# Patient Record
Sex: FEMALE | Race: WHITE | NOT HISPANIC OR LATINO | URBAN - METROPOLITAN AREA
[De-identification: names, ages, dates, MRNs, and addresses within clinical notes are randomized per-mention and may not be internally consistent; named-entity substitution may affect disease eponyms.]

---

## 2017-03-21 ENCOUNTER — INPATIENT (INPATIENT)
Facility: HOSPITAL | Age: 82
LOS: 16 days | Discharge: NURSING, REHAB OR LONG-TERM CARE | DRG: 291 | End: 2017-04-07
Attending: FAMILY MEDICINE | Admitting: INTERNAL MEDICINE
Payer: MEDICARE

## 2017-03-21 VITALS
WEIGHT: 89.95 LBS | HEART RATE: 140 BPM | SYSTOLIC BLOOD PRESSURE: 206 MMHG | RESPIRATION RATE: 34 BRPM | DIASTOLIC BLOOD PRESSURE: 140 MMHG

## 2017-03-21 DIAGNOSIS — T45.515A ADVERSE EFFECT OF ANTICOAGULANTS, INITIAL ENCOUNTER: ICD-10-CM

## 2017-03-21 DIAGNOSIS — R79.1 ABNORMAL COAGULATION PROFILE: ICD-10-CM

## 2017-03-21 DIAGNOSIS — J12.3 HUMAN METAPNEUMOVIRUS PNEUMONIA: ICD-10-CM

## 2017-03-21 DIAGNOSIS — J69.0 PNEUMONITIS DUE TO INHALATION OF FOOD AND VOMIT: ICD-10-CM

## 2017-03-21 DIAGNOSIS — B37.0 CANDIDAL STOMATITIS: ICD-10-CM

## 2017-03-22 DIAGNOSIS — I48.91 UNSPECIFIED ATRIAL FIBRILLATION: ICD-10-CM

## 2017-03-22 DIAGNOSIS — J81.0 ACUTE PULMONARY EDEMA: ICD-10-CM

## 2017-03-22 DIAGNOSIS — J96.91 RESPIRATORY FAILURE, UNSPECIFIED WITH HYPOXIA: ICD-10-CM

## 2017-03-22 DIAGNOSIS — A41.9 SEPSIS, UNSPECIFIED ORGANISM: ICD-10-CM

## 2017-03-22 DIAGNOSIS — E87.1 HYPO-OSMOLALITY AND HYPONATREMIA: ICD-10-CM

## 2017-03-22 DIAGNOSIS — J80 ACUTE RESPIRATORY DISTRESS SYNDROME: ICD-10-CM

## 2017-03-22 DIAGNOSIS — E78.5 HYPERLIPIDEMIA, UNSPECIFIED: ICD-10-CM

## 2017-03-22 DIAGNOSIS — I10 ESSENTIAL (PRIMARY) HYPERTENSION: ICD-10-CM

## 2017-03-22 DIAGNOSIS — Z41.8 ENCOUNTER FOR OTHER PROCEDURES FOR PURPOSES OTHER THAN REMEDYING HEALTH STATE: ICD-10-CM

## 2017-03-22 PROCEDURE — 71250 CT THORAX DX C-: CPT | Mod: 26

## 2017-03-22 PROCEDURE — 99223 1ST HOSP IP/OBS HIGH 75: CPT

## 2017-03-22 PROCEDURE — 99291 CRITICAL CARE FIRST HOUR: CPT

## 2017-03-22 PROCEDURE — 71010: CPT | Mod: 26

## 2017-03-22 PROCEDURE — 93010 ELECTROCARDIOGRAM REPORT: CPT

## 2017-03-22 PROCEDURE — 99223 1ST HOSP IP/OBS HIGH 75: CPT | Mod: AI,GC

## 2017-03-22 RX ORDER — SODIUM CHLORIDE 9 MG/ML
3 INJECTION INTRAMUSCULAR; INTRAVENOUS; SUBCUTANEOUS ONCE
Qty: 0 | Refills: 0 | Status: COMPLETED | OUTPATIENT
Start: 2017-03-22 | End: 2017-03-22

## 2017-03-22 RX ORDER — VANCOMYCIN HCL 1 G
1000 VIAL (EA) INTRAVENOUS EVERY 12 HOURS
Qty: 0 | Refills: 0 | Status: DISCONTINUED | OUTPATIENT
Start: 2017-03-22 | End: 2017-03-22

## 2017-03-22 RX ORDER — CEFTRIAXONE 500 MG/1
1 INJECTION, POWDER, FOR SOLUTION INTRAMUSCULAR; INTRAVENOUS EVERY 24 HOURS
Qty: 0 | Refills: 0 | Status: DISCONTINUED | OUTPATIENT
Start: 2017-03-23 | End: 2017-03-22

## 2017-03-22 RX ORDER — IPRATROPIUM/ALBUTEROL SULFATE 18-103MCG
3 AEROSOL WITH ADAPTER (GRAM) INHALATION EVERY 6 HOURS
Qty: 0 | Refills: 0 | Status: DISCONTINUED | OUTPATIENT
Start: 2017-03-22 | End: 2017-04-07

## 2017-03-22 RX ORDER — DILTIAZEM HCL 120 MG
5 CAPSULE, EXT RELEASE 24 HR ORAL
Qty: 125 | Refills: 0 | Status: DISCONTINUED | OUTPATIENT
Start: 2017-03-22 | End: 2017-03-22

## 2017-03-22 RX ORDER — ENOXAPARIN SODIUM 100 MG/ML
40 INJECTION SUBCUTANEOUS DAILY
Qty: 0 | Refills: 0 | Status: DISCONTINUED | OUTPATIENT
Start: 2017-03-22 | End: 2017-03-22

## 2017-03-22 RX ORDER — WARFARIN SODIUM 2.5 MG/1
2.5 TABLET ORAL ONCE
Qty: 0 | Refills: 0 | Status: COMPLETED | OUTPATIENT
Start: 2017-03-22 | End: 2017-03-22

## 2017-03-22 RX ORDER — CEFTRIAXONE 500 MG/1
1 INJECTION, POWDER, FOR SOLUTION INTRAMUSCULAR; INTRAVENOUS ONCE
Qty: 0 | Refills: 0 | Status: COMPLETED | OUTPATIENT
Start: 2017-03-22 | End: 2017-03-22

## 2017-03-22 RX ORDER — OLANZAPINE 15 MG/1
5 TABLET, FILM COATED ORAL ONCE
Qty: 0 | Refills: 0 | Status: COMPLETED | OUTPATIENT
Start: 2017-03-22 | End: 2017-03-22

## 2017-03-22 RX ORDER — NITROGLYCERIN 6.5 MG
50 CAPSULE, EXTENDED RELEASE ORAL
Qty: 50 | Refills: 0 | Status: DISCONTINUED | OUTPATIENT
Start: 2017-03-22 | End: 2017-03-22

## 2017-03-22 RX ORDER — FUROSEMIDE 40 MG
40 TABLET ORAL EVERY 12 HOURS
Qty: 0 | Refills: 0 | Status: DISCONTINUED | OUTPATIENT
Start: 2017-03-22 | End: 2017-03-24

## 2017-03-22 RX ORDER — FUROSEMIDE 40 MG
40 TABLET ORAL ONCE
Qty: 0 | Refills: 0 | Status: COMPLETED | OUTPATIENT
Start: 2017-03-22 | End: 2017-03-22

## 2017-03-22 RX ORDER — SODIUM CHLORIDE 9 MG/ML
1000 INJECTION INTRAMUSCULAR; INTRAVENOUS; SUBCUTANEOUS ONCE
Qty: 0 | Refills: 0 | Status: COMPLETED | OUTPATIENT
Start: 2017-03-22 | End: 2017-03-22

## 2017-03-22 RX ORDER — ACETAMINOPHEN 500 MG
650 TABLET ORAL EVERY 6 HOURS
Qty: 0 | Refills: 0 | Status: DISCONTINUED | OUTPATIENT
Start: 2017-03-22 | End: 2017-04-07

## 2017-03-22 RX ORDER — DILTIAZEM HCL 120 MG
10 CAPSULE, EXT RELEASE 24 HR ORAL
Qty: 125 | Refills: 0 | Status: DISCONTINUED | OUTPATIENT
Start: 2017-03-22 | End: 2017-03-24

## 2017-03-22 RX ORDER — VANCOMYCIN HCL 1 G
750 VIAL (EA) INTRAVENOUS EVERY 12 HOURS
Qty: 0 | Refills: 0 | Status: DISCONTINUED | OUTPATIENT
Start: 2017-03-22 | End: 2017-03-22

## 2017-03-22 RX ORDER — PANTOPRAZOLE SODIUM 20 MG/1
40 TABLET, DELAYED RELEASE ORAL DAILY
Qty: 0 | Refills: 0 | Status: DISCONTINUED | OUTPATIENT
Start: 2017-03-22 | End: 2017-03-27

## 2017-03-22 RX ADMIN — Medication 3 MILLILITER(S): at 08:15

## 2017-03-22 RX ADMIN — OLANZAPINE 5 MILLIGRAM(S): 15 TABLET, FILM COATED ORAL at 20:04

## 2017-03-22 RX ADMIN — Medication 20 MILLIGRAM(S): at 14:54

## 2017-03-22 RX ADMIN — Medication 40 MILLIGRAM(S): at 00:45

## 2017-03-22 RX ADMIN — Medication 5 MG/HR: at 00:51

## 2017-03-22 RX ADMIN — Medication 3 MILLILITER(S): at 19:24

## 2017-03-22 RX ADMIN — Medication 650 MILLIGRAM(S): at 22:34

## 2017-03-22 RX ADMIN — Medication 3 MILLILITER(S): at 13:51

## 2017-03-22 RX ADMIN — Medication 150 MILLIGRAM(S): at 04:08

## 2017-03-22 RX ADMIN — CEFTRIAXONE 100 GRAM(S): 500 INJECTION, POWDER, FOR SOLUTION INTRAMUSCULAR; INTRAVENOUS at 01:49

## 2017-03-22 RX ADMIN — Medication 40 MILLIGRAM(S): at 07:39

## 2017-03-22 RX ADMIN — SODIUM CHLORIDE 3 MILLILITER(S): 9 INJECTION INTRAMUSCULAR; INTRAVENOUS; SUBCUTANEOUS at 00:28

## 2017-03-22 RX ADMIN — WARFARIN SODIUM 2.5 MILLIGRAM(S): 2.5 TABLET ORAL at 22:34

## 2017-03-22 RX ADMIN — Medication 20 MILLIGRAM(S): at 22:33

## 2017-03-22 RX ADMIN — Medication 125 MILLIGRAM(S): at 00:27

## 2017-03-22 RX ADMIN — SODIUM CHLORIDE 1000 MILLILITER(S): 9 INJECTION INTRAMUSCULAR; INTRAVENOUS; SUBCUTANEOUS at 00:27

## 2017-03-22 RX ADMIN — Medication 40 MILLIGRAM(S): at 18:15

## 2017-03-22 RX ADMIN — ENOXAPARIN SODIUM 40 MILLIGRAM(S): 100 INJECTION SUBCUTANEOUS at 12:09

## 2017-03-22 RX ADMIN — PANTOPRAZOLE SODIUM 40 MILLIGRAM(S): 20 TABLET, DELAYED RELEASE ORAL at 12:08

## 2017-03-22 RX ADMIN — Medication 10 MG/HR: at 12:44

## 2017-03-22 RX ADMIN — Medication 15 MICROGRAM(S)/MIN: at 00:23

## 2017-03-22 NOTE — ED ADULT NURSE NOTE - PLAN OF CARE
Explanation of exam/test/Fall precautions/Side rails/Call bell/NPO/Position of comfort/Bedside visitors

## 2017-03-22 NOTE — H&P ADULT. - ASSESSMENT
87 y.o. F with PMHx of A fib on coumadin, MDD, HLD, GERD, dysphagia, dementia, prior hx of DVT/PE?, HTN, urinary retention, and constipation brought in by EMS from [facility], admitted with acute hypoxic respiratory failure 2/2 pulmonary edema 2/2 HMPV virus, UTI, hypertensive urgency, and afib with RVR.

## 2017-03-22 NOTE — H&P ADULT. - PROBLEM SELECTOR PLAN 3
-likely 2/2 respiratory distress  -rates improved on cardizem drip  -coumadin for goal INR 2-3   -f/u echo

## 2017-03-22 NOTE — ED PROVIDER NOTE - MEDICAL DECISION MAKING DETAILS
screening septic work up, BNP, procalcitonin, chest x-ray, TSH, INR, saline lock, Nitro gtt, Cardizem gtt, BIPAP, Lasix,

## 2017-03-22 NOTE — H&P ADULT. - PROBLEM SELECTOR PLAN 2
-elevated pro-bnp with vascular congestion on CXR  -continue with diuresis   -strict Is and Os, fluid restriction  -f/u echo   -trops negative, continue to trend   -consider beta blocker or ACE

## 2017-03-22 NOTE — ED PROVIDER NOTE - CARE PLAN
Principal Discharge DX:	Respiratory distress  Instructions for follow-up, activity and diet:	admit  Secondary Diagnosis:	Pulmonary edema, acute  Secondary Diagnosis:	Urinary tract infection

## 2017-03-22 NOTE — ED ADULT NURSE NOTE - PMH
Afib    Arthritis    Atrial fibrillation    DVT (deep venous thrombosis)    DVT (deep venous thrombosis)    Femur neck fracture    GERD (gastroesophageal reflux disease)    HTN (hypertension)    Hypertension    Hyponatremia    Memory impairment    Neurogenic bladder    Pulmonary embolism    Pulmonary embolism    Shortness of breath  etiology probably copd  Urinary retention    UTI (lower urinary tract infection)    Vertebral fracture, pathological

## 2017-03-22 NOTE — H&P ADULT. - PROBLEM SELECTOR PLAN 4
-(+) UA, (+) HMPV on RVP   -rocephin for UTI  -add vanco for HCAP coverage in setting of HMPV  -follow-up blood and urine cx -(+) UA, (+) HMPV on RVP   -rocephin for UTI  -add vanco for HCAP coverage in setting of HMPV  -consider coverage for aspiration PNA  -follow-up blood and urine cx

## 2017-03-22 NOTE — H&P ADULT. - ATTENDING COMMENTS
87 y.o. F with PMHx stated above being admitted with acute hypoxic respiratory failure 2/2 pulmonary edema 2/2 HMPV virus, UTI, hypertensive urgency, and afib with RVR.   Pt will be managed initially in the ICU.  Hospitalist will follow.

## 2017-03-22 NOTE — H&P ADULT. - PROBLEM SELECTOR PLAN 1
-acute hypoxemic respiratory failure 2/2 pulmonary edema, ?triggered by HMPV virus  -continue with BIPAP, ABG in AM -acute hypoxemic respiratory failure 2/2 pulmonary edema, ?triggered by HMPV virus  -continue with BIPAP, ABG in AM  -continue with solumedrol and duonebs

## 2017-03-22 NOTE — ED PROVIDER NOTE - OBJECTIVE STATEMENT
Pt presents to the ED in acute respiratory distress with systolic BPs 220 and higher.  Per EMS they received a call for respiratory distress.  Pt had received 1 breathing treatment prior to their arrival and they gave an additional treatment.  Unable to obtain access prior to arrival.  Per son's report at bedside pt had not been feeling well over the weekend when he came to visit.  She had been coughing and he noted some nasal congestion.   Tonight he came to see her after dinner and she appeared to be in acute distress.  He requested transfer to the ED. Per staff reports a NH pt has a previous history of pneumonia.  On arrival pt hypoixc, in a-fib RVR with BPs above 200s.  Pt noted to be anxious

## 2017-03-22 NOTE — ED ADULT NURSE REASSESSMENT NOTE - NS ED NURSE REASSESS COMMENT FT1
patient appears much improved, overall color improved, no longer with labored breathing. On bipap mask with good air flow and improvement in O2 sat. Cardizem gtt increased to 10 mg/hr as per Dr Bello.
patient has been much better, breathing unlabored and HR still in a-fib but rate improving to 90's, low 100's. Re-eval by ICU ELPIDIO Romo, patient may be downgraded. Bipap still in place.
BIPAP D/C'D. Placed on 100% NR. Miracle NR well, maintaining Spo2 >99%. Respirations nonlabored and eupneic. Lactate rising, increased from 1.6 2.6. Critical value discussed and reported to Dr Solis.

## 2017-03-22 NOTE — H&P ADULT. - HISTORY OF PRESENT ILLNESS
87 y.o. F with PMHx of A fib on coumadin, MDD, HLD, GERD, dysphagia, dementia, prior hx of DVT/PE?, HTN, urinary retention, and constipation brought in by EMS from [facility] for respiratory distress. History obtained from charts and son over phone as pt is lethargic and demented. As per son, he was called by facility and told that his mother was having some increased work of breathing which was being treated with duonebs. Pt went to visit Mom and asked for pt to be brought to hospital as she appeared to have labored breathing and was not acting like herself. Son is unsure if pt has been complaining of cough, SOB, chest pain, fevers, n/v/d/c. Upon chart review, pt was getting albuterol nebs around the clock for cough and was recently treated with levaquin on March 3rd.     In ED, labs significant for pro-bnp 3595 and (+) for HMPV on RVP  CXR: b/l vascular congestion   UA: (+) nitrites and LE with >50 WBC, TNTC bacteria   EKG: afib with RVR  Vitals significant for HR initially 180 now in 110s on cardizem drip, BP initially 206/140 now 124/70 after nitro drip which now d/c, tachypneic 87 y.o. F with PMHx of A fib on coumadin, MDD, HLD, GERD, dysphagia, dementia, prior hx of DVT/PE?, HTN, urinary retention, and constipation brought in by EMS from [facility] for respiratory distress. History obtained from charts and son over phone as pt is lethargic and demented. As per son, he was called by facility and told that his mother was having some increased work of breathing which was being treated with duonebs. Pt went to visit Mom and asked for pt to be brought to hospital as she appeared to have labored breathing and was not acting like herself. Son is unsure if pt has been complaining of cough, SOB, chest pain, fevers, n/v/d/c. Upon chart review, pt was getting albuterol nebs around the clock for cough and was recently treated with levaquin on March 3rd.     In ED, labs significant for pro-bnp 3595 and (+) for HMPV on RVP  CXR: b/l vascular congestion   UA: (+) nitrites and LE with >50 WBC, TNTC bacteria   EKG: afib with RVR  Vitals significant for HR initially 180 now in 110s on cardizem drip, BP initially 206/140 now 124/70 after nitro drip which now d/c, tachypneic     ICU was consulted, candidate for ICU.

## 2017-03-22 NOTE — ED ADULT NURSE NOTE - OBJECTIVE STATEMENT
patient arrives with resp distress, labored, orthopnea, low O2 sat, tachycardia to 180's (a-fib), placed on bipap on arrival as per MD

## 2017-03-22 NOTE — ED PROVIDER NOTE - PROGRESS NOTE DETAILS
pt presented to the ED in acute respiratory distress likely secondary to flash pulmonary edema in light of diffuse rhonchi and BPs with systolic greater then 220.  She had received 2 treatments prior to arrival.  Access was obtained, pt was placed on BIPAP, Solumedrol was given and pt was started on a Nitro gtt. Pt noted to be in A-fib RVR Pt with improvement in BP on Nitro gtt with titrate down to 25 mcg.  Pt stable on BIPAP with improved SPO2.  Son at bedside.  Pt remains in A-fib RVR Nitro gtt was stopped and pt was given Cardizem push at 10 mg along with Lasix 20 mg IVP.  X-ray reviewed.  Son at bedside updated on progress all questions answered Pt had min improvement in HR after Cardizem push, gtt ordered will begin at 5 ml/hr and titrate as needed pt remains stable on BIPAP at this time.  Son updated at bedside will leave to go home at this time all questions answered Results of urine noted and Rocephin 1 gm ordered at this time.  Pt HR has improved but still in rapid ventricular rate gtt increased to 10 mg/hr.  Will continue to monitor Pt continues to remain in A-fib with rapid ventricular rate gtt increased to 15 mg/hr. Pt with min. urine output after Lasix 20 mg with given another 20 mg as pt continues to have rhonchi bilaterally pt remains stable from a respiratory standpoint at this time.  On 15 ml/hr of Cardizem HR improving but will still trend into the 150s for periods of time.  ICU contacted will accept pt at this time. pt remains stable from a respiratory standpoint at this time.  On 15 ml/hr of Cardizem HR improving but will still trend into the 150s for periods of time.  ICU contacted will accept pt at this time. ABG still not drawn at this time and RVP remains pending

## 2017-03-22 NOTE — ED PROVIDER NOTE - CONDUCTED A DETAILED DISCUSSION WITH PATIENT AND/OR GUARDIAN REGARDING, MDM
lab results/radiology results/need to admit/return to ED if symptoms worsen, persist or questions arise

## 2017-03-23 LAB
ANION GAP SERPL CALC-SCNC: 10 MMOL/L — SIGNIFICANT CHANGE UP (ref 5–17)
BUN SERPL-MCNC: 32 MG/DL — HIGH (ref 7–23)
CALCIUM SERPL-MCNC: 8.7 MG/DL — SIGNIFICANT CHANGE UP (ref 8.5–10.1)
CHLORIDE SERPL-SCNC: 97 MMOL/L — SIGNIFICANT CHANGE UP (ref 96–108)
CO2 SERPL-SCNC: 31 MMOL/L — SIGNIFICANT CHANGE UP (ref 22–31)
CREAT SERPL-MCNC: 1 MG/DL — SIGNIFICANT CHANGE UP (ref 0.5–1.3)
GLUCOSE SERPL-MCNC: 202 MG/DL — HIGH (ref 70–99)
INR BLD: 3.88 RATIO — HIGH (ref 0.88–1.16)
POTASSIUM SERPL-MCNC: 3.7 MMOL/L — SIGNIFICANT CHANGE UP (ref 3.5–5.3)
POTASSIUM SERPL-SCNC: 3.7 MMOL/L — SIGNIFICANT CHANGE UP (ref 3.5–5.3)
PROTHROM AB SERPL-ACNC: 43.5 SEC — HIGH (ref 9.8–12.7)
SODIUM SERPL-SCNC: 138 MMOL/L — SIGNIFICANT CHANGE UP (ref 135–145)

## 2017-03-23 PROCEDURE — 99233 SBSQ HOSP IP/OBS HIGH 50: CPT | Mod: GC

## 2017-03-23 PROCEDURE — 99233 SBSQ HOSP IP/OBS HIGH 50: CPT

## 2017-03-23 RX ORDER — CEFTRIAXONE 500 MG/1
1 INJECTION, POWDER, FOR SOLUTION INTRAMUSCULAR; INTRAVENOUS ONCE
Qty: 0 | Refills: 0 | Status: COMPLETED | OUTPATIENT
Start: 2017-03-23 | End: 2017-03-23

## 2017-03-23 RX ORDER — CEFTRIAXONE 500 MG/1
1 INJECTION, POWDER, FOR SOLUTION INTRAMUSCULAR; INTRAVENOUS EVERY 24 HOURS
Qty: 0 | Refills: 0 | Status: DISCONTINUED | OUTPATIENT
Start: 2017-03-24 | End: 2017-03-26

## 2017-03-23 RX ORDER — POTASSIUM CHLORIDE 20 MEQ
20 PACKET (EA) ORAL
Qty: 0 | Refills: 0 | Status: COMPLETED | OUTPATIENT
Start: 2017-03-23 | End: 2017-03-23

## 2017-03-23 RX ORDER — CEFTRIAXONE 500 MG/1
INJECTION, POWDER, FOR SOLUTION INTRAMUSCULAR; INTRAVENOUS
Qty: 0 | Refills: 0 | Status: DISCONTINUED | OUTPATIENT
Start: 2017-03-23 | End: 2017-03-26

## 2017-03-23 RX ADMIN — Medication 20 MILLIGRAM(S): at 14:02

## 2017-03-23 RX ADMIN — Medication 40 MILLIGRAM(S): at 17:33

## 2017-03-23 RX ADMIN — Medication 3 MILLILITER(S): at 19:26

## 2017-03-23 RX ADMIN — Medication 3 MILLILITER(S): at 13:19

## 2017-03-23 RX ADMIN — CEFTRIAXONE 100 GRAM(S): 500 INJECTION, POWDER, FOR SOLUTION INTRAMUSCULAR; INTRAVENOUS at 17:33

## 2017-03-23 RX ADMIN — Medication 20 MILLIGRAM(S): at 22:09

## 2017-03-23 RX ADMIN — Medication 3 MILLILITER(S): at 00:59

## 2017-03-23 RX ADMIN — Medication 20 MILLIEQUIVALENT(S): at 11:41

## 2017-03-23 RX ADMIN — Medication 40 MILLIGRAM(S): at 05:59

## 2017-03-23 RX ADMIN — Medication 10 MG/HR: at 12:57

## 2017-03-23 RX ADMIN — Medication 20 MILLIGRAM(S): at 05:59

## 2017-03-23 RX ADMIN — PANTOPRAZOLE SODIUM 40 MILLIGRAM(S): 20 TABLET, DELAYED RELEASE ORAL at 11:40

## 2017-03-23 RX ADMIN — Medication 20 MILLIEQUIVALENT(S): at 10:01

## 2017-03-23 RX ADMIN — Medication 3 MILLILITER(S): at 08:09

## 2017-03-23 RX ADMIN — Medication 20 MILLIEQUIVALENT(S): at 08:54

## 2017-03-24 LAB
ALBUMIN SERPL ELPH-MCNC: 3.1 G/DL — LOW (ref 3.3–5)
ALP SERPL-CCNC: 60 U/L — SIGNIFICANT CHANGE UP (ref 40–120)
ALT FLD-CCNC: 14 U/L — SIGNIFICANT CHANGE UP (ref 12–78)
ANION GAP SERPL CALC-SCNC: 10 MMOL/L — SIGNIFICANT CHANGE UP (ref 5–17)
AST SERPL-CCNC: 18 U/L — SIGNIFICANT CHANGE UP (ref 15–37)
BILIRUB SERPL-MCNC: 0.2 MG/DL — SIGNIFICANT CHANGE UP (ref 0.2–1.2)
BUN SERPL-MCNC: 39 MG/DL — HIGH (ref 7–23)
CALCIUM SERPL-MCNC: 8.5 MG/DL — SIGNIFICANT CHANGE UP (ref 8.5–10.1)
CHLORIDE SERPL-SCNC: 95 MMOL/L — LOW (ref 96–108)
CO2 SERPL-SCNC: 32 MMOL/L — HIGH (ref 22–31)
CREAT SERPL-MCNC: 0.98 MG/DL — SIGNIFICANT CHANGE UP (ref 0.5–1.3)
GLUCOSE SERPL-MCNC: 189 MG/DL — HIGH (ref 70–99)
HCT VFR BLD CALC: 37.6 % — SIGNIFICANT CHANGE UP (ref 34.5–45)
HGB BLD-MCNC: 12 G/DL — SIGNIFICANT CHANGE UP (ref 11.5–15.5)
INR BLD: 5.36 RATIO — CRITICAL HIGH (ref 0.88–1.16)
MAGNESIUM SERPL-MCNC: 2.2 MG/DL — SIGNIFICANT CHANGE UP (ref 1.8–2.4)
MCHC RBC-ENTMCNC: 26 PG — LOW (ref 27–34)
MCHC RBC-ENTMCNC: 32 GM/DL — SIGNIFICANT CHANGE UP (ref 32–36)
MCV RBC AUTO: 81.2 FL — SIGNIFICANT CHANGE UP (ref 80–100)
PHOSPHATE SERPL-MCNC: 3 MG/DL — SIGNIFICANT CHANGE UP (ref 2.5–4.5)
PLATELET # BLD AUTO: 219 K/UL — SIGNIFICANT CHANGE UP (ref 150–400)
POTASSIUM SERPL-MCNC: 3.9 MMOL/L — SIGNIFICANT CHANGE UP (ref 3.5–5.3)
POTASSIUM SERPL-SCNC: 3.9 MMOL/L — SIGNIFICANT CHANGE UP (ref 3.5–5.3)
PROT SERPL-MCNC: 6.6 G/DL — SIGNIFICANT CHANGE UP (ref 6–8.3)
PROTHROM AB SERPL-ACNC: 60.5 SEC — HIGH (ref 9.8–12.7)
RBC # BLD: 4.63 M/UL — SIGNIFICANT CHANGE UP (ref 3.8–5.2)
RBC # FLD: 15.4 % — HIGH (ref 10.3–14.5)
SODIUM SERPL-SCNC: 137 MMOL/L — SIGNIFICANT CHANGE UP (ref 135–145)
WBC # BLD: 10.8 K/UL — HIGH (ref 3.8–10.5)
WBC # FLD AUTO: 10.8 K/UL — HIGH (ref 3.8–10.5)

## 2017-03-24 PROCEDURE — 99233 SBSQ HOSP IP/OBS HIGH 50: CPT | Mod: GC

## 2017-03-24 PROCEDURE — 99233 SBSQ HOSP IP/OBS HIGH 50: CPT

## 2017-03-24 RX ADMIN — Medication 3 MILLILITER(S): at 01:43

## 2017-03-24 RX ADMIN — Medication 40 MILLIGRAM(S): at 05:39

## 2017-03-24 RX ADMIN — Medication 3 MILLILITER(S): at 08:54

## 2017-03-24 RX ADMIN — Medication 10 MG/HR: at 00:25

## 2017-03-24 RX ADMIN — CEFTRIAXONE 100 GRAM(S): 500 INJECTION, POWDER, FOR SOLUTION INTRAMUSCULAR; INTRAVENOUS at 13:54

## 2017-03-24 RX ADMIN — Medication 20 MILLIGRAM(S): at 13:54

## 2017-03-24 RX ADMIN — Medication 20 MILLIGRAM(S): at 21:35

## 2017-03-24 RX ADMIN — Medication 3 MILLILITER(S): at 13:51

## 2017-03-24 RX ADMIN — Medication 20 MILLIGRAM(S): at 05:39

## 2017-03-24 RX ADMIN — Medication 3 MILLILITER(S): at 20:37

## 2017-03-24 RX ADMIN — PANTOPRAZOLE SODIUM 40 MILLIGRAM(S): 20 TABLET, DELAYED RELEASE ORAL at 11:47

## 2017-03-24 NOTE — DIETITIAN INITIAL EVALUATION ADULT. - PROBLEM SELECTOR PLAN 4
-(+) UA, (+) HMPV on RVP   -rocephin for UTI  -add vanco for HCAP coverage in setting of HMPV  -consider coverage for aspiration PNA  -follow-up blood and urine cx

## 2017-03-24 NOTE — DIETITIAN INITIAL EVALUATION ADULT. - PROBLEM SELECTOR PLAN 1
-acute hypoxemic respiratory failure 2/2 pulmonary edema, ?triggered by HMPV virus  -continue with BIPAP, ABG in AM  -continue with solumedrol and duonebs

## 2017-03-24 NOTE — SWALLOW BEDSIDE ASSESSMENT ADULT - COMMENTS
Pt seen for clinical swallow eval to r/o aspiration, ? Aspiration PNA. Pt with mild oral dysphagia due to poor dentition, h/e pt's son is reported to be bringing dentures later today. Pt with no overt s/s aspiration with any consistency trialed.

## 2017-03-24 NOTE — GOALS OF CARE CONVERSATION - PERSONAL ADVANCE DIRECTIVE - CONVERSATION DETAILS
message left for Louis phone to further discuss pt directives. await return call.
l/m for son on 3/22 and 3/23, spoke to son on phone today, reviewed pt medical and molst, son agrees w pt decision for cpr, want full code,all measures, spoke of pt decrease in appetite, pt is a picky eater and no dentures in hospital, pt ate a regular yogurt and banana for son last evening. pt not eating here and then son spoke to dietician regarding preferences. son feels once her infection is under control pt will be back to her prior status and be able to eat. son does not want feeding tube.

## 2017-03-24 NOTE — SWALLOW BEDSIDE ASSESSMENT ADULT - SWALLOW EVAL: RECOMMENDED FEEDING/EATING TECHNIQUES
crush medication (when feasible)/allow for swallow between intakes/maintain upright posture during/after eating for 30 mins

## 2017-03-25 LAB
ANION GAP SERPL CALC-SCNC: 12 MMOL/L — SIGNIFICANT CHANGE UP (ref 5–17)
BUN SERPL-MCNC: 42 MG/DL — HIGH (ref 7–23)
CALCIUM SERPL-MCNC: 8.8 MG/DL — SIGNIFICANT CHANGE UP (ref 8.5–10.1)
CHLORIDE SERPL-SCNC: 94 MMOL/L — LOW (ref 96–108)
CO2 SERPL-SCNC: 30 MMOL/L — SIGNIFICANT CHANGE UP (ref 22–31)
CREAT SERPL-MCNC: 0.96 MG/DL — SIGNIFICANT CHANGE UP (ref 0.5–1.3)
GLUCOSE SERPL-MCNC: 167 MG/DL — HIGH (ref 70–99)
HCT VFR BLD CALC: 37.5 % — SIGNIFICANT CHANGE UP (ref 34.5–45)
HGB BLD-MCNC: 12 G/DL — SIGNIFICANT CHANGE UP (ref 11.5–15.5)
INR BLD: 5.42 RATIO — CRITICAL HIGH (ref 0.88–1.16)
MCHC RBC-ENTMCNC: 26 PG — LOW (ref 27–34)
MCHC RBC-ENTMCNC: 32.1 GM/DL — SIGNIFICANT CHANGE UP (ref 32–36)
MCV RBC AUTO: 80.9 FL — SIGNIFICANT CHANGE UP (ref 80–100)
PLATELET # BLD AUTO: 225 K/UL — SIGNIFICANT CHANGE UP (ref 150–400)
POTASSIUM SERPL-MCNC: 4.1 MMOL/L — SIGNIFICANT CHANGE UP (ref 3.5–5.3)
POTASSIUM SERPL-SCNC: 4.1 MMOL/L — SIGNIFICANT CHANGE UP (ref 3.5–5.3)
PROTHROM AB SERPL-ACNC: 61.1 SEC — HIGH (ref 9.8–12.7)
RBC # BLD: 4.63 M/UL — SIGNIFICANT CHANGE UP (ref 3.8–5.2)
RBC # FLD: 15.2 % — HIGH (ref 10.3–14.5)
SODIUM SERPL-SCNC: 136 MMOL/L — SIGNIFICANT CHANGE UP (ref 135–145)
WBC # BLD: 12.8 K/UL — HIGH (ref 3.8–10.5)
WBC # FLD AUTO: 12.8 K/UL — HIGH (ref 3.8–10.5)

## 2017-03-25 PROCEDURE — 93306 TTE W/DOPPLER COMPLETE: CPT | Mod: 26

## 2017-03-25 PROCEDURE — 99233 SBSQ HOSP IP/OBS HIGH 50: CPT | Mod: 25

## 2017-03-25 PROCEDURE — 99233 SBSQ HOSP IP/OBS HIGH 50: CPT

## 2017-03-25 RX ADMIN — Medication 3 MILLILITER(S): at 13:13

## 2017-03-25 RX ADMIN — Medication 20 MILLIGRAM(S): at 05:30

## 2017-03-25 RX ADMIN — Medication 3 MILLILITER(S): at 08:45

## 2017-03-25 RX ADMIN — PANTOPRAZOLE SODIUM 40 MILLIGRAM(S): 20 TABLET, DELAYED RELEASE ORAL at 12:51

## 2017-03-25 RX ADMIN — Medication 3 MILLILITER(S): at 20:19

## 2017-03-25 RX ADMIN — Medication 20 MILLIGRAM(S): at 17:05

## 2017-03-25 RX ADMIN — CEFTRIAXONE 100 GRAM(S): 500 INJECTION, POWDER, FOR SOLUTION INTRAMUSCULAR; INTRAVENOUS at 14:12

## 2017-03-26 LAB
ANION GAP SERPL CALC-SCNC: 11 MMOL/L — SIGNIFICANT CHANGE UP (ref 5–17)
APTT BLD: 32.6 SEC — SIGNIFICANT CHANGE UP (ref 27.5–37.4)
BUN SERPL-MCNC: 42 MG/DL — HIGH (ref 7–23)
CALCIUM SERPL-MCNC: 8.8 MG/DL — SIGNIFICANT CHANGE UP (ref 8.5–10.1)
CHLORIDE SERPL-SCNC: 94 MMOL/L — LOW (ref 96–108)
CK MB BLD-MCNC: 2.9 % — SIGNIFICANT CHANGE UP (ref 0–3.5)
CK MB BLD-MCNC: 3.6 % — HIGH (ref 0–3.5)
CK MB BLD-MCNC: 4 % — HIGH (ref 0–3.5)
CK MB CFR SERPL CALC: 1.1 NG/ML — SIGNIFICANT CHANGE UP (ref 0–3.6)
CK MB CFR SERPL CALC: 1.2 NG/ML — SIGNIFICANT CHANGE UP (ref 0–3.6)
CK MB CFR SERPL CALC: 1.4 NG/ML — SIGNIFICANT CHANGE UP (ref 0–3.6)
CK SERPL-CCNC: 30 U/L — SIGNIFICANT CHANGE UP (ref 26–192)
CK SERPL-CCNC: 38 U/L — SIGNIFICANT CHANGE UP (ref 26–192)
CK SERPL-CCNC: 39 U/L — SIGNIFICANT CHANGE UP (ref 26–192)
CO2 SERPL-SCNC: 31 MMOL/L — SIGNIFICANT CHANGE UP (ref 22–31)
CREAT SERPL-MCNC: 0.87 MG/DL — SIGNIFICANT CHANGE UP (ref 0.5–1.3)
GLUCOSE SERPL-MCNC: 145 MG/DL — HIGH (ref 70–99)
HCT VFR BLD CALC: 36.8 % — SIGNIFICANT CHANGE UP (ref 34.5–45)
HGB BLD-MCNC: 12.1 G/DL — SIGNIFICANT CHANGE UP (ref 11.5–15.5)
INR BLD: 3.81 RATIO — HIGH (ref 0.88–1.16)
MAGNESIUM SERPL-MCNC: 2.2 MG/DL — SIGNIFICANT CHANGE UP (ref 1.8–2.4)
MCHC RBC-ENTMCNC: 26 PG — LOW (ref 27–34)
MCHC RBC-ENTMCNC: 32.9 GM/DL — SIGNIFICANT CHANGE UP (ref 32–36)
MCV RBC AUTO: 79 FL — LOW (ref 80–100)
PLATELET # BLD AUTO: 240 K/UL — SIGNIFICANT CHANGE UP (ref 150–400)
POTASSIUM SERPL-MCNC: 4.7 MMOL/L — SIGNIFICANT CHANGE UP (ref 3.5–5.3)
POTASSIUM SERPL-SCNC: 4.7 MMOL/L — SIGNIFICANT CHANGE UP (ref 3.5–5.3)
PROTHROM AB SERPL-ACNC: 42.7 SEC — HIGH (ref 9.8–12.7)
RBC # BLD: 4.66 M/UL — SIGNIFICANT CHANGE UP (ref 3.8–5.2)
RBC # FLD: 15 % — HIGH (ref 10.3–14.5)
SODIUM SERPL-SCNC: 136 MMOL/L — SIGNIFICANT CHANGE UP (ref 135–145)
TROPONIN I SERPL-MCNC: 0.03 NG/ML — SIGNIFICANT CHANGE UP (ref 0.01–0.04)
TROPONIN I SERPL-MCNC: 0.03 NG/ML — SIGNIFICANT CHANGE UP (ref 0.01–0.04)
TROPONIN I SERPL-MCNC: 0.04 NG/ML — SIGNIFICANT CHANGE UP (ref 0.01–0.04)
WBC # BLD: 16.3 K/UL — HIGH (ref 3.8–10.5)
WBC # FLD AUTO: 16.3 K/UL — HIGH (ref 3.8–10.5)

## 2017-03-26 PROCEDURE — 99233 SBSQ HOSP IP/OBS HIGH 50: CPT

## 2017-03-26 PROCEDURE — 93010 ELECTROCARDIOGRAM REPORT: CPT

## 2017-03-26 RX ORDER — PIPERACILLIN AND TAZOBACTAM 4; .5 G/20ML; G/20ML
3.38 INJECTION, POWDER, LYOPHILIZED, FOR SOLUTION INTRAVENOUS ONCE
Qty: 0 | Refills: 0 | Status: COMPLETED | OUTPATIENT
Start: 2017-03-26 | End: 2017-03-26

## 2017-03-26 RX ORDER — NITROGLYCERIN 6.5 MG
0.4 CAPSULE, EXTENDED RELEASE ORAL ONCE
Qty: 0 | Refills: 0 | Status: DISCONTINUED | OUTPATIENT
Start: 2017-03-26 | End: 2017-03-26

## 2017-03-26 RX ORDER — LACTOBACILLUS ACIDOPHILUS 100MM CELL
1 CAPSULE ORAL
Qty: 0 | Refills: 0 | Status: DISCONTINUED | OUTPATIENT
Start: 2017-03-26 | End: 2017-04-07

## 2017-03-26 RX ORDER — METOPROLOL TARTRATE 50 MG
5 TABLET ORAL ONCE
Qty: 0 | Refills: 0 | Status: COMPLETED | OUTPATIENT
Start: 2017-03-26 | End: 2017-03-26

## 2017-03-26 RX ORDER — PIPERACILLIN AND TAZOBACTAM 4; .5 G/20ML; G/20ML
3.38 INJECTION, POWDER, LYOPHILIZED, FOR SOLUTION INTRAVENOUS EVERY 12 HOURS
Qty: 0 | Refills: 0 | Status: DISCONTINUED | OUTPATIENT
Start: 2017-03-26 | End: 2017-03-27

## 2017-03-26 RX ADMIN — Medication 1 TABLET(S): at 13:02

## 2017-03-26 RX ADMIN — Medication 1 TABLET(S): at 17:14

## 2017-03-26 RX ADMIN — Medication 3 MILLILITER(S): at 19:29

## 2017-03-26 RX ADMIN — Medication 3 MILLILITER(S): at 14:10

## 2017-03-26 RX ADMIN — Medication 5 MILLIGRAM(S): at 02:37

## 2017-03-26 RX ADMIN — PIPERACILLIN AND TAZOBACTAM 200 GRAM(S): 4; .5 INJECTION, POWDER, LYOPHILIZED, FOR SOLUTION INTRAVENOUS at 13:02

## 2017-03-26 RX ADMIN — Medication 3 MILLILITER(S): at 09:17

## 2017-03-26 RX ADMIN — PANTOPRAZOLE SODIUM 40 MILLIGRAM(S): 20 TABLET, DELAYED RELEASE ORAL at 11:06

## 2017-03-26 RX ADMIN — Medication 20 MILLIGRAM(S): at 05:54

## 2017-03-26 RX ADMIN — PIPERACILLIN AND TAZOBACTAM 25 GRAM(S): 4; .5 INJECTION, POWDER, LYOPHILIZED, FOR SOLUTION INTRAVENOUS at 17:14

## 2017-03-27 LAB
ANION GAP SERPL CALC-SCNC: 5 MMOL/L — SIGNIFICANT CHANGE UP (ref 5–17)
BUN SERPL-MCNC: 39 MG/DL — HIGH (ref 7–23)
CALCIUM SERPL-MCNC: 8.5 MG/DL — SIGNIFICANT CHANGE UP (ref 8.5–10.1)
CHLORIDE SERPL-SCNC: 97 MMOL/L — SIGNIFICANT CHANGE UP (ref 96–108)
CO2 SERPL-SCNC: 32 MMOL/L — HIGH (ref 22–31)
CREAT SERPL-MCNC: 0.82 MG/DL — SIGNIFICANT CHANGE UP (ref 0.5–1.3)
GLUCOSE SERPL-MCNC: 128 MG/DL — HIGH (ref 70–99)
HCT VFR BLD CALC: 36.8 % — SIGNIFICANT CHANGE UP (ref 34.5–45)
HGB BLD-MCNC: 11.8 G/DL — SIGNIFICANT CHANGE UP (ref 11.5–15.5)
INR BLD: 3.22 RATIO — HIGH (ref 0.88–1.16)
LYMPHOCYTES # BLD AUTO: 13 % — SIGNIFICANT CHANGE UP (ref 13–44)
MCHC RBC-ENTMCNC: 26 PG — LOW (ref 27–34)
MCHC RBC-ENTMCNC: 32.2 GM/DL — SIGNIFICANT CHANGE UP (ref 32–36)
MCV RBC AUTO: 80.7 FL — SIGNIFICANT CHANGE UP (ref 80–100)
MONOCYTES NFR BLD AUTO: 3 % — SIGNIFICANT CHANGE UP (ref 1–9)
NEUTROPHILS NFR BLD AUTO: 81 % — HIGH (ref 43–77)
PLATELET # BLD AUTO: 257 K/UL — SIGNIFICANT CHANGE UP (ref 150–400)
POTASSIUM SERPL-MCNC: 4.7 MMOL/L — SIGNIFICANT CHANGE UP (ref 3.5–5.3)
POTASSIUM SERPL-SCNC: 4.7 MMOL/L — SIGNIFICANT CHANGE UP (ref 3.5–5.3)
PROTHROM AB SERPL-ACNC: 36 SEC — HIGH (ref 9.8–12.7)
RBC # BLD: 4.55 M/UL — SIGNIFICANT CHANGE UP (ref 3.8–5.2)
RBC # FLD: 14.8 % — HIGH (ref 10.3–14.5)
SODIUM SERPL-SCNC: 134 MMOL/L — LOW (ref 135–145)
WBC # BLD: 16.3 K/UL — HIGH (ref 3.8–10.5)
WBC # FLD AUTO: 16.3 K/UL — HIGH (ref 3.8–10.5)

## 2017-03-27 PROCEDURE — 71010: CPT | Mod: 26

## 2017-03-27 PROCEDURE — 99233 SBSQ HOSP IP/OBS HIGH 50: CPT | Mod: GC

## 2017-03-27 PROCEDURE — 99233 SBSQ HOSP IP/OBS HIGH 50: CPT

## 2017-03-27 RX ORDER — PIPERACILLIN AND TAZOBACTAM 4; .5 G/20ML; G/20ML
3.38 INJECTION, POWDER, LYOPHILIZED, FOR SOLUTION INTRAVENOUS EVERY 8 HOURS
Qty: 0 | Refills: 0 | Status: DISCONTINUED | OUTPATIENT
Start: 2017-03-27 | End: 2017-03-27

## 2017-03-27 RX ORDER — METOPROLOL TARTRATE 50 MG
25 TABLET ORAL DAILY
Qty: 0 | Refills: 0 | Status: DISCONTINUED | OUTPATIENT
Start: 2017-03-27 | End: 2017-03-31

## 2017-03-27 RX ORDER — PANTOPRAZOLE SODIUM 20 MG/1
40 TABLET, DELAYED RELEASE ORAL
Qty: 0 | Refills: 0 | Status: DISCONTINUED | OUTPATIENT
Start: 2017-03-27 | End: 2017-04-07

## 2017-03-27 RX ORDER — FUROSEMIDE 40 MG
40 TABLET ORAL ONCE
Qty: 0 | Refills: 0 | Status: COMPLETED | OUTPATIENT
Start: 2017-03-27 | End: 2017-03-27

## 2017-03-27 RX ORDER — ERTAPENEM SODIUM 1 G/1
500 INJECTION, POWDER, LYOPHILIZED, FOR SOLUTION INTRAMUSCULAR; INTRAVENOUS EVERY 24 HOURS
Qty: 0 | Refills: 0 | Status: DISCONTINUED | OUTPATIENT
Start: 2017-03-27 | End: 2017-03-29

## 2017-03-27 RX ADMIN — PIPERACILLIN AND TAZOBACTAM 25 GRAM(S): 4; .5 INJECTION, POWDER, LYOPHILIZED, FOR SOLUTION INTRAVENOUS at 06:54

## 2017-03-27 RX ADMIN — Medication 3 MILLILITER(S): at 08:07

## 2017-03-27 RX ADMIN — Medication 40 MILLIGRAM(S): at 05:21

## 2017-03-27 RX ADMIN — Medication 1 TABLET(S): at 17:25

## 2017-03-27 RX ADMIN — Medication 3 MILLILITER(S): at 19:59

## 2017-03-27 RX ADMIN — Medication 1 TABLET(S): at 11:39

## 2017-03-27 RX ADMIN — ERTAPENEM SODIUM 110 MILLIGRAM(S): 1 INJECTION, POWDER, LYOPHILIZED, FOR SOLUTION INTRAMUSCULAR; INTRAVENOUS at 17:25

## 2017-03-27 RX ADMIN — Medication 1 TABLET(S): at 08:43

## 2017-03-27 RX ADMIN — Medication 40 MILLIGRAM(S): at 12:45

## 2017-03-27 RX ADMIN — Medication 3 MILLILITER(S): at 13:38

## 2017-03-27 RX ADMIN — PIPERACILLIN AND TAZOBACTAM 25 GRAM(S): 4; .5 INJECTION, POWDER, LYOPHILIZED, FOR SOLUTION INTRAVENOUS at 13:12

## 2017-03-27 RX ADMIN — Medication 25 MILLIGRAM(S): at 08:44

## 2017-03-28 LAB
ANION GAP SERPL CALC-SCNC: 7 MMOL/L — SIGNIFICANT CHANGE UP (ref 5–17)
BUN SERPL-MCNC: 39 MG/DL — HIGH (ref 7–23)
CALCIUM SERPL-MCNC: 8.6 MG/DL — SIGNIFICANT CHANGE UP (ref 8.5–10.1)
CHLORIDE SERPL-SCNC: 92 MMOL/L — LOW (ref 96–108)
CO2 SERPL-SCNC: 35 MMOL/L — HIGH (ref 22–31)
CREAT SERPL-MCNC: 0.96 MG/DL — SIGNIFICANT CHANGE UP (ref 0.5–1.3)
GLUCOSE SERPL-MCNC: 96 MG/DL — SIGNIFICANT CHANGE UP (ref 70–99)
HCT VFR BLD CALC: 39.9 % — SIGNIFICANT CHANGE UP (ref 34.5–45)
HGB BLD-MCNC: 12.9 G/DL — SIGNIFICANT CHANGE UP (ref 11.5–15.5)
INR BLD: 2.05 RATIO — HIGH (ref 0.88–1.16)
MCHC RBC-ENTMCNC: 26.1 PG — LOW (ref 27–34)
MCHC RBC-ENTMCNC: 32.4 GM/DL — SIGNIFICANT CHANGE UP (ref 32–36)
MCV RBC AUTO: 80.8 FL — SIGNIFICANT CHANGE UP (ref 80–100)
PLATELET # BLD AUTO: 289 K/UL — SIGNIFICANT CHANGE UP (ref 150–400)
POTASSIUM SERPL-MCNC: 3.8 MMOL/L — SIGNIFICANT CHANGE UP (ref 3.5–5.3)
POTASSIUM SERPL-SCNC: 3.8 MMOL/L — SIGNIFICANT CHANGE UP (ref 3.5–5.3)
PROTHROM AB SERPL-ACNC: 22.7 SEC — HIGH (ref 9.8–12.7)
RBC # BLD: 4.95 M/UL — SIGNIFICANT CHANGE UP (ref 3.8–5.2)
RBC # FLD: 15.4 % — HIGH (ref 10.3–14.5)
SODIUM SERPL-SCNC: 134 MMOL/L — LOW (ref 135–145)
WBC # BLD: 18.1 K/UL — HIGH (ref 3.8–10.5)
WBC # FLD AUTO: 18.1 K/UL — HIGH (ref 3.8–10.5)

## 2017-03-28 PROCEDURE — 99233 SBSQ HOSP IP/OBS HIGH 50: CPT | Mod: GC

## 2017-03-28 PROCEDURE — 99233 SBSQ HOSP IP/OBS HIGH 50: CPT

## 2017-03-28 PROCEDURE — 71250 CT THORAX DX C-: CPT | Mod: 26

## 2017-03-28 RX ORDER — TAMSULOSIN HYDROCHLORIDE 0.4 MG/1
0.4 CAPSULE ORAL AT BEDTIME
Qty: 0 | Refills: 0 | Status: DISCONTINUED | OUTPATIENT
Start: 2017-03-28 | End: 2017-04-07

## 2017-03-28 RX ORDER — WARFARIN SODIUM 2.5 MG/1
0.5 TABLET ORAL ONCE
Qty: 0 | Refills: 0 | Status: COMPLETED | OUTPATIENT
Start: 2017-03-28 | End: 2017-03-28

## 2017-03-28 RX ADMIN — Medication 1 TABLET(S): at 08:32

## 2017-03-28 RX ADMIN — Medication 25 MILLIGRAM(S): at 05:35

## 2017-03-28 RX ADMIN — Medication 3 MILLILITER(S): at 19:55

## 2017-03-28 RX ADMIN — PANTOPRAZOLE SODIUM 40 MILLIGRAM(S): 20 TABLET, DELAYED RELEASE ORAL at 05:35

## 2017-03-28 RX ADMIN — Medication 3 MILLILITER(S): at 07:54

## 2017-03-28 RX ADMIN — WARFARIN SODIUM 0.5 MILLIGRAM(S): 2.5 TABLET ORAL at 21:21

## 2017-03-28 RX ADMIN — Medication 40 MILLIGRAM(S): at 05:34

## 2017-03-28 RX ADMIN — Medication 1 TABLET(S): at 11:37

## 2017-03-28 RX ADMIN — Medication 3 MILLILITER(S): at 14:33

## 2017-03-28 RX ADMIN — TAMSULOSIN HYDROCHLORIDE 0.4 MILLIGRAM(S): 0.4 CAPSULE ORAL at 21:21

## 2017-03-28 RX ADMIN — ERTAPENEM SODIUM 110 MILLIGRAM(S): 1 INJECTION, POWDER, LYOPHILIZED, FOR SOLUTION INTRAMUSCULAR; INTRAVENOUS at 17:20

## 2017-03-28 RX ADMIN — Medication 1 TABLET(S): at 17:32

## 2017-03-29 DIAGNOSIS — I48.0 PAROXYSMAL ATRIAL FIBRILLATION: ICD-10-CM

## 2017-03-29 DIAGNOSIS — I50.9 HEART FAILURE, UNSPECIFIED: ICD-10-CM

## 2017-03-29 DIAGNOSIS — R33.9 RETENTION OF URINE, UNSPECIFIED: ICD-10-CM

## 2017-03-29 DIAGNOSIS — E43 UNSPECIFIED SEVERE PROTEIN-CALORIE MALNUTRITION: ICD-10-CM

## 2017-03-29 DIAGNOSIS — Z29.9 ENCOUNTER FOR PROPHYLACTIC MEASURES, UNSPECIFIED: ICD-10-CM

## 2017-03-29 DIAGNOSIS — I50.23 ACUTE ON CHRONIC SYSTOLIC (CONGESTIVE) HEART FAILURE: ICD-10-CM

## 2017-03-29 DIAGNOSIS — M19.90 UNSPECIFIED OSTEOARTHRITIS, UNSPECIFIED SITE: ICD-10-CM

## 2017-03-29 DIAGNOSIS — J81.0 ACUTE PULMONARY EDEMA: ICD-10-CM

## 2017-03-29 DIAGNOSIS — N39.0 URINARY TRACT INFECTION, SITE NOT SPECIFIED: ICD-10-CM

## 2017-03-29 DIAGNOSIS — E87.6 HYPOKALEMIA: ICD-10-CM

## 2017-03-29 DIAGNOSIS — I10 ESSENTIAL (PRIMARY) HYPERTENSION: ICD-10-CM

## 2017-03-29 DIAGNOSIS — N31.9 NEUROMUSCULAR DYSFUNCTION OF BLADDER, UNSPECIFIED: ICD-10-CM

## 2017-03-29 DIAGNOSIS — M25.551 PAIN IN RIGHT HIP: ICD-10-CM

## 2017-03-29 DIAGNOSIS — I82.409 ACUTE EMBOLISM AND THROMBOSIS OF UNSPECIFIED DEEP VEINS OF UNSPECIFIED LOWER EXTREMITY: ICD-10-CM

## 2017-03-29 DIAGNOSIS — E87.1 HYPO-OSMOLALITY AND HYPONATREMIA: ICD-10-CM

## 2017-03-29 DIAGNOSIS — R41.3 OTHER AMNESIA: ICD-10-CM

## 2017-03-29 DIAGNOSIS — J12.9 VIRAL PNEUMONIA, UNSPECIFIED: ICD-10-CM

## 2017-03-29 DIAGNOSIS — F03.90 UNSPECIFIED DEMENTIA, UNSPECIFIED SEVERITY, WITHOUT BEHAVIORAL DISTURBANCE, PSYCHOTIC DISTURBANCE, MOOD DISTURBANCE, AND ANXIETY: ICD-10-CM

## 2017-03-29 DIAGNOSIS — J96.00 ACUTE RESPIRATORY FAILURE, UNSPECIFIED WHETHER WITH HYPOXIA OR HYPERCAPNIA: ICD-10-CM

## 2017-03-29 DIAGNOSIS — I48.91 UNSPECIFIED ATRIAL FIBRILLATION: ICD-10-CM

## 2017-03-29 LAB
ANION GAP SERPL CALC-SCNC: 8 MMOL/L — SIGNIFICANT CHANGE UP (ref 5–17)
BUN SERPL-MCNC: 42 MG/DL — HIGH (ref 7–23)
CALCIUM SERPL-MCNC: 8.5 MG/DL — SIGNIFICANT CHANGE UP (ref 8.5–10.1)
CHLORIDE SERPL-SCNC: 95 MMOL/L — LOW (ref 96–108)
CO2 SERPL-SCNC: 34 MMOL/L — HIGH (ref 22–31)
CREAT SERPL-MCNC: 0.75 MG/DL — SIGNIFICANT CHANGE UP (ref 0.5–1.3)
GLUCOSE SERPL-MCNC: 97 MG/DL — SIGNIFICANT CHANGE UP (ref 70–99)
HCT VFR BLD CALC: 37.5 % — SIGNIFICANT CHANGE UP (ref 34.5–45)
HGB BLD-MCNC: 11.9 G/DL — SIGNIFICANT CHANGE UP (ref 11.5–15.5)
INR BLD: 1.45 RATIO — HIGH (ref 0.88–1.16)
MAGNESIUM SERPL-MCNC: 2.4 MG/DL — SIGNIFICANT CHANGE UP (ref 1.8–2.4)
MCHC RBC-ENTMCNC: 25.7 PG — LOW (ref 27–34)
MCHC RBC-ENTMCNC: 31.8 GM/DL — LOW (ref 32–36)
MCV RBC AUTO: 80.8 FL — SIGNIFICANT CHANGE UP (ref 80–100)
PHOSPHATE SERPL-MCNC: 2.9 MG/DL — SIGNIFICANT CHANGE UP (ref 2.5–4.5)
PLATELET # BLD AUTO: 273 K/UL — SIGNIFICANT CHANGE UP (ref 150–400)
POTASSIUM SERPL-MCNC: 4.1 MMOL/L — SIGNIFICANT CHANGE UP (ref 3.5–5.3)
POTASSIUM SERPL-SCNC: 4.1 MMOL/L — SIGNIFICANT CHANGE UP (ref 3.5–5.3)
PROTHROM AB SERPL-ACNC: 15.9 SEC — HIGH (ref 9.8–12.7)
RBC # BLD: 4.64 M/UL — SIGNIFICANT CHANGE UP (ref 3.8–5.2)
RBC # FLD: 15.5 % — HIGH (ref 10.3–14.5)
SODIUM SERPL-SCNC: 137 MMOL/L — SIGNIFICANT CHANGE UP (ref 135–145)
WBC # BLD: 19.7 K/UL — HIGH (ref 3.8–10.5)
WBC # FLD AUTO: 19.7 K/UL — HIGH (ref 3.8–10.5)

## 2017-03-29 PROCEDURE — 99233 SBSQ HOSP IP/OBS HIGH 50: CPT | Mod: GC

## 2017-03-29 PROCEDURE — 73501 X-RAY EXAM HIP UNI 1 VIEW: CPT | Mod: 26,RT

## 2017-03-29 PROCEDURE — 99233 SBSQ HOSP IP/OBS HIGH 50: CPT

## 2017-03-29 RX ORDER — ERTAPENEM SODIUM 1 G/1
1000 INJECTION, POWDER, LYOPHILIZED, FOR SOLUTION INTRAMUSCULAR; INTRAVENOUS EVERY 24 HOURS
Qty: 0 | Refills: 0 | Status: DISCONTINUED | OUTPATIENT
Start: 2017-03-29 | End: 2017-03-29

## 2017-03-29 RX ORDER — ENOXAPARIN SODIUM 100 MG/ML
75 INJECTION SUBCUTANEOUS ONCE
Qty: 0 | Refills: 0 | Status: COMPLETED | OUTPATIENT
Start: 2017-03-29 | End: 2017-03-29

## 2017-03-29 RX ORDER — ERTAPENEM SODIUM 1 G/1
500 INJECTION, POWDER, LYOPHILIZED, FOR SOLUTION INTRAMUSCULAR; INTRAVENOUS EVERY 24 HOURS
Qty: 0 | Refills: 0 | Status: COMPLETED | OUTPATIENT
Start: 2017-03-29 | End: 2017-04-03

## 2017-03-29 RX ORDER — WARFARIN SODIUM 2.5 MG/1
1 TABLET ORAL ONCE
Qty: 0 | Refills: 0 | Status: COMPLETED | OUTPATIENT
Start: 2017-03-29 | End: 2017-03-29

## 2017-03-29 RX ORDER — METOPROLOL TARTRATE 50 MG
5 TABLET ORAL ONCE
Qty: 0 | Refills: 0 | Status: COMPLETED | OUTPATIENT
Start: 2017-03-29 | End: 2017-03-29

## 2017-03-29 RX ADMIN — TAMSULOSIN HYDROCHLORIDE 0.4 MILLIGRAM(S): 0.4 CAPSULE ORAL at 21:18

## 2017-03-29 RX ADMIN — Medication 3 MILLILITER(S): at 07:41

## 2017-03-29 RX ADMIN — Medication 3 MILLILITER(S): at 14:16

## 2017-03-29 RX ADMIN — Medication 1 TABLET(S): at 08:21

## 2017-03-29 RX ADMIN — Medication 3 MILLILITER(S): at 19:59

## 2017-03-29 RX ADMIN — Medication 650 MILLIGRAM(S): at 10:35

## 2017-03-29 RX ADMIN — ENOXAPARIN SODIUM 75 MILLIGRAM(S): 100 INJECTION SUBCUTANEOUS at 11:45

## 2017-03-29 RX ADMIN — Medication 650 MILLIGRAM(S): at 17:22

## 2017-03-29 RX ADMIN — PANTOPRAZOLE SODIUM 40 MILLIGRAM(S): 20 TABLET, DELAYED RELEASE ORAL at 06:01

## 2017-03-29 RX ADMIN — WARFARIN SODIUM 1 MILLIGRAM(S): 2.5 TABLET ORAL at 21:18

## 2017-03-29 RX ADMIN — Medication 40 MILLIGRAM(S): at 06:01

## 2017-03-29 RX ADMIN — Medication 650 MILLIGRAM(S): at 09:59

## 2017-03-29 RX ADMIN — ERTAPENEM SODIUM 110 MILLIGRAM(S): 1 INJECTION, POWDER, LYOPHILIZED, FOR SOLUTION INTRAMUSCULAR; INTRAVENOUS at 16:16

## 2017-03-29 RX ADMIN — Medication 1 TABLET(S): at 11:44

## 2017-03-29 RX ADMIN — Medication 25 MILLIGRAM(S): at 06:01

## 2017-03-29 RX ADMIN — Medication 650 MILLIGRAM(S): at 18:28

## 2017-03-29 RX ADMIN — Medication 5 MILLIGRAM(S): at 02:35

## 2017-03-29 RX ADMIN — Medication 1 TABLET(S): at 17:22

## 2017-03-29 NOTE — PROGRESS NOTE ADULT - ASSESSMENT
87 y.o. F with PMHx of A fib on coumadin, MDD, HLD, GERD, dysphagia, dementia, prior hx of DVT/PE?, HTN, urinary retention, and constipation brought in by EMS from [facility], admitted with acute hypoxic respiratory failure 2/2 pulmonary edema 2/2 HMPV virus, UTI(E.coli) , hypertensive urgency, and afib with RVR.

## 2017-03-29 NOTE — PROGRESS NOTE ADULT - ASSESSMENT
87 y.o. F with PMHx of A fib on coumadin, MDD, HLD, GERD, dysphagia, dementia, prior hx of DVT/PE?, HTN, urinary retention, and constipation brought in by EMS from [facility], admitted with acute hypoxic respiratory failure sec pulmonary edema with HMPV virus infection bronchitis/pna, UTI(E.coli) , and afib with RVR. Clinically stable.

## 2017-03-29 NOTE — PROGRESS NOTE ADULT - SUBJECTIVE AND OBJECTIVE BOX
CC:SOB    INTERVAL HPI/OVERNIGHT EVENTS: Pt seen and examed at bedside with . Per night team, Pt was afib with RVR at 130s -150s. Afib was controlled by 1 dose of lopressor    MEDICATIONS  (STANDING):  ALBUTerol/ipratropium for Nebulization 3milliLiter(s) Nebulizer every 6 hours  diltiazem    Tablet 60milliGRAM(s) Oral four times a day  predniSONE   Tablet 40milliGRAM(s) Oral daily  lactobacillus acidophilus 1Tablet(s) Oral three times a day with meals  metoprolol succinate ER 25milliGRAM(s) Oral daily  pantoprazole    Tablet 40milliGRAM(s) Oral before breakfast  ertapenem  IVPB 500milliGRAM(s) IV Intermittent every 24 hours  tamsulosin 0.4milliGRAM(s) Oral at bedtime    MEDICATIONS  (PRN):  acetaminophen   Tablet. 650milliGRAM(s) Oral every 6 hours PRN Mild Pain (1 - 3)      Allergies    No Known Allergies    Intolerances        CONSTITUTIONAL: No weakness, fevers or chills  EYES/ENT: No visual changes;  No vertigo or throat pain   NECK: No pain or stiffness  RESPIRATORY: No cough, wheezing, hemoptysis; No shortness of breath  CARDIOVASCULAR: No chest pain or palpitations  GASTROINTESTINAL: No abdominal or epigastric pain. No nausea, vomiting, or hematemesis; No diarrhea or constipation. No melena or hematochezia.  GENITOURINARY: No dysuria, frequency or hematuria  NEUROLOGICAL: No numbness or weakness  SKIN: No itching, burning, rashes, or lesions   All other review of systems is negative unless indicated above.    Vital Signs Last 24 Hrs  T(C): 36.3, Max: 36.6 (03-28 @ 16:36)  T(F): 97.3, Max: 97.8 (03-28 @ 16:36)  HR: 83 (79 - 105)  BP: 99/68 (99/68 - 120/73)  BP(mean): --  RR: 19 (19 - 21)  SpO2: 94% (93% - 97%)    I & Os for current day (as of 03-29 @ 09:00)  =============================================  IN: 520 ml / OUT: 1200 ml / NET: -680 ml      General: WN/WD NAD  Neurology: A&Ox3, nonfocal, AGUILAR x 4  Respiratory: CTA B/L  CV: RRR, S1S2, no murmurs, rubs or gallops  Abdominal: Soft, NT, ND +BS, Last BM  Extremities: No edema, + peripheral pulses      LABS:                          RADIOLOGY & ADDITIONAL TESTS: CT chest in 03/28 showed Interval resolution right lung infiltrate. No change small layering left   pleural effusion with dependent atelectasis.    PCP:  consultant: - cardio, Pallitive- , -ID CC:SOB    INTERVAL HPI/OVERNIGHT EVENTS: Pt seen and examed at bedside with . Per night team, Pt was afib with RVR at 130s -150s. Afib was controlled by 1 dose of lopressor    MEDICATIONS  (STANDING):  ALBUTerol/ipratropium for Nebulization 3milliLiter(s) Nebulizer every 6 hours  diltiazem    Tablet 60milliGRAM(s) Oral four times a day  predniSONE   Tablet 40milliGRAM(s) Oral daily  lactobacillus acidophilus 1Tablet(s) Oral three times a day with meals  metoprolol succinate ER 25milliGRAM(s) Oral daily  pantoprazole    Tablet 40milliGRAM(s) Oral before breakfast  ertapenem  IVPB 500milliGRAM(s) IV Intermittent every 24 hours  tamsulosin 0.4milliGRAM(s) Oral at bedtime    MEDICATIONS  (PRN):  acetaminophen   Tablet. 650milliGRAM(s) Oral every 6 hours PRN Mild Pain (1 - 3)      Allergies    No Known Allergies    Intolerances        CONSTITUTIONAL: No weakness, fevers or chills  EYES/ENT: No visual changes;  No vertigo or throat pain   NECK: No pain or stiffness  RESPIRATORY: No cough, wheezing, hemoptysis; No shortness of breath  CARDIOVASCULAR: No chest pain or palpitations  GASTROINTESTINAL: No abdominal or epigastric pain. No nausea, vomiting, or hematemesis; No diarrhea or constipation. No melena or hematochezia.  GENITOURINARY: No dysuria, frequency or hematuria  NEUROLOGICAL: No numbness or weakness  SKIN: No itching, burning, rashes, or lesions   All other review of systems is negative unless indicated above.    Vital Signs Last 24 Hrs  T(C): 36.3, Max: 36.6 (03-28 @ 16:36)  T(F): 97.3, Max: 97.8 (03-28 @ 16:36)  HR: 83 (79 - 105)  BP: 99/68 (99/68 - 120/73)  BP(mean): --  RR: 19 (19 - 21)  SpO2: 94% (93% - 97%)    I & Os for current day (as of 03-29 @ 09:00)  =============================================  IN: 520 ml / OUT: 1200 ml / NET: -680 ml      General: WN/WD NAD  Neurology: A&Ox3, nonfocal, AGUILAR x 4  Respiratory: CTA B/L  CV: RRR, S1S2, no murmurs, rubs or gallops  Abdominal: Soft, NT, ND +BS, Last BM  Extremities: No edema, + peripheral pulses      LABS:                      11.9   19.7  )-----------( 273      ( 29 Mar 2017 06:59 )             37.5     29 Mar 2017 06:59    137    |  95     |  42     ----------------------------<  97     4.1     |  34     |  0.75     Ca    8.5        29 Mar 2017 06:59  Phos  2.9       29 Mar 2017 06:59  Mg     2.4       29 Mar 2017 06:59      PT/INR - ( 29 Mar 2017 06:59 )   PT: 15.9 sec;   INR: 1.45 ratio                                     RADIOLOGY & ADDITIONAL TESTS: CT chest in 03/28 showed Interval resolution right lung infiltrate. No change small layering left   pleural effusion with dependent atelectasis.    PCP:  consultant: - cardio, Pallitive- , -ID CC:SOB    INTERVAL HPI/OVERNIGHT EVENTS: Pt seen and examed at bedside with . Per night team, Pt was afib with RVR at 130s -150s. Afib was controlled by 1 dose of lopressor. Reported Rt Hip pain    MEDICATIONS  (STANDING):  ALBUTerol/ipratropium for Nebulization 3milliLiter(s) Nebulizer every 6 hours  diltiazem    Tablet 60milliGRAM(s) Oral four times a day  predniSONE   Tablet 40milliGRAM(s) Oral daily  lactobacillus acidophilus 1Tablet(s) Oral three times a day with meals  metoprolol succinate ER 25milliGRAM(s) Oral daily  pantoprazole    Tablet 40milliGRAM(s) Oral before breakfast  ertapenem  IVPB 500milliGRAM(s) IV Intermittent every 24 hours  tamsulosin 0.4milliGRAM(s) Oral at bedtime    MEDICATIONS  (PRN):  acetaminophen   Tablet. 650milliGRAM(s) Oral every 6 hours PRN Mild Pain (1 - 3)      Allergies    No Known Allergies    Intolerances        limited ROS 2/2 dementia   All other review of systems is negative unless indicated above.    Vital Signs Last 24 Hrs  T(C): 36.3, Max: 36.6 (03-28 @ 16:36)  T(F): 97.3, Max: 97.8 (03-28 @ 16:36)  HR: 83 (79 - 105)  BP: 99/68 (99/68 - 120/73)  BP(mean): --  RR: 19 (19 - 21)  SpO2: 94% (93% - 97%)    I & Os for current day (as of 03-29 @ 09:00)  =============================================  IN: 520 ml / OUT: 1200 ml / NET: -680 ml      General: WN/WD NAD  Neurology: A&Ox3, nonfocal, AGUILAR x 4  Respiratory: diminished breath sound, no w/r/r  CV: distant HS, irregular rate and rhythm,  S1S2, no murmurs, rubs or gallops  Abdominal: Soft, NT, ND +BS, Last BM  Extremities: No edema, + peripheral pulses      LABS:                      11.9   19.7  )-----------( 273      ( 29 Mar 2017 06:59 )             37.5     29 Mar 2017 06:59    137    |  95     |  42     ----------------------------<  97     4.1     |  34     |  0.75     Ca    8.5        29 Mar 2017 06:59  Phos  2.9       29 Mar 2017 06:59  Mg     2.4       29 Mar 2017 06:59      PT/INR - ( 29 Mar 2017 06:59 )   PT: 15.9 sec;   INR: 1.45 ratio                                     RADIOLOGY & ADDITIONAL TESTS: CT chest in 03/28 showed Interval resolution right lung infiltrate. No change small layering left   pleural effusion with dependent atelectasis.    PCP:  consultant: - cardio, Pallitive- , -ID

## 2017-03-29 NOTE — PROGRESS NOTE ADULT - SUBJECTIVE AND OBJECTIVE BOX
KITTY ALCANTARA is a 87yFemale , patient examined and chart reviewed.   Patient being followed for UTI and possible asp pna    Subjective  No distress.  Had rapid afiv last night.  Afebrile.    ROS:  CONSTITUTIONAL:  Negative fever or chills, feels well, good appetite  EYES:  Negative  blurry vision or double vision  CARDIOVASCULAR:  Negative for chest pain or palpitations  RESPIRATORY:  Negative for cough, wheezing, or SOB   GASTROINTESTINAL:  Negative for nausea, vomiting, diarrhea, constipation, or abdominal pain  GENITOURINARY:  Negative frequency, urgency or dysuria  NEUROLOGIC:  Confusion    No Known Allergies    ANTIBIOTICS/RELEVANT:  lactobacillus acidophilus 1Tablet(s) Oral three times a day with meals  ertapenem  IVPB 500milliGRAM(s) IV Intermittent every 24 hours      ALBUTerol/ipratropium for Nebulization 3milliLiter(s) Nebulizer every 6 hours  acetaminophen   Tablet. 650milliGRAM(s) Oral every 6 hours PRN  diltiazem    Tablet 60milliGRAM(s) Oral four times a day  predniSONE   Tablet 40milliGRAM(s) Oral daily  metoprolol succinate ER 25milliGRAM(s) Oral daily  pantoprazole    Tablet 40milliGRAM(s) Oral before breakfast  tamsulosin 0.4milliGRAM(s) Oral at bedtime  warfarin 1milliGRAM(s) Oral once      Objective:  I & Os for 24h ending 03-29 @ 07:00  =============================================  IN: 520 ml / OUT: 1200 ml / NET: -680 ml    I & Os for current day (as of 03-29 @ 14:34)  =============================================  IN: 0 ml / OUT: 200 ml / NET: -200 ml    T(C): 37, Max: 37 (03-29 @ 11:31)  HR: 77 (75 - 105)  BP: 128/69 (99/68 - 131/82)  RR: 19 (19 - 20)  SpO2: 95% (93% - 97%)  Wt(kg): --    PHYSICAL EXAM:  Constitutional:Well-developed, well nourished  Eyes:RADHA, EOMI  Ear/Nose/Throat: no oral lesion, no sinus tenderness on percussion	  Neck:no JVD, no lymphadenopathy, supple  Respiratory: Decreased rashida  Cardiovascular: S1S2 RRR, no murmurs  Gastrointestinal:soft, (+) BS, no HSM  Extremities:no e/e/c  CNS: Confused    LABS:                        11.9   19.7  )-----------( 273      ( 29 Mar 2017 06:59 )             37.5     29 Mar 2017 06:59    137    |  95     |  42     ----------------------------<  97     4.1     |  34     |  0.75     Ca    8.5        29 Mar 2017 06:59  Phos  2.9       29 Mar 2017 06:59  Mg     2.4       29 Mar 2017 06:59      PT/INR - ( 29 Mar 2017 06:59 )   PT: 15.9 sec;   INR: 1.45 ratio           RADIOLOGY & ADDITIONAL STUDIES:  EXAM:  CT CHEST                            PROCEDURE DATE:  03/28/2017        INTERPRETATION:  Indication: Follow-up.    Noncontrast chest CT. Prior 3/22/2017.    There has been interval resolution of the right lung infiltrate.  No significant interval change in small layering left pleural effusion   and dependent atelectasis left base.  Central airways patent. No mediastinal adenopathy or other interval   change.    Impression:    Interval resolution right lung infiltrate. No change small layering left   pleural effusion with dependent atelectasis.

## 2017-03-29 NOTE — PROGRESS NOTE ADULT - PROBLEM SELECTOR PLAN 4
-C/w Ertapenem  -ID following -increase Ertapenem to 1g q24hr  -ID following -increase Ertapenem to 1g q24hr, cr improved  -ID following -c/w Ertapenem to 500mg q24hr  -ID following

## 2017-03-29 NOTE — PROGRESS NOTE ADULT - PROBLEM SELECTOR PLAN 3
-acute on chronic  -c/w Cardizem PO per cardio, INR within therapeutic range  -start Coumadin, c/w trending INR -acute on chronic  -c/w Cardizem PO per cardio, INR was under therapeutic range  -start 1 mg Coumadin and 1x Lovenox full dose today, c/w trending INR  -c/w tele monitor   -cardio following

## 2017-03-29 NOTE — PROGRESS NOTE ADULT - ATTENDING COMMENTS
restrictive lung disease, copd, kyphosis, ataxic gait, weakness, frailty, functional decline, dysphagia, hx of VTE  asp prec  oral and skin care  nebs  cvs regimen  out of bed with asst  fall prec  monitor sats  overall prognosis is very poor  son has unrealistic expectations

## 2017-03-29 NOTE — PROGRESS NOTE ADULT - SUBJECTIVE AND OBJECTIVE BOX
[facility]     Patient resting comfortably in bed in NAD.  Still reporting that she feels unwell.  Meza placed yesterday with 700-800 cc urinary retention.  No overnight issues.    Telemetry shows rate controlled atrial fibrillation    MEDICATIONS  (STANDING):  ALBUTerol/ipratropium for Nebulization 3milliLiter(s) Nebulizer every 6 hours  diltiazem    Tablet 60milliGRAM(s) Oral four times a day  predniSONE   Tablet 40milliGRAM(s) Oral daily  lactobacillus acidophilus 1Tablet(s) Oral three times a day with meals  metoprolol succinate ER 25milliGRAM(s) Oral daily  pantoprazole    Tablet 40milliGRAM(s) Oral before breakfast  ertapenem  IVPB 500milliGRAM(s) IV Intermittent every 24 hours  tamsulosin 0.4milliGRAM(s) Oral at bedtime    MEDICATIONS  (PRN):  acetaminophen   Tablet. 650milliGRAM(s) Oral every 6 hours PRN Mild Pain (1 - 3)      Allergies    No Known Allergies        Vital Signs Last 24 Hrs  T(C): 36.3, Max: 36.6 (03-28 @ 16:36)  T(F): 97.3, Max: 97.8 (03-28 @ 16:36)  HR: 83 (79 - 105)  BP: 99/68 (99/68 - 120/73)  BP(mean): --  RR: 19 (19 - 21)  SpO2: 94% (93% - 97%)    I&O's Summary    I & Os for current day (as of 29 Mar 2017 08:41)  =============================================  IN: 520 ml / OUT: 1200 ml / NET: -680 ml      PHYSICAL EXAM:    Constitutional: NAD, awake and alert, verbal  ENMT: No exudate or erythema.  MMM.  Anicteric  Pulmonary: Non-labored, breath sounds are clear bilaterally, No wheezing, rales or rhonchi  Cardiovascular: Irregular, S1 and S2 positive  Gastrointestinal: Bowel Sounds present, soft, nontender.   Lymph: No peripheral edema.  Skin: No rashes.    LABS: All Labs Reviewed:                        11.9   19.7  )-----------( 273      ( 29 Mar 2017 06:59 )             37.5                         12.9   18.1  )-----------( 289      ( 28 Mar 2017 07:05 )             39.9                         11.8   16.3  )-----------( 257      ( 27 Mar 2017 07:44 )             36.8     28 Mar 2017 07:05    134    |  92     |  39     ----------------------------<  96     3.8     |  35     |  0.96   27 Mar 2017 07:44    134    |  97     |  39     ----------------------------<  128    4.7     |  32     |  0.82     Ca    8.6        28 Mar 2017 07:05  Ca    8.5        27 Mar 2017 07:44      PT/INR - ( 29 Mar 2017 06:59 )   PT: 15.9 sec;   INR: 1.45 ratio      CT chest:  No evidence of pulmonary edema.  Unchanged pleural effusion.      Assessment/Plan:  87 year old woman with chronic atrial fibrillation, AC with Coumadin, DVT/PE, dementia, respiratory failure secondary to HMPV:    1.  Ventricular rate is well controlled.  Continue diltiazem 60 QID and Toprol 25 QD  2.  Dose Coumadin for goal INR 2-3  3.  Nebs/steroids per pulmonary  4.  Monitor and replete electrolytes  5.  To follow with you.

## 2017-03-29 NOTE — PROGRESS NOTE ADULT - ATTENDING COMMENTS
Agree with exam and plan as above with the following: In brief this is a 86 yo F w/ ILD/COPD/ HFpEF, hx of PE, Afib comes in with Viral infection/ respiratory failure as above, had urinary retention s/p recent love, also on ertapenem for UTI to complete 7 days. C.w medical management as above. Spoke to son aware of overall grim prognosis given multiple comorbidities and frailty. Will attempt to readdress GOC. Get Xray of hip for R hip tenderness. C/w Coumadin bridge today with Lovenox. INR daily. Rest agree as above. Agree with exam and plan as above with the following: In brief this is a 86 yo F w/ ILD/COPD/ HFpEF, hx of PE, Afib comes in with Viral infection/ respiratory failure as above, had urinary retention s/p recent love, also on ertapenem for UTI to complete 7 days. Keep ertapenem at 500mg IV given elderly patient and risk of seizure with ertapenems. C.w medical management as above. Spoke to son aware of overall grim prognosis given multiple comorbidities and frailty. Will attempt to readdress GOC. Get Xray of hip for R hip tenderness. C/w Coumadin bridge today with Lovenox. INR daily. Rest agree as above.

## 2017-03-29 NOTE — PROGRESS NOTE ADULT - SUBJECTIVE AND OBJECTIVE BOX
Date/Time Patient Seen:  		  Referring MD:   Data Reviewed	       Patient is a 87y old  Female who presents with a chief complaint of SOB (22 Mar 2017 03:46)  pt in bed  weak and frail  occ Pain complaint        Subjective/HPI       Medication list         MEDICATIONS  (STANDING):  ALBUTerol/ipratropium for Nebulization 3milliLiter(s) Nebulizer every 6 hours  diltiazem    Tablet 60milliGRAM(s) Oral four times a day  predniSONE   Tablet 40milliGRAM(s) Oral daily  lactobacillus acidophilus 1Tablet(s) Oral three times a day with meals  metoprolol succinate ER 25milliGRAM(s) Oral daily  pantoprazole    Tablet 40milliGRAM(s) Oral before breakfast  tamsulosin 0.4milliGRAM(s) Oral at bedtime  ertapenem  IVPB 1000milliGRAM(s) IV Intermittent every 24 hours  warfarin 1milliGRAM(s) Oral once    MEDICATIONS  (PRN):  acetaminophen   Tablet. 650milliGRAM(s) Oral every 6 hours PRN Mild Pain (1 - 3)         Vitals log        ICU Vital Signs Last 24 Hrs  T(C): 37, Max: 37 (03-29 @ 11:31)  T(F): 98.6, Max: 98.6 (03-29 @ 11:31)  HR: 75 (75 - 105)  BP: 128/69 (99/68 - 131/82)  BP(mean): --  ABP: --  ABP(mean): --  RR: 19 (19 - 21)  SpO2: 96% (93% - 97%)           Input and Output:  I&O's Detail    I & Os for current day (as of 29 Mar 2017 12:44)  =============================================  IN:    Oral Fluid: 420 ml    Solution: 100 ml    Total IN: 520 ml  ---------------------------------------------  OUT:    Indwelling Catheter - Urethral: 1200 ml    Total OUT: 1200 ml  ---------------------------------------------  Total NET: -680 ml      Lab Data                        11.9   19.7  )-----------( 273      ( 29 Mar 2017 06:59 )             37.5     29 Mar 2017 06:59    137    |  95     |  42     ----------------------------<  97     4.1     |  34     |  0.75     Ca    8.5        29 Mar 2017 06:59  Phos  2.9       29 Mar 2017 06:59  Mg     2.4       29 Mar 2017 06:59              Subjective/HPI:             Other:    Review of Systems	    Constitutional:  weak       ENT/Mouth:  at     Resp:  dec BS,        Cardiovascular s1s2                   Objective  frail and weak  dec functional status    General             Resp no wheeze    CV s1s2       GI    Extremities no gross edema       Neuro       Skin         Pertinent Lab findings & Imaging  CXR:    ***   Pathology:  ***    Derrick:  NO   Adequate UO     I&O's Detail    I & Os for current day (as of 29 Mar 2017 12:44)  =============================================  IN:    Oral Fluid: 420 ml    Solution: 100 ml    Total IN: 520 ml  ---------------------------------------------  OUT:    Indwelling Catheter - Urethral: 1200 ml    Total OUT: 1200 ml  ---------------------------------------------  Total NET: -680 ml           Discussed with:     Cultures:	        Radiology      Impression & Recommendations

## 2017-03-29 NOTE — PROGRESS NOTE ADULT - PROBLEM SELECTOR PLAN 9
-full code,   -poor prognosis,  -son has unrealistic expectations -full code,   -poor prognosis,  -son made aware of multiple comorbidities, unrealistic expectations

## 2017-03-30 ENCOUNTER — TRANSCRIPTION ENCOUNTER (OUTPATIENT)
Age: 82
End: 2017-03-30

## 2017-03-30 DIAGNOSIS — J98.4 OTHER DISORDERS OF LUNG: ICD-10-CM

## 2017-03-30 DIAGNOSIS — R79.1 ABNORMAL COAGULATION PROFILE: ICD-10-CM

## 2017-03-30 DIAGNOSIS — J44.9 CHRONIC OBSTRUCTIVE PULMONARY DISEASE, UNSPECIFIED: ICD-10-CM

## 2017-03-30 LAB
ANION GAP SERPL CALC-SCNC: 9 MMOL/L — SIGNIFICANT CHANGE UP (ref 5–17)
BUN SERPL-MCNC: 39 MG/DL — HIGH (ref 7–23)
CALCIUM SERPL-MCNC: 8.7 MG/DL — SIGNIFICANT CHANGE UP (ref 8.5–10.1)
CHLORIDE SERPL-SCNC: 95 MMOL/L — LOW (ref 96–108)
CO2 SERPL-SCNC: 33 MMOL/L — HIGH (ref 22–31)
CREAT SERPL-MCNC: 0.74 MG/DL — SIGNIFICANT CHANGE UP (ref 0.5–1.3)
GLUCOSE SERPL-MCNC: 102 MG/DL — HIGH (ref 70–99)
HCT VFR BLD CALC: 37 % — SIGNIFICANT CHANGE UP (ref 34.5–45)
HGB BLD-MCNC: 12.3 G/DL — SIGNIFICANT CHANGE UP (ref 11.5–15.5)
INR BLD: 1.3 RATIO — HIGH (ref 0.88–1.16)
MAGNESIUM SERPL-MCNC: 2.3 MG/DL — SIGNIFICANT CHANGE UP (ref 1.8–2.4)
MCHC RBC-ENTMCNC: 26.3 PG — LOW (ref 27–34)
MCHC RBC-ENTMCNC: 33.2 GM/DL — SIGNIFICANT CHANGE UP (ref 32–36)
MCV RBC AUTO: 79.2 FL — LOW (ref 80–100)
PHOSPHATE SERPL-MCNC: 3 MG/DL — SIGNIFICANT CHANGE UP (ref 2.5–4.5)
PLATELET # BLD AUTO: 296 K/UL — SIGNIFICANT CHANGE UP (ref 150–400)
POTASSIUM SERPL-MCNC: 4.3 MMOL/L — SIGNIFICANT CHANGE UP (ref 3.5–5.3)
POTASSIUM SERPL-SCNC: 4.3 MMOL/L — SIGNIFICANT CHANGE UP (ref 3.5–5.3)
PROTHROM AB SERPL-ACNC: 14.3 SEC — HIGH (ref 9.8–12.7)
RBC # BLD: 4.67 M/UL — SIGNIFICANT CHANGE UP (ref 3.8–5.2)
RBC # FLD: 15.5 % — HIGH (ref 10.3–14.5)
SODIUM SERPL-SCNC: 137 MMOL/L — SIGNIFICANT CHANGE UP (ref 135–145)
WBC # BLD: 19.3 K/UL — HIGH (ref 3.8–10.5)
WBC # FLD AUTO: 19.3 K/UL — HIGH (ref 3.8–10.5)

## 2017-03-30 PROCEDURE — 99233 SBSQ HOSP IP/OBS HIGH 50: CPT | Mod: GC

## 2017-03-30 PROCEDURE — 99233 SBSQ HOSP IP/OBS HIGH 50: CPT

## 2017-03-30 RX ORDER — WARFARIN SODIUM 2.5 MG/1
1 TABLET ORAL ONCE
Qty: 0 | Refills: 0 | Status: COMPLETED | OUTPATIENT
Start: 2017-03-30 | End: 2017-03-30

## 2017-03-30 RX ORDER — FUROSEMIDE 40 MG
60 TABLET ORAL EVERY 12 HOURS
Qty: 0 | Refills: 0 | Status: DISCONTINUED | OUTPATIENT
Start: 2017-03-30 | End: 2017-03-30

## 2017-03-30 RX ORDER — FUROSEMIDE 40 MG
20 TABLET ORAL ONCE
Qty: 0 | Refills: 0 | Status: COMPLETED | OUTPATIENT
Start: 2017-03-30 | End: 2017-03-30

## 2017-03-30 RX ORDER — ENOXAPARIN SODIUM 100 MG/ML
75 INJECTION SUBCUTANEOUS ONCE
Qty: 0 | Refills: 0 | Status: COMPLETED | OUTPATIENT
Start: 2017-03-30 | End: 2017-03-30

## 2017-03-30 RX ADMIN — Medication 1 TABLET(S): at 11:56

## 2017-03-30 RX ADMIN — Medication 650 MILLIGRAM(S): at 18:04

## 2017-03-30 RX ADMIN — PANTOPRAZOLE SODIUM 40 MILLIGRAM(S): 20 TABLET, DELAYED RELEASE ORAL at 05:27

## 2017-03-30 RX ADMIN — ENOXAPARIN SODIUM 75 MILLIGRAM(S): 100 INJECTION SUBCUTANEOUS at 11:56

## 2017-03-30 RX ADMIN — TAMSULOSIN HYDROCHLORIDE 0.4 MILLIGRAM(S): 0.4 CAPSULE ORAL at 21:25

## 2017-03-30 RX ADMIN — Medication 1 TABLET(S): at 08:09

## 2017-03-30 RX ADMIN — Medication 3 MILLILITER(S): at 07:31

## 2017-03-30 RX ADMIN — Medication 650 MILLIGRAM(S): at 17:37

## 2017-03-30 RX ADMIN — Medication 20 MILLIGRAM(S): at 11:55

## 2017-03-30 RX ADMIN — Medication 3 MILLILITER(S): at 13:49

## 2017-03-30 RX ADMIN — WARFARIN SODIUM 1 MILLIGRAM(S): 2.5 TABLET ORAL at 21:25

## 2017-03-30 RX ADMIN — Medication 25 MILLIGRAM(S): at 05:28

## 2017-03-30 RX ADMIN — Medication 40 MILLIGRAM(S): at 05:28

## 2017-03-30 RX ADMIN — Medication 1 TABLET(S): at 17:36

## 2017-03-30 RX ADMIN — ERTAPENEM SODIUM 110 MILLIGRAM(S): 1 INJECTION, POWDER, LYOPHILIZED, FOR SOLUTION INTRAMUSCULAR; INTRAVENOUS at 15:34

## 2017-03-30 NOTE — DISCHARGE NOTE ADULT - HOSPITAL COURSE
87 y.o. F with PMHx of A fib on coumadin, MDD, HLD, GERD, dysphagia, dementia, prior hx of DVT/PE?, HTN, urinary retention, and constipation brought in by EMS from [facility] for respiratory distress. History obtained from charts and son over phone as pt is lethargic and demented. As per son, he was called by facility and told that his mother was having some increased work of breathing which was being treated with duonebs. Pt went to visit Mom and asked for pt to be brought to hospital as she appeared to have labored breathing and was not acting like herself. Son is unsure if pt has been complaining of cough, SOB, chest pain, fevers, n/v/d/c. Upon chart review, pt was getting albuterol nebs around the clock for cough and was recently treated with levaquin on March 3rd.     In ED, labs significant for pro-bnp 3595 and (+) for HMPV on RVP  CXR: b/l vascular congestion   UA: (+) nitrites and LE with >50 WBC, TNTC bacteria   EKG: afib with RVR  Vitals significant for HR initially 180 now in 110s on cardizem drip, BP initially 206/140 now 124/70 after nitro drip which now d/c, tachypneic     ICU was consulted, candidate for ICU.     03/23: consulted Dr. Romeo for + ecoli UTI,Started rocephin iv.  Per night team, pt was agiated, contolled by 1x zyprexa. hyperkalemia resolved s/p K+ supplement. per pulm, taping down solumedrol    03/24: d/c cardizemdrip and start cardizem PO and stop iv lasix and start po lasix per cardio. per speech and swollow, start mechanical soft diet    03/26: SERVICE INTERN: pt complained of 3 min CP, then spontaneously resolved; pt had stat ekg showing a. fib with RVR (like admit ekg)--pt CE's stat ordered--3 sets of trop wnl  Pt was tacy 118--given 5mg IV Lopressor,  pt HR 94 at 5am  love was d/c    03/27: pt 's breathing worsenning with more ronchi. Repeat CXR showed Low lung volumes. Patient's face obscures portions of both upper lobes.   No change heart mediastinum. There is new left basilar   consolidation/atelectasis possible trace left pleural effusion. Reveived 1 dose of IV lasix. continued baldder scan for urinary retention ppx.per cardio's rec, Added 25 mg metorpolol  change abx to ertapenem.     03/28: Pt was in mild resp. distress, repeat CT Chest showed Interval resolution right lung infiltrate. No change small layering left pleural effusion with dependent atelectasis. Pt's bladder scan showed that  Pt was in urinary retension, Love placed and started flomax    03/29: per night team, pt was in rapid afib at rate 130-150s. Rapid afib was controlled by 1x lopressor. No further intervention. C/w pt on ertepenam and Pt 's INR was low at 1.45, start 1 x lovenox bridging  and 1 mg coumadin. C/w INR trending. Pt c.o hip and pelvic pain, hip and pelvic  xray showed no acute fracture    03/30: pt's lung with mild ronchi, gave 20mg lasix x1 and INR at 1.3,  continue 1mg Coumadin and bridging with Lovenox. continue ertepenem (last dose at 04/03) 87 y.o. F with PMHx of A fib on coumadin, MDD, HLD, GERD, dysphagia, dementia, prior hx of DVT/PE?, HTN, urinary retention, and constipation brought in by EMS from [facility] for respiratory distress. History obtained from charts and son over phone as pt is lethargic and demented. As per son, he was called by facility and told that his mother was having some increased work of breathing which was being treated with duonebs. Pt went to visit Mom and asked for pt to be brought to hospital as she appeared to have labored breathing and was not acting like herself. Son is unsure if pt has been complaining of cough, SOB, chest pain, fevers, n/v/d/c. Upon chart review, pt was getting albuterol nebs around the clock for cough and was recently treated with levaquin on March 3rd.     In ED, labs significant for pro-bnp 3595 and (+) for HMPV on RVP  CXR: b/l vascular congestion   UA: (+) nitrites and LE with >50 WBC, TNTC bacteria   EKG: afib with RVR  Vitals significant for HR initially 180 now in 110s on cardizem drip, BP initially 206/140 now 124/70 after nitro drip which now d/c, tachypneic     ICU was consulted, candidate for ICU.     03/23: consulted Dr. Romeo for + ecoli UTI,Started rocephin iv.  Per night team, pt was agiated, contolled by 1x zyprexa. hyperkalemia resolved s/p K+ supplement. per pulm, taping down solumedrol    03/24: d/c cardizemdrip and start cardizem PO and stop iv lasix and start po lasix per cardio. per speech and swollow, start mechanical soft diet    03/26: SERVICE INTERN: pt complained of 3 min CP, then spontaneously resolved; pt had stat ekg showing a. fib with RVR (like admit ekg)--pt CE's stat ordered--3 sets of trop wnl  Pt was tacy 118--given 5mg IV Lopressor,  pt HR 94 at 5am  love was d/c    03/27: pt 's breathing worsenning with more ronchi. Repeat CXR showed Low lung volumes. Patient's face obscures portions of both upper lobes.   No change heart mediastinum. There is new left basilar   consolidation/atelectasis possible trace left pleural effusion. Reveived 1 dose of IV lasix. continued baldder scan for urinary retention ppx.per cardio's rec, Added 25 mg metorpolol  change abx to ertapenem.     03/28: Pt was in mild resp. distress, repeat CT Chest showed Interval resolution right lung infiltrate. No change small layering left pleural effusion with dependent atelectasis. Pt's bladder scan showed that  Pt was in urinary retension, Love placed and started flomax    03/29: per night team, pt was in rapid afib at rate 130-150s. Rapid afib was controlled by 1x lopressor. No further intervention. C/w pt on ertepenam and Pt 's INR was low at 1.45, start 1 x lovenox bridging  and 1 mg coumadin. C/w INR trending. Pt c.o hip and pelvic pain, hip and pelvic  xray showed no acute fracture    03/30: pt's lung with mild ronchi, gave 20mg lasix x1 and INR at 1.3,  continue 1mg Coumadin and bridging with Lovenox. continue ertepenem (last dose at 04/03)     04/03: hold coumadin for today 2/2 INR within [place] range, will have family meeting for tomorrow  4/4: continuing to hold coumadin due to borderline INR, family meeting tomorrow.  4/5-      Pt medically optimized for discharge, patients family aware of plan and instructed for follow up. 87 y.o. F with PMHx of A fib on coumadin, MDD, HLD, GERD, dysphagia, dementia, prior hx of DVT/PE?, HTN, urinary retention, and constipation brought in by EMS from [facility] for respiratory distress. History obtained from charts and son over phone as pt is lethargic and demented. As per son, he was called by facility and told that his mother was having some increased work of breathing which was being treated with duonebs. Pt went to visit Mom and asked for pt to be brought to hospital as she appeared to have labored breathing and was not acting like herself. Son is unsure if pt has been complaining of cough, SOB, chest pain, fevers, n/v/d/c. Upon chart review, pt was getting albuterol nebs around the clock for cough and was recently treated with levaquin on March 3rd.     In ED, labs significant for pro-bnp 3595 and (+) for HMPV on RVP  CXR: b/l vascular congestion   UA: (+) nitrites and LE with >50 WBC, TNTC bacteria   EKG: afib with RVR  Vitals significant for HR initially 180 now in 110s on cardizem drip, BP initially 206/140 now 124/70 after nitro drip which now d/c, tachypneic     ICU was consulted, candidate for ICU.     03/23: consulted Dr. Romeo for + ecoli UTI,Started rocephin iv.  Per night team, pt was agiated, contolled by 1x zyprexa. hyperkalemia resolved s/p K+ supplement. per pulm, taping down solumedrol    03/24: d/c cardizemdrip and start cardizem PO and stop iv lasix and start po lasix per cardio. per speech and swollow, start mechanical soft diet    03/26: SERVICE INTERN: pt complained of 3 min CP, then spontaneously resolved; pt had stat ekg showing a. fib with RVR (like admit ekg)--pt CE's stat ordered--3 sets of trop wnl  Pt was tacy 118--given 5mg IV Lopressor,  pt HR 94 at 5am  love was d/c    03/27: pt 's breathing worsenning with more ronchi. Repeat CXR showed Low lung volumes. Patient's face obscures portions of both upper lobes.   No change heart mediastinum. There is new left basilar   consolidation/atelectasis possible trace left pleural effusion. Reveived 1 dose of IV lasix. continued baldder scan for urinary retention ppx.per cardio's rec, Added 25 mg metorpolol  change abx to ertapenem.     03/28: Pt was in mild resp. distress, repeat CT Chest showed Interval resolution right lung infiltrate. No change small layering left pleural effusion with dependent atelectasis. Pt's bladder scan showed that  Pt was in urinary retension, Love placed and started flomax    03/29: per night team, pt was in rapid afib at rate 130-150s. Rapid afib was controlled by 1x lopressor. No further intervention. C/w pt on ertepenam and Pt 's INR was low at 1.45, start 1 x lovenox bridging  and 1 mg coumadin. C/w INR trending. Pt c.o hip and pelvic pain, hip and pelvic  xray showed no acute fracture    03/30: pt's lung with mild ronchi, gave 20mg lasix x1 and INR at 1.3,  continue 1mg Coumadin and bridging with Lovenox. continue ertepenem (last dose at 04/03)     04/03: hold coumadin for today 2/2 INR within [place] range, will have family meeting for tomorrow  04/04: continuing to hold coumadin due to borderline INR,   04/05: INR wnl, continue to hold coumadin, family meeting with hospice care today.      Pt medically optimized for discharge, patients family aware of plan and instructed for follow up. 87 y.o. F with PMHx of A fib on coumadin, MDD, HLD, GERD, dysphagia, dementia, prior hx of DVT/PE?, HTN, urinary retention, and constipation was brought in by EMS from [facility] to [facility] ED for respiratory distress in 03/22 . History obtained from charts and son over phone as pt is lethargic and demented. As per son, he was called by facility and told that his mother was having some increased work of breathing which was being treated with duonebs. Pt went to visit Mom and asked for pt to be brought to hospital as she appeared to have labored breathing and was not acting like herself. Son is unsure if pt has been complaining of cough, SOB, chest pain, fevers, n/v/d/c. Upon chart review, pt was getting albuterol nebs around the clock for cough and was recently treated with levaquin on March 3rd. In ED, labs was significant for pro-bnp 3595 and (+) for HMPV on RVP, CXR: b/l vascular congestion, UA: (+) nitrites and LE with >50 WBC, TNTC bacteria, EKG: afib with RVR, Vitals significant for HR initially 180 now in 110s on cardizem drip, BP initially 206/140 now 124/70 after nitro drip which now d/c, tachypneic   ICU was consulted. Pt was admitted to Tele. Through out hte     in 03/23: consulted Dr. Romeo for + ecoli UTI,Started rocephin iv.  Per night team, pt was agiated, contolled by 1x zyprexa. hyperkalemia resolved s/p K+ supplement. per pulm, taping down solumedrol    03/24: d/c cardizemdrip and start cardizem PO and stop iv lasix and start po lasix per cardio. per speech and swollow, start mechanical soft diet    03/26: SERVICE INTERN: pt complained of 3 min CP, then spontaneously resolved; pt had stat ekg showing a. fib with RVR (like admit ekg)--pt CE's stat ordered--3 sets of trop wnl  Pt was tacy 118--given 5mg IV Lopressor,  pt HR 94 at 5am  love was d/c    03/27: pt 's breathing worsenning with more ronchi. Repeat CXR showed Low lung volumes. Patient's face obscures portions of both upper lobes.   No change heart mediastinum. There is new left basilar   consolidation/atelectasis possible trace left pleural effusion. Reveived 1 dose of IV lasix. continued baldder scan for urinary retention ppx.per cardio's rec, Added 25 mg metorpolol  change abx to ertapenem.     03/28: Pt was in mild resp. distress, repeat CT Chest showed Interval resolution right lung infiltrate. No change small layering left pleural effusion with dependent atelectasis. Pt's bladder scan showed that  Pt was in urinary retension, Love placed and started flomax    03/29: per night team, pt was in rapid afib at rate 130-150s. Rapid afib was controlled by 1x lopressor. No further intervention. C/w pt on ertepenam and Pt 's INR was low at 1.45, start 1 x lovenox bridging  and 1 mg coumadin. C/w INR trending. Pt c.o hip and pelvic pain, hip and pelvic  xray showed no acute fracture    03/30: pt's lung with mild ronchi, gave 20mg lasix x1 and INR at 1.3,  continue 1mg Coumadin and bridging with Lovenox. continue ertepenem (last dose at 04/03)     04/03: hold coumadin for today 2/2 INR within [place] range, will have family meeting for tomorrow  04/04: continuing to hold coumadin due to borderline INR,   04/05: INR wnl, continue to hold coumadin, family meeting with hospice care today.      Pt medically optimized for discharge, patients family aware of plan and instructed for follow up. 87 y.o. F with PMHx of A fib on coumadin, MDD, HLD, GERD, dysphagia, dementia, prior hx of DVT/PE?, HTN, urinary retention, and constipation was brought in by EMS from [facility] to [facility] ED for respiratory distress in 03/22 . History obtained from charts and son over phone as pt is lethargic and demented. As per son, he was called by facility and told that his mother was having some increased work of breathing which was being treated with duonebs. Pt went to visit Mom and asked for pt to be brought to hospital as she appeared to have labored breathing and was not acting like herself. Son is unsure if pt has been complaining of cough, SOB, chest pain, fevers, n/v/d/c. Upon chart review, pt was getting albuterol nebs around the clock for cough and was recently treated with levaquin on March 3rd. In ED, labs was significant for pro-bnp 3595 and (+) for HMPV on RVP, CXR: b/l vascular congestion, UA: (+) nitrites and LE with >50 WBC, TNTC bacteria, EKG: afib with RVR, Vitals significant for HR initially 180 now in 110s on cardizem drip, BP initially 206/140 now 124/70 after nitro drip which now d/c, tachypneic ICU was consulted. Pt was admitted to Tele.Through the hospital stay, Pt was treated with supportive care for HMPV. Cardiologist was on board for assisting controlling  Pt's arrythmia and fluid overload with lasix. Pulm was on board for assisting manage of pt's resp. failure 2/2 viral infection and pulm edema. and ID was consulted for assisting for treating pt's MDR UTI. Pt finished 7 days course of ertapenem. Palliative care team was consulted, and family meeting was hold among son, palliative care, and PCP.  Son verbally stated that he understanding the poor prognosis of pt, however, still had unrealistic expectation and refuse hospice care.      Pt medically optimized for discharge, patients family aware of plan and instructed for follow up. 87 y.o. F with PMHx of A fib on coumadin, MDD, HLD, GERD, dysphagia, dementia, prior hx of DVT/PE?, HTN, urinary retention, and constipation was brought in by EMS from [facility] to [facility] ED for respiratory distress in 03/22 . History obtained from charts and son over phone as pt is lethargic and demented. As per son, he was called by facility and told that his mother was having some increased work of breathing which was being treated with duonebs. Pt went to visit Mom and asked for pt to be brought to hospital as she appeared to have labored breathing and was not acting like herself. Son is unsure if pt has been complaining of cough, SOB, chest pain, fevers, n/v/d/c. Upon chart review, pt was getting albuterol nebs around the clock for cough and was recently treated with levaquin on March 3rd. In ED, labs was significant for pro-bnp 3595 and (+) for HMPV on RVP, CXR: b/l vascular congestion, UA: (+) nitrites and LE with >50 WBC, TNTC bacteria, EKG: afib with RVR, Vitals significant for HR initially 180 now in 110s on cardizem drip, BP initially 206/140 now 124/70 after nitro drip which now d/c, tachypneic ICU was consulted. Pt was admitted to Tele.Through the hospital stay, Pt was treated with supportive care for HMPV. Cardiologist was on board for assisting controlling  Pt's arrythmia and fluid overload with lasix. Pulm was on board for assisting manage of pt's resp. failure 2/2 viral infection and pulm edema. and ID was consulted for assisting for treating pt's MDR UTI. Pt finished 7 days course of ertapenem. Palliative care team was consulted, and family meeting was hold among son, palliative care, and PCP.  Son verbally stated that he understanding the poor prognosis of pt, however, still wants to maintain pt on full code and will rediscuss in future if condition changes.    Pt medically optimized for discharge, patients family aware of plan and instructed for follow up.      Time spent: 55 minutes

## 2017-03-30 NOTE — DISCHARGE NOTE ADULT - PLAN OF CARE
improvement of symptoms Follow up with PMD and Pulmonologist within 1 week   continue with bronchodilators, oral and skin care  s/p course of Ertapenam for pneumonia -completed Ertapenam x 7 days course   -WBC normalized  -follow up with PMD within 1 week continue outpatient care, follow up with PMD within 1 week Dose Coumadin goal INR 2-3, continue current dosages of diltiazem and Toprol continue current dosages of diltiazem and Toprol continue diflucan tablet, oral hygiene hold Coumadin for now, recheck INR on Sunday, goal INR 2-3,   continue current dosages of diltiazem and Toprol - oral hygiene

## 2017-03-30 NOTE — PROGRESS NOTE ADULT - PROBLEM SELECTOR PLAN 4
-acute on chronic  -c/w Cardizem PO per cardio, INR was under therapeutic range  -start 2 mg Coumadin and 1x Lovenox full dose today, c/w trending INR  -c/w tele monitor   -cardio following. -acute on chronic  -c/w Cardizem PO per cardio, INR was under therapeutic range  -continue with 1 mg Coumadin and 1x Lovenox full dose bridging today, c/w trending INR  -c/w tele monitor   -cardio following.

## 2017-03-30 NOTE — PROGRESS NOTE ADULT - ASSESSMENT
Assessment/Plan:  87 year old woman with chronic atrial fibrillation, AC with Coumadin, DVT/PE, dementia, respiratory failure secondary to HMPV:    1.  Ventricular rate is well controlled.  Continue diltiazem 60 QID and Toprol 25 QD  2.  Dose Coumadin for goal INR 2-3  3.  Nebs/steroids per pulmonary  4.  Monitor and replete electrolytes  5.  To follow with you.

## 2017-03-30 NOTE — PROGRESS NOTE ADULT - SUBJECTIVE AND OBJECTIVE BOX
ICC:SOB    INTERVAL HPI/OVERNIGHT EVENTS: Pt seen and examed at bedside with . No acute event overnight      MEDICATIONS  (STANDING):  ALBUTerol/ipratropium for Nebulization 3milliLiter(s) Nebulizer every 6 hours  diltiazem    Tablet 60milliGRAM(s) Oral four times a day  lactobacillus acidophilus 1Tablet(s) Oral three times a day with meals  metoprolol succinate ER 25milliGRAM(s) Oral daily  pantoprazole    Tablet 40milliGRAM(s) Oral before breakfast  tamsulosin 0.4milliGRAM(s) Oral at bedtime  ertapenem  IVPB 500milliGRAM(s) IV Intermittent every 24 hours    MEDICATIONS  (PRN):  acetaminophen   Tablet. 650milliGRAM(s) Oral every 6 hours PRN Mild Pain (1 - 3)      Allergies    No Known Allergies    Intolerances        CONSTITUTIONAL: No weakness, fevers or chills  EYES/ENT: No visual changes;  No vertigo or throat pain   NECK: No pain or stiffness  RESPIRATORY: No cough, wheezing, hemoptysis; No shortness of breath  CARDIOVASCULAR: No chest pain or palpitations  GASTROINTESTINAL: No abdominal or epigastric pain. No nausea, vomiting, or hematemesis; No diarrhea or constipation. No melena or hematochezia.  GENITOURINARY: No dysuria, frequency or hematuria  NEUROLOGICAL: No numbness or weakness  SKIN: No itching, burning, rashes, or lesions   All other review of systems is negative unless indicated above.    Vital Signs Last 24 Hrs  T(C): 36.4, Max: 37 (03-29 @ 11:31)  T(F): 97.5, Max: 98.6 (03-29 @ 11:31)  HR: 105 (75 - 105)  BP: 132/83 (115/69 - 132/83)  BP(mean): --  RR: 18 (18 - 19)  SpO2: 94% (92% - 96%)    I & Os for current day (as of 03-30 @ 07:47)  =============================================  IN: 410 ml / OUT: 700 ml / NET: -290 ml      General: WN/WD NAD  Neurology: A&Ox3, nonfocal, AGUILAR x 4  Respiratory: CTA B/L  CV: RRR, S1S2, no murmurs, rubs or gallops  Abdominal: Soft, NT, ND +BS, Last BM  Extremities: No edema, + peripheral pulses      LABS:                        12.3   19.3  )-----------( 296      ( 30 Mar 2017 06:02 )             37.0     30 Mar 2017 06:02    137    |  95     |  39     ----------------------------<  102    4.3     |  33     |  0.74     Ca    8.7        30 Mar 2017 06:02  Phos  3.0       30 Mar 2017 06:02  Mg     2.3       30 Mar 2017 06:02      PT/INR - ( 30 Mar 2017 06:02 )   PT: 14.3 sec;   INR: 1.30 ratio               RADIOLOGY & ADDITIONAL TESTS: Rt hip x ray  No acute displaced fracture.          PCP:  consultant: - cardio, Pallitive- , -ID ICC:SOB    INTERVAL HPI/OVERNIGHT EVENTS: Pt seen and examed at bedside with . No acute event overnight. Pt denied CP/ palpitation/ and hip pain. Per tele monitor, Pt was at rate controlled afib.      MEDICATIONS  (STANDING):  ALBUTerol/ipratropium for Nebulization 3milliLiter(s) Nebulizer every 6 hours  diltiazem    Tablet 60milliGRAM(s) Oral four times a day  lactobacillus acidophilus 1Tablet(s) Oral three times a day with meals  metoprolol succinate ER 25milliGRAM(s) Oral daily  pantoprazole    Tablet 40milliGRAM(s) Oral before breakfast  tamsulosin 0.4milliGRAM(s) Oral at bedtime  ertapenem  IVPB 500milliGRAM(s) IV Intermittent every 24 hours    MEDICATIONS  (PRN):  acetaminophen   Tablet. 650milliGRAM(s) Oral every 6 hours PRN Mild Pain (1 - 3)      Allergies    No Known Allergies    Intolerances        ROS:   Limited 2/2 dementia  All other review of systems is negative unless indicated above.    Vital Signs Last 24 Hrs  T(C): 36.4, Max: 37 (03-29 @ 11:31)  T(F): 97.5, Max: 98.6 (03-29 @ 11:31)  HR: 105 (75 - 105)  BP: 132/83 (115/69 - 132/83)  BP(mean): --  RR: 18 (18 - 19)  SpO2: 94% (92% - 96%)    I & Os for current day (as of 03-30 @ 07:47)  =============================================  IN: 410 ml / OUT: 700 ml / NET: -290 ml      General: WN/WD NAD  Neurology: alert and awake, nonfocal,   Respiratory: CTA B/L  CV: Irregular rate and rhythm , S1S2, no murmurs, rubs or gallops  Abdominal: Soft, NT, ND +BS, Last BM  Extremities: No edema, + peripheral pulses      LABS:                        12.3   19.3  )-----------( 296      ( 30 Mar 2017 06:02 )             37.0     30 Mar 2017 06:02    137    |  95     |  39     ----------------------------<  102    4.3     |  33     |  0.74     Ca    8.7        30 Mar 2017 06:02  Phos  3.0       30 Mar 2017 06:02  Mg     2.3       30 Mar 2017 06:02      PT/INR - ( 30 Mar 2017 06:02 )   PT: 14.3 sec;   INR: 1.30 ratio               RADIOLOGY & ADDITIONAL TESTS: Rt hip x ray  No acute displaced fracture.          PCP:  consultant: - cardio, Pallitive- , -ID ICC:SOB    INTERVAL HPI/OVERNIGHT EVENTS: Pt seen and examed at bedside with . No acute event overnight. Pt denied CP/ palpitation/ and hip pain. Per tele monitor, Pt was at rate controlled afib.      MEDICATIONS  (STANDING):  ALBUTerol/ipratropium for Nebulization 3milliLiter(s) Nebulizer every 6 hours  diltiazem    Tablet 60milliGRAM(s) Oral four times a day  lactobacillus acidophilus 1Tablet(s) Oral three times a day with meals  metoprolol succinate ER 25milliGRAM(s) Oral daily  pantoprazole    Tablet 40milliGRAM(s) Oral before breakfast  tamsulosin 0.4milliGRAM(s) Oral at bedtime  ertapenem  IVPB 500milliGRAM(s) IV Intermittent every 24 hours    MEDICATIONS  (PRN):  acetaminophen   Tablet. 650milliGRAM(s) Oral every 6 hours PRN Mild Pain (1 - 3)      Allergies    No Known Allergies    Intolerances        ROS:   Limited 2/2 dementia  All other review of systems is negative unless indicated above.    Vital Signs Last 24 Hrs  T(C): 36.4, Max: 37 (03-29 @ 11:31)  T(F): 97.5, Max: 98.6 (03-29 @ 11:31)  HR: 105 (75 - 105)  BP: 132/83 (115/69 - 132/83)  BP(mean): --  RR: 18 (18 - 19)  SpO2: 94% (92% - 96%)    I & Os for current day (as of 03-30 @ 07:47)  =============================================  IN: 410 ml / OUT: 700 ml / NET: -290 ml      General: WN/WD NAD  Neurology: alert and awake, nonfocal,   Respiratory: diminished BS, mild Lt upper and lower lung rhonchi     CV: distant HS, Irregular rate and rhythm , S1/S2, no murmurs, rubs or gallops  Abdominal: Soft, NT, ND +BS, Last BM  Extremities: No edema, + peripheral pulses      LABS:                        12.3   19.3  )-----------( 296      ( 30 Mar 2017 06:02 )             37.0     30 Mar 2017 06:02    137    |  95     |  39     ----------------------------<  102    4.3     |  33     |  0.74     Ca    8.7        30 Mar 2017 06:02  Phos  3.0       30 Mar 2017 06:02  Mg     2.3       30 Mar 2017 06:02      PT/INR - ( 30 Mar 2017 06:02 )   PT: 14.3 sec;   INR: 1.30 ratio               RADIOLOGY & ADDITIONAL TESTS: Rt hip x ray  No acute displaced fracture.          PCP:  consultant: - cardio, Pallitive- , -ID ICC:SOB    INTERVAL HPI/OVERNIGHT EVENTS: Pt seen and examed at bedside with . No acute event overnight. Pt denied CP/ palpitation/ and hip pain. Per tele monitor, Pt was at rate controlled afib.      MEDICATIONS  (STANDING):  ALBUTerol/ipratropium for Nebulization 3milliLiter(s) Nebulizer every 6 hours  diltiazem    Tablet 60milliGRAM(s) Oral four times a day  lactobacillus acidophilus 1Tablet(s) Oral three times a day with meals  metoprolol succinate ER 25milliGRAM(s) Oral daily  pantoprazole    Tablet 40milliGRAM(s) Oral before breakfast  tamsulosin 0.4milliGRAM(s) Oral at bedtime  ertapenem  IVPB 500milliGRAM(s) IV Intermittent every 24 hours    MEDICATIONS  (PRN):  acetaminophen   Tablet. 650milliGRAM(s) Oral every 6 hours PRN Mild Pain (1 - 3)      Allergies    No Known Allergies    Intolerances        ROS:   Limited 2/2 dementia  All other review of systems is negative unless indicated above.    Vital Signs Last 24 Hrs  T(C): 36.4, Max: 37 (03-29 @ 11:31)  T(F): 97.5, Max: 98.6 (03-29 @ 11:31)  HR: 105 (75 - 105)  BP: 132/83 (115/69 - 132/83)  BP(mean): --  RR: 18 (18 - 19)  SpO2: 94% (92% - 96%)    I & Os for current day (as of 03-30 @ 07:47)  =============================================  IN: 410 ml / OUT: 700 ml / NET: -290 ml      General: WN/WD NAD  Neurology: alert and awake, nonfocal,   Respiratory: diminished BS, mild Lt upper and lower lung rhonchi   +crackles  CV: distant HS, Irregular rate and rhythm , S1/S2, no murmurs, rubs or gallops  Abdominal: Soft, NT, ND +BS, Last BM  Extremities: No edema, + peripheral pulses      LABS:                        12.3   19.3  )-----------( 296      ( 30 Mar 2017 06:02 )             37.0     30 Mar 2017 06:02    137    |  95     |  39     ----------------------------<  102    4.3     |  33     |  0.74     Ca    8.7        30 Mar 2017 06:02  Phos  3.0       30 Mar 2017 06:02  Mg     2.3       30 Mar 2017 06:02      PT/INR - ( 30 Mar 2017 06:02 )   PT: 14.3 sec;   INR: 1.30 ratio               RADIOLOGY & ADDITIONAL TESTS: Rt hip x ray  No acute displaced fracture.          PCP:  consultant: - cardio, Pallitive- , -ID

## 2017-03-30 NOTE — PROGRESS NOTE ADULT - ASSESSMENT
87 y.o. F with PMHx of A fib on coumadin, MDD, HLD, GERD, dysphagia, dementia, prior hx of DVT/PE?, HTN, urinary retention, and constipation brought in by EMS from [facility], admitted with acute hypoxic respiratory failure sec pulmonary edema with HMPV virus infection bronchitis/pna, UTI(E.coli) , and afib with RVR. Clinically stable.

## 2017-03-30 NOTE — PROGRESS NOTE ADULT - SUBJECTIVE AND OBJECTIVE BOX
Date/Time Patient Seen:  		  Referring MD:   Data Reviewed	       Patient is a 87y old  Female who presents with a chief complaint of SOB (22 Mar 2017 03:46)    in bedm awake, love cath in, frail and weak  poor functional status      Subjective/HPI       Medication list         MEDICATIONS  (STANDING):  ALBUTerol/ipratropium for Nebulization 3milliLiter(s) Nebulizer every 6 hours  diltiazem    Tablet 60milliGRAM(s) Oral four times a day  lactobacillus acidophilus 1Tablet(s) Oral three times a day with meals  metoprolol succinate ER 25milliGRAM(s) Oral daily  pantoprazole    Tablet 40milliGRAM(s) Oral before breakfast  tamsulosin 0.4milliGRAM(s) Oral at bedtime  ertapenem  IVPB 500milliGRAM(s) IV Intermittent every 24 hours    MEDICATIONS  (PRN):  acetaminophen   Tablet. 650milliGRAM(s) Oral every 6 hours PRN Mild Pain (1 - 3)         Vitals log        ICU Vital Signs Last 24 Hrs  T(C): 36.4, Max: 37 (03-29 @ 11:31)  T(F): 97.5, Max: 98.6 (03-29 @ 11:31)  HR: 105 (75 - 105)  BP: 132/83 (99/68 - 132/83)  BP(mean): --  ABP: --  ABP(mean): --  RR: 18 (18 - 19)  SpO2: 94% (92% - 96%)           Input and Output:  I&O's Detail  I & Os for 24h ending 29 Mar 2017 07:00  =============================================  IN:    Oral Fluid: 420 ml    Solution: 100 ml    Total IN: 520 ml  ---------------------------------------------  OUT:    Indwelling Catheter - Urethral: 1200 ml    Total OUT: 1200 ml  ---------------------------------------------  Total NET: -680 ml    I & Os for current day (as of 30 Mar 2017 05:37)  =============================================  IN:    Oral Fluid: 360 ml    Solution: 50 ml    Total IN: 410 ml  ---------------------------------------------  OUT:    Indwelling Catheter - Urethral: 700 ml    Total OUT: 700 ml  ---------------------------------------------  Total NET: -290 ml      Lab Data                        11.9   19.7  )-----------( 273      ( 29 Mar 2017 06:59 )             37.5     29 Mar 2017 06:59    137    |  95     |  42     ----------------------------<  97     4.1     |  34     |  0.75     Ca    8.5        29 Mar 2017 06:59  Phos  2.9       29 Mar 2017 06:59  Mg     2.4       29 Mar 2017 06:59              Subjective/HPI:             Other:    Review of Systems	    Constitutional:         ENT/Mouth:       Resp:         Cardiovascular denies CP                   Objective    General weak             Resp dec BS    CVs1s2       GI    Extremities no gross edema       Neuro       Skin         Pertinent Lab findings & Imaging  CXR:    ***   Pathology:  ***    Derrick:  NO   Adequate UO     I&O's Detail  I & Os for 24h ending 29 Mar 2017 07:00  =============================================  IN:    Oral Fluid: 420 ml    Solution: 100 ml    Total IN: 520 ml  ---------------------------------------------  OUT:    Indwelling Catheter - Urethral: 1200 ml    Total OUT: 1200 ml  ---------------------------------------------  Total NET: -680 ml    I & Os for current day (as of 30 Mar 2017 05:37)  =============================================  IN:    Oral Fluid: 360 ml    Solution: 50 ml    Total IN: 410 ml  ---------------------------------------------  OUT:    Indwelling Catheter - Urethral: 700 ml    Total OUT: 700 ml  ---------------------------------------------  Total NET: -290 ml           Discussed with:     Cultures:	        Radiology      Impression & Recommendations

## 2017-03-30 NOTE — DISCHARGE NOTE ADULT - CARE PLAN
Principal Discharge DX:	Acute respiratory failure  Secondary Diagnosis:	UTI (urinary tract infection)  Secondary Diagnosis:	Pulmonary embolism  Secondary Diagnosis:	Atrial fibrillation  Secondary Diagnosis:	Dementia  Secondary Diagnosis:	HLD (hyperlipidemia)  Secondary Diagnosis:	HTN (hypertension) Principal Discharge DX:	Acute respiratory failure  Goal:	improvement of symptoms  Instructions for follow-up, activity and diet:	Follow up with PMD and Pulmonologist within 1 week   continue with bronchodilators, oral and skin care  s/p course of Ertapenam for pneumonia  Secondary Diagnosis:	UTI (urinary tract infection)  Instructions for follow-up, activity and diet:	-completed Ertapenam x 7 days course   -WBC normalized  -follow up with PMD within 1 week  Secondary Diagnosis:	Atrial fibrillation  Instructions for follow-up, activity and diet:	Dose Coumadin goal INR 2-3, continue current dosages of diltiazem and Toprol  Secondary Diagnosis:	Dementia  Instructions for follow-up, activity and diet:	continue outpatient care, follow up with PMD within 1 week  Secondary Diagnosis:	HTN (hypertension)  Instructions for follow-up, activity and diet:	continue current dosages of diltiazem and Toprol  Secondary Diagnosis:	Thrush  Instructions for follow-up, activity and diet:	continue diflucan tablet, oral hygiene Principal Discharge DX:	Acute respiratory failure  Goal:	improvement of symptoms  Instructions for follow-up, activity and diet:	Follow up with PMD and Pulmonologist within 1 week   continue with bronchodilators, oral and skin care  s/p course of Ertapenam for pneumonia  Secondary Diagnosis:	UTI (urinary tract infection)  Instructions for follow-up, activity and diet:	-completed Ertapenam x 7 days course   -WBC normalized  -follow up with PMD within 1 week  Secondary Diagnosis:	Atrial fibrillation  Instructions for follow-up, activity and diet:	hold Coumadin for now, recheck INR on Sunday, goal INR 2-3,   continue current dosages of diltiazem and Toprol  Secondary Diagnosis:	Dementia  Instructions for follow-up, activity and diet:	continue outpatient care, follow up with PMD within 1 week  Secondary Diagnosis:	HTN (hypertension)  Instructions for follow-up, activity and diet:	continue current dosages of diltiazem and Toprol  Secondary Diagnosis:	Thrush  Instructions for follow-up, activity and diet:	- oral hygiene

## 2017-03-30 NOTE — PROGRESS NOTE ADULT - PROBLEM SELECTOR PLAN 7
-INR was under therapeutic range  -start 2 mg Coumadin and 1x Lovenox full dose today, c/w trending INR

## 2017-03-30 NOTE — PROGRESS NOTE ADULT - ATTENDING COMMENTS
Agree with exam and plan as above with the following: In brief this is a 88 yo F w/ ILD/COPD/ HFpEF, hx of PE, Afib comes in with Viral infection HMPV/ respiratory failure as above, had urinary retention s/p recent love, also on ertapenem for UTI to complete 7 days. ID recs noted.  Will give Lasix 20mg IVP x1 today given crackles on exam, may need daily lasix PO. Appreciate cardio recs.   Continue Coumadin- bridge again w/ Lovenox today. INR daily.  Will need to readdress GOC with son. Spoke to son aware of overall grim prognosis given multiple comorbidities and frailty. Appreciate Pulm recs.  Rest agree as above

## 2017-03-30 NOTE — DISCHARGE NOTE ADULT - MEDICATION SUMMARY - MEDICATIONS TO TAKE
I will START or STAY ON the medications listed below when I get home from the hospital:    Milk of Magnesia 8% oral suspension  -- 30 milliliter(s) by mouth once a day (at bedtime), As Needed  -- Indication: For Constipation    diltiazem 60 mg oral tablet  -- 1 tab(s) by mouth 3 times a day  -- Indication: For Afib    Marinol 2.5 mg oral capsule  -- 1 cap(s) by mouth 2 times a day  -- Indication: For Appetite stimuli     metoprolol succinate 25 mg oral tablet, extended release  -- 1 tab(s) by mouth once a day  -- Indication: For HTN (hypertension)    albuterol 2.5 mg/3 mL (0.083%) inhalation solution  -- 3 milliliter(s) inhaled every 8 hours  -- Indication: For Obstructive airway disease    ferrous sulfate 325 mg (65 mg elemental iron) oral delayed release tablet  -- 1 tab(s) by mouth once a day  -- Indication: For Supplement    Dulcolax Laxative 10 mg rectal suppository  -- 1 suppository(ies) rectally once a day, As Needed  -- Indication: For Constipation    docusate sodium 100 mg oral capsule  -- 2 cap(s) by mouth once a day (at bedtime), As Needed  -- Indication: For Constipation    polyethylene glycol 3350 oral powder for reconstitution  -- 17 gram(s) by mouth once a day  -- Indication: For Constipation    senna 8.6 mg oral tablet  -- 2 tab(s) by mouth once a day (at bedtime)  -- Indication: For Constipation    Lovaza 1000 mg oral capsule  -- 2 cap(s) by mouth 2 times a day  -- Indication: For Sore throat    Multiple Vitamins oral tablet  -- 1 tab(s) by mouth once a day  -- Indication: For Supplement    Oyster Shell Calcium with Vitamin D 500 mg-200 intl units oral tablet  -- 1 tab(s) by mouth once a day  -- Indication: For Supplement

## 2017-03-30 NOTE — PROGRESS NOTE ADULT - PROBLEM SELECTOR PLAN 2
-2/2 HMPV  -c/w duoneb /O2 supplement  -Pulm following  -per Pulm rec. tapering prednisone. -2/2 HMPV  -c/w duoneb /O2 supplement  -finished coursed of steroid tapering  -Pulm following -2/2 HMPV  -c/w Duoneb /O2 supplement  -finished coursed of steroid tapering  -Pulm following

## 2017-03-30 NOTE — DISCHARGE NOTE ADULT - SECONDARY DIAGNOSIS.
UTI (urinary tract infection) Pulmonary embolism Atrial fibrillation Dementia HLD (hyperlipidemia) HTN (hypertension) Thrush

## 2017-03-30 NOTE — PROGRESS NOTE ADULT - ASSESSMENT
87 y.o. F with PMHx of A fib on coumadin, MDD, HLD, GERD, dysphagia, dementia, prior hx of DVT/PE?, HTN, urinary retention, and constipation brought in by EMS from [facility], admitted with acute hypoxic respiratory failure 2/2 pulmonary edema 2/2 HMPV virus, UTI(E.coli) , hypertensive urgency, and afib with RVR.

## 2017-03-30 NOTE — PROGRESS NOTE ADULT - SUBJECTIVE AND OBJECTIVE BOX
[facility]     CHIEF COMPLAINT: Patient is a 87y old  Female who presents with a chief complaint of SOB (30 Mar 2017 14:20)      Follow Up: [ ] Chest Pain      [x ] Dyspnea     [ ] Palpitations    [ x] Atrial Fibrillation     [ ] Ventricular Dysrhythmia    [ ] Abnormal EKG                      [ ] Abnormal Cardiac Enzymes     [ ] Valvular Disease   [ ] CAD  [ ] Hypertension [ ] CHF     HPI:  87 y.o. F with PMHx of A fib on coumadin, MDD, HLD, GERD, dysphagia, dementia, prior hx of DVT/PE?, HTN, urinary retention, and constipation brought in by EMS from [facility] for respiratory distress. History obtained from charts and son over phone as pt is lethargic and demented. As per son, he was called by facility and told that his mother was having some increased work of breathing which was being treated with duonebs. Pt went to visit Mom and asked for pt to be brought to hospital as she appeared to have labored breathing and was not acting like herself. Son is unsure if pt has been complaining of cough, SOB, chest pain, fevers, n/v/d/c. Upon chart review, pt was getting albuterol nebs around the clock for cough and was recently treated with levaquin on March 3rd.     In ED, labs significant for pro-bnp 3595 and (+) for HMPV on RVP  CXR: b/l vascular congestion   UA: (+) nitrites and LE with >50 WBC, TNTC bacteria   EKG: afib with RVR    She was initially treated with negative chronotropic therapy and evidence of pulmonary edema felt to be due to tube  multiple comorbidities including sepsis. She is now on the floor feeling better without specific complaints. She reports no chest pain  or dyspnea.      PAST MEDICAL & SURGICAL HISTORY:  Femur neck fracture  Vertebral fracture, pathological  Neurogenic bladder  DVT (deep venous thrombosis)  Afib  GERD (gastroesophageal reflux disease)  Pulmonary embolism  UTI (lower urinary tract infection)  HTN (hypertension)  Pulmonary embolism  Memory impairment  Hypertension  Atrial fibrillation  Hyponatremia  Shortness of breath: etiology probably copd  Urinary retention  Arthritis  DVT (deep venous thrombosis)  No significant past surgical history  History of hip surgery: right gamma nail  No significant past surgical history      MEDICATIONS  (STANDING):  ALBUTerol/ipratropium for Nebulization 3milliLiter(s) Nebulizer every 6 hours  diltiazem    Tablet 60milliGRAM(s) Oral four times a day  lactobacillus acidophilus 1Tablet(s) Oral three times a day with meals  metoprolol succinate ER 25milliGRAM(s) Oral daily  pantoprazole    Tablet 40milliGRAM(s) Oral before breakfast  tamsulosin 0.4milliGRAM(s) Oral at bedtime  ertapenem  IVPB 500milliGRAM(s) IV Intermittent every 24 hours  warfarin 1milliGRAM(s) Oral once    MEDICATIONS  (PRN):  acetaminophen   Tablet. 650milliGRAM(s) Oral every 6 hours PRN Mild Pain (1 - 3)      Allergies    No Known Allergies    Intolerances        REVIEW OF SYSTEMS:    CONSTITUTIONAL: No weakness, fevers or chills.   EYES/ENT: No visual changes;  No vertigo or throat pain   NECK: No pain or stiffness  RESPIRATORY: No cough, wheezing, hemoptysis; No shortness of breath  CARDIOVASCULAR: No chest pain or palpitations  GASTROINTESTINAL: No abdominal or epigastric pain. No nausea, vomiting, or hematemesis; No diarrhea or constipation. No melena or hematochezia.  GENITOURINARY: No dysuria, frequency or hematuria  NEUROLOGICAL: No numbness or weakness  SKIN: No itching, burning, rashes, or lesions   All other review of systems is negative unless indicated above    Vital Signs Last 24 Hrs  T(C): 36.6, Max: 37.1 (03-30 @ 07:05)  T(F): 97.8, Max: 98.8 (03-30 @ 07:05)  HR: 79 (71 - 105)  BP: 135/85 (107/70 - 135/85)  BP(mean): --  RR: 16 (16 - 23)  SpO2: 99% (91% - 99%)    I&O's Summary  I & Os for 24h ending 30 Mar 2017 07:00  =============================================  IN: 410 ml / OUT: 700 ml / NET: -290 ml    I & Os for current day (as of 30 Mar 2017 18:32)  =============================================  IN: 50 ml / OUT: 675 ml / NET: -625 ml      PHYSICAL EXAM:    Constitutional: NAD, awake and alert, well-developed  Eyes:  EOMI,  Pupils round, No oral cyanosis.  ENMT: No exudate or erythema  Pulmonary: Non-labored, breath sounds are clear bilaterally, No wheezing, rales or rhonchi  Cardiovascular: Regular, S1 and S2, No murmurs, rubs, gallops oir clicks  Gastrointestinal: Bowel Sounds present, soft, nontender.   Lymph: No peripheral edema. No lymphadenopathy.  Neurological: Alert, no focal deficits  Skin: No rashes.  Psych:  Mood & affect appropriate    LABS: All Labs Reviewed:                        12.3   19.3  )-----------( 296      ( 30 Mar 2017 06:02 )             37.0                         11.9   19.7  )-----------( 273      ( 29 Mar 2017 06:59 )             37.5                         12.9   18.1  )-----------( 289      ( 28 Mar 2017 07:05 )             39.9     30 Mar 2017 06:02    137    |  95     |  39     ----------------------------<  102    4.3     |  33     |  0.74   29 Mar 2017 06:59    137    |  95     |  42     ----------------------------<  97     4.1     |  34     |  0.75   28 Mar 2017 07:05    134    |  92     |  39     ----------------------------<  96     3.8     |  35     |  0.96     Ca    8.7        30 Mar 2017 06:02  Ca    8.5        29 Mar 2017 06:59  Ca    8.6        28 Mar 2017 07:05  Phos  3.0       30 Mar 2017 06:02  Phos  2.9       29 Mar 2017 06:59  Mg     2.3       30 Mar 2017 06:02  Mg     2.4       29 Mar 2017 06:59      PT/INR - ( 30 Mar 2017 06:02 )   PT: 14.3 sec;   INR: 1.30 ratio       Telemetry reveals atrial fibrillation/flutter at approximately 100 beats per minute.    Echocardiography on March 25 revealed grossly normal left ventricular function, aortic sclerosis, mitral annular calcification, left atrial enlargement

## 2017-03-30 NOTE — DISCHARGE NOTE ADULT - CARE PROVIDERS DIRECT ADDRESSES
,DirectAddress_Unknown,[email or web address],DirectAddress_Unknown,DirectAddress_Unknown

## 2017-03-30 NOTE — DISCHARGE NOTE ADULT - MEDICATION SUMMARY - MEDICATIONS TO CHANGE
I will SWITCH the dose or number of times a day I take the medications listed below when I get home from the hospital:    Coumadin 5 mg oral tablet  -- pt takes anywhere from 2.5-5mg according to INR

## 2017-03-30 NOTE — DISCHARGE NOTE ADULT - CARE PROVIDER_API CALL
Lena Augustin), Internal Medicine; Rheumatology  [address] [facility] [address]  [place] [address]  Phone: [phone]  Fax: [phone]    Miles Phelps), Cardiovascular Disease  [address] [place] [address]  Phone: [phone]  Fax: [phone]    Jay Lopez), Critical Care Medicine; [facility]; Internal Medicine; Pulmonary Disease  [address] [place] [address]  Phone: [phone]  Fax: [phone]

## 2017-03-30 NOTE — DISCHARGE NOTE ADULT - ADDITIONAL INSTRUCTIONS
-follow up with  within 2-3 days after discharge for the subacute rechab   -follow up with  within 1 week after discharge for the subacute rechab  -follow up with  within 1 week after discharge for the subacute rechab  -if patient develop difficulty of breath, chest pain, loss of consciousness, fever>100.4, please sent patient to Emergency department or doctor's office -urinary retention: to be discharge with chronic Meza   -follow up with  within 2-3 days after discharge for the subacute rechab   -follow up with  within 1 week after discharge for the subacute rechab  -follow up with  within 1 week after discharge for the subacute rechab  -if patient develop difficulty of breath, chest pain, loss of consciousness, fever>100.4, please sent patient to Emergency department or doctor's office -urinary retention: to be discharge with chronic Meza   -follow up with  within 2-3 days after discharge for the subacute rechab   -follow up with  within 1 week after discharge for the subacute rechab  -follow up with  palliative care within 1 week after discharge for the subacute rechab  -if patient develop difficulty of breath, chest pain, loss of consciousness, fever>100.4, please sent patient to Emergency department or doctor's office  -please check inr on monday as was supratherapeutic  -demond garcia wants maintain patient on full code and will re discuss in future if condition changes

## 2017-03-30 NOTE — DISCHARGE NOTE ADULT - PATIENT PORTAL LINK FT
“You can access the Follow[employer] Patient Portal, offered by [facility], by registering with the following website: [email or web address]”

## 2017-03-30 NOTE — PROGRESS NOTE ADULT - PROBLEM SELECTOR PLAN 3
-suspect 2/2 CHF vs infection,   -last TTE was limited study -suspect 2/2 CHF vs infection  -last TTE was limited study  -Lasix x1 -suspect 2/2 CHF vs infection  -last TTE was limited study  -Lasix 20mg IVP x1

## 2017-03-30 NOTE — PROGRESS NOTE ADULT - SUBJECTIVE AND OBJECTIVE BOX
infectious diseases progress note:  KITTY ALCANTARA is a 87yFemale patient    Subjective:  Awake alert.   Sitting in chair.  Family at bedside.  Afebrile.    INR (international normal ratio) abnormal  Restrictive airway disease  Obstructive airway disease  Paroxysmal atrial fibrillation  Urinary retention  Dementia  Acute on chronic systolic congestive heart failure  Pneumonia, viral  Neurogenic bladder  DVT (deep venous thrombosis)  Arthritis  Memory impairment  Hypertension  Heart failure  Hip pain, acute, right  Prophylactic measure  Severe malnutrition  Hypokalemia  Chronic hyponatremia  UTI (urinary tract infection)  Afib  Acute pulmonary edema  Acute respiratory failure  Need for prophylactic measure  HLD (hyperlipidemia)  Hyponatremia  HTN (hypertension)  Sepsis  Atrial fibrillation with RVR  Pulmonary edema, acute  Respiratory failure with hypoxia      ROS:  CONSTITUTIONAL:  Negative fever or chills, feels well, good appetite  EYES:  Negative  blurry vision or double vision  CARDIOVASCULAR:  Negative for chest pain or palpitations  RESPIRATORY:  Negative for cough, wheezing, or SOB   GASTROINTESTINAL:  Negative for nausea, vomiting, diarrhea, constipation, or abdominal pain  GENITOURINARY:  Negative frequency, urgency or dysuria  NEUROLOGIC:  No headache, confusion, dizziness, lightheadedness    Allergies    No Known Allergies    Intolerances        ANTIBIOTICS/RELEVANT:  antimicrobials  ertapenem  IVPB 500milliGRAM(s) IV Intermittent every 24 hours    immunologic:    OTHER:  ALBUTerol/ipratropium for Nebulization 3milliLiter(s) Nebulizer every 6 hours  acetaminophen   Tablet. 650milliGRAM(s) Oral every 6 hours PRN  diltiazem    Tablet 60milliGRAM(s) Oral four times a day  lactobacillus acidophilus 1Tablet(s) Oral three times a day with meals  metoprolol succinate ER 25milliGRAM(s) Oral daily  pantoprazole    Tablet 40milliGRAM(s) Oral before breakfast  tamsulosin 0.4milliGRAM(s) Oral at bedtime  warfarin 1milliGRAM(s) Oral once      Objective:  Vital Signs Last 24 Hrs  T(C): 36.5, Max: 37.1 (03-30 @ 07:05)  T(F): 97.7, Max: 98.8 (03-30 @ 07:05)  HR: 71 (71 - 105)  BP: 111/74 (107/70 - 132/83)  BP(mean): --  RR: 23 (18 - 23)  SpO2: 99% (91% - 99%)    PHYSICAL EXAM:  Constitutional: NAD  Eyes:RADHA, EOMI  Ear/Nose/Throat: no oral lesion, no sinus tenderness on percussion	  Neck:no JVD, no lymphadenopathy, supple  Respiratory: Decreased  rashida  Cardiovascular: S1S2 RRR, no murmurs  Gastrointestinal:soft, (+) BS, no HSM  Extremities: Trace edema        LABS:                        12.3   19.3  )-----------( 296      ( 30 Mar 2017 06:02 )             37.0     30 Mar 2017 06:02    137    |  95     |  39     ----------------------------<  102    4.3     |  33     |  0.74     Ca    8.7        30 Mar 2017 06:02  Phos  3.0       30 Mar 2017 06:02  Mg     2.3       30 Mar 2017 06:02      PT/INR - ( 30 Mar 2017 06:02 )   PT: 14.3 sec;   INR: 1.30 ratio              RADIOLOGY & ADDITIONAL STUDIES:

## 2017-03-31 DIAGNOSIS — I48.91 UNSPECIFIED ATRIAL FIBRILLATION: ICD-10-CM

## 2017-03-31 DIAGNOSIS — J44.9 CHRONIC OBSTRUCTIVE PULMONARY DISEASE, UNSPECIFIED: ICD-10-CM

## 2017-03-31 DIAGNOSIS — D72.829 ELEVATED WHITE BLOOD CELL COUNT, UNSPECIFIED: ICD-10-CM

## 2017-03-31 LAB
ANION GAP SERPL CALC-SCNC: 10 MMOL/L — SIGNIFICANT CHANGE UP (ref 5–17)
BUN SERPL-MCNC: 42 MG/DL — HIGH (ref 7–23)
CALCIUM SERPL-MCNC: 8.5 MG/DL — SIGNIFICANT CHANGE UP (ref 8.5–10.1)
CHLORIDE SERPL-SCNC: 97 MMOL/L — SIGNIFICANT CHANGE UP (ref 96–108)
CO2 SERPL-SCNC: 30 MMOL/L — SIGNIFICANT CHANGE UP (ref 22–31)
CREAT SERPL-MCNC: 0.69 MG/DL — SIGNIFICANT CHANGE UP (ref 0.5–1.3)
GLUCOSE SERPL-MCNC: 107 MG/DL — HIGH (ref 70–99)
HCT VFR BLD CALC: 34.6 % — SIGNIFICANT CHANGE UP (ref 34.5–45)
HGB BLD-MCNC: 11.5 G/DL — SIGNIFICANT CHANGE UP (ref 11.5–15.5)
INR BLD: 1.25 RATIO — HIGH (ref 0.88–1.16)
MAGNESIUM SERPL-MCNC: 2.2 MG/DL — SIGNIFICANT CHANGE UP (ref 1.8–2.4)
MCHC RBC-ENTMCNC: 26.3 PG — LOW (ref 27–34)
MCHC RBC-ENTMCNC: 33.2 GM/DL — SIGNIFICANT CHANGE UP (ref 32–36)
MCV RBC AUTO: 79.2 FL — LOW (ref 80–100)
PHOSPHATE SERPL-MCNC: 2.9 MG/DL — SIGNIFICANT CHANGE UP (ref 2.5–4.5)
PLATELET # BLD AUTO: 288 K/UL — SIGNIFICANT CHANGE UP (ref 150–400)
POTASSIUM SERPL-MCNC: 4 MMOL/L — SIGNIFICANT CHANGE UP (ref 3.5–5.3)
POTASSIUM SERPL-SCNC: 4 MMOL/L — SIGNIFICANT CHANGE UP (ref 3.5–5.3)
PROTHROM AB SERPL-ACNC: 13.7 SEC — HIGH (ref 9.8–12.7)
RBC # BLD: 4.37 M/UL — SIGNIFICANT CHANGE UP (ref 3.8–5.2)
RBC # FLD: 15.1 % — HIGH (ref 10.3–14.5)
SODIUM SERPL-SCNC: 137 MMOL/L — SIGNIFICANT CHANGE UP (ref 135–145)
WBC # BLD: 20.5 K/UL — HIGH (ref 3.8–10.5)
WBC # FLD AUTO: 20.5 K/UL — HIGH (ref 3.8–10.5)

## 2017-03-31 PROCEDURE — 99233 SBSQ HOSP IP/OBS HIGH 50: CPT

## 2017-03-31 PROCEDURE — 99233 SBSQ HOSP IP/OBS HIGH 50: CPT | Mod: GC

## 2017-03-31 RX ORDER — METOPROLOL TARTRATE 50 MG
50 TABLET ORAL DAILY
Qty: 0 | Refills: 0 | Status: DISCONTINUED | OUTPATIENT
Start: 2017-04-01 | End: 2017-04-03

## 2017-03-31 RX ORDER — ENOXAPARIN SODIUM 100 MG/ML
75 INJECTION SUBCUTANEOUS ONCE
Qty: 0 | Refills: 0 | Status: COMPLETED | OUTPATIENT
Start: 2017-03-31 | End: 2017-03-31

## 2017-03-31 RX ORDER — METOPROLOL TARTRATE 50 MG
25 TABLET ORAL ONCE
Qty: 0 | Refills: 0 | Status: COMPLETED | OUTPATIENT
Start: 2017-03-31 | End: 2017-03-31

## 2017-03-31 RX ORDER — WARFARIN SODIUM 2.5 MG/1
1.5 TABLET ORAL ONCE
Qty: 0 | Refills: 0 | Status: COMPLETED | OUTPATIENT
Start: 2017-03-31 | End: 2017-03-31

## 2017-03-31 RX ORDER — NYSTATIN 500MM UNIT
500000 POWDER (EA) MISCELLANEOUS
Qty: 0 | Refills: 0 | Status: DISCONTINUED | OUTPATIENT
Start: 2017-03-31 | End: 2017-04-07

## 2017-03-31 RX ADMIN — Medication 3 MILLILITER(S): at 14:09

## 2017-03-31 RX ADMIN — PANTOPRAZOLE SODIUM 40 MILLIGRAM(S): 20 TABLET, DELAYED RELEASE ORAL at 05:41

## 2017-03-31 RX ADMIN — Medication 25 MILLIGRAM(S): at 10:06

## 2017-03-31 RX ADMIN — ENOXAPARIN SODIUM 75 MILLIGRAM(S): 100 INJECTION SUBCUTANEOUS at 12:06

## 2017-03-31 RX ADMIN — Medication 25 MILLIGRAM(S): at 05:41

## 2017-03-31 RX ADMIN — Medication 3 MILLILITER(S): at 07:43

## 2017-03-31 RX ADMIN — Medication 500000 UNIT(S): at 23:58

## 2017-03-31 RX ADMIN — Medication 1 TABLET(S): at 11:32

## 2017-03-31 RX ADMIN — Medication 1 TABLET(S): at 17:23

## 2017-03-31 RX ADMIN — TAMSULOSIN HYDROCHLORIDE 0.4 MILLIGRAM(S): 0.4 CAPSULE ORAL at 22:20

## 2017-03-31 RX ADMIN — Medication 1 TABLET(S): at 08:06

## 2017-03-31 RX ADMIN — WARFARIN SODIUM 1.5 MILLIGRAM(S): 2.5 TABLET ORAL at 22:20

## 2017-03-31 RX ADMIN — ERTAPENEM SODIUM 110 MILLIGRAM(S): 1 INJECTION, POWDER, LYOPHILIZED, FOR SOLUTION INTRAMUSCULAR; INTRAVENOUS at 17:22

## 2017-03-31 RX ADMIN — Medication 3 MILLILITER(S): at 19:36

## 2017-03-31 NOTE — PROGRESS NOTE ADULT - PROBLEM SELECTOR PLAN 3
kyphosis present  nebs  incentive marisela - pt cannot follow instructions  monitor sats  supp o2  keep sats greater than 88 percent  prognosis poor

## 2017-03-31 NOTE — PROGRESS NOTE ADULT - PROBLEM SELECTOR PLAN 4
-acute on chronic  -c/w Cardizem PO per cardio, INR was under therapeutic range  -continue with 2 mg Coumadin and 1x Lovenox full dose bridging today, c/w trending INR  -c/w tele monitor   -cardio following. -acute on chronic  -c/w Cardizem PO per cardio, INR was under therapeutic range  -increase to  50mg Toprol QD  -continue with1.5mg Coumadin and 1x Lovenox full dose bridging today, c/w trending INR  -c/w tele monitor   -cardio following.

## 2017-03-31 NOTE — PROGRESS NOTE ADULT - SUBJECTIVE AND OBJECTIVE BOX
Date/Time Patient Seen:  		  Referring MD:   Data Reviewed	       Patient is a 87y old  Female who presents with a chief complaint of SOB (30 Mar 2017 14:20)  weak and frail  lvoe cath remains in  poor historian      Subjective/HPI       Medication list         MEDICATIONS  (STANDING):  ALBUTerol/ipratropium for Nebulization 3milliLiter(s) Nebulizer every 6 hours  diltiazem    Tablet 60milliGRAM(s) Oral four times a day  lactobacillus acidophilus 1Tablet(s) Oral three times a day with meals  metoprolol succinate ER 25milliGRAM(s) Oral daily  pantoprazole    Tablet 40milliGRAM(s) Oral before breakfast  tamsulosin 0.4milliGRAM(s) Oral at bedtime  ertapenem  IVPB 500milliGRAM(s) IV Intermittent every 24 hours    MEDICATIONS  (PRN):  acetaminophen   Tablet. 650milliGRAM(s) Oral every 6 hours PRN Mild Pain (1 - 3)         Vitals log        ICU Vital Signs Last 24 Hrs  T(C): 36.7, Max: 36.7 (03-31 @ 04:14)  T(F): 98.1, Max: 98.1 (03-31 @ 04:14)  HR: 99 (71 - 111)  BP: 122/70 (105/72 - 135/85)  BP(mean): --  ABP: --  ABP(mean): --  RR: 16 (16 - 16)  SpO2: 96% (92% - 99%)           Input and Output:  I&O's Detail    I & Os for current day (as of 31 Mar 2017 07:12)  =============================================  IN:    Oral Fluid: 570 ml    Solution: 50 ml    Total IN: 620 ml  ---------------------------------------------  OUT:    Indwelling Catheter - Urethral: 1375 ml    Total OUT: 1375 ml  ---------------------------------------------  Total NET: -755 ml      Lab Data                        11.5   20.5  )-----------( 288      ( 31 Mar 2017 06:26 )             34.6     30 Mar 2017 06:02    137    |  95     |  39     ----------------------------<  102    4.3     |  33     |  0.74     Ca    8.7        30 Mar 2017 06:02  Phos  3.0       30 Mar 2017 06:02  Mg     2.3       30 Mar 2017 06:02              Review of Systems	      Objective       Resp  dec BS  no rhonchi  CV     s1s2  GI    Extremities     no gross edema  Neuro       Skin         Pertinent Lab findings & Imaging      Derrick:  NO   Adequate UO     I&O's Detail    I & Os for current day (as of 31 Mar 2017 07:12)  =============================================  IN:    Oral Fluid: 570 ml    Solution: 50 ml    Total IN: 620 ml  ---------------------------------------------  OUT:    Indwelling Catheter - Urethral: 1375 ml    Total OUT: 1375 ml  ---------------------------------------------  Total NET: -755 ml           Discussed with:     Cultures:	        Radiology

## 2017-03-31 NOTE — PROGRESS NOTE ADULT - PROBLEM SELECTOR PLAN 7
Likely sec to steroids  Clinically stable Likely sec to steroids  No diarrhea. Pt is afebrile.  Clinically stable

## 2017-03-31 NOTE — PROGRESS NOTE ADULT - PROBLEM SELECTOR PLAN 2
-suspect 2/2 CHF vs infection  -last TTE was limited study  -Lasix 20mg IVP x1 -suspect 2/2 CHF vs infection  -last TTE was limited study  -Lasix 20mg IVP prn  -Appreciate cardiology recs

## 2017-03-31 NOTE — CHART NOTE - NSCHARTNOTEFT_GEN_A_CORE
PGY1 on call note    Called by RN due to sore throat. Pt with family at bedside. Stated having sore throat starting from this morning. Difficult/painful to swallow yogurt. Pt denies having any other symptoms otherwise. Denies fever/chills, n/v, SOB, CP, palpitations.       ALBUTerol/ipratropium for Nebulization 3milliLiter(s) Nebulizer every 6 hours  acetaminophen   Tablet. 650milliGRAM(s) Oral every 6 hours PRN  diltiazem    Tablet 60milliGRAM(s) Oral four times a day  lactobacillus acidophilus 1Tablet(s) Oral three times a day with meals  pantoprazole    Tablet 40milliGRAM(s) Oral before breakfast  tamsulosin 0.4milliGRAM(s) Oral at bedtime  ertapenem  IVPB 500milliGRAM(s) IV Intermittent every 24 hours  nystatin    Suspension 686457Htci(s) Oral four times a day      T(C): 36.3, Max: 36.7 (03-31 @ 04:14)  HR: 75 (62 - 109)  BP: 123/70 (105/72 - 123/70)  RR: 17 (16 - 18)  SpO2: 90% (89% - 97%)  Wt(kg): --        Gen: Awake , alert, NAD  HEENT: MMM, No JVD, left cervical lymphadenopathy - tender upon palpation, oral thrush present  Cardio: +S1S2, No M/R/G  Resp: CTA b/l No W/R/R  Abd: Soft NTND   Ext: No C/C/E  Neuro: no focal deficits    A/P: 87 y.o. F with PMHx of A fib on coumadin, MDD, HLD, GERD, dysphagia, dementia, prior hx of DVT/PE?, HTN, urinary retention, and constipation brought in by EMS from [facility], admitted with acute hypoxic respiratory failure, now with oral thrush possibly 2/2 duoneb treatments  -will order nystatin suspension, oral, QID PGY1 on call note    Called by RN due to sore throat. Pt with family at bedside. Stated having sore throat starting from this morning. Difficult/painful to swallow yogurt. Pt denies having any other symptoms otherwise. Denies fever/chills, n/v, SOB, CP, palpitations.       ALBUTerol/ipratropium for Nebulization 3milliLiter(s) Nebulizer every 6 hours  acetaminophen   Tablet. 650milliGRAM(s) Oral every 6 hours PRN  diltiazem    Tablet 60milliGRAM(s) Oral four times a day  lactobacillus acidophilus 1Tablet(s) Oral three times a day with meals  pantoprazole    Tablet 40milliGRAM(s) Oral before breakfast  tamsulosin 0.4milliGRAM(s) Oral at bedtime  ertapenem  IVPB 500milliGRAM(s) IV Intermittent every 24 hours  nystatin    Suspension 255097Blmw(s) Oral four times a day      T(C): 36.3, Max: 36.7 (03-31 @ 04:14)  HR: 75 (62 - 109)  BP: 123/70 (105/72 - 123/70)  RR: 17 (16 - 18)  SpO2: 90% (89% - 97%)  Wt(kg): --        Gen: Awake , alert, NAD  HEENT: MMM, No JVD, left cervical lymphadenopathy - tender upon palpation, oral thrush present  Cardio: +S1S2, No M/R/G  Resp: CTA b/l No W/R/R  Abd: Soft NTND   Ext: No C/C/E  Neuro: no focal deficits    A/P: 87 y.o. F with PMHx of A fib on coumadin, MDD, HLD, GERD, dysphagia, dementia, prior hx of DVT/PE?, HTN, urinary retention, and constipation brought in by EMS from [facility], admitted with acute hypoxic respiratory failure, now with oral thrush.  -will order nystatin suspension, oral, QID

## 2017-03-31 NOTE — PROGRESS NOTE ADULT - SUBJECTIVE AND OBJECTIVE BOX
ICC:SOB    INTERVAL HPI/OVERNIGHT EVENTS: Pt seen and examed at bedside with . No acute event overnight.    MEDICATIONS  (STANDING):  ALBUTerol/ipratropium for Nebulization 3milliLiter(s) Nebulizer every 6 hours  diltiazem    Tablet 60milliGRAM(s) Oral four times a day  lactobacillus acidophilus 1Tablet(s) Oral three times a day with meals  metoprolol succinate ER 25milliGRAM(s) Oral daily  pantoprazole    Tablet 40milliGRAM(s) Oral before breakfast  tamsulosin 0.4milliGRAM(s) Oral at bedtime  ertapenem  IVPB 500milliGRAM(s) IV Intermittent every 24 hours    MEDICATIONS  (PRN):  acetaminophen   Tablet. 650milliGRAM(s) Oral every 6 hours PRN Mild Pain (1 - 3)      Allergies    No Known Allergies    Intolerances        ROS:   Limited 2/2 dementia  All other review of systems is negative unless indicated above.    Vital Signs Last 24 Hrs  T(C): 36.4, Max: 37 (03-29 @ 11:31)  T(F): 97.5, Max: 98.6 (03-29 @ 11:31)  HR: 105 (75 - 105)  BP: 132/83 (115/69 - 132/83)  BP(mean): --  RR: 18 (18 - 19)  SpO2: 94% (92% - 96%)    I & Os for current day (as of 03-30 @ 07:47)  =============================================  IN: 410 ml / OUT: 700 ml / NET: -290 ml      General: WN/WD NAD  Neurology: alert and awake, nonfocal,   Respiratory: diminished BS, mild Lt upper and lower lung rhonchi   +crackles  CV: distant HS, Irregular rate and rhythm , S1/S2, no murmurs, rubs or gallops  Abdominal: Soft, NT, ND +BS, Last BM  Extremities: No edema, + peripheral pulses      LABS:                                   11.5   20.5  )-----------( 288      ( 31 Mar 2017 06:26 )             34.6     31 Mar 2017 06:26    137    |  97     |  42     ----------------------------<  107    4.0     |  30     |  0.69     Ca    8.5        31 Mar 2017 06:26  Phos  2.9       31 Mar 2017 06:26  Mg     2.2       31 Mar 2017 06:26      PT/INR - ( 31 Mar 2017 06:26 )   PT: 13.7 sec;   INR: 1.25 ratio                        RADIOLOGY & ADDITIONAL TESTS: Rt hip x ray  No acute displaced fracture.          PCP:  consultant: - cardio, Pallitive- , -ID ICC:SOB    INTERVAL HPI/OVERNIGHT EVENTS: Pt seen and examed at bedside with . No acute event overnight. Per tele monitor, pt was Afib at rate 100s-110s. HR was controlled by one dose of lopressor per cardio    MEDICATIONS  (STANDING):  ALBUTerol/ipratropium for Nebulization 3milliLiter(s) Nebulizer every 6 hours  diltiazem    Tablet 60milliGRAM(s) Oral four times a day  lactobacillus acidophilus 1Tablet(s) Oral three times a day with meals  metoprolol succinate ER 25milliGRAM(s) Oral daily  pantoprazole    Tablet 40milliGRAM(s) Oral before breakfast  tamsulosin 0.4milliGRAM(s) Oral at bedtime  ertapenem  IVPB 500milliGRAM(s) IV Intermittent every 24 hours    MEDICATIONS  (PRN):  acetaminophen   Tablet. 650milliGRAM(s) Oral every 6 hours PRN Mild Pain (1 - 3)      Allergies    No Known Allergies    Intolerances        ROS:   Limited 2/2 dementia  All other review of systems is negative unless indicated above.    Vital Signs Last 24 Hrs  T(C): 36.4, Max: 37 (03-29 @ 11:31)  T(F): 97.5, Max: 98.6 (03-29 @ 11:31)  HR: 105 (75 - 105)  BP: 132/83 (115/69 - 132/83)  BP(mean): --  RR: 18 (18 - 19)  SpO2: 94% (92% - 96%)    I & Os for current day (as of 03-30 @ 07:47)  =============================================  IN: 410 ml / OUT: 700 ml / NET: -290 ml      General: WN/WD NAD  Neurology: alert and awake, nonfocal,   Respiratory: diminished BS, mild Lt upper and lower lung rhonchi   +crackles  CV: distant HS, Irregular rate and rhythm , S1/S2, no murmurs, rubs or gallops  Abdominal: Soft, NT, ND +BS, Last BM  Extremities: No edema, + peripheral pulses      LABS:                                   11.5   20.5  )-----------( 288      ( 31 Mar 2017 06:26 )             34.6     31 Mar 2017 06:26    137    |  97     |  42     ----------------------------<  107    4.0     |  30     |  0.69     Ca    8.5        31 Mar 2017 06:26  Phos  2.9       31 Mar 2017 06:26  Mg     2.2       31 Mar 2017 06:26      PT/INR - ( 31 Mar 2017 06:26 )   PT: 13.7 sec;   INR: 1.25 ratio                        RADIOLOGY & ADDITIONAL TESTS: Rt hip x ray  No acute displaced fracture.          PCP:  consultant: - cardio, Pallitive- , -ID

## 2017-03-31 NOTE — PROGRESS NOTE ADULT - ASSESSMENT
87 y.o. F with PMHx of A fib on coumadin, MDD, HLD, GERD, dysphagia, dementia, prior hx of DVT/PE?, HTN, urinary retention, and constipation brought in by EMS from [facility], admitted with acute hypoxic respiratory failure 2/2 pulmonary edema 2/2 HMPV virus, UTI(E.coli) , hypertensive urgency, and afib with RVR.

## 2017-03-31 NOTE — PROGRESS NOTE ADULT - SUBJECTIVE AND OBJECTIVE BOX
KITTY ALCANTARA is a 87yFemale , patient examined and chart reviewed. Patient being followed for   UTI and possible Asp pna    Subjective:  No events.  Afebrile.  No n/v/diarrhea.      ROS:  CONSTITUTIONAL:  Negative fever or chills, feels well, good appetite  EYES:  Negative  blurry vision or double vision  CARDIOVASCULAR:  Negative for chest pain or palpitations  RESPIRATORY:  Negative for cough, wheezing, or SOB   GASTROINTESTINAL:  Negative for nausea, vomiting, diarrhea, constipation, or abdominal pain  GENITOURINARY:  Negative frequency, urgency or dysuria  NEUROLOGIC:  No headache, confusion, dizziness, lightheadedness    No Known Allergies      ANTIBIOTICS/RELEVANT:  lactobacillus acidophilus 1Tablet(s) Oral three times a day with meals  ertapenem  IVPB 500milliGRAM(s) IV Intermittent every 24 hours      ALBUTerol/ipratropium for Nebulization 3milliLiter(s) Nebulizer every 6 hours  acetaminophen   Tablet. 650milliGRAM(s) Oral every 6 hours PRN  diltiazem    Tablet 60milliGRAM(s) Oral four times a day  pantoprazole    Tablet 40milliGRAM(s) Oral before breakfast  tamsulosin 0.4milliGRAM(s) Oral at bedtime  warfarin 1.5milliGRAM(s) Oral once      Objective:  I & Os for 24h ending 03-31 @ 07:00  =============================================  IN: 620 ml / OUT: 1375 ml / NET: -755 ml    I & Os for current day (as of 03-31 @ 18:53)  =============================================  IN: 270 ml / OUT: 0 ml / NET: 270 ml    T(C): 36.4, Max: 36.7 (03-31 @ 04:14)  HR: 75 (62 - 111)  BP: 114/76 (105/72 - 122/70)  RR: 17 (16 - 18)  SpO2: 91% (89% - 97%)  Wt(kg): --    PHYSICAL EXAM:  Constitutional:Well-developed, well nourished  Eyes:RADHA, EOMI  Ear/Nose/Throat: no oral lesion, no sinus tenderness on percussion	  Neck:no JVD, no lymphadenopathy, supple  Respiratory: CTA rashida  Cardiovascular: S1S2 RRR, no murmurs  Gastrointestinal:soft, (+) BS, no HSM  Extremities:no e/e/c  CNS:     LABS:                        11.5   20.5  )-----------( 288      ( 31 Mar 2017 06:26 )             34.6     31 Mar 2017 06:26    137    |  97     |  42     ----------------------------<  107    4.0     |  30     |  0.69     Ca    8.5        31 Mar 2017 06:26  Phos  2.9       31 Mar 2017 06:26  Mg     2.2       31 Mar 2017 06:26      PT/INR - ( 31 Mar 2017 06:26 )   PT: 13.7 sec;   INR: 1.25 ratio         RADIOLOGY & ADDITIONAL STUDIES:  EXAM:  CT CHEST                        PROCEDURE DATE:  03/28/2017        INTERPRETATION:  Indication: Follow-up.    Noncontrast chest CT. Prior 3/22/2017.    There has been interval resolution of the right lung infiltrate.  No significant interval change in small layering left pleural effusion   and dependent atelectasis left base.  Central airways patent. No mediastinal adenopathy or other interval   change.    Impression:    Interval resolution right lung infiltrate. No change small layering left   pleural effusion with dependent atelectasis.    RADHA POLK M.D., ATTENDING RADIOLOGIST  This document has been electronically signed. Mar 28 2017 12:48PM

## 2017-03-31 NOTE — PROGRESS NOTE ADULT - SUBJECTIVE AND OBJECTIVE BOX
[facility]     CHIEF COMPLAINT: Patient is a 87y old  Female who presents with a chief complaint of SOB (30 Mar 2017 14:20)      Follow Up: af, prior dvt/pe    Interim history:  The patient is unable to provide a history on the basis of dementia. The chart has been reviewed.    MEDICATIONS  (STANDING):  ALBUTerol/ipratropium for Nebulization 3milliLiter(s) Nebulizer every 6 hours  diltiazem    Tablet 60milliGRAM(s) Oral four times a day  lactobacillus acidophilus 1Tablet(s) Oral three times a day with meals  metoprolol succinate ER 25milliGRAM(s) Oral daily  pantoprazole    Tablet 40milliGRAM(s) Oral before breakfast  tamsulosin 0.4milliGRAM(s) Oral at bedtime  ertapenem  IVPB 500milliGRAM(s) IV Intermittent every 24 hours    MEDICATIONS  (PRN):  acetaminophen   Tablet. 650milliGRAM(s) Oral every 6 hours PRN Mild Pain (1 - 3)      REVIEW OF SYSTEMS:  eye, ent, GI, , allergic, dermatologic, musculoskeletal and neurologic are negative except as described above    Vital Signs Last 24 Hrs  T(C): 36.5, Max: 36.7 (03-31 @ 04:14)  T(F): 97.7, Max: 98.1 (03-31 @ 04:14)  HR: 97 (71 - 111)  BP: 117/72 (105/72 - 135/85)  BP(mean): --  RR: 16 (16 - 18)  SpO2: 97% (93% - 99%)    I&O's Summary    I & Os for current day (as of 31 Mar 2017 09:23)  =============================================  IN: 620 ml / OUT: 1375 ml / NET: -755 ml      Telemetry past 24h: Atrial fibrillation, 90s to 110s, overall not optimally controlled.    PHYSICAL EXAM:    Constitutional: well-nourished, well-developed, NAD   HEENT:  MMM, sclerae anicteric, conjunctivae clear, no oral cyanosis.  Pulmonary: Non-labored, breath sounds are clear bilaterally, Limited anterior examination. No wheezing, rales or rhonchi  Cardiovascular: Irregular, S1 and S2, No murmurs, rubs, gallops or clicks  Gastrointestinal: Bowel Sounds present, soft, nontender.   Lymph: No peripheral edema. No lymphadenopathy.  Neurological: Lethargic but arousable  Skin: No rashes.  Psych: Lethargic but arousable    LABS: All Labs Reviewed:                        11.5   20.5  )-----------( 288      ( 31 Mar 2017 06:26 )             34.6                         12.3   19.3  )-----------( 296      ( 30 Mar 2017 06:02 )             37.0                         11.9   19.7  )-----------( 273      ( 29 Mar 2017 06:59 )             37.5     31 Mar 2017 06:26    137    |  97     |  42     ----------------------------<  107    4.0     |  30     |  0.69   30 Mar 2017 06:02    137    |  95     |  39     ----------------------------<  102    4.3     |  33     |  0.74   29 Mar 2017 06:59    137    |  95     |  42     ----------------------------<  97     4.1     |  34     |  0.75     Ca    8.5        31 Mar 2017 06:26  Ca    8.7        30 Mar 2017 06:02  Ca    8.5        29 Mar 2017 06:59  Phos  2.9       31 Mar 2017 06:26  Phos  3.0       30 Mar 2017 06:02  Phos  2.9       29 Mar 2017 06:59  Mg     2.2       31 Mar 2017 06:26  Mg     2.3       30 Mar 2017 06:02  Mg     2.4       29 Mar 2017 06:59      PT/INR - ( 31 Mar 2017 06:26 )   PT: 13.7 sec;   INR: 1.25 ratio               Blood Culture:         RADIOLOGY/EKG:         EXAM:  ECHO TTE W/O CON COMP W/DOPPLR         PROCEDURE DATE:  03/25/2017        INTERPRETATION:  Ordering Physician: LYNNE GARDNER [id number]    Indication: SVT    Study Quality: Technically difficult and limited   A limited echocardiographicstudy was performed.      Height: 157 cm  Weight: 41 kg  BSA: 1.36 sq m  Blood Pressure: 114/73    RVSP: 39mmHg    FINDINGS  Left Ventricle: Endocardium is not well-visualized. Grossly, normal left   ventricular systolic function.  Aortic Valve: Calcified trileaflet aortic valve. Minimal aortic   insufficiency.  Mitral Valve: Mitral annular calcification and calcified mitral leaflets.   Mild mitral insufficiency.  Tricuspid Valve: Tricuspid valve is not well-visualized. Mild tricuspid   insufficiency.  Pulmonic Valve: Not well visualized. Trace pulmonic insufficiency.  Left Atrium: Severe left atrial enlargement.  Right Ventricle: Normal right ventricular size and systolic function.  Right Atrium: Moderately enlarged.  Pericardium/Pleura: Normal pericardium with no pericardial effusion.                    JOSE GRIJALVA M.D., ATTENDING CARDIOLOGIST  This document has been electronically signed. Mar 26 2017  1:47PM                Impression and Plan:    There is no evidence of acute ischemia. There is no known history of CAD.    There is no meaningful volume overload on exam. A small left pleural effusion was identified on the CT scan from March 28. Diuretic therapy does not seem to be required at this time.    She is in permanent atrial fibrillation. Her heart rate control is suboptimal. She is on diltiazem 60 mg 4 times daily as well as metoprolol succinate 25 mg daily. I will try to add additional metoprolol succinate, raising the dose to 50 mg daily. Her blood pressure is marginal at times, and her blood pressure will need to be followed carefully.    She is on long-term anticoagulation for atrial fibrillation and prior pulmonary embolism. Goal INR is between 2 and 3. Unless there is a contraindication, Lovenox and be administered to bridge while INR is subtherapeutic.

## 2017-03-31 NOTE — PROGRESS NOTE ADULT - PROBLEM SELECTOR PLAN 7
-INR was under therapeutic range  -start 2 mg Coumadin and 1x Lovenox full dose today, c/w trending INR -INR was under therapeutic range  -start 1.5 mg Coumadin and 1x Lovenox full dose today, c/w trending INR

## 2017-03-31 NOTE — PROGRESS NOTE ADULT - ATTENDING COMMENTS
Agree with exam and plan as above with the following: In brief this is a 88 yo F w/ ILD/COPD/ HFpEF, hx of PE, Afib comes in with Viral infection HMPV/ respiratory failure as above, had urinary retention s/p recent love, also on ertapenem for UTI to complete 7 days.   ID recs noted.  Hold Lasix for now. Increase Metoprolol. Appreciate cardio recs.   Continue Coumadin- bridge again w/ Lovenox today. INR daily.  Will need to readdress GOC with son. Son aware of overall grim prognosis given multiple comorbidities and frailty.   Appreciate Pulm recs.  Rest agree as above.

## 2017-03-31 NOTE — PROGRESS NOTE ADULT - PROBLEM SELECTOR PLAN 3
-c/w Duoneb /O2 supplement  -finished coursed of steroid tapering  -Pt was unable to follow instruction of intense marisela   -Pulm following.   -overall prognosis poor -c/w Duoneb /O2 supplement  -finished coursed of steroid tapering  -leukocytosis likely from steroids, monitor CBC  -Pulm following.   -overall prognosis poor

## 2017-03-31 NOTE — PROGRESS NOTE ADULT - ASSESSMENT
87 y.o. F with PMHx of A fib on coumadin, MDD, HLD, GERD, dysphagia, dementia, prior hx of DVT/PE?, HTN, urinary retention, and constipation brought in by EMS from [facility], admitted with acute hypoxic respiratory failure sec pulmonary edema with HMPV virus infection bronchitis/pna, UTI(E.coli) , and afib with RVR. Clinically stable.

## 2017-04-01 DIAGNOSIS — B37.0 CANDIDAL STOMATITIS: ICD-10-CM

## 2017-04-01 DIAGNOSIS — I26.99 OTHER PULMONARY EMBOLISM WITHOUT ACUTE COR PULMONALE: ICD-10-CM

## 2017-04-01 LAB
ANION GAP SERPL CALC-SCNC: 8 MMOL/L — SIGNIFICANT CHANGE UP (ref 5–17)
BUN SERPL-MCNC: 34 MG/DL — HIGH (ref 7–23)
CALCIUM SERPL-MCNC: 8 MG/DL — LOW (ref 8.5–10.1)
CHLORIDE SERPL-SCNC: 98 MMOL/L — SIGNIFICANT CHANGE UP (ref 96–108)
CO2 SERPL-SCNC: 31 MMOL/L — SIGNIFICANT CHANGE UP (ref 22–31)
CREAT SERPL-MCNC: 0.66 MG/DL — SIGNIFICANT CHANGE UP (ref 0.5–1.3)
GLUCOSE SERPL-MCNC: 103 MG/DL — HIGH (ref 70–99)
HCT VFR BLD CALC: 35.4 % — SIGNIFICANT CHANGE UP (ref 34.5–45)
HGB BLD-MCNC: 11.5 G/DL — SIGNIFICANT CHANGE UP (ref 11.5–15.5)
INR BLD: 1.27 RATIO — HIGH (ref 0.88–1.16)
MAGNESIUM SERPL-MCNC: 2.1 MG/DL — SIGNIFICANT CHANGE UP (ref 1.8–2.4)
MCHC RBC-ENTMCNC: 26.2 PG — LOW (ref 27–34)
MCHC RBC-ENTMCNC: 32.3 GM/DL — SIGNIFICANT CHANGE UP (ref 32–36)
MCV RBC AUTO: 81.1 FL — SIGNIFICANT CHANGE UP (ref 80–100)
PHOSPHATE SERPL-MCNC: 2.7 MG/DL — SIGNIFICANT CHANGE UP (ref 2.5–4.5)
PLATELET # BLD AUTO: 236 K/UL — SIGNIFICANT CHANGE UP (ref 150–400)
POTASSIUM SERPL-MCNC: 4 MMOL/L — SIGNIFICANT CHANGE UP (ref 3.5–5.3)
POTASSIUM SERPL-SCNC: 4 MMOL/L — SIGNIFICANT CHANGE UP (ref 3.5–5.3)
PROCALCITONIN SERPL-MCNC: <0.05 NG/ML — SIGNIFICANT CHANGE UP (ref 0–0.05)
PROTHROM AB SERPL-ACNC: 13.9 SEC — HIGH (ref 9.8–12.7)
RBC # BLD: 4.37 M/UL — SIGNIFICANT CHANGE UP (ref 3.8–5.2)
RBC # FLD: 15.6 % — HIGH (ref 10.3–14.5)
SODIUM SERPL-SCNC: 137 MMOL/L — SIGNIFICANT CHANGE UP (ref 135–145)
WBC # BLD: 17.7 K/UL — HIGH (ref 3.8–10.5)
WBC # FLD AUTO: 17.7 K/UL — HIGH (ref 3.8–10.5)

## 2017-04-01 PROCEDURE — 99233 SBSQ HOSP IP/OBS HIGH 50: CPT

## 2017-04-01 RX ORDER — WARFARIN SODIUM 2.5 MG/1
5 TABLET ORAL ONCE
Qty: 0 | Refills: 0 | Status: COMPLETED | OUTPATIENT
Start: 2017-04-01 | End: 2017-04-01

## 2017-04-01 RX ORDER — FLUCONAZOLE 150 MG/1
200 TABLET ORAL DAILY
Qty: 0 | Refills: 0 | Status: COMPLETED | OUTPATIENT
Start: 2017-04-01 | End: 2017-04-07

## 2017-04-01 RX ORDER — ENOXAPARIN SODIUM 100 MG/ML
50 INJECTION SUBCUTANEOUS DAILY
Qty: 0 | Refills: 0 | Status: DISCONTINUED | OUTPATIENT
Start: 2017-04-01 | End: 2017-04-03

## 2017-04-01 RX ADMIN — PANTOPRAZOLE SODIUM 40 MILLIGRAM(S): 20 TABLET, DELAYED RELEASE ORAL at 05:31

## 2017-04-01 RX ADMIN — ENOXAPARIN SODIUM 50 MILLIGRAM(S): 100 INJECTION SUBCUTANEOUS at 23:47

## 2017-04-01 RX ADMIN — Medication 50 MILLIGRAM(S): at 05:31

## 2017-04-01 RX ADMIN — WARFARIN SODIUM 5 MILLIGRAM(S): 2.5 TABLET ORAL at 21:41

## 2017-04-01 RX ADMIN — Medication 500000 UNIT(S): at 23:46

## 2017-04-01 RX ADMIN — Medication 650 MILLIGRAM(S): at 19:52

## 2017-04-01 RX ADMIN — Medication 500000 UNIT(S): at 05:30

## 2017-04-01 RX ADMIN — Medication 1 TABLET(S): at 11:59

## 2017-04-01 RX ADMIN — Medication 500000 UNIT(S): at 17:27

## 2017-04-01 RX ADMIN — Medication 650 MILLIGRAM(S): at 12:01

## 2017-04-01 RX ADMIN — Medication 1 TABLET(S): at 17:27

## 2017-04-01 RX ADMIN — Medication 3 MILLILITER(S): at 19:55

## 2017-04-01 RX ADMIN — TAMSULOSIN HYDROCHLORIDE 0.4 MILLIGRAM(S): 0.4 CAPSULE ORAL at 21:41

## 2017-04-01 RX ADMIN — Medication 3 MILLILITER(S): at 14:51

## 2017-04-01 RX ADMIN — Medication 1 TABLET(S): at 09:34

## 2017-04-01 RX ADMIN — Medication 650 MILLIGRAM(S): at 13:10

## 2017-04-01 RX ADMIN — FLUCONAZOLE 200 MILLIGRAM(S): 150 TABLET ORAL at 12:00

## 2017-04-01 RX ADMIN — ERTAPENEM SODIUM 110 MILLIGRAM(S): 1 INJECTION, POWDER, LYOPHILIZED, FOR SOLUTION INTRAMUSCULAR; INTRAVENOUS at 15:20

## 2017-04-01 RX ADMIN — Medication 500000 UNIT(S): at 11:59

## 2017-04-01 RX ADMIN — Medication 650 MILLIGRAM(S): at 20:50

## 2017-04-01 RX ADMIN — Medication 3 MILLILITER(S): at 07:37

## 2017-04-01 NOTE — PROGRESS NOTE ADULT - PROBLEM SELECTOR PLAN 1
-acute  -ID rec. appreciated   -c/w Ertapenem to 500mg q24hr for total 7 days, last dose 4/4  -ID following, apprec recs

## 2017-04-01 NOTE — PROGRESS NOTE ADULT - SUBJECTIVE AND OBJECTIVE BOX
Date/Time Patient Seen:  		  Referring MD:   Data Reviewed	       Patient is a 87y old  Female who presents with a chief complaint of SOB (30 Mar 2017 14:20)  in bed  weak and frail  on O2 support  c/o sore throat - started on Nystatin, but not cooperative with Nystatin oral      Subjective/HPI       Medication list         MEDICATIONS  (STANDING):  ALBUTerol/ipratropium for Nebulization 3milliLiter(s) Nebulizer every 6 hours  diltiazem    Tablet 60milliGRAM(s) Oral four times a day  lactobacillus acidophilus 1Tablet(s) Oral three times a day with meals  pantoprazole    Tablet 40milliGRAM(s) Oral before breakfast  tamsulosin 0.4milliGRAM(s) Oral at bedtime  ertapenem  IVPB 500milliGRAM(s) IV Intermittent every 24 hours  metoprolol succinate ER 50milliGRAM(s) Oral daily  nystatin    Suspension [id number](s) Oral four times a day    MEDICATIONS  (PRN):  acetaminophen   Tablet. 650milliGRAM(s) Oral every 6 hours PRN Mild Pain (1 - 3)         Vitals log        ICU Vital Signs Last 24 Hrs  T(C): 36.4, Max: 36.5 (03-31 @ 07:50)  T(F): 97.5, Max: 97.7 (03-31 @ 07:50)  HR: 93 (62 - 104)  BP: 110/81 (110/77 - 123/70)  BP(mean): --  ABP: --  ABP(mean): --  RR: 17 (16 - 18)  SpO2: 92% (89% - 97%)           Input and Output:  I&O's Detail  I & Os for 24h ending 31 Mar 2017 07:00  =============================================  IN:    Oral Fluid: 570 ml    Solution: 50 ml    Total IN: 620 ml  ---------------------------------------------  OUT:    Indwelling Catheter - Urethral: 1375 ml    Total OUT: 1375 ml  ---------------------------------------------  Total NET: -755 ml    I & Os for current day (as of 01 Apr 2017 06:15)  =============================================  IN:    Oral Fluid: 340 ml    Solution: 50 ml    Total IN: 390 ml  ---------------------------------------------  OUT:    Indwelling Catheter - Urethral: 650 ml    Total OUT: 650 ml  ---------------------------------------------  Total NET: -260 ml      Lab Data                        11.5   20.5  )-----------( 288      ( 31 Mar 2017 06:26 )             34.6     31 Mar 2017 06:26    137    |  97     |  42     ----------------------------<  107    4.0     |  30     |  0.69     Ca    8.5        31 Mar 2017 06:26  Phos  2.9       31 Mar 2017 06:26  Mg     2.2       31 Mar 2017 06:26              Review of Systems	      Objective       Resp no rhonchi    CV s1s2       GI    Extremities       Neuro       Skin         Pertinent Lab findings & Imaging      Derrick:  NO   Adequate UO     I&O's Detail  I & Os for 24h ending 31 Mar 2017 07:00  =============================================  IN:    Oral Fluid: 570 ml    Solution: 50 ml    Total IN: 620 ml  ---------------------------------------------  OUT:    Indwelling Catheter - Urethral: 1375 ml    Total OUT: 1375 ml  ---------------------------------------------  Total NET: -755 ml    I & Os for current day (as of 01 Apr 2017 06:15)  =============================================  IN:    Oral Fluid: 340 ml    Solution: 50 ml    Total IN: 390 ml  ---------------------------------------------  OUT:    Indwelling Catheter - Urethral: 650 ml    Total OUT: 650 ml  ---------------------------------------------  Total NET: -260 ml           Discussed with:     Cultures:	        Radiology

## 2017-04-01 NOTE — PROGRESS NOTE ADULT - SUBJECTIVE AND OBJECTIVE BOX
[facility]    Marshall Frederick, Lenin Griffin, Kofi [place]      [phone]    CHIEF COMPLAINT: Patient is a 87y old  Female who presents with a chief complaint of SOB (30 Mar 2017 14:20)      Follow Up: dvt, pe, af    Interim history: She is unable to provide a meaningful history on the basis of dementia.    MEDICATIONS  (STANDING):  ALBUTerol/ipratropium for Nebulization 3milliLiter(s) Nebulizer every 6 hours  diltiazem    Tablet 60milliGRAM(s) Oral four times a day  lactobacillus acidophilus 1Tablet(s) Oral three times a day with meals  pantoprazole    Tablet 40milliGRAM(s) Oral before breakfast  tamsulosin 0.4milliGRAM(s) Oral at bedtime  ertapenem  IVPB 500milliGRAM(s) IV Intermittent every 24 hours  metoprolol succinate ER 50milliGRAM(s) Oral daily  nystatin    Suspension [id number](s) Oral four times a day  fluconAZOLE   Tablet 200milliGRAM(s) Oral daily    MEDICATIONS  (PRN):  acetaminophen   Tablet. 650milliGRAM(s) Oral every 6 hours PRN Mild Pain (1 - 3)      REVIEW OF SYSTEMS:  eye, ent, GI, , allergic, dermatologic, musculoskeletal and neurologic are negative except as described above    Vital Signs Last 24 Hrs  T(C): 36.4, Max: 36.4 (03-31 @ 11:34)  T(F): 97.5, Max: 97.6 (03-31 @ 11:34)  HR: 93 (62 - 104)  BP: 110/81 (110/77 - 123/70)  BP(mean): --  RR: 17 (16 - 17)  SpO2: 92% (89% - 95%)    I&O's Summary    I & Os for current day (as of 01 Apr 2017 08:17)  =============================================  IN: 390 ml / OUT: 650 ml / NET: -260 ml      Telemetry past 24h:    PHYSICAL EXAM:    Constitutional: well-nourished, well-developed, NAD   HEENT:  MMM, sclerae anicteric, conjunctivae clear, no oral cyanosis.  Pulmonary: Limited anterior examination. Non-labored, breath sounds are clear bilaterally, No wheezing, rales or rhonchi  Cardiovascular: Irregular, S1 and S2, No murmurs, rubs, gallops or clicks  Gastrointestinal: Bowel Sounds present, soft, nontender.   Lymph: No peripheral edema. No lymphadenopathy.  Neurological: Alert, Dementia  Skin: No rashes.  Psych:  Mood & affect appropriate    LABS: All Labs Reviewed:                        11.5   17.7  )-----------( 236      ( 01 Apr 2017 06:46 )             35.4                         11.5   20.5  )-----------( 288      ( 31 Mar 2017 06:26 )             34.6                         12.3   19.3  )-----------( 296      ( 30 Mar 2017 06:02 )             37.0     01 Apr 2017 06:46    137    |  98     |  34     ----------------------------<  103    4.0     |  31     |  0.66   31 Mar 2017 06:26    137    |  97     |  42     ----------------------------<  107    4.0     |  30     |  0.69   30 Mar 2017 06:02    137    |  95     |  39     ----------------------------<  102    4.3     |  33     |  0.74     Ca    8.0        01 Apr 2017 06:46  Ca    8.5        31 Mar 2017 06:26  Ca    8.7        30 Mar 2017 06:02  Phos  2.7       01 Apr 2017 06:46  Phos  2.9       31 Mar 2017 06:26  Phos  3.0       30 Mar 2017 06:02  Mg     2.1       01 Apr 2017 06:46  Mg     2.2       31 Mar 2017 06:26  Mg     2.3       30 Mar 2017 06:02      PT/INR - ( 01 Apr 2017 06:46 )   PT: 13.9 sec;   INR: 1.27 ratio               Blood Culture:         RADIOLOGY:       EXAM:  ECHO TTE W/O CON COMP W/DOPPLR         PROCEDURE DATE:  03/25/2017        INTERPRETATION:  Ordering Physician: LYNNE GARDNER [id number]    Indication: SVT    Study Quality: Technically difficult and limited   A limited echocardiographicstudy was performed.      Height: 157 cm  Weight: 41 kg  BSA: 1.36 sq m  Blood Pressure: 114/73    RVSP: 39mmHg    FINDINGS  Left Ventricle: Endocardium is not well-visualized. Grossly, normal left   ventricular systolic function.  Aortic Valve: Calcified trileaflet aortic valve. Minimal aortic   insufficiency.  Mitral Valve: Mitral annular calcification and calcified mitral leaflets.   Mild mitral insufficiency.  Tricuspid Valve: Tricuspid valve is not well-visualized. Mild tricuspid   insufficiency.  Pulmonic Valve: Not well visualized. Trace pulmonic insufficiency.  Left Atrium: Severe left atrial enlargement.  Right Ventricle: Normal right ventricular size and systolic function.  Right Atrium: Moderately enlarged.  Pericardium/Pleura: Normal pericardium with no pericardial effusion.               EKG:      Impression and Plan: 87 female, chronic atrial fibrillation and prior DVT PE admitted with viral infection, decompensated by urinary retention and uncontrolled heart rate.    There is no evidence of acute ischemia.    Her exam is difficult, but there is no clear volume overload. No diuretics appear necessary at this time. I would suggest a repeat chest x-ray.    She remains in atrial fibrillation, overall rate controlled. Medications were changed yesterday. At this time, I would continue her present rate control regimen, including metoprolol succinate 50 mg daily, diltiazem 60 mg 4 times daily. I would suggest changing diltiazem to Cardizem  mg daily, beginning tomorrow.    She should be anticoagulated. She is not being bridged with parenteral medication. Her goal INR is between 2 and 3, but she should be given daily Lovenox until her INR is therapeutic.

## 2017-04-01 NOTE — PROGRESS NOTE ADULT - SUBJECTIVE AND OBJECTIVE BOX
Patient is a 87y old  Female who presents with a chief complaint of SOB (30 Mar 2017 14:20)      INTERVAL HPI: Pt seen and examined. No acute complaints, still has some sob.     OVERNIGHT EVENTS:  T(C): 36.6, Max: 37 (04-01 @ 08:00)  HR: 85 (84 - 104)  BP: 118/84 (100/63 - 118/84)  RR: 19 (17 - 23)  SpO2: 98% (91% - 98%)  Wt(kg): --  I&O's Summary  I & Os for 24h ending 01 Apr 2017 07:00  =============================================  IN: 390 ml / OUT: 650 ml / NET: -260 ml    I & Os for current day (as of 01 Apr 2017 23:05)  =============================================  IN: 110 ml / OUT: 400 ml / NET: -290 ml      REVIEW OF SYSTEMS:  CONSTITUTIONAL: No fever, weight loss, or fatigue  EYES: No eye pain, visual disturbances, or discharge  ENMT:  No difficulty hearing, tinnitus, vertigo; No sinus or throat pain  NECK: No pain or stiffness  BREASTS: No pain, masses, or nipple discharge  RESPIRATORY: No cough, wheezing, chills or hemoptysis; mild shortness of breath  CARDIOVASCULAR: No chest pain, palpitations, dizziness, or leg swelling  GASTROINTESTINAL: No abdominal or epigastric pain. No nausea, vomiting, or hematemesis; No diarrhea or constipation. No melena or hematochezia.  GENITOURINARY: No dysuria, frequency, hematuria, or incontinence  NEUROLOGICAL: No headaches, memory loss, loss of strength, numbness, or tremors  SKIN: No itching, burning, rashes, or lesions   LYMPH NODES: No enlarged glands  ENDOCRINE: No heat or cold intolerance; No hair loss  MUSCULOSKELETAL: No joint pain or swelling; No muscle, back, or extremity pain  PSYCHIATRIC: No depression, anxiety, mood swings, or difficulty sleeping  HEME/LYMPH: No easy bruising, or bleeding gums  ALLERY AND IMMUNOLOGIC: No hives or eczema    PHYSICAL EXAM:  GENERAL: NAD, cooperative on exam  HEAD:  Atraumatic, Normocephalic  EYES: EOMI, PERRLA, conjunctiva and sclera clear  ENMT: No tonsillar erythema, exudates, or enlargement; Moist mucous membranes, Good dentition, No lesions  NECK: Supple, No JVD, Normal thyroid  NERVOUS SYSTEM:  Alert & Oriented X3, no other focal deficits  CHEST/LUNG: Clear to percussion bilaterally; No rales, rhonchi, mild wheeze  HEART: Regular rate and rhythm; No murmurs, rubs, or gallops  ABDOMEN: Soft, Nontender, Nondistended; Bowel sounds present  EXTREMITIES:  2+ Peripheral Pulses, No clubbing, cyanosis, or edema  LYMPH: No lymphadenopathy noted  SKIN: No rashes or lesions    LABS:                        11.5   17.7  )-----------( 236      ( 01 Apr 2017 06:46 )             35.4     01 Apr 2017 06:46    137    |  98     |  34     ----------------------------<  103    4.0     |  31     |  0.66     Ca    8.0        01 Apr 2017 06:46  Phos  2.7       01 Apr 2017 06:46  Mg     2.1       01 Apr 2017 06:46      PT/INR - ( 01 Apr 2017 06:46 )   PT: 13.9 sec;   INR: 1.27 ratio        MEDICATIONS  (STANDING):  ALBUTerol/ipratropium for Nebulization 3milliLiter(s) Nebulizer every 6 hours  diltiazem    Tablet 60milliGRAM(s) Oral four times a day  lactobacillus acidophilus 1Tablet(s) Oral three times a day with meals  pantoprazole    Tablet 40milliGRAM(s) Oral before breakfast  tamsulosin 0.4milliGRAM(s) Oral at bedtime  ertapenem  IVPB 500milliGRAM(s) IV Intermittent every 24 hours  metoprolol succinate ER 50milliGRAM(s) Oral daily  nystatin    Suspension [id number](s) Oral four times a day  fluconAZOLE   Tablet 200milliGRAM(s) Oral daily  enoxaparin Injectable 50milliGRAM(s) SubCutaneous daily    MEDICATIONS  (PRN):  acetaminophen   Tablet. 650milliGRAM(s) Oral every 6 hours PRN Mild Pain (1 - 3)

## 2017-04-01 NOTE — PROGRESS NOTE ADULT - PROBLEM SELECTOR PLAN 3
-chronic  -c/w Duoneb /O2 supplement  -finished coursed of steroid tapering  -leukocytosis likely from steroids, monitor CBC  -Pulm following.   -overall prognosis poor

## 2017-04-01 NOTE — PROGRESS NOTE ADULT - PROBLEM SELECTOR PLAN 2
-suspect 2/2 CHF vs infection-hmpv,   -last TTE was limited study  -Lasix 20mg IVP prn  -Appreciate cardiology recs

## 2017-04-01 NOTE — PROGRESS NOTE ADULT - ASSESSMENT
87 y.o. F with PMHx of A fib on coumadin, MDD, HLD, GERD, dysphagia, dementia, prior hx of DVT/PE?, HTN, urinary retention, and constipation brought in by EMS from [facility], admitted with acute hypoxic respiratory failure 2/2 pulmonary edema 2/2 HMPV virus, UTI(E.coli) , hypertensive urgency, and afib with RVR.

## 2017-04-01 NOTE — PROGRESS NOTE ADULT - PROBLEM SELECTOR PLAN 4
-acute on chronic  -c/w Cardizem PO per cardio, bridge coumadin to therapeutic range with lovenox  -increase to  50mg Toprol QD  -continue with1.5mg Coumadin and 1x Lovenox full dose bridging today, c/w trending INR  -c/w tele monitor   -cardio following.

## 2017-04-02 LAB
ANION GAP SERPL CALC-SCNC: 8 MMOL/L — SIGNIFICANT CHANGE UP (ref 5–17)
BASOPHILS # BLD AUTO: 0.1 K/UL — SIGNIFICANT CHANGE UP (ref 0–0.2)
BASOPHILS NFR BLD AUTO: 0.5 % — SIGNIFICANT CHANGE UP (ref 0–2)
BUN SERPL-MCNC: 37 MG/DL — HIGH (ref 7–23)
CALCIUM SERPL-MCNC: 8.5 MG/DL — SIGNIFICANT CHANGE UP (ref 8.5–10.1)
CHLORIDE SERPL-SCNC: 100 MMOL/L — SIGNIFICANT CHANGE UP (ref 96–108)
CO2 SERPL-SCNC: 30 MMOL/L — SIGNIFICANT CHANGE UP (ref 22–31)
CREAT SERPL-MCNC: 0.63 MG/DL — SIGNIFICANT CHANGE UP (ref 0.5–1.3)
EOSINOPHIL # BLD AUTO: 0.3 K/UL — SIGNIFICANT CHANGE UP (ref 0–0.5)
EOSINOPHIL NFR BLD AUTO: 1.7 % — SIGNIFICANT CHANGE UP (ref 0–6)
GLUCOSE SERPL-MCNC: 121 MG/DL — HIGH (ref 70–99)
HCT VFR BLD CALC: 35.1 % — SIGNIFICANT CHANGE UP (ref 34.5–45)
HGB BLD-MCNC: 11.6 G/DL — SIGNIFICANT CHANGE UP (ref 11.5–15.5)
INR BLD: 1.36 RATIO — HIGH (ref 0.88–1.16)
LYMPHOCYTES # BLD AUTO: 17.2 % — SIGNIFICANT CHANGE UP (ref 13–44)
LYMPHOCYTES # BLD AUTO: 2.8 K/UL — SIGNIFICANT CHANGE UP (ref 1–3.3)
MCHC RBC-ENTMCNC: 26.6 PG — LOW (ref 27–34)
MCHC RBC-ENTMCNC: 33.1 GM/DL — SIGNIFICANT CHANGE UP (ref 32–36)
MCV RBC AUTO: 80.3 FL — SIGNIFICANT CHANGE UP (ref 80–100)
MONOCYTES # BLD AUTO: 0.9 K/UL — SIGNIFICANT CHANGE UP (ref 0–0.9)
MONOCYTES NFR BLD AUTO: 5.7 % — SIGNIFICANT CHANGE UP (ref 1–9)
NEUTROPHILS # BLD AUTO: 12.4 K/UL — HIGH (ref 1.8–7.4)
NEUTROPHILS NFR BLD AUTO: 74.9 % — SIGNIFICANT CHANGE UP (ref 43–77)
PLATELET # BLD AUTO: 258 K/UL — SIGNIFICANT CHANGE UP (ref 150–400)
POTASSIUM SERPL-MCNC: 3.8 MMOL/L — SIGNIFICANT CHANGE UP (ref 3.5–5.3)
POTASSIUM SERPL-SCNC: 3.8 MMOL/L — SIGNIFICANT CHANGE UP (ref 3.5–5.3)
PROTHROM AB SERPL-ACNC: 14.9 SEC — HIGH (ref 9.8–12.7)
RBC # BLD: 4.38 M/UL — SIGNIFICANT CHANGE UP (ref 3.8–5.2)
RBC # FLD: 15.6 % — HIGH (ref 10.3–14.5)
SODIUM SERPL-SCNC: 138 MMOL/L — SIGNIFICANT CHANGE UP (ref 135–145)
WBC # BLD: 16.5 K/UL — HIGH (ref 3.8–10.5)
WBC # FLD AUTO: 16.5 K/UL — HIGH (ref 3.8–10.5)

## 2017-04-02 PROCEDURE — 71010: CPT | Mod: 26

## 2017-04-02 PROCEDURE — 99233 SBSQ HOSP IP/OBS HIGH 50: CPT

## 2017-04-02 RX ORDER — INSULIN LISPRO 100/ML
VIAL (ML) SUBCUTANEOUS AT BEDTIME
Qty: 0 | Refills: 0 | Status: DISCONTINUED | OUTPATIENT
Start: 2017-04-03 | End: 2017-04-07

## 2017-04-02 RX ORDER — GLUCAGON INJECTION, SOLUTION 0.5 MG/.1ML
1 INJECTION, SOLUTION SUBCUTANEOUS ONCE
Qty: 0 | Refills: 0 | Status: DISCONTINUED | OUTPATIENT
Start: 2017-04-02 | End: 2017-04-07

## 2017-04-02 RX ORDER — SODIUM CHLORIDE 9 MG/ML
1000 INJECTION, SOLUTION INTRAVENOUS
Qty: 0 | Refills: 0 | Status: DISCONTINUED | OUTPATIENT
Start: 2017-04-02 | End: 2017-04-07

## 2017-04-02 RX ORDER — DILTIAZEM HCL 120 MG
240 CAPSULE, EXT RELEASE 24 HR ORAL DAILY
Qty: 0 | Refills: 0 | Status: DISCONTINUED | OUTPATIENT
Start: 2017-04-02 | End: 2017-04-07

## 2017-04-02 RX ORDER — WARFARIN SODIUM 2.5 MG/1
5 TABLET ORAL ONCE
Qty: 0 | Refills: 0 | Status: COMPLETED | OUTPATIENT
Start: 2017-04-02 | End: 2017-04-02

## 2017-04-02 RX ORDER — DEXTROSE 50 % IN WATER 50 %
25 SYRINGE (ML) INTRAVENOUS ONCE
Qty: 0 | Refills: 0 | Status: DISCONTINUED | OUTPATIENT
Start: 2017-04-02 | End: 2017-04-07

## 2017-04-02 RX ORDER — DEXTROSE 50 % IN WATER 50 %
12.5 SYRINGE (ML) INTRAVENOUS ONCE
Qty: 0 | Refills: 0 | Status: DISCONTINUED | OUTPATIENT
Start: 2017-04-02 | End: 2017-04-07

## 2017-04-02 RX ORDER — DEXTROSE 50 % IN WATER 50 %
1 SYRINGE (ML) INTRAVENOUS ONCE
Qty: 0 | Refills: 0 | Status: DISCONTINUED | OUTPATIENT
Start: 2017-04-02 | End: 2017-04-07

## 2017-04-02 RX ORDER — INSULIN LISPRO 100/ML
VIAL (ML) SUBCUTANEOUS
Qty: 0 | Refills: 0 | Status: DISCONTINUED | OUTPATIENT
Start: 2017-04-03 | End: 2017-04-07

## 2017-04-02 RX ADMIN — Medication 50 MILLIGRAM(S): at 05:58

## 2017-04-02 RX ADMIN — ENOXAPARIN SODIUM 50 MILLIGRAM(S): 100 INJECTION SUBCUTANEOUS at 11:53

## 2017-04-02 RX ADMIN — TAMSULOSIN HYDROCHLORIDE 0.4 MILLIGRAM(S): 0.4 CAPSULE ORAL at 22:20

## 2017-04-02 RX ADMIN — FLUCONAZOLE 200 MILLIGRAM(S): 150 TABLET ORAL at 11:55

## 2017-04-02 RX ADMIN — ERTAPENEM SODIUM 110 MILLIGRAM(S): 1 INJECTION, POWDER, LYOPHILIZED, FOR SOLUTION INTRAMUSCULAR; INTRAVENOUS at 15:56

## 2017-04-02 RX ADMIN — Medication 500000 UNIT(S): at 17:32

## 2017-04-02 RX ADMIN — Medication 3 MILLILITER(S): at 01:02

## 2017-04-02 RX ADMIN — PANTOPRAZOLE SODIUM 40 MILLIGRAM(S): 20 TABLET, DELAYED RELEASE ORAL at 05:58

## 2017-04-02 RX ADMIN — Medication 650 MILLIGRAM(S): at 01:00

## 2017-04-02 RX ADMIN — Medication 1 TABLET(S): at 17:32

## 2017-04-02 RX ADMIN — Medication 1 TABLET(S): at 11:55

## 2017-04-02 RX ADMIN — Medication 500000 UNIT(S): at 05:58

## 2017-04-02 RX ADMIN — Medication 3 MILLILITER(S): at 20:23

## 2017-04-02 RX ADMIN — WARFARIN SODIUM 5 MILLIGRAM(S): 2.5 TABLET ORAL at 22:19

## 2017-04-02 RX ADMIN — Medication 500000 UNIT(S): at 23:44

## 2017-04-02 RX ADMIN — Medication 3 MILLILITER(S): at 07:38

## 2017-04-02 RX ADMIN — Medication 500000 UNIT(S): at 11:56

## 2017-04-02 RX ADMIN — Medication 650 MILLIGRAM(S): at 02:00

## 2017-04-02 RX ADMIN — Medication 3 MILLILITER(S): at 14:02

## 2017-04-02 RX ADMIN — Medication 240 MILLIGRAM(S): at 12:04

## 2017-04-02 RX ADMIN — Medication 1 TABLET(S): at 08:29

## 2017-04-02 NOTE — PROGRESS NOTE ADULT - PROBLEM SELECTOR PLAN 8
-poor prognosis, frailty  -Son updated, made aware of poor prognosis, may be amenable to hospice intro, but still may want aggressive measures for mother and dispo may involve mother to return to  rehab once INR therapeutic, aware of poor prognosis  -Palliative apprec

## 2017-04-02 NOTE — PROGRESS NOTE ADULT - PROBLEM SELECTOR PLAN 2
-suspect 2/2 CHF vs infection-hmpv, improving  -last TTE was limited study  -Lasix 20mg IVP prn  -Appreciate cardiology and pulm  recs

## 2017-04-02 NOTE — PROGRESS NOTE ADULT - SUBJECTIVE AND OBJECTIVE BOX
[facility]    Marshall Frederick, Elias, Lenin, Valencia, Kofi      [phone]    CHIEF COMPLAINT: Patient is a 87y old  Female who presents with a chief complaint of SOB (30 Mar 2017 14:20)      Follow Up: af, sob    Interim history:  She is unable to provide a meaningful history on the basis of dementia.    MEDICATIONS  (STANDING):  ALBUTerol/ipratropium for Nebulization 3milliLiter(s) Nebulizer every 6 hours  diltiazem    Tablet 60milliGRAM(s) Oral four times a day  lactobacillus acidophilus 1Tablet(s) Oral three times a day with meals  pantoprazole    Tablet 40milliGRAM(s) Oral before breakfast  tamsulosin 0.4milliGRAM(s) Oral at bedtime  ertapenem  IVPB 500milliGRAM(s) IV Intermittent every 24 hours  metoprolol succinate ER 50milliGRAM(s) Oral daily  nystatin    Suspension 299002Qmna(s) Oral four times a day  fluconAZOLE   Tablet 200milliGRAM(s) Oral daily  enoxaparin Injectable 50milliGRAM(s) SubCutaneous daily    MEDICATIONS  (PRN):  acetaminophen   Tablet. 650milliGRAM(s) Oral every 6 hours PRN Mild Pain (1 - 3)      REVIEW OF SYSTEMS:  eye, ent, GI, , allergic, dermatologic, musculoskeletal and neurologic are negative except as described above    Vital Signs Last 24 Hrs  T(C): 36.7, Max: 36.7 (04-02 @ 07:50)  T(F): 98.1, Max: 98.1 (04-02 @ 07:50)  HR: 79 (77 - 98)  BP: 102/73 (102/73 - 118/84)  BP(mean): --  RR: 17 (17 - 20)  SpO2: 99% (91% - 99%)    I&O's Summary    I & Os for current day (as of 02 Apr 2017 11:16)  =============================================  IN: 110 ml / OUT: 600 ml / NET: -490 ml      Telemetry past 24h:    PHYSICAL EXAM:    Constitutional: well-nourished, well-developed, NAD   HEENT:  MMM, sclerae anicteric, conjunctivae clear, no oral cyanosis.  Pulmonary: Limited anterior examination. Non-labored, breath sounds are clear bilaterally, No wheezing, rales or rhonchi  Cardiovascular: Regular, S1 and S2, No murmurs, rubs, gallops or clicks  Gastrointestinal: Bowel Sounds present, soft, nontender.   Lymph: No peripheral edema. No lymphadenopathy.  Neurological: Alert, Demented  Skin: No rashes.  Psych:  Mood & affect appropriate Demented    LABS: All Labs Reviewed:                        11.6   16.5  )-----------( 258      ( 02 Apr 2017 06:25 )             35.1                         11.5   17.7  )-----------( 236      ( 01 Apr 2017 06:46 )             35.4                         11.5   20.5  )-----------( 288      ( 31 Mar 2017 06:26 )             34.6     02 Apr 2017 06:25    138    |  100    |  37     ----------------------------<  121    3.8     |  30     |  0.63   01 Apr 2017 06:46    137    |  98     |  34     ----------------------------<  103    4.0     |  31     |  0.66   31 Mar 2017 06:26    137    |  97     |  42     ----------------------------<  107    4.0     |  30     |  0.69     Ca    8.5        02 Apr 2017 06:25  Ca    8.0        01 Apr 2017 06:46  Ca    8.5        31 Mar 2017 06:26  Phos  2.7       01 Apr 2017 06:46  Phos  2.9       31 Mar 2017 06:26  Mg     2.1       01 Apr 2017 06:46  Mg     2.2       31 Mar 2017 06:26      PT/INR - ( 02 Apr 2017 06:25 )   PT: 14.9 sec;   INR: 1.36 ratio               Blood Culture:         RADIOLOGY:    EKG:    Echo:       EXAM:  ECHO TTE W/O CON COMP W/DOPPLR         PROCEDURE DATE:  03/25/2017        INTERPRETATION:  Ordering Physician: LYNNE GARDNER [id number]    Indication: SVT    Study Quality: Technically difficult and limited   A limited echocardiographicstudy was performed.      Height: 157 cm  Weight: 41 kg  BSA: 1.36 sq m  Blood Pressure: 114/73    RVSP: 39mmHg    FINDINGS  Left Ventricle: Endocardium is not well-visualized. Grossly, normal left   ventricular systolic function.  Aortic Valve: Calcified trileaflet aortic valve. Minimal aortic   insufficiency.  Mitral Valve: Mitral annular calcification and calcified mitral leaflets.   Mild mitral insufficiency.  Tricuspid Valve: Tricuspid valve is not well-visualized. Mild tricuspid   insufficiency.  Pulmonic Valve: Not well visualized. Trace pulmonic insufficiency.  Left Atrium: Severe left atrial enlargement.  Right Ventricle: Normal right ventricular size and systolic function.  Right Atrium: Moderately enlarged.  Pericardium/Pleura: Normal pericardium with no pericardial effusion.                    JOSE GRIJALVA M.D., ATTENDING CARDIOLOGIST  This document has been electronically signed. Mar 26 2017  1:47PM

## 2017-04-02 NOTE — PROGRESS NOTE ADULT - PROBLEM SELECTOR PLAN 3
-acute on chronic  -c/w Duoneb /O2 supplement  -finished coursed of steroid tapering  -leukocytosis likely from steroids, monitor CBC  -Pulm following.   -overall prognosis poor

## 2017-04-02 NOTE — PROGRESS NOTE ADULT - SUBJECTIVE AND OBJECTIVE BOX
Date/Time Patient Seen:  		  Referring MD:   Data Reviewed	       Patient is a 87y old  Female who presents with a chief complaint of SOB (30 Mar 2017 14:20)  pt in bed  seen and examined  weak and frail  on oxygen  love cath remains in  confused      Subjective/HPI       Medication list         MEDICATIONS  (STANDING):  ALBUTerol/ipratropium for Nebulization 3milliLiter(s) Nebulizer every 6 hours  diltiazem    Tablet 60milliGRAM(s) Oral four times a day  lactobacillus acidophilus 1Tablet(s) Oral three times a day with meals  pantoprazole    Tablet 40milliGRAM(s) Oral before breakfast  tamsulosin 0.4milliGRAM(s) Oral at bedtime  ertapenem  IVPB 500milliGRAM(s) IV Intermittent every 24 hours  metoprolol succinate ER 50milliGRAM(s) Oral daily  nystatin    Suspension 862011Rebu(s) Oral four times a day  fluconAZOLE   Tablet 200milliGRAM(s) Oral daily  enoxaparin Injectable 50milliGRAM(s) SubCutaneous daily    MEDICATIONS  (PRN):  acetaminophen   Tablet. 650milliGRAM(s) Oral every 6 hours PRN Mild Pain (1 - 3)         Vitals log        ICU Vital Signs Last 24 Hrs  T(C): 36.3, Max: 37 (04-01 @ 08:00)  T(F): 97.3, Max: 98.6 (04-01 @ 08:00)  HR: 77 (77 - 98)  BP: 103/68 (100/63 - 118/84)  BP(mean): --  ABP: --  ABP(mean): --  RR: 18 (17 - 23)  SpO2: 97% (91% - 98%)           Input and Output:  I&O's Detail  I & Os for 24h ending 01 Apr 2017 07:00  =============================================  IN:    Oral Fluid: 340 ml    Solution: 50 ml    Total IN: 390 ml  ---------------------------------------------  OUT:    Indwelling Catheter - Urethral: 650 ml    Total OUT: 650 ml  ---------------------------------------------  Total NET: -260 ml    I & Os for current day (as of 02 Apr 2017 06:44)  =============================================  IN:    Solution: 110 ml    Total IN: 110 ml  ---------------------------------------------  OUT:    Indwelling Catheter - Urethral: 400 ml    Total OUT: 400 ml  ---------------------------------------------  Total NET: -290 ml      Lab Data                        11.5   17.7  )-----------( 236      ( 01 Apr 2017 06:46 )             35.4     01 Apr 2017 06:46    137    |  98     |  34     ----------------------------<  103    4.0     |  31     |  0.66     Ca    8.0        01 Apr 2017 06:46  Phos  2.7       01 Apr 2017 06:46  Mg     2.1       01 Apr 2017 06:46              Review of Systems	      Objective       Resp occ rhonchi    CV       GI    Extremities       Neuro       Skin         Pertinent Lab findings & Imaging      Love:  NO   Adequate UO     I&O's Detail  I & Os for 24h ending 01 Apr 2017 07:00  =============================================  IN:    Oral Fluid: 340 ml    Solution: 50 ml    Total IN: 390 ml  ---------------------------------------------  OUT:    Indwelling Catheter - Urethral: 650 ml    Total OUT: 650 ml  ---------------------------------------------  Total NET: -260 ml    I & Os for current day (as of 02 Apr 2017 06:44)  =============================================  IN:    Solution: 110 ml    Total IN: 110 ml  ---------------------------------------------  OUT:    Indwelling Catheter - Urethral: 400 ml    Total OUT: 400 ml  ---------------------------------------------  Total NET: -290 ml           Discussed with:     Cultures:	        Radiology

## 2017-04-02 NOTE — PROGRESS NOTE ADULT - PROBLEM SELECTOR PLAN 1
-acute, MDR ecoli  -ID rec. appreciated   -c/w Ertapenem to 500mg q24hr for total 7 days, last dose 4/4  -ID following, apprec recs

## 2017-04-02 NOTE — PROGRESS NOTE ADULT - PROBLEM SELECTOR PLAN 1
nebs  hob > 30 deg  oral care  chest PT prn  pt will not cooperate with Incentive Prudencio  pt also has restrictive lung disease and her dyspnea is multifactorial   HF  Restr Lung Disease  Kyphosis  COPD  Atelectasis  OP  OA  Frailty    Prognosis is very poor  remains on ABX for UTI and on Diflucan for Thrush  will follow  needs a GOC and Family meeting  Unrealistic expectations from the SON

## 2017-04-02 NOTE — PROGRESS NOTE ADULT - SUBJECTIVE AND OBJECTIVE BOX
Patient is a 87y old  Female who presents with a chief complaint of SOB (30 Mar 2017 14:20)      INTERVAL HPI: Pt appears well, states she is ok and her breathing is good. States ok to speak with son.    OVERNIGHT EVENTS:  T(F): 97.3, Max: 98.1 (04-02 @ 07:50)  HR: 99 (77 - 102)  BP: 123/72 (102/73 - 123/72)  RR: 19 (17 - 23)  SpO2: 91% (91% - 99%)  Wt(kg): --  I&O's Summary  I & Os for 24h ending 02 Apr 2017 07:00  =============================================  IN: 110 ml / OUT: 600 ml / NET: -490 ml    I & Os for current day (as of 02 Apr 2017 17:16)  =============================================  IN: 0 ml / OUT: 150 ml / NET: -150 ml      REVIEW OF SYSTEMS: Limited english  CONSTITUTIONAL: No fever, weight loss, or fatigue  EYES: No eye pain, visual disturbances, or discharge  ENMT:  No difficulty hearing, tinnitus, vertigo; No sinus or throat pain  NECK: No pain or stiffness  RESPIRATORY: minimal, wheezing, no cough,  chills or hemoptysis; No shortness of breath  CARDIOVASCULAR: No chest pain, palpitations, dizziness, or leg swelling  GASTROINTESTINAL: No abdominal or epigastric pain. No nausea, vomiting, or hematemesis; No diarrhea or constipation. No melena or hematochezia.  GENITOURINARY: No dysuria, frequency, hematuria, or incontinence  NEUROLOGICAL: No headaches, memory loss, loss of strength, numbness, or tremors  SKIN: No itching, burning, rashes, or lesions   MUSCULOSKELETAL: No joint pain or swelling; No muscle, back, or extremity pain        PHYSICAL EXAM:  GENERAL: NAD, weak and frail, bedbound, chronically ill appearing  HEAD:  Atraumatic, Normocephalic  EYES: EOMI, PERRLA, conjunctiva and sclera clear  ENMT: No tonsillar erythema, exudates, or enlargement; Moist mucous membranes, Good dentition, No lesions  NECK: Supple, No JVD, Normal thyroid  NERVOUS SYSTEM:  Alert & Oriented X3, Good concentration; Motor Strength 5/5 B/L upper and lower extremities; DTRs 2+ intact and symmetric  CHEST/LUNG: diminished breaths sounds, less labored breathing, comfortable  HEART: Regular rate and rhythm; No murmurs, rubs, or gallops  ABDOMEN: Soft, Nontender, Nondistended; Bowel sounds present  EXTREMITIES:  2+ Peripheral Pulses, No clubbing, cyanosis, or edema  LYMPH: No lymphadenopathy noted  SKIN: No rashes or lesions    LABS:                        11.6   16.5  )-----------( 258      ( 02 Apr 2017 06:25 )             35.1     02 Apr 2017 06:25    138    |  100    |  37     ----------------------------<  121    3.8     |  30     |  0.63     Ca    8.5        02 Apr 2017 06:25  Phos  2.7       01 Apr 2017 06:46  Mg     2.1       01 Apr 2017 06:46      PT/INR - ( 02 Apr 2017 06:25 )   PT: 14.9 sec;   INR: 1.36 ratio             MEDICATIONS  (STANDING):  ALBUTerol/ipratropium for Nebulization 3milliLiter(s) Nebulizer every 6 hours  lactobacillus acidophilus 1Tablet(s) Oral three times a day with meals  pantoprazole    Tablet 40milliGRAM(s) Oral before breakfast  tamsulosin 0.4milliGRAM(s) Oral at bedtime  ertapenem  IVPB 500milliGRAM(s) IV Intermittent every 24 hours  metoprolol succinate ER 50milliGRAM(s) Oral daily  nystatin    Suspension [id number](s) Oral four times a day  fluconAZOLE   Tablet 200milliGRAM(s) Oral daily  enoxaparin Injectable 50milliGRAM(s) SubCutaneous daily  diltiazem   CD 240milliGRAM(s) Oral daily  warfarin 5milliGRAM(s) Oral once    MEDICATIONS  (PRN):  acetaminophen   Tablet. 650milliGRAM(s) Oral every 6 hours PRN Mild Pain (1 - 3)

## 2017-04-02 NOTE — PROGRESS NOTE ADULT - ASSESSMENT
Impression and Plan: 87 female, chronic atrial fibrillation and prior DVT PE admitted with viral infection, decompensated by urinary retention and uncontrolled heart rate.    There is no evidence of acute ischemia.    Her exam is difficult, but there is no clear volume overload. No diuretics appear necessary at this time. I have ordered a repeat chest x-ray.    She remains in atrial fibrillation, overall rate controlled. Continue metoprolol succinate. I have changed her Cardizem to  mg daily.    She should be anticoagulated. Her goal INR is between 2 and 3.  She should continue daily Lovenox until her INR is therapeutic.

## 2017-04-02 NOTE — PROGRESS NOTE ADULT - ASSESSMENT
87 y.o. F with PMHx of A fib on coumadin, MDD, HLD, GERD, dysphagia, dementia, prior hx of DVT/PE?, HTN, urinary retention, and constipation brought in by EMS from [facility], admitted with acute hypoxic respiratory failure 2/2 pulmonary edema 2/2 HMPV virus, UTI(E.coli) , hypertensive urgency, and afib with RVR.

## 2017-04-03 DIAGNOSIS — B37.0 CANDIDAL STOMATITIS: ICD-10-CM

## 2017-04-03 DIAGNOSIS — M79.604 PAIN IN RIGHT LEG: ICD-10-CM

## 2017-04-03 DIAGNOSIS — J96.10 CHRONIC RESPIRATORY FAILURE, UNSPECIFIED WHETHER WITH HYPOXIA OR HYPERCAPNIA: ICD-10-CM

## 2017-04-03 LAB
ANION GAP SERPL CALC-SCNC: 7 MMOL/L — SIGNIFICANT CHANGE UP (ref 5–17)
BASOPHILS # BLD AUTO: 0.1 K/UL — SIGNIFICANT CHANGE UP (ref 0–0.2)
BASOPHILS NFR BLD AUTO: 0.5 % — SIGNIFICANT CHANGE UP (ref 0–2)
BUN SERPL-MCNC: 32 MG/DL — HIGH (ref 7–23)
CALCIUM SERPL-MCNC: 7.9 MG/DL — LOW (ref 8.5–10.1)
CHLORIDE SERPL-SCNC: 101 MMOL/L — SIGNIFICANT CHANGE UP (ref 96–108)
CO2 SERPL-SCNC: 30 MMOL/L — SIGNIFICANT CHANGE UP (ref 22–31)
CREAT SERPL-MCNC: 0.61 MG/DL — SIGNIFICANT CHANGE UP (ref 0.5–1.3)
EOSINOPHIL # BLD AUTO: 0.2 K/UL — SIGNIFICANT CHANGE UP (ref 0–0.5)
EOSINOPHIL NFR BLD AUTO: 1.3 % — SIGNIFICANT CHANGE UP (ref 0–6)
GLUCOSE SERPL-MCNC: 115 MG/DL — HIGH (ref 70–99)
HCT VFR BLD CALC: 32 % — LOW (ref 34.5–45)
HGB BLD-MCNC: 10.3 G/DL — LOW (ref 11.5–15.5)
INR BLD: 2.03 RATIO — HIGH (ref 0.88–1.16)
LYMPHOCYTES # BLD AUTO: 18.8 % — SIGNIFICANT CHANGE UP (ref 13–44)
LYMPHOCYTES # BLD AUTO: 2.9 K/UL — SIGNIFICANT CHANGE UP (ref 1–3.3)
MCHC RBC-ENTMCNC: 26.4 PG — LOW (ref 27–34)
MCHC RBC-ENTMCNC: 32.2 GM/DL — SIGNIFICANT CHANGE UP (ref 32–36)
MCV RBC AUTO: 81.8 FL — SIGNIFICANT CHANGE UP (ref 80–100)
MONOCYTES # BLD AUTO: 0.9 K/UL — SIGNIFICANT CHANGE UP (ref 0–0.9)
MONOCYTES NFR BLD AUTO: 5.7 % — SIGNIFICANT CHANGE UP (ref 1–9)
NEUTROPHILS # BLD AUTO: 11.2 K/UL — HIGH (ref 1.8–7.4)
NEUTROPHILS NFR BLD AUTO: 73.6 % — SIGNIFICANT CHANGE UP (ref 43–77)
PLATELET # BLD AUTO: 219 K/UL — SIGNIFICANT CHANGE UP (ref 150–400)
POTASSIUM SERPL-MCNC: 4 MMOL/L — SIGNIFICANT CHANGE UP (ref 3.5–5.3)
POTASSIUM SERPL-SCNC: 4 MMOL/L — SIGNIFICANT CHANGE UP (ref 3.5–5.3)
PROTHROM AB SERPL-ACNC: 22.4 SEC — HIGH (ref 9.8–12.7)
RBC # BLD: 3.91 M/UL — SIGNIFICANT CHANGE UP (ref 3.8–5.2)
RBC # FLD: 16 % — HIGH (ref 10.3–14.5)
SODIUM SERPL-SCNC: 138 MMOL/L — SIGNIFICANT CHANGE UP (ref 135–145)
WBC # BLD: 15.3 K/UL — HIGH (ref 3.8–10.5)
WBC # FLD AUTO: 15.3 K/UL — HIGH (ref 3.8–10.5)

## 2017-04-03 PROCEDURE — 99233 SBSQ HOSP IP/OBS HIGH 50: CPT

## 2017-04-03 PROCEDURE — 99233 SBSQ HOSP IP/OBS HIGH 50: CPT | Mod: GC

## 2017-04-03 RX ORDER — METOPROLOL TARTRATE 50 MG
50 TABLET ORAL DAILY
Qty: 0 | Refills: 0 | Status: DISCONTINUED | OUTPATIENT
Start: 2017-04-03 | End: 2017-04-05

## 2017-04-03 RX ADMIN — Medication 3 MILLILITER(S): at 20:17

## 2017-04-03 RX ADMIN — Medication 1 TABLET(S): at 11:50

## 2017-04-03 RX ADMIN — ERTAPENEM SODIUM 110 MILLIGRAM(S): 1 INJECTION, POWDER, LYOPHILIZED, FOR SOLUTION INTRAMUSCULAR; INTRAVENOUS at 17:49

## 2017-04-03 RX ADMIN — Medication 3 MILLILITER(S): at 14:15

## 2017-04-03 RX ADMIN — TAMSULOSIN HYDROCHLORIDE 0.4 MILLIGRAM(S): 0.4 CAPSULE ORAL at 23:07

## 2017-04-03 RX ADMIN — FLUCONAZOLE 200 MILLIGRAM(S): 150 TABLET ORAL at 11:38

## 2017-04-03 RX ADMIN — Medication 500000 UNIT(S): at 11:38

## 2017-04-03 RX ADMIN — Medication 500000 UNIT(S): at 17:49

## 2017-04-03 RX ADMIN — Medication 500000 UNIT(S): at 23:07

## 2017-04-03 RX ADMIN — Medication 3 MILLILITER(S): at 07:38

## 2017-04-03 RX ADMIN — Medication 650 MILLIGRAM(S): at 01:28

## 2017-04-03 RX ADMIN — ENOXAPARIN SODIUM 50 MILLIGRAM(S): 100 INJECTION SUBCUTANEOUS at 11:37

## 2017-04-03 RX ADMIN — Medication 1 TABLET(S): at 17:49

## 2017-04-03 RX ADMIN — Medication 240 MILLIGRAM(S): at 11:39

## 2017-04-03 RX ADMIN — Medication 650 MILLIGRAM(S): at 02:30

## 2017-04-03 RX ADMIN — Medication 2: at 11:47

## 2017-04-03 NOTE — PROGRESS NOTE ADULT - PROBLEM SELECTOR PLAN 4
-acute on chronic  -c/w Cardizem PO per cardio, bridge coumadin to therapeutic range with lovenox  -increase to  50mg Toprol QD  -continue with1.5mg Coumadin and 1x Lovenox full dose bridging today, c/w trending INR  -c/w tele monitor   -cardio following. -acute on chronic  -c/w Duoneb /O2 supplement  -finished coursed of steroid tapering  -leukocytosis likely from steroids, monitor CBC  -Pulm following.   -overall prognosis poor

## 2017-04-03 NOTE — PROGRESS NOTE ADULT - PROBLEM SELECTOR PLAN 9
-poor prognosis, frailty  -Son updated, made aware of poor prognosis, may be amenable to hospice intro, but still may want aggressive measures for mother and dispo may involve mother to return to  rehab once INR therapeutic, aware of poor prognosis  -Palliative rec. apprec -poor prognosis, frailty  -Son with unrealistic expectation, awared of poor prognosis, ?comfort care vs long term nursing home care  -Palliative rec. apprec

## 2017-04-03 NOTE — PROGRESS NOTE ADULT - SUBJECTIVE AND OBJECTIVE BOX
Date/Time Patient Seen:  		  Referring MD:   Data Reviewed	       Patient is a 87y old  Female who presents with a chief complaint of SOB (30 Mar 2017 14:20)  pt in bed  seen and examined  vs and meds reviewed  on oxygen  frail  weak  confused  hard of hearing      Subjective/HPI       Medication list         MEDICATIONS  (STANDING):  ALBUTerol/ipratropium for Nebulization 3milliLiter(s) Nebulizer every 6 hours  lactobacillus acidophilus 1Tablet(s) Oral three times a day with meals  pantoprazole    Tablet 40milliGRAM(s) Oral before breakfast  tamsulosin 0.4milliGRAM(s) Oral at bedtime  ertapenem  IVPB 500milliGRAM(s) IV Intermittent every 24 hours  nystatin    Suspension 256259Wvhw(s) Oral four times a day  fluconAZOLE   Tablet 200milliGRAM(s) Oral daily  enoxaparin Injectable 50milliGRAM(s) SubCutaneous daily  diltiazem   CD 240milliGRAM(s) Oral daily  insulin lispro (HumaLOG) corrective regimen sliding scale  SubCutaneous three times a day before meals  insulin lispro (HumaLOG) corrective regimen sliding scale  SubCutaneous at bedtime  dextrose 5%. 1000milliLiter(s) IV Continuous <Continuous>  dextrose 50% Injectable 12.5Gram(s) IV Push once  dextrose 50% Injectable 25Gram(s) IV Push once  dextrose 50% Injectable 25Gram(s) IV Push once  metoprolol succinate ER 50milliGRAM(s) Oral daily    MEDICATIONS  (PRN):  acetaminophen   Tablet. 650milliGRAM(s) Oral every 6 hours PRN Mild Pain (1 - 3)  dextrose Gel 1Dose(s) Oral once PRN Blood Glucose LESS THAN 70 milliGRAM(s)/deciliter  glucagon  Injectable 1milliGRAM(s) IntraMuscular once PRN Glucose LESS THAN 70 milligrams/deciliter         Vitals log        ICU Vital Signs Last 24 Hrs  T(C): 37.1, Max: 38.1 (04-02 @ 23:06)  T(F): 98.8, Max: 100.5 (04-02 @ 23:06)  HR: 98 (78 - 104)  BP: 113/76 (92/58 - 123/72)  BP(mean): --  ABP: --  ABP(mean): --  RR: 18 (17 - 23)  SpO2: 97% (91% - 99%)           Input and Output:  I&O's Detail    I & Os for current day (as of 03 Apr 2017 07:37)  =============================================  IN:    Total IN: 0 ml  ---------------------------------------------  OUT:    Indwelling Catheter - Urethral: 600 ml    Total OUT: 600 ml  ---------------------------------------------  Total NET: -600 ml      Lab Data                        10.3   15.3  )-----------( 219      ( 03 Apr 2017 06:51 )             32.0     03 Apr 2017 06:51    138    |  101    |  32     ----------------------------<  115    4.0     |  30     |  0.61     Ca    7.9        03 Apr 2017 06:51              Review of Systems	      Objective       Resp dec BS    CV s1s2       GI    Extremities frail       Neuro       Skin         Pertinent Lab findings & Imaging      Meza:  NO   Adequate UO     I&O's Detail    I & Os for current day (as of 03 Apr 2017 07:37)  =============================================  IN:    Total IN: 0 ml  ---------------------------------------------  OUT:    Indwelling Catheter - Urethral: 600 ml    Total OUT: 600 ml  ---------------------------------------------  Total NET: -600 ml           Discussed with:     Cultures:	        Radiology

## 2017-04-03 NOTE — PROGRESS NOTE ADULT - ASSESSMENT
87 y.o. F with PMHx of A fib on coumadin, MDD, HLD, GERD, dysphagia, dementia, prior hx of DVT/PE?, HTN, urinary retention, and constipation brought in by EMS from [facility], admitted with acute hypoxic respiratory failure sec pulmonary edema with HMPV virus infection bronchitis/pna, UTI(E.coli) , and afib with RVR. Clinically stable.

## 2017-04-03 NOTE — PROGRESS NOTE ADULT - PROBLEM SELECTOR PLAN 2
-suspect 2/2 CHF vs infection HMVP , improving  -last TTE was limited study  -Appreciate cardiology and pulm recs -suspect 2/2 CHF vs infection HMPV , improving  -last TTE was limited study  -Appreciate cardiology and pulm recs

## 2017-04-03 NOTE — PROGRESS NOTE ADULT - PROBLEM SELECTOR PLAN 2
Stable  Poss concurrent aspiration pneumonia  Resp status stable  To finish course of Ertapenam today  Monitor temps  Trend wbc

## 2017-04-03 NOTE — PROGRESS NOTE ADULT - ATTENDING COMMENTS
pt is clinically improved, dc planning, family meeting tomorrow to discuss goals of care long term, likely dc to  tomorrow or wednesday, apprec pulm, id and cardio recs, encourage po as lee ann, pt has severe protein malnutrition, px poor

## 2017-04-03 NOTE — PROGRESS NOTE ADULT - ASSESSMENT
87 y.o. F with PMHx of A fib on coumadin, MDD, HLD, GERD, dysphagia, dementia, prior hx of DVT/PE?, HTN, urinary retention, and constipation brought in by EMS from [facility], admitted with acute hypoxic respiratory failure 2/2 pulmonary edema 2/2 HMPV virus, UTI(E.coli) , hypertensive urgency, and afib with RVR.

## 2017-04-03 NOTE — PROGRESS NOTE ADULT - PROBLEM SELECTOR PLAN 5
-acute on chronic  failed ociding trial   -c/w Derrick -acute on chronic  -c/w Cardizem PO per cardio, bridge coumadin to therapeutic range with lovenox  -increase to  50mg Toprol QD  -continue with1.5mg Coumadin and 1x Lovenox full dose bridging today, c/w trending INR  -c/w tele monitor   -cardio following.

## 2017-04-03 NOTE — PROGRESS NOTE ADULT - PROBLEM SELECTOR PLAN 10
-poor prognosis, frailty  -Son updated, made aware of poor prognosis, may be amenable to hospice intro, but still may want aggressive measures for mother and dispo may involve mother to return to  rehab once INR therapeutic, aware of poor prognosis  -Palliative rec. apprec -poor prognosis, frailty  -Son updated, made aware of poor prognosis, may be amenable to hospice intro, will have family meeting with medicine team, palliative care attending and case management tomorrow   -Palliative rec. apprec

## 2017-04-03 NOTE — PROGRESS NOTE ADULT - SUBJECTIVE AND OBJECTIVE BOX
Follow up: AF, SOB, HTN    HPI:  87 y.o. F with PMHx of A fib on coumadin, MDD, HLD, GERD, dysphagia, dementia, prior hx of DVT/PE?, HTN, urinary retention, and constipation brought in by EMS from [facility] for respiratory distress. Serology was positive for human metapneumo virus and she was treated for accelerated hypertension and viral infection. Her course was complicated by volume overload which continues to be treated.    PAST MEDICAL & SURGICAL HISTORY:  Femur neck fracture  Vertebral fracture, pathological  Neurogenic bladder  DVT (deep venous thrombosis)  Afib  GERD (gastroesophageal reflux disease)  Pulmonary embolism  UTI (lower urinary tract infection)  HTN (hypertension)  Pulmonary embolism  Memory impairment  Hypertension  Atrial fibrillation  Hyponatremia  Shortness of breath: etiology probably copd  Urinary retention  Arthritis  DVT (deep venous thrombosis)  No significant past surgical history  History of hip surgery: right gamma nail  No significant past surgical history      MEDICATIONS  (STANDING):  ALBUTerol/ipratropium for Nebulization 3milliLiter(s) Nebulizer every 6 hours  lactobacillus acidophilus 1Tablet(s) Oral three times a day with meals  pantoprazole    Tablet 40milliGRAM(s) Oral before breakfast  tamsulosin 0.4milliGRAM(s) Oral at bedtime  ertapenem  IVPB 500milliGRAM(s) IV Intermittent every 24 hours  nystatin    Suspension [id number](s) Oral four times a day  fluconAZOLE   Tablet 200milliGRAM(s) Oral daily  diltiazem   CD 240milliGRAM(s) Oral daily  insulin lispro (HumaLOG) corrective regimen sliding scale  SubCutaneous three times a day before meals  insulin lispro (HumaLOG) corrective regimen sliding scale  SubCutaneous at bedtime  dextrose 5%. 1000milliLiter(s) IV Continuous <Continuous>  dextrose 50% Injectable 12.5Gram(s) IV Push once  dextrose 50% Injectable 25Gram(s) IV Push once  dextrose 50% Injectable 25Gram(s) IV Push once  metoprolol succinate ER 50milliGRAM(s) Oral daily    MEDICATIONS  (PRN):  acetaminophen   Tablet. 650milliGRAM(s) Oral every 6 hours PRN Mild Pain (1 - 3)  dextrose Gel 1Dose(s) Oral once PRN Blood Glucose LESS THAN 70 milliGRAM(s)/deciliter  glucagon  Injectable 1milliGRAM(s) IntraMuscular once PRN Glucose LESS THAN 70 milligrams/deciliter    Vital Signs Last 24 Hrs  T(C): 37, Max: 38.1 (04-02 @ 23:06)  T(F): 98.6, Max: 100.5 (04-02 @ 23:06)  HR: 93 (83 - 104)  BP: 99/64 (92/58 - 113/76)  BP(mean): --  RR: 17 (17 - 18)  SpO2: 96% (94% - 97%)    I&O's Summary  I & Os for 24h ending 03 Apr 2017 07:00  =============================================  IN: 0 ml / OUT: 600 ml / NET: -600 ml    I & Os for current day (as of 03 Apr 2017 16:15)  =============================================  IN: 0 ml / OUT: 150 ml / NET: -150 ml      PHYSICAL EXAM:    Constitutional: NAD, awake and alert, well-developed  Eyes:  EOMI,  Pupils round, no lesions  ENMT: no exudate or erythema  Pulmonary: Non-labored, breath sounds are clear bilaterally, No wheezing, rales or rhonchi  Cardiovascular: PMI not palpable non-displaced irregular S1 and S2, no murmurs, rubs, gallops or clicks  Gastrointestinal: Bowel Sounds present, soft, nontender.   Lymph: No peripheral edema. No cervical lymphadenopathy.  Neurological: Alert, no focal deficits  Skin: No rashes. Changes of chronic venous stasis. No cyanosis.  Psych:  Confused.                                10.3   15.3  )-----------( 219      ( 03 Apr 2017 06:51 )             32.0     CBC Full  -  ( 03 Apr 2017 06:51 )  WBC Count : 15.3 K/uL  Hemoglobin : 10.3 g/dL  Hematocrit : 32.0 %  Platelet Count - Automated : 219 K/uL  Mean Cell Volume : 81.8 fl  Mean Cell Hemoglobin : 26.4 pg  Mean Cell Hemoglobin Concentration : 32.2 gm/dL  Auto Neutrophil # : 11.2 K/uL  Auto Lymphocyte # : 2.9 K/uL  Auto Monocyte # : 0.9 K/uL  Auto Eosinophil # : 0.2 K/uL  Auto Basophil # : 0.1 K/uL  Auto Neutrophil % : 73.6 %  Auto Lymphocyte % : 18.8 %  Auto Monocyte % : 5.7 %  Auto Eosinophil % : 1.3 %  Auto Basophil % : 0.5 %    03 Apr 2017 06:51    138    |  101    |  32     ----------------------------<  115    4.0     |  30     |  0.61     Ca    7.9        03 Apr 2017 06:51      echo:     EXAM:  ECHO TTE W/O CON COMP W/DOPPLR         PROCEDURE DATE:  03/25/2017        INTERPRETATION:  Ordering Physician: LYNNE GARDNER [id number]    Indication: SVT    Study Quality: Technically difficult and limited   A limited echocardiographicstudy was performed.      Height: 157 cm  Weight: 41 kg  BSA: 1.36 sq m  Blood Pressure: 114/73    RVSP: 39mmHg    FINDINGS  Left Ventricle: Endocardium is not well-visualized. Grossly, normal left   ventricular systolic function.  Aortic Valve: Calcified trileaflet aortic valve. Minimal aortic   insufficiency.  Mitral Valve: Mitral annular calcification and calcified mitral leaflets.   Mild mitral insufficiency.  Tricuspid Valve: Tricuspid valve is not well-visualized. Mild tricuspid   insufficiency.  Pulmonic Valve: Not well visualized. Trace pulmonic insufficiency.  Left Atrium: Severe left atrial enlargement.  Right Ventricle: Normal right ventricular size and systolic function.  Right Atrium: Moderately enlarged.  Pericardium/Pleura: Normal pericardium with no pericardial effusion.                    JOSE GRIJALVA M.D., ATTENDING CARDIOLOGIST  This document has been electronically signed. Mar 26 2017  1:47PM

## 2017-04-03 NOTE — PROGRESS NOTE ADULT - PROBLEM SELECTOR PLAN 7
-INR was on therapeutic range  -c/w 3mg coumadin today  -f/u INR in AM -acute on chronic  -c/w mechanical  soft diet with ensure  -apprec dietary recs.

## 2017-04-03 NOTE — PROGRESS NOTE ADULT - SUBJECTIVE AND OBJECTIVE BOX
INTERVAL HPI/OVERNIGHT EVENTS:Pt seen and examed at bedside with Dr. Solis. Pt was septic overnight .     MEDICATIONS  (STANDING):  ALBUTerol/ipratropium for Nebulization 3milliLiter(s) Nebulizer every 6 hours  lactobacillus acidophilus 1Tablet(s) Oral three times a day with meals  pantoprazole    Tablet 40milliGRAM(s) Oral before breakfast  tamsulosin 0.4milliGRAM(s) Oral at bedtime  ertapenem  IVPB 500milliGRAM(s) IV Intermittent every 24 hours  nystatin    Suspension [id number](s) Oral four times a day  fluconAZOLE   Tablet 200milliGRAM(s) Oral daily  enoxaparin Injectable 50milliGRAM(s) SubCutaneous daily  diltiazem   CD 240milliGRAM(s) Oral daily  insulin lispro (HumaLOG) corrective regimen sliding scale  SubCutaneous three times a day before meals  insulin lispro (HumaLOG) corrective regimen sliding scale  SubCutaneous at bedtime  dextrose 5%. 1000milliLiter(s) IV Continuous <Continuous>  dextrose 50% Injectable 12.5Gram(s) IV Push once  dextrose 50% Injectable 25Gram(s) IV Push once  dextrose 50% Injectable 25Gram(s) IV Push once  metoprolol succinate ER 50milliGRAM(s) Oral daily    MEDICATIONS  (PRN):  acetaminophen   Tablet. 650milliGRAM(s) Oral every 6 hours PRN Mild Pain (1 - 3)  dextrose Gel 1Dose(s) Oral once PRN Blood Glucose LESS THAN 70 milliGRAM(s)/deciliter  glucagon  Injectable 1milliGRAM(s) IntraMuscular once PRN Glucose LESS THAN 70 milligrams/deciliter      Allergies    No Known Allergies    Intolerances        CONSTITUTIONAL: No weakness, fevers or chills  EYES/ENT: No visual changes;  No vertigo or throat pain   NECK: No pain or stiffness  RESPIRATORY: No cough, wheezing, hemoptysis; No shortness of breath  CARDIOVASCULAR: No chest pain or palpitations  GASTROINTESTINAL: No abdominal or epigastric pain. No nausea, vomiting, or hematemesis; No diarrhea or constipation. No melena or hematochezia.  GENITOURINARY: No dysuria, frequency or hematuria  NEUROLOGICAL: No numbness or weakness  SKIN: No itching, burning, rashes, or lesions   All other review of systems is negative unless indicated above.    Vital Signs Last 24 Hrs  T(C): 37.1, Max: 38.1 (04-02 @ 23:06)  T(F): 98.8, Max: 100.5 (04-02 @ 23:06)  HR: 87 (78 - 104)  BP: 113/76 (92/58 - 123/72)  BP(mean): --  RR: 18 (17 - 23)  SpO2: 97% (91% - 99%)    I & Os for current day (as of 04-03 @ 07:45)  =============================================  IN: 0 ml / OUT: 600 ml / NET: -600 ml      General: WN/WD NAD  Neurology: A&Ox3, nonfocal, AGUILAR x 4  Respiratory: CTA B/L  CV: RRR, S1S2, no murmurs, rubs or gallops  Abdominal: Soft, NT, ND +BS, Last BM  Extremities: No edema, + peripheral pulses      LABS:                        10.3   15.3  )-----------( 219      ( 03 Apr 2017 06:51 )             32.0     03 Apr 2017 06:51    138    |  101    |  32     ----------------------------<  115    4.0     |  30     |  0.61     Ca    7.9        03 Apr 2017 06:51      PT/INR - ( 03 Apr 2017 06:51 )   PT: 22.4 sec;   INR: 2.03 ratio               RADIOLOGY & ADDITIONAL TESTS:   EXAM:  PORTABLE CHEST                            PROCEDURE DATE:  04/02/2017        INTERPRETATION:  INDICATION: Left basilar consolidation/atelectasis on   chest radiography March 27, 2017; follow-up assessment.    PRIORS: March 27, 2017    VIEWS: Portable AP radiography of the chest performed.    FINDINGS: Since prior evaluation, no significant interval change. There   is continued opacity within the left basilar region partially obscuring   the left hemidiaphragm suggesting left lower lobe   consolidation/atelectasis. A small left pleural effusion cannot be   excluded. There is no evidence for acute right-sided infiltrate. The   superior mediastinal region cannot be assessed as it is obscured by the   patient's face/chin. Heart size appears within normal limits.    IMPRESSION: No significant interval change in left basilar opacity   suggesting left lower lobe consolidation/atelectasis. A small left   pleural effusion cannot be excluded. INTERVAL HPI/OVERNIGHT EVENTS:Pt seen and examed at bedside with Dr. Solis. Pt was septic overnight. Fever was controlled by tylenols. No other acute event overnight. No acute abnormal tracing per tele. Pt c.o R LE pain and unable to move the LEs     MEDICATIONS  (STANDING):  ALBUTerol/ipratropium for Nebulization 3milliLiter(s) Nebulizer every 6 hours  lactobacillus acidophilus 1Tablet(s) Oral three times a day with meals  pantoprazole    Tablet 40milliGRAM(s) Oral before breakfast  tamsulosin 0.4milliGRAM(s) Oral at bedtime  ertapenem  IVPB 500milliGRAM(s) IV Intermittent every 24 hours  nystatin    Suspension [id number](s) Oral four times a day  fluconAZOLE   Tablet 200milliGRAM(s) Oral daily  enoxaparin Injectable 50milliGRAM(s) SubCutaneous daily  diltiazem   CD 240milliGRAM(s) Oral daily  insulin lispro (HumaLOG) corrective regimen sliding scale  SubCutaneous three times a day before meals  insulin lispro (HumaLOG) corrective regimen sliding scale  SubCutaneous at bedtime  dextrose 5%. 1000milliLiter(s) IV Continuous <Continuous>  dextrose 50% Injectable 12.5Gram(s) IV Push once  dextrose 50% Injectable 25Gram(s) IV Push once  dextrose 50% Injectable 25Gram(s) IV Push once  metoprolol succinate ER 50milliGRAM(s) Oral daily    MEDICATIONS  (PRN):  acetaminophen   Tablet. 650milliGRAM(s) Oral every 6 hours PRN Mild Pain (1 - 3)  dextrose Gel 1Dose(s) Oral once PRN Blood Glucose LESS THAN 70 milliGRAM(s)/deciliter  glucagon  Injectable 1milliGRAM(s) IntraMuscular once PRN Glucose LESS THAN 70 milligrams/deciliter      Allergies    No Known Allergies    Intolerances        limited 2/2 dementia   All other review of systems is negative unless indicated above.    Vital Signs Last 24 Hrs  T(C): 37.1, Max: 38.1 (04-02 @ 23:06)  T(F): 98.8, Max: 100.5 (04-02 @ 23:06)  HR: 87 (78 - 104)  BP: 113/76 (92/58 - 123/72)  BP(mean): --  RR: 18 (17 - 23)  SpO2: 97% (91% - 99%)    I & Os for current day (as of 04-03 @ 07:45)  =============================================  IN: 0 ml / OUT: 600 ml / NET: -600 ml      General: III  Neurology: Alert and awake  Respiratory: diminished BS, mild crackles at Lt base   CV: distant HS, irregular rate and rhythms, S1S2, no murmurs  Abdominal: Soft, NT, ND +BS, Last BM  Extremities: No edema, + peripheral pulses, R LE pain and       LABS:                        10.3   15.3  )-----------( 219      ( 03 Apr 2017 06:51 )             32.0     03 Apr 2017 06:51    138    |  101    |  32     ----------------------------<  115    4.0     |  30     |  0.61     Ca    7.9        03 Apr 2017 06:51      PT/INR - ( 03 Apr 2017 06:51 )   PT: 22.4 sec;   INR: 2.03 ratio               RADIOLOGY & ADDITIONAL TESTS:   EXAM:  PORTABLE CHEST                            PROCEDURE DATE:  04/02/2017        INTERPRETATION:  INDICATION: Left basilar consolidation/atelectasis on   chest radiography March 27, 2017; follow-up assessment.    PRIORS: March 27, 2017    VIEWS: Portable AP radiography of the chest performed.    FINDINGS: Since prior evaluation, no significant interval change. There   is continued opacity within the left basilar region partially obscuring   the left hemidiaphragm suggesting left lower lobe   consolidation/atelectasis. A small left pleural effusion cannot be   excluded. There is no evidence for acute right-sided infiltrate. The   superior mediastinal region cannot be assessed as it is obscured by the   patient's face/chin. Heart size appears within normal limits.    IMPRESSION: No significant interval change in left basilar opacity   suggesting left lower lobe consolidation/atelectasis. A small left   pleural effusion cannot be excluded. INTERVAL HPI/OVERNIGHT EVENTS:Pt seen and examed at bedside with Dr. Solis. Pt was septic overnight. Fever was controlled by tylenols. No other acute event overnight. No acute abnormal tracing per tele. Pt denies of Rt LE pain   MEDICATIONS  (STANDING):  ALBUTerol/ipratropium for Nebulization 3milliLiter(s) Nebulizer every 6 hours  lactobacillus acidophilus 1Tablet(s) Oral three times a day with meals  pantoprazole    Tablet 40milliGRAM(s) Oral before breakfast  tamsulosin 0.4milliGRAM(s) Oral at bedtime  ertapenem  IVPB 500milliGRAM(s) IV Intermittent every 24 hours  nystatin    Suspension [id number](s) Oral four times a day  fluconAZOLE   Tablet 200milliGRAM(s) Oral daily  enoxaparin Injectable 50milliGRAM(s) SubCutaneous daily  diltiazem   CD 240milliGRAM(s) Oral daily  insulin lispro (HumaLOG) corrective regimen sliding scale  SubCutaneous three times a day before meals  insulin lispro (HumaLOG) corrective regimen sliding scale  SubCutaneous at bedtime  dextrose 5%. 1000milliLiter(s) IV Continuous <Continuous>  dextrose 50% Injectable 12.5Gram(s) IV Push once  dextrose 50% Injectable 25Gram(s) IV Push once  dextrose 50% Injectable 25Gram(s) IV Push once  metoprolol succinate ER 50milliGRAM(s) Oral daily    MEDICATIONS  (PRN):  acetaminophen   Tablet. 650milliGRAM(s) Oral every 6 hours PRN Mild Pain (1 - 3)  dextrose Gel 1Dose(s) Oral once PRN Blood Glucose LESS THAN 70 milliGRAM(s)/deciliter  glucagon  Injectable 1milliGRAM(s) IntraMuscular once PRN Glucose LESS THAN 70 milligrams/deciliter      Allergies    No Known Allergies    Intolerances        limited 2/2 dementia   All other review of systems is negative unless indicated above.    Vital Signs Last 24 Hrs  T(C): 37.1, Max: 38.1 (04-02 @ 23:06)  T(F): 98.8, Max: 100.5 (04-02 @ 23:06)  HR: 87 (78 - 104)  BP: 113/76 (92/58 - 123/72)  BP(mean): --  RR: 18 (17 - 23)  SpO2: 97% (91% - 99%)    I & Os for current day (as of 04-03 @ 07:45)  =============================================  IN: 0 ml / OUT: 600 ml / NET: -600 ml      General: III  Neurology: Alert and awake  Respiratory: diminished BS, mild crackles at Lt base   CV: distant HS, irregular rate and rhythms, S1S2, no murmurs  Abdominal: Soft, NT, ND +BS, Last BM  Extremities: No edema, + peripheral pulses, pt became agitate when Rt LE pain was palpated. no swelling and erythematous appreciated.        LABS:                        10.3   15.3  )-----------( 219      ( 03 Apr 2017 06:51 )             32.0     03 Apr 2017 06:51    138    |  101    |  32     ----------------------------<  115    4.0     |  30     |  0.61     Ca    7.9        03 Apr 2017 06:51      PT/INR - ( 03 Apr 2017 06:51 )   PT: 22.4 sec;   INR: 2.03 ratio               RADIOLOGY & ADDITIONAL TESTS:   EXAM:  PORTABLE CHEST                            PROCEDURE DATE:  04/02/2017        INTERPRETATION:  INDICATION: Left basilar consolidation/atelectasis on   chest radiography March 27, 2017; follow-up assessment.    PRIORS: March 27, 2017    VIEWS: Portable AP radiography of the chest performed.    FINDINGS: Since prior evaluation, no significant interval change. There   is continued opacity within the left basilar region partially obscuring   the left hemidiaphragm suggesting left lower lobe   consolidation/atelectasis. A small left pleural effusion cannot be   excluded. There is no evidence for acute right-sided infiltrate. The   superior mediastinal region cannot be assessed as it is obscured by the   patient's face/chin. Heart size appears within normal limits.    IMPRESSION: No significant interval change in left basilar opacity   suggesting left lower lobe consolidation/atelectasis. A small left   pleural effusion cannot be excluded.

## 2017-04-03 NOTE — PROGRESS NOTE ADULT - PROBLEM SELECTOR PLAN 1
-acute, MDR Ecoli  -ID rec. appreciated   -c/w Ertapenem to 500mg q24hr for total 7 days, last dose 4/4  -ID following -acute, MDR E.coli  -ID rec. appreciated   -c/w Ertapenem to 500mg q24hr for total 7 days, last dose 4/4  -ID following -acute, MDR E.coli  -ID rec. appreciated   -c/w Ertapenem to 500mg q24hr for total 7 days, last dose 4/3 today  -ID following

## 2017-04-03 NOTE — PROGRESS NOTE ADULT - PROBLEM SELECTOR PLAN 6
-acute on chronic  -c/w mechanical  soft diet with ensure  -apprec dietary recs. -acute on chronic  -c/w mechanical soft diet with ensure  -prognosis poor, comfort care vs tube feeding.  -apprec dietary recs.

## 2017-04-03 NOTE — PROGRESS NOTE ADULT - PROBLEM SELECTOR PLAN 8
-c/w nystatin and fluconazole -INR was on therapeutic range  -c/w 3mg coumadin today  -f/u INR in AM -INR was on therapeutic range  -hold coumadin today and hold Lovenox  -f/u INR in AM

## 2017-04-03 NOTE — PROGRESS NOTE ADULT - ASSESSMENT
Impression and Plan: 87 female, chronic atrial fibrillation and prior DVT PE admitted with viral infection, decompensated by urinary retention and uncontrolled heart rate.    There is no evidence of acute ischemia.    Her exam is difficult, but there is no clear volume overload. No diuretics appear necessary at this time. Chest x-ray reveals no interval change in left basilar opacity suggestive of left lower lobe consolidation and no clear pulmonary vascular congestion.    She remains in atrial fibrillation, overall rate controlled. Continue metoprolol succinate and Cardizem   mg daily.    She should be anticoagulated. Her goal INR is between 2 and 3, today 2.03

## 2017-04-03 NOTE — PROGRESS NOTE ADULT - PROBLEM SELECTOR PLAN 1
debilitated pt  with obstructive and restrictive airway disease and HF, hx of PE  prognosis poor  cont supportive care  on AC, caution with fall risk  unrealistic expectations from the SON  will follow  overall very poor prognosis going forward  asst with ADL  oral and skin care  chest PT as needed  keep Sat > 90 pct

## 2017-04-03 NOTE — PROGRESS NOTE ADULT - PROBLEM SELECTOR PLAN 3
-acute on chronic  -c/w Duoneb /O2 supplement  -finished coursed of steroid tapering  -leukocytosis likely from steroids, monitor CBC  -Pulm following.   -overall prognosis poor -US extremities b/l r/o DVT

## 2017-04-03 NOTE — PROGRESS NOTE ADULT - SUBJECTIVE AND OBJECTIVE BOX
KITTY ALCANTARA is a 87yFemale , patient examined and chart reviewed.   Patient being followed for Asp pneumonia and UTI      Subjective;  Sitting in chair. NAD  Family at bedside.  Low grade temp last night.    ROS:  CONSTITUTIONAL:  +fever No chills, feels well  EYES:  Negative  blurry vision or double vision  CARDIOVASCULAR:  Negative for chest pain or palpitations  RESPIRATORY:  Negative for cough, wheezing, or SOB   GASTROINTESTINAL:  Negative for nausea, vomiting, diarrhea, constipation, or abdominal pain  GENITOURINARY:  Negative frequency, urgency or dysuria  NEUROLOGIC:  +Confusion    No Known Allergies      ANTIBIOTICS/RELEVANT:  lactobacillus acidophilus 1Tablet(s) Oral three times a day with meals  ertapenem  IVPB 500milliGRAM(s) IV Intermittent every 24 hours  nystatin    Suspension 268313Puur(s) Oral four times a day  fluconAZOLE   Tablet 200milliGRAM(s) Oral daily      ALBUTerol/ipratropium for Nebulization 3milliLiter(s) Nebulizer every 6 hours  acetaminophen   Tablet. 650milliGRAM(s) Oral every 6 hours PRN  pantoprazole    Tablet 40milliGRAM(s) Oral before breakfast  tamsulosin 0.4milliGRAM(s) Oral at bedtime  enoxaparin Injectable 50milliGRAM(s) SubCutaneous daily  diltiazem   CD 240milliGRAM(s) Oral daily  insulin lispro (HumaLOG) corrective regimen sliding scale  SubCutaneous three times a day before meals  insulin lispro (HumaLOG) corrective regimen sliding scale  SubCutaneous at bedtime  dextrose 5%. 1000milliLiter(s) IV Continuous <Continuous>  dextrose Gel 1Dose(s) Oral once PRN  dextrose 50% Injectable 12.5Gram(s) IV Push once  dextrose 50% Injectable 25Gram(s) IV Push once  dextrose 50% Injectable 25Gram(s) IV Push once  glucagon  Injectable 1milliGRAM(s) IntraMuscular once PRN  metoprolol succinate ER 50milliGRAM(s) Oral daily      Objective:    I & Os for current day (as of 04-03 @ 13:51)  =============================================  IN: 0 ml / OUT: 600 ml / NET: -600 ml    T(C): 36.8, Max: 38.1 (04-02 @ 23:06)  HR: 97 (83 - 104)  BP: 105/72 (92/58 - 123/72)  RR: 18 (17 - 19)  SpO2: 96% (91% - 97%)  Wt(kg): --    PHYSICAL EXAM:  Constitutional:Well-developed, well nourished  Eyes:RADHA, EOMI  Ear/Nose/Throat: no oral lesion, no sinus tenderness on percussion	  Neck:no JVD, no lymphadenopathy, supple  Respiratory: CTA rashida  Cardiovascular: S1S2 RRR, no murmurs  Gastrointestinal:soft, (+) BS, no HSM  Extremities:no e/e/c  CNS:     LABS:                        10.3   15.3  )-----------( 219      ( 03 Apr 2017 06:51 )             32.0     03 Apr 2017 06:51    138    |  101    |  32     ----------------------------<  115    4.0     |  30     |  0.61     Ca    7.9        03 Apr 2017 06:51      PT/INR - ( 03 Apr 2017 06:51 )   PT: 22.4 sec;   INR: 2.03 ratio         RADIOLOGY & ADDITIONAL STUDIES:  EXAM:  PORTABLE CHEST                            PROCEDURE DATE:  04/02/2017        INTERPRETATION:  INDICATION: Left basilar consolidation/atelectasis on   chest radiography March 27, 2017; follow-up assessment.    PRIORS: March 27, 2017    VIEWS:Portable AP radiography of the chest performed.    FINDINGS: Since prior evaluation, no significant interval change. There   is continued opacity within the left basilar region partially obscuring   the left hemidiaphragm suggesting left lower lobe   consolidation/atelectasis. A small left pleural effusion cannot be   excluded. There is no evidence for acute right-sided infiltrate. The   superior mediastinal region cannot be assessed as it is obscured by the   patient's face/chin. Heart size appears within normal limits.    IMPRESSION: No significant interval change in left basilar opacity   suggesting left lower lobe consolidation/atelectasis. A small left   pleural effusion cannot be excluded. KITTY ALCANTARA is a 87yFemale , patient examined and chart reviewed.   Patient being followed for Asp pneumonia and UTI      Subjective;  Sitting in chair. NAD  Family at bedside.  Low grade temp last night.    ROS:  CONSTITUTIONAL:  +fever No chills, feels well  EYES:  Negative  blurry vision or double vision  CARDIOVASCULAR:  Negative for chest pain or palpitations  RESPIRATORY:  Negative for cough, wheezing, or SOB   GASTROINTESTINAL:  Negative for nausea, vomiting, diarrhea, constipation, or abdominal pain  GENITOURINARY:  Negative frequency, urgency or dysuria  NEUROLOGIC:  +Confusion    No Known Allergies      ANTIBIOTICS/RELEVANT:  lactobacillus acidophilus 1Tablet(s) Oral three times a day with meals  ertapenem  IVPB 500milliGRAM(s) IV Intermittent every 24 hours  nystatin    Suspension 010824Pohp(s) Oral four times a day  fluconAZOLE   Tablet 200milliGRAM(s) Oral daily      ALBUTerol/ipratropium for Nebulization 3milliLiter(s) Nebulizer every 6 hours  acetaminophen   Tablet. 650milliGRAM(s) Oral every 6 hours PRN  pantoprazole    Tablet 40milliGRAM(s) Oral before breakfast  tamsulosin 0.4milliGRAM(s) Oral at bedtime  enoxaparin Injectable 50milliGRAM(s) SubCutaneous daily  diltiazem   CD 240milliGRAM(s) Oral daily  insulin lispro (HumaLOG) corrective regimen sliding scale  SubCutaneous three times a day before meals  insulin lispro (HumaLOG) corrective regimen sliding scale  SubCutaneous at bedtime  dextrose 5%. 1000milliLiter(s) IV Continuous <Continuous>  dextrose Gel 1Dose(s) Oral once PRN  dextrose 50% Injectable 12.5Gram(s) IV Push once  dextrose 50% Injectable 25Gram(s) IV Push once  dextrose 50% Injectable 25Gram(s) IV Push once  glucagon  Injectable 1milliGRAM(s) IntraMuscular once PRN  metoprolol succinate ER 50milliGRAM(s) Oral daily      Objective:    I & Os for current day (as of 04-03 @ 13:51)  =============================================  IN: 0 ml / OUT: 600 ml / NET: -600 ml    T(C): 36.8, Max: 38.1 (04-02 @ 23:06)  HR: 97 (83 - 104)  BP: 105/72 (92/58 - 123/72)  RR: 18 (17 - 19)  SpO2: 96% (91% - 97%)  Wt(kg): --    PHYSICAL EXAM:  Constitutional: Confused Sitting in chair NAD  Eyes:RADHA, EOMI  Ear/Nose/Throat: no oral lesion, no sinus tenderness on percussion	  Neck:no JVD, no lymphadenopathy, supple  Respiratory: Decreased rashida  Cardiovascular: S1S2 RRR, no murmurs  Gastrointestinal:soft, (+) BS, NTND  Extremities:no e/e/c  CNS: Confused    LABS:                        10.3   15.3  )-----------( 219      ( 03 Apr 2017 06:51 )             32.0     03 Apr 2017 06:51    138    |  101    |  32     ----------------------------<  115    4.0     |  30     |  0.61     Ca    7.9        03 Apr 2017 06:51      PT/INR - ( 03 Apr 2017 06:51 )   PT: 22.4 sec;   INR: 2.03 ratio         RADIOLOGY & ADDITIONAL STUDIES:  EXAM:  PORTABLE CHEST                            PROCEDURE DATE:  04/02/2017        INTERPRETATION:  INDICATION: Left basilar consolidation/atelectasis on   chest radiography March 27, 2017; follow-up assessment.    PRIORS: March 27, 2017    VIEWS:Portable AP radiography of the chest performed.    FINDINGS: Since prior evaluation, no significant interval change. There   is continued opacity within the left basilar region partially obscuring   the left hemidiaphragm suggesting left lower lobe   consolidation/atelectasis. A small left pleural effusion cannot be   excluded. There is no evidence for acute right-sided infiltrate. The   superior mediastinal region cannot be assessed as it is obscured by the   patient's face/chin. Heart size appears within normal limits.    IMPRESSION: No significant interval change in left basilar opacity   suggesting left lower lobe consolidation/atelectasis. A small left   pleural effusion cannot be excluded.

## 2017-04-04 LAB
ANION GAP SERPL CALC-SCNC: 12 MMOL/L — SIGNIFICANT CHANGE UP (ref 5–17)
BUN SERPL-MCNC: 31 MG/DL — HIGH (ref 7–23)
CALCIUM SERPL-MCNC: 7.7 MG/DL — LOW (ref 8.5–10.1)
CHLORIDE SERPL-SCNC: 102 MMOL/L — SIGNIFICANT CHANGE UP (ref 96–108)
CO2 SERPL-SCNC: 24 MMOL/L — SIGNIFICANT CHANGE UP (ref 22–31)
CREAT SERPL-MCNC: 0.66 MG/DL — SIGNIFICANT CHANGE UP (ref 0.5–1.3)
GLUCOSE SERPL-MCNC: 260 MG/DL — HIGH (ref 70–99)
HCT VFR BLD CALC: 31.4 % — LOW (ref 34.5–45)
HGB BLD-MCNC: 10.2 G/DL — LOW (ref 11.5–15.5)
INR BLD: 2.95 RATIO — HIGH (ref 0.88–1.16)
MCHC RBC-ENTMCNC: 27.2 PG — SIGNIFICANT CHANGE UP (ref 27–34)
MCHC RBC-ENTMCNC: 32.4 GM/DL — SIGNIFICANT CHANGE UP (ref 32–36)
MCV RBC AUTO: 84.1 FL — SIGNIFICANT CHANGE UP (ref 80–100)
PLATELET # BLD AUTO: 190 K/UL — SIGNIFICANT CHANGE UP (ref 150–400)
POTASSIUM SERPL-MCNC: 3.3 MMOL/L — LOW (ref 3.5–5.3)
POTASSIUM SERPL-SCNC: 3.3 MMOL/L — LOW (ref 3.5–5.3)
PROTHROM AB SERPL-ACNC: 32.9 SEC — HIGH (ref 9.8–12.7)
RBC # BLD: 3.74 M/UL — LOW (ref 3.8–5.2)
RBC # FLD: 16.3 % — HIGH (ref 10.3–14.5)
SODIUM SERPL-SCNC: 138 MMOL/L — SIGNIFICANT CHANGE UP (ref 135–145)
WBC # BLD: 9.6 K/UL — SIGNIFICANT CHANGE UP (ref 3.8–10.5)
WBC # FLD AUTO: 9.6 K/UL — SIGNIFICANT CHANGE UP (ref 3.8–10.5)

## 2017-04-04 PROCEDURE — 99233 SBSQ HOSP IP/OBS HIGH 50: CPT | Mod: GC

## 2017-04-04 PROCEDURE — 99233 SBSQ HOSP IP/OBS HIGH 50: CPT

## 2017-04-04 RX ORDER — POTASSIUM PHOSPHATE, MONOBASIC POTASSIUM PHOSPHATE, DIBASIC 236; 224 MG/ML; MG/ML
15 INJECTION, SOLUTION INTRAVENOUS ONCE
Qty: 0 | Refills: 0 | Status: COMPLETED | OUTPATIENT
Start: 2017-04-04 | End: 2017-04-04

## 2017-04-04 RX ORDER — POTASSIUM CHLORIDE 20 MEQ
40 PACKET (EA) ORAL EVERY 4 HOURS
Qty: 0 | Refills: 0 | Status: COMPLETED | OUTPATIENT
Start: 2017-04-04 | End: 2017-04-04

## 2017-04-04 RX ORDER — DRONABINOL 2.5 MG
2.5 CAPSULE ORAL
Qty: 0 | Refills: 0 | Status: DISCONTINUED | OUTPATIENT
Start: 2017-04-04 | End: 2017-04-07

## 2017-04-04 RX ADMIN — Medication 40 MILLIEQUIVALENT(S): at 15:55

## 2017-04-04 RX ADMIN — Medication 40 MILLIEQUIVALENT(S): at 09:08

## 2017-04-04 RX ADMIN — Medication 1 TABLET(S): at 09:09

## 2017-04-04 RX ADMIN — POTASSIUM PHOSPHATE, MONOBASIC POTASSIUM PHOSPHATE, DIBASIC 63.75 MILLIMOLE(S): 236; 224 INJECTION, SOLUTION INTRAVENOUS at 10:38

## 2017-04-04 RX ADMIN — Medication 3 MILLILITER(S): at 13:53

## 2017-04-04 RX ADMIN — Medication 500000 UNIT(S): at 15:52

## 2017-04-04 RX ADMIN — Medication 650 MILLIGRAM(S): at 00:34

## 2017-04-04 RX ADMIN — Medication 3 MILLILITER(S): at 20:29

## 2017-04-04 RX ADMIN — Medication 3 MILLILITER(S): at 07:32

## 2017-04-04 RX ADMIN — Medication 2: at 09:09

## 2017-04-04 RX ADMIN — Medication 240 MILLIGRAM(S): at 15:53

## 2017-04-04 RX ADMIN — FLUCONAZOLE 200 MILLIGRAM(S): 150 TABLET ORAL at 15:53

## 2017-04-04 RX ADMIN — Medication 1 TABLET(S): at 17:40

## 2017-04-04 RX ADMIN — Medication 650 MILLIGRAM(S): at 15:53

## 2017-04-04 NOTE — PROGRESS NOTE ADULT - PROBLEM SELECTOR PLAN 2
multifactorial  has hx of PE  restr and obstr lung disease  significant kyphosis  copd and bronchiectasis  diastolic HF  frailty and functional decline  chest pt, bronchodilators, oral and skin care, asst with adl, supp 02, position HOB > 30 deg  overall - without improvement  family meet today at 9 30 am  prognosis poor  son has unrealistic expectations

## 2017-04-04 NOTE — PROGRESS NOTE ADULT - PROBLEM SELECTOR PLAN 1
-acute, MDR E.coli  -ID rec. appreciated   -c/w Ertapenem to 500mg q24hr for total 7 days, last dose 4/3 today  -ID following -acute, MDR E.coli  -ID rec. appreciated   -finished course of  Ertapenem total 7 days  -off abx for now per ID  -ID following

## 2017-04-04 NOTE — PROGRESS NOTE ADULT - PROBLEM SELECTOR PLAN 7
-acute on chronic  -c/w mechanical  soft diet with ensure  -apprec dietary recs. -acute on chronic  -c/w mechanical  soft diet with ensure  -apprec dietary recs.  will try priscilla to see if helps with appetite as per family request

## 2017-04-04 NOTE — PROGRESS NOTE ADULT - PROBLEM SELECTOR PLAN 6
-acute on chronic  -c/w mechanical soft diet with ensure  -prognosis poor, comfort care vs tube feeding.  -apprec dietary recs.

## 2017-04-04 NOTE — PROGRESS NOTE ADULT - PROBLEM SELECTOR PLAN 10
-poor prognosis, frailty  -Son updated, made aware of poor prognosis, may be amenable to hospice intro, will have family meeting with medicine team, palliative care attending and case management tomorrow   -Palliative rec. apprec -poor prognosis, frailty  -Son updated, still with unrealistic expectation, cancelled family meeting today.     -Palliative following

## 2017-04-04 NOTE — PROGRESS NOTE ADULT - PROBLEM SELECTOR PLAN 2
-suspect 2/2 CHF vs infection HMPV , improving  -last TTE was limited study  -Appreciate cardiology and pulm recs

## 2017-04-04 NOTE — PROGRESS NOTE ADULT - SUBJECTIVE AND OBJECTIVE BOX
INTERVAL HPI/OVERNIGHT EVENTS:    MEDICATIONS  (STANDING):  ALBUTerol/ipratropium for Nebulization 3milliLiter(s) Nebulizer every 6 hours  lactobacillus acidophilus 1Tablet(s) Oral three times a day with meals  pantoprazole    Tablet 40milliGRAM(s) Oral before breakfast  tamsulosin 0.4milliGRAM(s) Oral at bedtime  nystatin    Suspension 426307Npfn(s) Oral four times a day  fluconAZOLE   Tablet 200milliGRAM(s) Oral daily  diltiazem   CD 240milliGRAM(s) Oral daily  insulin lispro (HumaLOG) corrective regimen sliding scale  SubCutaneous three times a day before meals  insulin lispro (HumaLOG) corrective regimen sliding scale  SubCutaneous at bedtime  dextrose 5%. 1000milliLiter(s) IV Continuous <Continuous>  dextrose 50% Injectable 12.5Gram(s) IV Push once  dextrose 50% Injectable 25Gram(s) IV Push once  dextrose 50% Injectable 25Gram(s) IV Push once  metoprolol succinate ER 50milliGRAM(s) Oral daily  potassium chloride    Tablet ER 40milliEquivalent(s) Oral every 4 hours    MEDICATIONS  (PRN):  acetaminophen   Tablet. 650milliGRAM(s) Oral every 6 hours PRN Mild Pain (1 - 3)  dextrose Gel 1Dose(s) Oral once PRN Blood Glucose LESS THAN 70 milliGRAM(s)/deciliter  glucagon  Injectable 1milliGRAM(s) IntraMuscular once PRN Glucose LESS THAN 70 milligrams/deciliter      Allergies    No Known Allergies    Intolerances        CONSTITUTIONAL: No weakness, fevers or chills  EYES/ENT: No visual changes;  No vertigo or throat pain   NECK: No pain or stiffness  RESPIRATORY: No cough, wheezing, hemoptysis; No shortness of breath  CARDIOVASCULAR: No chest pain or palpitations  GASTROINTESTINAL: No abdominal or epigastric pain. No nausea, vomiting, or hematemesis; No diarrhea or constipation. No melena or hematochezia.  GENITOURINARY: No dysuria, frequency or hematuria  NEUROLOGICAL: No numbness or weakness  SKIN: No itching, burning, rashes, or lesions   All other review of systems is negative unless indicated above.    Vital Signs Last 24 Hrs  T(C): 36.6, Max: 37.1 (04-03 @ 20:17)  T(F): 97.8, Max: 98.8 (04-03 @ 20:17)  HR: 101 (86 - 101)  BP: 106/70 (93/62 - 110/71)  BP(mean): --  RR: 18 (17 - 19)  SpO2: 97% (96% - 98%)  I & Os for 24h ending 04-04 @ 07:00  =============================================  IN: 100 ml / OUT: 450 ml / NET: -350 ml    I & Os for current day (as of 04-04 @ 11:01)  =============================================  IN: 250 ml / OUT: 0 ml / NET: 250 ml      General: WN/WD NAD  Neurology: A&Ox3, nonfocal, AGUILAR x 4  Respiratory: CTA B/L  CV: RRR, S1S2, no murmurs, rubs or gallops  Abdominal: Soft, NT, ND +BS, Last BM  Extremities: No edema, + peripheral pulses      LABS:                        10.2   9.6   )-----------( 190      ( 04 Apr 2017 06:48 )             31.4     04 Apr 2017 06:48    138    |  102    |  31     ----------------------------<  260    3.3     |  24     |  0.66     Ca    7.7        04 Apr 2017 06:48  Phos  2.2       04 Apr 2017 06:48  Mg     2.0       04 Apr 2017 06:48      PT/INR - ( 04 Apr 2017 06:48 )   PT: 32.9 sec;   INR: 2.95 ratio               RADIOLOGY & ADDITIONAL TESTS: INTERVAL HPI/OVERNIGHT EVENTS: Pt seen and examed at bedside with . Pt is awake and alert. Pt was hypotensive overnight, no intervention per night team.    MEDICATIONS  (STANDING):  ALBUTerol/ipratropium for Nebulization 3milliLiter(s) Nebulizer every 6 hours  lactobacillus acidophilus 1Tablet(s) Oral three times a day with meals  pantoprazole    Tablet 40milliGRAM(s) Oral before breakfast  tamsulosin 0.4milliGRAM(s) Oral at bedtime  nystatin    Suspension [id number](s) Oral four times a day  fluconAZOLE   Tablet 200milliGRAM(s) Oral daily  diltiazem   CD 240milliGRAM(s) Oral daily  insulin lispro (HumaLOG) corrective regimen sliding scale  SubCutaneous three times a day before meals  insulin lispro (HumaLOG) corrective regimen sliding scale  SubCutaneous at bedtime  dextrose 5%. 1000milliLiter(s) IV Continuous <Continuous>  dextrose 50% Injectable 12.5Gram(s) IV Push once  dextrose 50% Injectable 25Gram(s) IV Push once  dextrose 50% Injectable 25Gram(s) IV Push once  metoprolol succinate ER 50milliGRAM(s) Oral daily  potassium chloride    Tablet ER 40milliEquivalent(s) Oral every 4 hours    MEDICATIONS  (PRN):  acetaminophen   Tablet. 650milliGRAM(s) Oral every 6 hours PRN Mild Pain (1 - 3)  dextrose Gel 1Dose(s) Oral once PRN Blood Glucose LESS THAN 70 milliGRAM(s)/deciliter  glucagon  Injectable 1milliGRAM(s) IntraMuscular once PRN Glucose LESS THAN 70 milligrams/deciliter      Allergies    No Known Allergies    Intolerances      limited 2/2 dementia   All other review of systems is negative unless indicated above.    Vital Signs Last 24 Hrs  T(C): 36.6, Max: 37.1 (04-03 @ 20:17)  T(F): 97.8, Max: 98.8 (04-03 @ 20:17)  HR: 101 (86 - 101)  BP: 106/70 (93/62 - 110/71)  BP(mean): --  RR: 18 (17 - 19)  SpO2: 97% (96% - 98%)  I & Os for 24h ending 04-04 @ 07:00  =============================================  IN: 100 ml / OUT: 450 ml / NET: -350 ml    I & Os for current day (as of 04-04 @ 11:01)  =============================================  IN: 250 ml / OUT: 0 ml / NET: 250 ml      General: III  HEENT: oral thrush noticed   Neurology: alert and awake   Respiratory: diminished breath sound   CV: irregular rate and Rhythm, S1S2, no murmurs, rubs or gallops  Abdominal: Soft, NT, ND +BS, Last BM  Extremities: No edema, + peripheral pulses      LABS:                        10.2   9.6   )-----------( 190      ( 04 Apr 2017 06:48 )             31.4     04 Apr 2017 06:48    138    |  102    |  31     ----------------------------<  260    3.3     |  24     |  0.66     Ca    7.7        04 Apr 2017 06:48  Phos  2.2       04 Apr 2017 06:48  Mg     2.0       04 Apr 2017 06:48      PT/INR - ( 04 Apr 2017 06:48 )   PT: 32.9 sec;   INR: 2.95 ratio               RADIOLOGY & ADDITIONAL TESTS: INTERVAL HPI/OVERNIGHT EVENTS: Pt seen and examed at bedside with . Pt is awake and alert. Pt was hypotensive overnight, no intervention per night team.    MEDICATIONS  (STANDING):  ALBUTerol/ipratropium for Nebulization 3milliLiter(s) Nebulizer every 6 hours  lactobacillus acidophilus 1Tablet(s) Oral three times a day with meals  pantoprazole    Tablet 40milliGRAM(s) Oral before breakfast  tamsulosin 0.4milliGRAM(s) Oral at bedtime  nystatin    Suspension 100121Gqaf(s) Oral four times a day  fluconAZOLE   Tablet 200milliGRAM(s) Oral daily  diltiazem   CD 240milliGRAM(s) Oral daily  insulin lispro (HumaLOG) corrective regimen sliding scale  SubCutaneous three times a day before meals  insulin lispro (HumaLOG) corrective regimen sliding scale  SubCutaneous at bedtime  dextrose 5%. 1000milliLiter(s) IV Continuous <Continuous>  dextrose 50% Injectable 12.5Gram(s) IV Push once  dextrose 50% Injectable 25Gram(s) IV Push once  dextrose 50% Injectable 25Gram(s) IV Push once  metoprolol succinate ER 50milliGRAM(s) Oral daily  potassium chloride    Tablet ER 40milliEquivalent(s) Oral every 4 hours    MEDICATIONS  (PRN):  acetaminophen   Tablet. 650milliGRAM(s) Oral every 6 hours PRN Mild Pain (1 - 3)  dextrose Gel 1Dose(s) Oral once PRN Blood Glucose LESS THAN 70 milliGRAM(s)/deciliter  glucagon  Injectable 1milliGRAM(s) IntraMuscular once PRN Glucose LESS THAN 70 milligrams/deciliter      Allergies    No Known Allergies    Intolerances      limited 2/2 dementia   All other review of systems is negative unless indicated above.    Vital Signs Last 24 Hrs  T(C): 36.6, Max: 37.1 (04-03 @ 20:17)  T(F): 97.8, Max: 98.8 (04-03 @ 20:17)  HR: 101 (86 - 101)  BP: 106/70 (93/62 - 110/71)  BP(mean): --  RR: 18 (17 - 19)  SpO2: 97% (96% - 98%)  I & Os for 24h ending 04-04 @ 07:00  =============================================  IN: 100 ml / OUT: 450 ml / NET: -350 ml    I & Os for current day (as of 04-04 @ 11:01)  =============================================  IN: 250 ml / OUT: 0 ml / NET: 250 ml      General: chronically ill, cachetic  HEENT: oral thrush noticed   Neurology: alert and awake   Respiratory: diminished breath sound   CV: irregular rate and Rhythm, S1S2, no murmurs, rubs or gallops  Abdominal: Soft, NT, ND +BS, Last BM  Extremities: No edema, + peripheral pulses      LABS:                        10.2   9.6   )-----------( 190      ( 04 Apr 2017 06:48 )             31.4     04 Apr 2017 06:48    138    |  102    |  31     ----------------------------<  260    3.3     |  24     |  0.66     Ca    7.7        04 Apr 2017 06:48  Phos  2.2       04 Apr 2017 06:48  Mg     2.0       04 Apr 2017 06:48      PT/INR - ( 04 Apr 2017 06:48 )   PT: 32.9 sec;   INR: 2.95 ratio               RADIOLOGY & ADDITIONAL TESTS:

## 2017-04-04 NOTE — PROGRESS NOTE ADULT - PROBLEM SELECTOR PLAN 9
-poor prognosis, frailty  -Son with unrealistic expectation, awared of poor prognosis, ?comfort care vs long term nursing home care  -Palliative rec. apprec -improving  -poor prognosis, frailty  -son not ready to advance patient to comfort measures  -Palliative rec. apprec

## 2017-04-04 NOTE — PROGRESS NOTE ADULT - SUBJECTIVE AND OBJECTIVE BOX
JEFFKITTY CUNNINGHAM is a 87yFemale , patient examined and chart reviewed.   Patient being followed for UTI and asp pna.     Subjective;  Sitting in chair. NAD  Afebrile.    ROS:  CONSTITUTIONAL:  +fever No chills, feels well  EYES:  Negative  blurry vision or double vision  CARDIOVASCULAR:  Negative for chest pain or palpitations  RESPIRATORY:  Negative for cough, wheezing, or SOB   GASTROINTESTINAL:  Negative for nausea, vomiting, diarrhea, constipation, or abdominal pain  GENITOURINARY:  Negative frequency, urgency or dysuria  NEUROLOGIC:  +Confusion    No Known Allergies      ANTIBIOTICS/RELEVANT:  lactobacillus acidophilus 1Tablet(s) Oral three times a day with meals  nystatin    Suspension 516876Yxcg(s) Oral four times a day  fluconAZOLE   Tablet 200milliGRAM(s) Oral daily      ALBUTerol/ipratropium for Nebulization 3milliLiter(s) Nebulizer every 6 hours  acetaminophen   Tablet. 650milliGRAM(s) Oral every 6 hours PRN  pantoprazole    Tablet 40milliGRAM(s) Oral before breakfast  tamsulosin 0.4milliGRAM(s) Oral at bedtime  diltiazem   CD 240milliGRAM(s) Oral daily  insulin lispro (HumaLOG) corrective regimen sliding scale  SubCutaneous three times a day before meals  insulin lispro (HumaLOG) corrective regimen sliding scale  SubCutaneous at bedtime  dextrose 5%. 1000milliLiter(s) IV Continuous <Continuous>  dextrose Gel 1Dose(s) Oral once PRN  dextrose 50% Injectable 12.5Gram(s) IV Push once  dextrose 50% Injectable 25Gram(s) IV Push once  dextrose 50% Injectable 25Gram(s) IV Push once  glucagon  Injectable 1milliGRAM(s) IntraMuscular once PRN  metoprolol succinate ER 50milliGRAM(s) Oral daily  potassium chloride    Tablet ER 40milliEquivalent(s) Oral every 4 hours      Objective:  I & Os for 24h ending 04-04 @ 07:00  =============================================  IN: 100 ml / OUT: 450 ml / NET: -350 ml    I & Os for current day (as of 04-04 @ 15:05)  =============================================  IN: 250 ml / OUT: 175 ml / NET: 75 ml    T(C): 36.7, Max: 37.1 (04-03 @ 20:17)  HR: 100 (86 - 110)  BP: 104/69 (93/62 - 110/71)  RR: 18 (17 - 19)  SpO2: 97% (94% - 98%)  Wt(kg): --    PHYSICAL EXAM:  Constitutional: Confused Sitting in chair NAD  Eyes:RADHA, EOMI  Ear/Nose/Throat: no oral lesion, no sinus tenderness on percussion	  Neck:no JVD, no lymphadenopathy, supple  Respiratory: Decreased rashida  Cardiovascular: S1S2 RRR, no murmurs  Gastrointestinal:soft, (+) BS, NTND  Extremities:no e/e/c  CNS: Confused    LABS:                        10.2   9.6   )-----------( 190      ( 04 Apr 2017 06:48 )             31.4     04 Apr 2017 06:48    138    |  102    |  31     ----------------------------<  260    3.3     |  24     |  0.66     Ca    7.7        04 Apr 2017 06:48  Phos  2.2       04 Apr 2017 06:48  Mg     2.0       04 Apr 2017 06:48      PT/INR - ( 04 Apr 2017 06:48 )   PT: 32.9 sec;   INR: 2.95 ratio                             RADIOLOGY & ADDITIONAL STUDIES:

## 2017-04-04 NOTE — PROGRESS NOTE ADULT - ASSESSMENT
87 y.o. F with PMHx of A fib on coumadin, MDD, HLD, GERD, dysphagia, dementia, prior hx of DVT/PE?, HTN, urinary retention, and constipation brought in by EMS from [facility], admitted with acute hypoxic respiratory failure 2/2 pulmonary edema 2/2 HMPV virus, UTI(E.coli) , hypertensive urgency, and afib with RVR.

## 2017-04-04 NOTE — PROGRESS NOTE ADULT - SUBJECTIVE AND OBJECTIVE BOX
Date/Time Patient Seen:  		  Referring MD:   Data Reviewed	       Patient is a 87y old  Female who presents with a chief complaint of SOB (30 Mar 2017 14:20)  pt in bed  seen and examined  vs and meds reviewed  pall / family meet scheduled for this am  overall no improvement noted  remains weak and frail and on oxygen      Subjective/HPI       Medication list         MEDICATIONS  (STANDING):  ALBUTerol/ipratropium for Nebulization 3milliLiter(s) Nebulizer every 6 hours  lactobacillus acidophilus 1Tablet(s) Oral three times a day with meals  pantoprazole    Tablet 40milliGRAM(s) Oral before breakfast  tamsulosin 0.4milliGRAM(s) Oral at bedtime  nystatin    Suspension [id number](s) Oral four times a day  fluconAZOLE   Tablet 200milliGRAM(s) Oral daily  diltiazem   CD 240milliGRAM(s) Oral daily  insulin lispro (HumaLOG) corrective regimen sliding scale  SubCutaneous three times a day before meals  insulin lispro (HumaLOG) corrective regimen sliding scale  SubCutaneous at bedtime  dextrose 5%. 1000milliLiter(s) IV Continuous <Continuous>  dextrose 50% Injectable 12.5Gram(s) IV Push once  dextrose 50% Injectable 25Gram(s) IV Push once  dextrose 50% Injectable 25Gram(s) IV Push once  metoprolol succinate ER 50milliGRAM(s) Oral daily    MEDICATIONS  (PRN):  acetaminophen   Tablet. 650milliGRAM(s) Oral every 6 hours PRN Mild Pain (1 - 3)  dextrose Gel 1Dose(s) Oral once PRN Blood Glucose LESS THAN 70 milliGRAM(s)/deciliter  glucagon  Injectable 1milliGRAM(s) IntraMuscular once PRN Glucose LESS THAN 70 milligrams/deciliter         Vitals log        ICU Vital Signs Last 24 Hrs  T(C): 36.5, Max: 37.1 (04-03 @ 07:33)  T(F): 97.7, Max: 98.8 (04-03 @ 07:33)  HR: 93 (85 - 101)  BP: 99/64 (93/62 - 113/76)  BP(mean): --  ABP: --  ABP(mean): --  RR: 18 (17 - 19)  SpO2: 97% (96% - 98%)           Input and Output:  I&O's Detail    I & Os for current day (as of 04 Apr 2017 07:32)  =============================================  IN:    Solution: 100 ml    Total IN: 100 ml  ---------------------------------------------  OUT:    Indwelling Catheter - Urethral: 450 ml    Total OUT: 450 ml  ---------------------------------------------  Total NET: -350 ml      Lab Data                        10.3   15.3  )-----------( 219      ( 03 Apr 2017 06:51 )             32.0     04 Apr 2017 06:48    138    |  102    |  31     ----------------------------<  260    3.3     |  24     |  0.66     Ca    7.7        04 Apr 2017 06:48              Review of Systems	      Objective       Resp  dec BS  rhonchi    CV     s1s2  GI    Extremities       Neuro       Skin         Pertinent Lab findings & Imaging      Derrick:  NO   Adequate UO     I&O's Detail    I & Os for current day (as of 04 Apr 2017 07:32)  =============================================  IN:    Solution: 100 ml    Total IN: 100 ml  ---------------------------------------------  OUT:    Indwelling Catheter - Urethral: 450 ml    Total OUT: 450 ml  ---------------------------------------------  Total NET: -350 ml           Discussed with:     Cultures:	        Radiology

## 2017-04-04 NOTE — PROGRESS NOTE ADULT - SUBJECTIVE AND OBJECTIVE BOX
[facility]     Patient resting comfortably in bed in NAD.  No complaints of chest pain, dyspnea, palpitations, PND, or orthopnea.    Telemetry:  Rate controlled atrial fibrillation    MEDICATIONS  (STANDING):  ALBUTerol/ipratropium for Nebulization 3milliLiter(s) Nebulizer every 6 hours  lactobacillus acidophilus 1Tablet(s) Oral three times a day with meals  pantoprazole    Tablet 40milliGRAM(s) Oral before breakfast  tamsulosin 0.4milliGRAM(s) Oral at bedtime  nystatin    Suspension [id number](s) Oral four times a day  fluconAZOLE   Tablet 200milliGRAM(s) Oral daily  diltiazem   CD 240milliGRAM(s) Oral daily  insulin lispro (HumaLOG) corrective regimen sliding scale  SubCutaneous three times a day before meals  insulin lispro (HumaLOG) corrective regimen sliding scale  SubCutaneous at bedtime  dextrose 5%. 1000milliLiter(s) IV Continuous <Continuous>  dextrose 50% Injectable 12.5Gram(s) IV Push once  dextrose 50% Injectable 25Gram(s) IV Push once  dextrose 50% Injectable 25Gram(s) IV Push once  metoprolol succinate ER 50milliGRAM(s) Oral daily  potassium chloride    Tablet ER 40milliEquivalent(s) Oral every 4 hours    MEDICATIONS  (PRN):  acetaminophen   Tablet. 650milliGRAM(s) Oral every 6 hours PRN Mild Pain (1 - 3)  dextrose Gel 1Dose(s) Oral once PRN Blood Glucose LESS THAN 70 milliGRAM(s)/deciliter  glucagon  Injectable 1milliGRAM(s) IntraMuscular once PRN Glucose LESS THAN 70 milligrams/deciliter      Allergies    No Known Allergies        Vital Signs Last 24 Hrs  T(C): 36.5, Max: 37.1 (04-03 @ 20:17)  T(F): 97.7, Max: 98.8 (04-03 @ 20:17)  HR: 86 (86 - 101)  BP: 99/64 (93/62 - 110/71)  BP(mean): --  RR: 18 (17 - 19)  SpO2: 96% (96% - 98%)    I&O's Summary    I & Os for current day (as of 04 Apr 2017 08:39)  =============================================  IN: 100 ml / OUT: 450 ml / NET: -350 ml      ON EXAM:    General: NAD, awake and alert  HEENT: Mucous membranes are dry, anicteric  Lungs: Non-labored, breath sounds are clear bilaterally, No wheezing, rales or rhonchi  Cardiovascular: Irregular, S1 and S2, no murmurs, rubs, or gallops  Gastrointestinal: Bowel Sounds present, soft, nontender.   Lymph: No peripheral edema. No lymphadenopathy.  Skin: No rashes or ulcers    LABS: All Labs Reviewed:                        10.3   15.3  )-----------( 219      ( 03 Apr 2017 06:51 )             32.0                         11.6   16.5  )-----------( 258      ( 02 Apr 2017 06:25 )             35.1     04 Apr 2017 06:48    138    |  102    |  31     ----------------------------<  260    3.3     |  24     |  0.66   03 Apr 2017 06:51    138    |  101    |  32     ----------------------------<  115    4.0     |  30     |  0.61   02 Apr 2017 06:25    138    |  100    |  37     ----------------------------<  121    3.8     |  30     |  0.63     Ca    7.7        04 Apr 2017 06:48  Ca    7.9        03 Apr 2017 06:51  Ca    8.5        02 Apr 2017 06:25  Phos  2.2       04 Apr 2017 06:48  Mg     2.0       04 Apr 2017 06:48      PT/INR - ( 04 Apr 2017 06:48 )   PT: 32.9 sec;   INR: 2.95 ratio          Assessment/Plan:  87y Female with history of chronic atrial fibrillation, AC with Coumadin, prior DVT/PE, resolved pulmonary edema, HMPV:    1.  Dose Coumadin goal INR 2-3  2.  Ventricular rate controlled on current dosages of diltiazem and Toprol.  Continue as written  3.  Monitor and replete electrolytes as needed  4.  Supplemental oxygen as needed  5.  D/C planning per primary team

## 2017-04-04 NOTE — PROGRESS NOTE ADULT - PROBLEM SELECTOR PLAN 5
-acute on chronic  -c/w Cardizem PO per cardio, bridge coumadin to therapeutic range with lovenox  -increase to  50mg Toprol QD  -continue with1.5mg Coumadin and 1x Lovenox full dose bridging today, c/w trending INR  -c/w tele monitor   -cardio following. -acute on chronic  -c/w Cardizem PO per cardio, bridge coumadin to therapeutic range with lovenox  -c/w Toprol QD  -hold Coumadin for today 2/2  board line elevated INR, c/w trending INR  -c/w tele monitor   -cardio following.

## 2017-04-04 NOTE — PROGRESS NOTE ADULT - PROBLEM SELECTOR PLAN 2
Stable  Poss concurrent aspiration pneumonia  Resp status stable  Monitor off antibiotics  To finish course of Ertapenam today  Monitor temps  Trend wbc

## 2017-04-04 NOTE — PROGRESS NOTE ADULT - ASSESSMENT
87 y.o. F with PMHx of A fib on coumadin, MDD, HLD, GERD, dysphagia, dementia, prior hx of DVT/PE?, HTN, urinary retention, and constipation brought in by EMS from [facility], admitted with acute hypoxic respiratory failure sec pulmonary edema with HMPV virus infection bronchitis/pna, UTI(E.coli) , and afib with RVR. Clinically stable.

## 2017-04-05 LAB
ANION GAP SERPL CALC-SCNC: 8 MMOL/L — SIGNIFICANT CHANGE UP (ref 5–17)
BASOPHILS # BLD AUTO: 0 K/UL — SIGNIFICANT CHANGE UP (ref 0–0.2)
BASOPHILS NFR BLD AUTO: 0.5 % — SIGNIFICANT CHANGE UP (ref 0–2)
BUN SERPL-MCNC: 24 MG/DL — HIGH (ref 7–23)
CALCIUM SERPL-MCNC: 8 MG/DL — LOW (ref 8.5–10.1)
CHLORIDE SERPL-SCNC: 106 MMOL/L — SIGNIFICANT CHANGE UP (ref 96–108)
CO2 SERPL-SCNC: 25 MMOL/L — SIGNIFICANT CHANGE UP (ref 22–31)
CREAT SERPL-MCNC: 0.41 MG/DL — LOW (ref 0.5–1.3)
EOSINOPHIL # BLD AUTO: 0.2 K/UL — SIGNIFICANT CHANGE UP (ref 0–0.5)
EOSINOPHIL NFR BLD AUTO: 1.6 % — SIGNIFICANT CHANGE UP (ref 0–6)
GLUCOSE SERPL-MCNC: 99 MG/DL — SIGNIFICANT CHANGE UP (ref 70–99)
HCT VFR BLD CALC: 31.4 % — LOW (ref 34.5–45)
HGB BLD-MCNC: 9.9 G/DL — LOW (ref 11.5–15.5)
INR BLD: 2.84 RATIO — HIGH (ref 0.88–1.16)
LYMPHOCYTES # BLD AUTO: 1.5 K/UL — SIGNIFICANT CHANGE UP (ref 1–3.3)
LYMPHOCYTES # BLD AUTO: 14.8 % — SIGNIFICANT CHANGE UP (ref 13–44)
MAGNESIUM SERPL-MCNC: 2.2 MG/DL — SIGNIFICANT CHANGE UP (ref 1.8–2.4)
MCHC RBC-ENTMCNC: 26.8 PG — LOW (ref 27–34)
MCHC RBC-ENTMCNC: 31.7 GM/DL — LOW (ref 32–36)
MCV RBC AUTO: 84.5 FL — SIGNIFICANT CHANGE UP (ref 80–100)
MONOCYTES # BLD AUTO: 0.7 K/UL — SIGNIFICANT CHANGE UP (ref 0–0.9)
MONOCYTES NFR BLD AUTO: 6.4 % — SIGNIFICANT CHANGE UP (ref 1–9)
NEUTROPHILS # BLD AUTO: 7.9 K/UL — HIGH (ref 1.8–7.4)
NEUTROPHILS NFR BLD AUTO: 76.7 % — SIGNIFICANT CHANGE UP (ref 43–77)
PHOSPHATE SERPL-MCNC: 2.3 MG/DL — LOW (ref 2.5–4.5)
PLATELET # BLD AUTO: 220 K/UL — SIGNIFICANT CHANGE UP (ref 150–400)
POTASSIUM SERPL-MCNC: 4.6 MMOL/L — SIGNIFICANT CHANGE UP (ref 3.5–5.3)
POTASSIUM SERPL-SCNC: 4.6 MMOL/L — SIGNIFICANT CHANGE UP (ref 3.5–5.3)
PROTHROM AB SERPL-ACNC: 31.6 SEC — HIGH (ref 9.8–12.7)
RBC # BLD: 3.71 M/UL — LOW (ref 3.8–5.2)
RBC # FLD: 17.2 % — HIGH (ref 10.3–14.5)
SODIUM SERPL-SCNC: 139 MMOL/L — SIGNIFICANT CHANGE UP (ref 135–145)
WBC # BLD: 10.3 K/UL — SIGNIFICANT CHANGE UP (ref 3.8–10.5)
WBC # FLD AUTO: 10.3 K/UL — SIGNIFICANT CHANGE UP (ref 3.8–10.5)

## 2017-04-05 PROCEDURE — 93970 EXTREMITY STUDY: CPT | Mod: 26

## 2017-04-05 PROCEDURE — 99233 SBSQ HOSP IP/OBS HIGH 50: CPT

## 2017-04-05 PROCEDURE — 99233 SBSQ HOSP IP/OBS HIGH 50: CPT | Mod: GC

## 2017-04-05 RX ORDER — METOPROLOL TARTRATE 50 MG
25 TABLET ORAL DAILY
Qty: 0 | Refills: 0 | Status: DISCONTINUED | OUTPATIENT
Start: 2017-04-05 | End: 2017-04-07

## 2017-04-05 RX ORDER — DRONABINOL 2.5 MG
1 CAPSULE ORAL
Qty: 0 | Refills: 0 | COMMUNITY
Start: 2017-04-05

## 2017-04-05 RX ORDER — POTASSIUM PHOSPHATE, MONOBASIC POTASSIUM PHOSPHATE, DIBASIC 236; 224 MG/ML; MG/ML
15 INJECTION, SOLUTION INTRAVENOUS ONCE
Qty: 0 | Refills: 0 | Status: COMPLETED | OUTPATIENT
Start: 2017-04-05 | End: 2017-04-05

## 2017-04-05 RX ORDER — METOPROLOL TARTRATE 50 MG
1 TABLET ORAL
Qty: 0 | Refills: 0 | COMMUNITY
Start: 2017-04-05

## 2017-04-05 RX ORDER — FLUCONAZOLE 150 MG/1
1 TABLET ORAL
Qty: 0 | Refills: 0 | COMMUNITY
Start: 2017-04-05

## 2017-04-05 RX ORDER — NYSTATIN 500MM UNIT
5 POWDER (EA) MISCELLANEOUS
Qty: 0 | Refills: 0 | COMMUNITY
Start: 2017-04-05

## 2017-04-05 RX ADMIN — Medication 1 TABLET(S): at 09:43

## 2017-04-05 RX ADMIN — Medication 650 MILLIGRAM(S): at 23:30

## 2017-04-05 RX ADMIN — Medication 2: at 17:29

## 2017-04-05 RX ADMIN — Medication 500000 UNIT(S): at 06:26

## 2017-04-05 RX ADMIN — POTASSIUM PHOSPHATE, MONOBASIC POTASSIUM PHOSPHATE, DIBASIC 63.75 MILLIMOLE(S): 236; 224 INJECTION, SOLUTION INTRAVENOUS at 10:57

## 2017-04-05 RX ADMIN — Medication 3 MILLILITER(S): at 07:25

## 2017-04-05 RX ADMIN — Medication 240 MILLIGRAM(S): at 17:29

## 2017-04-05 RX ADMIN — Medication 2.5 MILLIGRAM(S): at 17:29

## 2017-04-05 RX ADMIN — FLUCONAZOLE 200 MILLIGRAM(S): 150 TABLET ORAL at 12:24

## 2017-04-05 RX ADMIN — TAMSULOSIN HYDROCHLORIDE 0.4 MILLIGRAM(S): 0.4 CAPSULE ORAL at 22:01

## 2017-04-05 RX ADMIN — Medication 3 MILLILITER(S): at 13:24

## 2017-04-05 RX ADMIN — Medication 500000 UNIT(S): at 00:05

## 2017-04-05 RX ADMIN — Medication 25 MILLIGRAM(S): at 12:23

## 2017-04-05 RX ADMIN — TAMSULOSIN HYDROCHLORIDE 0.4 MILLIGRAM(S): 0.4 CAPSULE ORAL at 00:06

## 2017-04-05 RX ADMIN — Medication 650 MILLIGRAM(S): at 00:05

## 2017-04-05 RX ADMIN — Medication 1 TABLET(S): at 17:29

## 2017-04-05 RX ADMIN — PANTOPRAZOLE SODIUM 40 MILLIGRAM(S): 20 TABLET, DELAYED RELEASE ORAL at 06:26

## 2017-04-05 RX ADMIN — Medication 650 MILLIGRAM(S): at 22:01

## 2017-04-05 RX ADMIN — Medication 2.5 MILLIGRAM(S): at 06:26

## 2017-04-05 RX ADMIN — Medication 3 MILLILITER(S): at 19:05

## 2017-04-05 NOTE — PROGRESS NOTE ADULT - PROBLEM SELECTOR PLAN 7
-acute on chronic  -c/w mechanical  soft diet with ensure  -apprec dietary recs.  will try priscilla to see if helps with appetite as per family request

## 2017-04-05 NOTE — PROGRESS NOTE ADULT - ASSESSMENT
87 y.o. F with PMHx of A fib on coumadin, MDD, HLD, GERD, dysphagia, dementia, prior hx of DVT/PE?, HTN, urinary retention, and constipation brought in by EMS from [facility], admitted with acute hypoxic respiratory failure sec pulmonary edema with HMPV virus infection bronchitis/pna, UTI(E.coli) , and afib with RVR. Clinically stable.

## 2017-04-05 NOTE — PROGRESS NOTE ADULT - PROBLEM SELECTOR PLAN 10
-poor prognosis, frailty  -Son updated, still with unrealistic expectation,  family meeting today.     -Palliative following -poor prognosis, frailty  -Son updated,  family meeting today.     -Palliative following

## 2017-04-05 NOTE — PROGRESS NOTE ADULT - PROBLEM SELECTOR PLAN 2
Stable  Poss concurrent aspiration pneumonia  Resp status stable  Monitor off antibiotics  To finish course of Ertapenam today  Monitor temps  Trend wbc Stable  Poss concurrent aspiration pneumonia  Resp status stable  Monitor off antibiotics Stable  sp poss aspiration pneumonia  Resp status stable  Monitor off antibiotics

## 2017-04-05 NOTE — PROGRESS NOTE ADULT - PROBLEM SELECTOR PLAN 1
-acute, MDR E.coli  -ID rec. appreciated   -finished course of  Ertapenem total 7 days  -off abx for now per ID  -ID following

## 2017-04-05 NOTE — PROGRESS NOTE ADULT - PROBLEM SELECTOR PLAN 5
-acute on chronic  -c/w Cardizem PO per cardio, bridge coumadin to therapeutic range with lovenox  -c/w Toprol QD  -hold Coumadin for today 2/2  board line elevated INR, c/w trending INR  -c/w tele monitor   -cardio following.

## 2017-04-05 NOTE — PROGRESS NOTE ADULT - PROBLEM SELECTOR PLAN 2
-suspect 2/2 CHF vs infection HMPV , improving  -last TTE was limited study  -Appreciate cardiology and pulm recs -suspect 2/2 dCHF vs infection HMPV , improving  -last TTE was limited study  -Appreciate cardiology and pulm recs

## 2017-04-05 NOTE — PROGRESS NOTE ADULT - SUBJECTIVE AND OBJECTIVE BOX
[facility]    Marshall Frederick, Elias, Lenin, Kofi [place]      [phone]    CHIEF COMPLAINT: Patient is a 87y old  Female who presents with a chief complaint of SOB (30 Mar 2017 14:20)      Follow Up: af, wct, prior dvt/pe, viral syndrome    Interim history: confused unable to obtain hx    MEDICATIONS  (STANDING):  ALBUTerol/ipratropium for Nebulization 3milliLiter(s) Nebulizer every 6 hours  lactobacillus acidophilus 1Tablet(s) Oral three times a day with meals  pantoprazole    Tablet 40milliGRAM(s) Oral before breakfast  tamsulosin 0.4milliGRAM(s) Oral at bedtime  nystatin    Suspension [id number](s) Oral four times a day  fluconAZOLE   Tablet 200milliGRAM(s) Oral daily  diltiazem   CD 240milliGRAM(s) Oral daily  insulin lispro (HumaLOG) corrective regimen sliding scale  SubCutaneous three times a day before meals  insulin lispro (HumaLOG) corrective regimen sliding scale  SubCutaneous at bedtime  dextrose 5%. 1000milliLiter(s) IV Continuous <Continuous>  dextrose 50% Injectable 12.5Gram(s) IV Push once  dextrose 50% Injectable 25Gram(s) IV Push once  dextrose 50% Injectable 25Gram(s) IV Push once  metoprolol succinate ER 50milliGRAM(s) Oral daily  dronabinol 2.5milliGRAM(s) Oral two times a day  potassium phosphate IVPB 15milliMole(s) IV Intermittent once    MEDICATIONS  (PRN):  acetaminophen   Tablet. 650milliGRAM(s) Oral every 6 hours PRN Mild Pain (1 - 3)  dextrose Gel 1Dose(s) Oral once PRN Blood Glucose LESS THAN 70 milliGRAM(s)/deciliter  glucagon  Injectable 1milliGRAM(s) IntraMuscular once PRN Glucose LESS THAN 70 milligrams/deciliter      REVIEW OF SYSTEMS:  eye, ent, GI, , allergic, dermatologic, musculoskeletal and neurologic are negative except as described above    Vital Signs Last 24 Hrs  T(C): 36.7, Max: 36.9 (04-04 @ 15:26)  T(F): 98, Max: 98.5 (04-04 @ 15:26)  HR: 85 (79 - 118)  BP: 94/66 (90/52 - 121/76)  BP(mean): --  RR: 16 (16 - 19)  SpO2: 99% (93% - 100%)    I&O's Summary    I & Os for current day (as of 05 Apr 2017 10:39)  =============================================  IN: 250 ml / OUT: 575 ml / NET: -325 ml      Telemetry past 24h: af inc vr 90s-110s    PHYSICAL EXAM:    Constitutional: well-nourished, well-developed, NAD   HEENT:  MMM, sclerae anicteric, conjunctivae clear, no oral cyanosis.  Pulmonary: Non-labored, breath sounds are clear bilaterally, No wheezing, rales or rhonchi  Cardiovascular: Regular, S1 and S2, No murmurs, rubs, gallops or clicks  Gastrointestinal: Bowel Sounds present, soft, nontender.   Lymph: No peripheral edema. No lymphadenopathy.  Neurological: Alert, no focal deficits  Skin: No rashes.  Psych:  Mood & affect appropriate    LABS: All Labs Reviewed:                        9.9    10.3  )-----------( 220      ( 05 Apr 2017 07:19 )             31.4                         10.2   9.6   )-----------( 190      ( 04 Apr 2017 06:48 )             31.4                         10.3   15.3  )-----------( 219      ( 03 Apr 2017 06:51 )             32.0     05 Apr 2017 07:19    139    |  106    |  24     ----------------------------<  99     4.6     |  25     |  0.41   04 Apr 2017 06:48    138    |  102    |  31     ----------------------------<  260    3.3     |  24     |  0.66   03 Apr 2017 06:51    138    |  101    |  32     ----------------------------<  115    4.0     |  30     |  0.61     Ca    8.0        05 Apr 2017 07:19  Ca    7.7        04 Apr 2017 06:48  Ca    7.9        03 Apr 2017 06:51  Phos  2.3       05 Apr 2017 07:19  Phos  2.2       04 Apr 2017 06:48  Mg     2.2       05 Apr 2017 07:19  Mg     2.0       04 Apr 2017 06:48      PT/INR - ( 05 Apr 2017 07:19 )   PT: 31.6 sec;   INR: 2.84 ratio               Blood Culture:         RADIOLOGY:    EKG:    Echo:     EXAM:  ECHO TTE W/O CON COMP W/DOPPLR         PROCEDURE DATE:  03/25/2017        INTERPRETATION:  Ordering Physician: LYNNE GARDNER [id number]    Indication: SVT    Study Quality: Technically difficult and limited   A limited echocardiographicstudy was performed.      Height: 157 cm  Weight: 41 kg  BSA: 1.36 sq m  Blood Pressure: 114/73    RVSP: 39mmHg    FINDINGS  Left Ventricle: Endocardium is not well-visualized. Grossly, normal left   ventricular systolic function.  Aortic Valve: Calcified trileaflet aortic valve. Minimal aortic   insufficiency.  Mitral Valve: Mitral annular calcification and calcified mitral leaflets.   Mild mitral insufficiency.  Tricuspid Valve: Tricuspid valve is not well-visualized. Mild tricuspid   insufficiency.  Pulmonic Valve: Not well visualized. Trace pulmonic insufficiency.  Left Atrium: Severe left atrial enlargement.  Right Ventricle: Normal right ventricular size and systolic function.  Right Atrium: Moderately enlarged.  Pericardium/Pleura: Normal pericardium with no pericardial effusion.                    JOSE BIRMINGHAM M.D., ATTENDING CARDIOLOGIST  This document has been electronically signed. Mar 26 2017  1:47PM [facility]    Marshall Frederick, Elias, Lenin, Kofi [place]      [phone]    CHIEF COMPLAINT: Patient is a 87y old  Female who presents with a chief complaint of SOB (30 Mar 2017 14:20)      Follow Up: af, wct, prior dvt/pe, viral syndrome    Interim history: confused unable to obtain hx    MEDICATIONS  (STANDING):  ALBUTerol/ipratropium for Nebulization 3milliLiter(s) Nebulizer every 6 hours  lactobacillus acidophilus 1Tablet(s) Oral three times a day with meals  pantoprazole    Tablet 40milliGRAM(s) Oral before breakfast  tamsulosin 0.4milliGRAM(s) Oral at bedtime  nystatin    Suspension [id number](s) Oral four times a day  fluconAZOLE   Tablet 200milliGRAM(s) Oral daily  diltiazem   CD 240milliGRAM(s) Oral daily  insulin lispro (HumaLOG) corrective regimen sliding scale  SubCutaneous three times a day before meals  insulin lispro (HumaLOG) corrective regimen sliding scale  SubCutaneous at bedtime  dextrose 5%. 1000milliLiter(s) IV Continuous <Continuous>  dextrose 50% Injectable 12.5Gram(s) IV Push once  dextrose 50% Injectable 25Gram(s) IV Push once  dextrose 50% Injectable 25Gram(s) IV Push once  metoprolol succinate ER 50milliGRAM(s) Oral daily  dronabinol 2.5milliGRAM(s) Oral two times a day  potassium phosphate IVPB 15milliMole(s) IV Intermittent once    MEDICATIONS  (PRN):  acetaminophen   Tablet. 650milliGRAM(s) Oral every 6 hours PRN Mild Pain (1 - 3)  dextrose Gel 1Dose(s) Oral once PRN Blood Glucose LESS THAN 70 milliGRAM(s)/deciliter  glucagon  Injectable 1milliGRAM(s) IntraMuscular once PRN Glucose LESS THAN 70 milligrams/deciliter      REVIEW OF SYSTEMS:  eye, ent, GI, , allergic, dermatologic, musculoskeletal and neurologic are negative except as described above    Vital Signs Last 24 Hrs  T(C): 36.7, Max: 36.9 (04-04 @ 15:26)  T(F): 98, Max: 98.5 (04-04 @ 15:26)  HR: 85 (79 - 118)  BP: 94/66 (90/52 - 121/76)  BP(mean): --  RR: 16 (16 - 19)  SpO2: 99% (93% - 100%)    I&O's Summary    I & Os for current day (as of 05 Apr 2017 10:39)  =============================================  IN: 250 ml / OUT: 575 ml / NET: -325 ml      Telemetry past 24h: af inc vr 90s-110s. 37beats wct    PHYSICAL EXAM:    Constitutional: well-nourished, well-developed, NAD   HEENT:  MMM, sclerae anicteric, conjunctivae clear, no oral cyanosis.  Pulmonary: Non-labored, breath sounds are clear bilaterally, No wheezing, rales or rhonchi  Cardiovascular: Regular, S1 and S2, No murmurs, rubs, gallops or clicks  Gastrointestinal: Bowel Sounds present, soft, nontender.   Lymph: No peripheral edema. No lymphadenopathy.  Neurological: Alert, no focal deficits  Skin: No rashes.  Psych:  Mood & affect appropriate    LABS: All Labs Reviewed:                        9.9    10.3  )-----------( 220      ( 05 Apr 2017 07:19 )             31.4                         10.2   9.6   )-----------( 190      ( 04 Apr 2017 06:48 )             31.4                         10.3   15.3  )-----------( 219      ( 03 Apr 2017 06:51 )             32.0     05 Apr 2017 07:19    139    |  106    |  24     ----------------------------<  99     4.6     |  25     |  0.41   04 Apr 2017 06:48    138    |  102    |  31     ----------------------------<  260    3.3     |  24     |  0.66   03 Apr 2017 06:51    138    |  101    |  32     ----------------------------<  115    4.0     |  30     |  0.61     Ca    8.0        05 Apr 2017 07:19  Ca    7.7        04 Apr 2017 06:48  Ca    7.9        03 Apr 2017 06:51  Phos  2.3       05 Apr 2017 07:19  Phos  2.2       04 Apr 2017 06:48  Mg     2.2       05 Apr 2017 07:19  Mg     2.0       04 Apr 2017 06:48      PT/INR - ( 05 Apr 2017 07:19 )   PT: 31.6 sec;   INR: 2.84 ratio               Blood Culture:         RADIOLOGY:    EKG:    Echo:     EXAM:  ECHO TTE W/O CON COMP W/DOPPLR         PROCEDURE DATE:  03/25/2017        INTERPRETATION:  Ordering Physician: LYNNE GARDNER [id number]    Indication: SVT    Study Quality: Technically difficult and limited   A limited echocardiographicstudy was performed.      Height: 157 cm  Weight: 41 kg  BSA: 1.36 sq m  Blood Pressure: 114/73    RVSP: 39mmHg    FINDINGS  Left Ventricle: Endocardium is not well-visualized. Grossly, normal left   ventricular systolic function.  Aortic Valve: Calcified trileaflet aortic valve. Minimal aortic   insufficiency.  Mitral Valve: Mitral annular calcification and calcified mitral leaflets.   Mild mitral insufficiency.  Tricuspid Valve: Tricuspid valve is not well-visualized. Mild tricuspid   insufficiency.  Pulmonic Valve: Not well visualized. Trace pulmonic insufficiency.  Left Atrium: Severe left atrial enlargement.  Right Ventricle: Normal right ventricular size and systolic function.  Right Atrium: Moderately enlarged.  Pericardium/Pleura: Normal pericardium with no pericardial effusion.                    JOSE BIRMINGHAM M.D., ATTENDING CARDIOLOGIST  This document has been electronically signed. Mar 26 2017  1:47PM

## 2017-04-05 NOTE — PROGRESS NOTE ADULT - ATTENDING COMMENTS
family mtg conductd along with sw, palliative team and nutrition, son Rufina who is hcp states he will discuss cmo and hospice with rest of family, but verbalized understanding of poor prognosis of pt, will f/u with son's decision, dc planning to rehab as pt as medically improved given poor px, pt tends to have poor po, monitor clinical course, appprec pulm and cardio recs.

## 2017-04-05 NOTE — PROGRESS NOTE ADULT - ASSESSMENT
There is no evidence of acute ischemia.  She has atrial fibrillation which is not well rate controlled. She has received diltiazem, but has not received metoprolol because of hold parameters. Parameter will be adjusted.  There is no definite evidence of volume overload.  INR is therapeutic. Dose warfarin to a goal INR between 2 and 3. There is no evidence of acute ischemia.  She has atrial fibrillation which is not well rate controlled. She also had a prolonged run of wide-complex tachycardia.  She has received diltiazem, but has not received metoprolol because of hold parameters. Parameter will be adjusted.  Monitor for recurrent ventricular arrhythmia.  There is no definite evidence of volume overload.  INR is therapeutic. Dose warfarin to a goal INR between 2 and 3.

## 2017-04-05 NOTE — PROGRESS NOTE ADULT - ASSESSMENT
87 y.o. F with PMHx of A fib on coumadin, MDD, HLD, GERD, dysphagia, dementia, prior hx of DVT/PE?, HTN, urinary retention, and constipation brought in by EMS from [facility], admitted with acute hypoxic respiratory failure 2/2 pulmonary edema 2/2 HMPV virus, UTI(E.coli) , hypertensive urgency, and afib with RVR.

## 2017-04-05 NOTE — PROGRESS NOTE ADULT - PROBLEM SELECTOR PLAN 2
multiple medical issues  obstr and restr lung disease  supportive care  nebs  02  HOB > 30 deg  oral hygiene  asp prec  asst with ADL  monitor Sats  pt may be going back to [facility] soon, DC plan noted  overall minimal progress, remains weak and frail, prognosis poor, family with poor insight

## 2017-04-05 NOTE — PROGRESS NOTE ADULT - SUBJECTIVE AND OBJECTIVE BOX
Date/Time Patient Seen:  		  Referring MD:   Data Reviewed	       Patient is a 87y old  Female who presents with a chief complaint of SOB (30 Mar 2017 14:20)  pt in bed  weak and frail  love cath in place  on nebs  started on Marinol for appetite stimulation  vs and meds reviewed      Subjective/HPI       Medication list         MEDICATIONS  (STANDING):  ALBUTerol/ipratropium for Nebulization 3milliLiter(s) Nebulizer every 6 hours  lactobacillus acidophilus 1Tablet(s) Oral three times a day with meals  pantoprazole    Tablet 40milliGRAM(s) Oral before breakfast  tamsulosin 0.4milliGRAM(s) Oral at bedtime  nystatin    Suspension 253712Lwzq(s) Oral four times a day  fluconAZOLE   Tablet 200milliGRAM(s) Oral daily  diltiazem   CD 240milliGRAM(s) Oral daily  insulin lispro (HumaLOG) corrective regimen sliding scale  SubCutaneous three times a day before meals  insulin lispro (HumaLOG) corrective regimen sliding scale  SubCutaneous at bedtime  dextrose 5%. 1000milliLiter(s) IV Continuous <Continuous>  dextrose 50% Injectable 12.5Gram(s) IV Push once  dextrose 50% Injectable 25Gram(s) IV Push once  dextrose 50% Injectable 25Gram(s) IV Push once  metoprolol succinate ER 50milliGRAM(s) Oral daily  dronabinol 2.5milliGRAM(s) Oral two times a day    MEDICATIONS  (PRN):  acetaminophen   Tablet. 650milliGRAM(s) Oral every 6 hours PRN Mild Pain (1 - 3)  dextrose Gel 1Dose(s) Oral once PRN Blood Glucose LESS THAN 70 milliGRAM(s)/deciliter  glucagon  Injectable 1milliGRAM(s) IntraMuscular once PRN Glucose LESS THAN 70 milligrams/deciliter         Vitals log        ICU Vital Signs Last 24 Hrs  T(C): 36.7, Max: 36.9 (04-04 @ 15:26)  T(F): 98, Max: 98.5 (04-04 @ 15:26)  HR: 85 (79 - 118)  BP: 94/66 (90/52 - 121/76)  BP(mean): --  ABP: --  ABP(mean): --  RR: 16 (16 - 19)  SpO2: 99% (93% - 100%)           Input and Output:  I&O's Detail    I & Os for current day (as of 05 Apr 2017 07:35)  =============================================  IN:    Solution: 250 ml    Total IN: 250 ml  ---------------------------------------------  OUT:    Indwelling Catheter - Urethral: 575 ml    Total OUT: 575 ml  ---------------------------------------------  Total NET: -325 ml      Lab Data                        10.2   9.6   )-----------( 190      ( 04 Apr 2017 06:48 )             31.4     04 Apr 2017 06:48    138    |  102    |  31     ----------------------------<  260    3.3     |  24     |  0.66     Ca    7.7        04 Apr 2017 06:48  Phos  2.2       04 Apr 2017 06:48  Mg     2.0       04 Apr 2017 06:48              Review of Systems	      Objective       Resp no wheezing, dec effort    CV       GI    Extremities       Neuro       Skin         Pertinent Lab findings & Imaging      Derrick:  NO   Adequate UO     I&O's Detail    I & Os for current day (as of 05 Apr 2017 07:35)  =============================================  IN:    Solution: 250 ml    Total IN: 250 ml  ---------------------------------------------  OUT:    Indwelling Catheter - Urethral: 575 ml    Total OUT: 575 ml  ---------------------------------------------  Total NET: -325 ml           Discussed with:     Cultures:	        Radiology

## 2017-04-05 NOTE — PROGRESS NOTE ADULT - SUBJECTIVE AND OBJECTIVE BOX
INTERVAL HPI/OVERNIGHT EVENTS: Pt seen and examed at bedside. Pt is awake and alert. Pt c.o B/L leg pain. Pt was mild hypotensive overnight. No acute event overnight.     MEDICATIONS  (STANDING):  ALBUTerol/ipratropium for Nebulization 3milliLiter(s) Nebulizer every 6 hours  lactobacillus acidophilus 1Tablet(s) Oral three times a day with meals  pantoprazole    Tablet 40milliGRAM(s) Oral before breakfast  tamsulosin 0.4milliGRAM(s) Oral at bedtime  nystatin    Suspension 164980Svey(s) Oral four times a day  fluconAZOLE   Tablet 200milliGRAM(s) Oral daily  diltiazem   CD 240milliGRAM(s) Oral daily  insulin lispro (HumaLOG) corrective regimen sliding scale  SubCutaneous three times a day before meals  insulin lispro (HumaLOG) corrective regimen sliding scale  SubCutaneous at bedtime  dextrose 5%. 1000milliLiter(s) IV Continuous <Continuous>  dextrose 50% Injectable 12.5Gram(s) IV Push once  dextrose 50% Injectable 25Gram(s) IV Push once  dextrose 50% Injectable 25Gram(s) IV Push once  metoprolol succinate ER 50milliGRAM(s) Oral daily  potassium chloride    Tablet ER 40milliEquivalent(s) Oral every 4 hours    MEDICATIONS  (PRN):  acetaminophen   Tablet. 650milliGRAM(s) Oral every 6 hours PRN Mild Pain (1 - 3)  dextrose Gel 1Dose(s) Oral once PRN Blood Glucose LESS THAN 70 milliGRAM(s)/deciliter  glucagon  Injectable 1milliGRAM(s) IntraMuscular once PRN Glucose LESS THAN 70 milligrams/deciliter      Allergies    No Known Allergies    Intolerances      limited 2/2 dementia   All other review of systems is negative unless indicated above.    Vital Signs Last 24 Hrs  T(C): 36.4, Max: 36.7 (04-05 @ 07:29)  T(F): 97.6, Max: 98 (04-05 @ 07:29)  HR: 95 (79 - 103)  BP: 108/59 (90/52 - 108/59)  BP(mean): --  RR: 16 (16 - 17)  SpO2: 94% (93% - 100%)T(C): 36.6, Max: 37.1 (04-03 @ 20:17)  T(F): 97.8, Max: 98.8 (04-03 @ 20:17)  HR: 101 (86 - 101)  BP: 106/70 (93/62 - 110/71)  BP(mean): --  RR: 18 (17 - 19)  SpO2: 97% (96% - 98%)  I & Os for 24h ending 04-04 @ 07:00  =============================================  IN: 100 ml / OUT: 450 ml / NET: -350 ml    I & Os for current day (as of 04-04 @ 11:01)  =============================================  IN: 250 ml / OUT: 0 ml / NET: 250 ml      General: chronically ill, cachetic  HEENT: oral thrush noticed but improved from yesterday    Neurology: alert and awake   Respiratory: diminished breath sound   CV: irregular rate and Rhythm, S1S2, no murmurs, rubs or gallops  Abdominal: Soft, NT, ND +BS, Last BM  Extremities: No edema, + peripheral pulses      LABS:                            9.9    10.3  )-----------( 220      ( 05 Apr 2017 07:19 )             31.4     04-05    139  |  106  |  24<H>  ----------------------------<  99  4.6   |  25  |  0.41<L>    Ca    8.0<L>      05 Apr 2017 07:19  Phos  2.3     04-05  Mg     2.2     04-05      PT/INR - ( 05 Apr 2017 07:19 )   PT: 31.6 sec;   INR: 2.84 ratio                        RADIOLOGY & ADDITIONAL TESTS:

## 2017-04-05 NOTE — PROGRESS NOTE ADULT - PROBLEM SELECTOR PLAN 9
-improving  -poor prognosis, frailty  -son not ready to advance patient to comfort measures  -Palliative rec. apprec

## 2017-04-05 NOTE — PROGRESS NOTE ADULT - SUBJECTIVE AND OBJECTIVE BOX
KITTY ALCANTARA is a 87yFemale , patient examined and chart reviewed.   Patient being followed for UTI and asp pna.     Subjective;  Sitting in chair. NAD  Afebrile.  No events.    ROS:  CONSTITUTIONAL:  +fever No chills, feels well  EYES:  Negative  blurry vision or double vision  CARDIOVASCULAR:  Negative for chest pain or palpitations  RESPIRATORY:  Negative for cough, wheezing, or SOB   GASTROINTESTINAL:  Negative for nausea, vomiting, diarrhea, constipation, or abdominal pain  GENITOURINARY:  Negative frequency, urgency or dysuria  NEUROLOGIC:  +Confusion    ANTIBIOTICS/RELEVANT:  lactobacillus acidophilus 1Tablet(s) Oral three times a day with meals  nystatin    Suspension 392940Isad(s) Oral four times a day  fluconAZOLE   Tablet 200milliGRAM(s) Oral daily      ALBUTerol/ipratropium for Nebulization 3milliLiter(s) Nebulizer every 6 hours  acetaminophen   Tablet. 650milliGRAM(s) Oral every 6 hours PRN  pantoprazole    Tablet 40milliGRAM(s) Oral before breakfast  tamsulosin 0.4milliGRAM(s) Oral at bedtime  diltiazem   CD 240milliGRAM(s) Oral daily  insulin lispro (HumaLOG) corrective regimen sliding scale  SubCutaneous three times a day before meals  insulin lispro (HumaLOG) corrective regimen sliding scale  SubCutaneous at bedtime  dextrose 5%. 1000milliLiter(s) IV Continuous <Continuous>  dextrose Gel 1Dose(s) Oral once PRN  dextrose 50% Injectable 12.5Gram(s) IV Push once  dextrose 50% Injectable 25Gram(s) IV Push once  dextrose 50% Injectable 25Gram(s) IV Push once  glucagon  Injectable 1milliGRAM(s) IntraMuscular once PRN  dronabinol 2.5milliGRAM(s) Oral two times a day  metoprolol succinate ER 25milliGRAM(s) Oral daily      Objective:  I & Os for 24h ending 04-05 @ 07:00  =============================================  IN: 250 ml / OUT: 575 ml / NET: -325 ml    I & Os for current day (as of 04-05 @ 17:19)  =============================================  IN: 325 ml / OUT: 250 ml / NET: 75 ml    T(C): 36.7, Max: 36.7 (04-05 @ 07:29)  HR: 98 (79 - 103)  BP: 117/71 (90/52 - 117/71)  RR: 19 (16 - 19)  SpO2: 100% (93% - 100%)  Wt(kg): --    PHYSICAL EXAM:  Constitutional: Confused Sitting in chair NAD  Eyes:RADHA, EOMI  Ear/Nose/Throat: no oral lesion, no sinus tenderness on percussion	  Neck:no JVD, no lymphadenopathy, supple  Respiratory: Decreased rashida  Cardiovascular: S1S2 RRR, no murmurs  Gastrointestinal:soft, (+) BS, NTND  Extremities:no e/e/c  CNS: Confused    LABS:                        9.9    10.3  )-----------( 220      ( 05 Apr 2017 07:19 )             31.4     04-05    139  |  106  |  24<H>  ----------------------------<  99  4.6   |  25  |  0.41<L>    Ca    8.0<L>      05 Apr 2017 07:19  Phos  2.3     04-05  Mg     2.2     04-05      PT/INR - ( 05 Apr 2017 07:19 )   PT: 31.6 sec;   INR: 2.84 ratio

## 2017-04-06 LAB
ANION GAP SERPL CALC-SCNC: 9 MMOL/L — SIGNIFICANT CHANGE UP (ref 5–17)
BUN SERPL-MCNC: 29 MG/DL — HIGH (ref 7–23)
CALCIUM SERPL-MCNC: 8 MG/DL — LOW (ref 8.5–10.1)
CHLORIDE SERPL-SCNC: 106 MMOL/L — SIGNIFICANT CHANGE UP (ref 96–108)
CO2 SERPL-SCNC: 25 MMOL/L — SIGNIFICANT CHANGE UP (ref 22–31)
CREAT SERPL-MCNC: 0.49 MG/DL — LOW (ref 0.5–1.3)
GLUCOSE SERPL-MCNC: 104 MG/DL — HIGH (ref 70–99)
HCT VFR BLD CALC: 30.7 % — LOW (ref 34.5–45)
HGB BLD-MCNC: 9.6 G/DL — LOW (ref 11.5–15.5)
INR BLD: 2.62 RATIO — HIGH (ref 0.88–1.16)
MAGNESIUM SERPL-MCNC: 2.1 MG/DL — SIGNIFICANT CHANGE UP (ref 1.8–2.4)
MCHC RBC-ENTMCNC: 26.5 PG — LOW (ref 27–34)
MCHC RBC-ENTMCNC: 31.3 GM/DL — LOW (ref 32–36)
MCV RBC AUTO: 84.7 FL — SIGNIFICANT CHANGE UP (ref 80–100)
PHOSPHATE SERPL-MCNC: 3 MG/DL — SIGNIFICANT CHANGE UP (ref 2.5–4.5)
PLATELET # BLD AUTO: 178 K/UL — SIGNIFICANT CHANGE UP (ref 150–400)
POTASSIUM SERPL-MCNC: 4.3 MMOL/L — SIGNIFICANT CHANGE UP (ref 3.5–5.3)
POTASSIUM SERPL-SCNC: 4.3 MMOL/L — SIGNIFICANT CHANGE UP (ref 3.5–5.3)
PROTHROM AB SERPL-ACNC: 29.1 SEC — HIGH (ref 9.8–12.7)
RBC # BLD: 3.62 M/UL — LOW (ref 3.8–5.2)
RBC # FLD: 16.9 % — HIGH (ref 10.3–14.5)
SODIUM SERPL-SCNC: 140 MMOL/L — SIGNIFICANT CHANGE UP (ref 135–145)
WBC # BLD: 9.9 K/UL — SIGNIFICANT CHANGE UP (ref 3.8–10.5)
WBC # FLD AUTO: 9.9 K/UL — SIGNIFICANT CHANGE UP (ref 3.8–10.5)

## 2017-04-06 PROCEDURE — 99233 SBSQ HOSP IP/OBS HIGH 50: CPT | Mod: GC

## 2017-04-06 PROCEDURE — 99233 SBSQ HOSP IP/OBS HIGH 50: CPT

## 2017-04-06 RX ORDER — WARFARIN SODIUM 2.5 MG/1
2.5 TABLET ORAL ONCE
Qty: 0 | Refills: 0 | Status: COMPLETED | OUTPATIENT
Start: 2017-04-06 | End: 2017-04-06

## 2017-04-06 RX ADMIN — Medication 3 MILLILITER(S): at 07:40

## 2017-04-06 RX ADMIN — TAMSULOSIN HYDROCHLORIDE 0.4 MILLIGRAM(S): 0.4 CAPSULE ORAL at 21:15

## 2017-04-06 RX ADMIN — Medication 240 MILLIGRAM(S): at 11:10

## 2017-04-06 RX ADMIN — Medication 0: at 21:19

## 2017-04-06 RX ADMIN — Medication 500000 UNIT(S): at 17:22

## 2017-04-06 RX ADMIN — WARFARIN SODIUM 2.5 MILLIGRAM(S): 2.5 TABLET ORAL at 21:15

## 2017-04-06 RX ADMIN — Medication 650 MILLIGRAM(S): at 21:50

## 2017-04-06 RX ADMIN — Medication 2.5 MILLIGRAM(S): at 17:29

## 2017-04-06 RX ADMIN — Medication 650 MILLIGRAM(S): at 11:00

## 2017-04-06 RX ADMIN — Medication 650 MILLIGRAM(S): at 21:16

## 2017-04-06 RX ADMIN — Medication 500000 UNIT(S): at 11:11

## 2017-04-06 RX ADMIN — Medication 1 TABLET(S): at 17:22

## 2017-04-06 RX ADMIN — Medication 25 MILLIGRAM(S): at 05:49

## 2017-04-06 RX ADMIN — Medication 650 MILLIGRAM(S): at 10:55

## 2017-04-06 RX ADMIN — FLUCONAZOLE 200 MILLIGRAM(S): 150 TABLET ORAL at 11:10

## 2017-04-06 RX ADMIN — Medication 3 MILLILITER(S): at 19:11

## 2017-04-06 RX ADMIN — Medication 1 TABLET(S): at 08:23

## 2017-04-06 RX ADMIN — Medication 2.5 MILLIGRAM(S): at 05:49

## 2017-04-06 RX ADMIN — Medication 1 TABLET(S): at 12:26

## 2017-04-06 RX ADMIN — Medication 3 MILLILITER(S): at 14:05

## 2017-04-06 RX ADMIN — PANTOPRAZOLE SODIUM 40 MILLIGRAM(S): 20 TABLET, DELAYED RELEASE ORAL at 05:49

## 2017-04-06 RX ADMIN — Medication 4: at 17:21

## 2017-04-06 RX ADMIN — Medication 500000 UNIT(S): at 23:54

## 2017-04-06 NOTE — PROGRESS NOTE ADULT - ATTENDING COMMENTS
awaiting decision from son and family, likely dc regardless to rehab as pt is medically improved and stable as possible given chronic morbidities, prognosis guarded to poor, apprec pulm/palliative team recs, apprec id and cardio recs, attempted to call son but no answer, left msg

## 2017-04-06 NOTE — PROGRESS NOTE ADULT - SUBJECTIVE AND OBJECTIVE BOX
INTERVAL HPI/OVERNIGHT EVENTS: Pt seen and examed at bedside.          MEDICATIONS  (STANDING):  ALBUTerol/ipratropium for Nebulization 3milliLiter(s) Nebulizer every 6 hours  lactobacillus acidophilus 1Tablet(s) Oral three times a day with meals  pantoprazole    Tablet 40milliGRAM(s) Oral before breakfast  tamsulosin 0.4milliGRAM(s) Oral at bedtime  nystatin    Suspension 126664Lubg(s) Oral four times a day  fluconAZOLE   Tablet 200milliGRAM(s) Oral daily  diltiazem   CD 240milliGRAM(s) Oral daily  insulin lispro (HumaLOG) corrective regimen sliding scale  SubCutaneous three times a day before meals  insulin lispro (HumaLOG) corrective regimen sliding scale  SubCutaneous at bedtime  dextrose 5%. 1000milliLiter(s) IV Continuous <Continuous>  dextrose 50% Injectable 12.5Gram(s) IV Push once  dextrose 50% Injectable 25Gram(s) IV Push once  dextrose 50% Injectable 25Gram(s) IV Push once  metoprolol succinate ER 50milliGRAM(s) Oral daily  potassium chloride    Tablet ER 40milliEquivalent(s) Oral every 4 hours    MEDICATIONS  (PRN):  acetaminophen   Tablet. 650milliGRAM(s) Oral every 6 hours PRN Mild Pain (1 - 3)  dextrose Gel 1Dose(s) Oral once PRN Blood Glucose LESS THAN 70 milliGRAM(s)/deciliter  glucagon  Injectable 1milliGRAM(s) IntraMuscular once PRN Glucose LESS THAN 70 milligrams/deciliter      Allergies    No Known Allergies    Intolerances      limited 2/2 dementia   All other review of systems is negative unless indicated above.      Vital Signs Last 24 Hrs  T(C): 37, Max: 37.1 (04-05 @ 19:28)  T(F): 98.6, Max: 98.8 (04-05 @ 19:28)  HR: 94 (85 - 103)  BP: 101/64 (101/64 - 117/71)  BP(mean): --  RR: 17 (16 - 19)  SpO2: 94% (94% - 100%)  General: chronically ill, cachetic  HEENT: oral thrush noticed but improved from yesterday    Neurology: alert and awake   Respiratory: diminished breath sound   CV: irregular rate and Rhythm, S1S2, no murmurs, rubs or gallops  Abdominal: Soft, NT, ND +BS, Last BM  Extremities: No edema, + peripheral pulses      LABS:                                                             RADIOLOGY & ADDITIONAL TESTS: INTERVAL HPI/OVERNIGHT EVENTS: Pt seen and examed with Dr. Solis at bedside.           MEDICATIONS  (STANDING):  ALBUTerol/ipratropium for Nebulization 3milliLiter(s) Nebulizer every 6 hours  lactobacillus acidophilus 1Tablet(s) Oral three times a day with meals  pantoprazole    Tablet 40milliGRAM(s) Oral before breakfast  tamsulosin 0.4milliGRAM(s) Oral at bedtime  nystatin    Suspension 450937Jpsp(s) Oral four times a day  fluconAZOLE   Tablet 200milliGRAM(s) Oral daily  diltiazem   CD 240milliGRAM(s) Oral daily  insulin lispro (HumaLOG) corrective regimen sliding scale  SubCutaneous three times a day before meals  insulin lispro (HumaLOG) corrective regimen sliding scale  SubCutaneous at bedtime  dextrose 5%. 1000milliLiter(s) IV Continuous <Continuous>  dextrose 50% Injectable 12.5Gram(s) IV Push once  dextrose 50% Injectable 25Gram(s) IV Push once  dextrose 50% Injectable 25Gram(s) IV Push once  metoprolol succinate ER 50milliGRAM(s) Oral daily  potassium chloride    Tablet ER 40milliEquivalent(s) Oral every 4 hours    MEDICATIONS  (PRN):  acetaminophen   Tablet. 650milliGRAM(s) Oral every 6 hours PRN Mild Pain (1 - 3)  dextrose Gel 1Dose(s) Oral once PRN Blood Glucose LESS THAN 70 milliGRAM(s)/deciliter  glucagon  Injectable 1milliGRAM(s) IntraMuscular once PRN Glucose LESS THAN 70 milligrams/deciliter      Allergies    No Known Allergies    Intolerances      limited 2/2 dementia   All other review of systems is negative unless indicated above.      Vital Signs Last 24 Hrs  T(C): 37, Max: 37.1 (04-05 @ 19:28)  T(F): 98.6, Max: 98.8 (04-05 @ 19:28)  HR: 94 (85 - 103)  BP: 101/64 (101/64 - 117/71)  BP(mean): --  RR: 17 (16 - 19)  SpO2: 94% (94% - 100%)  General: chronically ill, cachetic  HEENT: oral thrush noticed but improved from yesterday    Neurology: alert and awake   Respiratory: diminished breath sound   CV: irregular rate and Rhythm, S1S2, no murmurs, rubs or gallops  Abdominal: Soft, NT, ND +BS, Last BM  Extremities: No edema, + peripheral pulses      LABS:                       9.6    9.9   )-----------( 178      ( 06 Apr 2017 07:38 )             30.7     04-06    140  |  106  |  29<H>  ----------------------------<  104<H>  4.3   |  25  |  0.49<L>    Ca    8.0<L>      06 Apr 2017 07:38  Phos  3.0     04-06  Mg     2.1     04-06      PT/INR - ( 06 Apr 2017 07:38 )   PT: 29.1 sec;   INR: 2.62 ratio                                                               RADIOLOGY & ADDITIONAL TESTS: INTERVAL HPI/OVERNIGHT EVENTS: Pt seen and examed with Dr. Solis at bedside. No complaint          MEDICATIONS  (STANDING):  ALBUTerol/ipratropium for Nebulization 3milliLiter(s) Nebulizer every 6 hours  lactobacillus acidophilus 1Tablet(s) Oral three times a day with meals  pantoprazole    Tablet 40milliGRAM(s) Oral before breakfast  tamsulosin 0.4milliGRAM(s) Oral at bedtime  nystatin    Suspension 009468Dxgd(s) Oral four times a day  fluconAZOLE   Tablet 200milliGRAM(s) Oral daily  diltiazem   CD 240milliGRAM(s) Oral daily  insulin lispro (HumaLOG) corrective regimen sliding scale  SubCutaneous three times a day before meals  insulin lispro (HumaLOG) corrective regimen sliding scale  SubCutaneous at bedtime  dextrose 5%. 1000milliLiter(s) IV Continuous <Continuous>  dextrose 50% Injectable 12.5Gram(s) IV Push once  dextrose 50% Injectable 25Gram(s) IV Push once  dextrose 50% Injectable 25Gram(s) IV Push once  metoprolol succinate ER 50milliGRAM(s) Oral daily  potassium chloride    Tablet ER 40milliEquivalent(s) Oral every 4 hours    MEDICATIONS  (PRN):  acetaminophen   Tablet. 650milliGRAM(s) Oral every 6 hours PRN Mild Pain (1 - 3)  dextrose Gel 1Dose(s) Oral once PRN Blood Glucose LESS THAN 70 milliGRAM(s)/deciliter  glucagon  Injectable 1milliGRAM(s) IntraMuscular once PRN Glucose LESS THAN 70 milligrams/deciliter      Allergies    No Known Allergies    Intolerances      limited 2/2 dementia   All other review of systems is negative unless indicated above.      Vital Signs Last 24 Hrs  T(C): 37, Max: 37.1 (04-05 @ 19:28)  T(F): 98.6, Max: 98.8 (04-05 @ 19:28)  HR: 94 (85 - 103)  BP: 101/64 (101/64 - 117/71)  BP(mean): --  RR: 17 (16 - 19)  SpO2: 94% (94% - 100%)  General: chronically ill, cachetic  HEENT: oral thrush noticed but improved from yesterday    Neurology: alert and awake   Respiratory: diminished breath sound   CV: irregular rate and Rhythm, S1S2, no murmurs, rubs or gallops  Abdominal: Soft, NT, ND +BS, Last BM  Extremities: No edema, + peripheral pulses      LABS:                       9.6    9.9   )-----------( 178      ( 06 Apr 2017 07:38 )             30.7     04-06    140  |  106  |  29<H>  ----------------------------<  104<H>  4.3   |  25  |  0.49<L>    Ca    8.0<L>      06 Apr 2017 07:38  Phos  3.0     04-06  Mg     2.1     04-06      PT/INR - ( 06 Apr 2017 07:38 )   PT: 29.1 sec;   INR: 2.62 ratio                                                               RADIOLOGY & ADDITIONAL TESTS:

## 2017-04-06 NOTE — PROGRESS NOTE ADULT - SUBJECTIVE AND OBJECTIVE BOX
Date/Time Patient Seen:  		  Referring MD:   Data Reviewed	       Patient is a 87y old  Female who presents with a chief complaint of SOB (30 Mar 2017 14:20)  pt in bed  seen and examined  vs and meds reviewed  on [facility] in  frail and weak      Subjective/HPI       Medication list         MEDICATIONS  (STANDING):  ALBUTerol/ipratropium for Nebulization 3milliLiter(s) Nebulizer every 6 hours  lactobacillus acidophilus 1Tablet(s) Oral three times a day with meals  pantoprazole    Tablet 40milliGRAM(s) Oral before breakfast  tamsulosin 0.4milliGRAM(s) Oral at bedtime  nystatin    Suspension 034189Agoh(s) Oral four times a day  fluconAZOLE   Tablet 200milliGRAM(s) Oral daily  diltiazem   CD 240milliGRAM(s) Oral daily  insulin lispro (HumaLOG) corrective regimen sliding scale  SubCutaneous three times a day before meals  insulin lispro (HumaLOG) corrective regimen sliding scale  SubCutaneous at bedtime  dextrose 5%. 1000milliLiter(s) IV Continuous <Continuous>  dextrose 50% Injectable 12.5Gram(s) IV Push once  dextrose 50% Injectable 25Gram(s) IV Push once  dextrose 50% Injectable 25Gram(s) IV Push once  dronabinol 2.5milliGRAM(s) Oral two times a day  metoprolol succinate ER 25milliGRAM(s) Oral daily    MEDICATIONS  (PRN):  acetaminophen   Tablet. 650milliGRAM(s) Oral every 6 hours PRN Mild Pain (1 - 3)  dextrose Gel 1Dose(s) Oral once PRN Blood Glucose LESS THAN 70 milliGRAM(s)/deciliter  glucagon  Injectable 1milliGRAM(s) IntraMuscular once PRN Glucose LESS THAN 70 milligrams/deciliter         Vitals log        ICU Vital Signs Last 24 Hrs  T(C): 37, Max: 37.1 (04-05 @ 19:28)  T(F): 98.6, Max: 98.8 (04-05 @ 19:28)  HR: 94 (79 - 103)  BP: 101/64 (94/66 - 117/71)  BP(mean): --  ABP: --  ABP(mean): --  RR: 17 (16 - 19)  SpO2: 94% (93% - 100%)           Input and Output:  I&O's Detail  I & Os for 24h ending 05 Apr 2017 07:00  =============================================  IN:    Solution: 250 ml    Total IN: 250 ml  ---------------------------------------------  OUT:    Indwelling Catheter - Urethral: 575 ml    Total OUT: 575 ml  ---------------------------------------------  Total NET: -325 ml    I & Os for current day (as of 06 Apr 2017 06:29)  =============================================  IN:    Solution: 250 ml    Oral Fluid: 75 ml    Total IN: 325 ml  ---------------------------------------------  OUT:    Indwelling Catheter - Urethral: 650 ml    Total OUT: 650 ml  ---------------------------------------------  Total NET: -325 ml      Lab Data                        9.9    10.3  )-----------( 220      ( 05 Apr 2017 07:19 )             31.4     04-05    139  |  106  |  24<H>  ----------------------------<  99  4.6   |  25  |  0.41<L>    Ca    8.0<L>      05 Apr 2017 07:19  Phos  2.3     04-05  Mg     2.2     04-05              Review of Systems	      Objective       Resp dec BS, no wheeze    CV       GI    Extremities       Neuro       Skin         Pertinent Lab findings & Imaging      Derrick:  NO   Adequate UO     I&O's Detail  I & Os for 24h ending 05 Apr 2017 07:00  =============================================  IN:    Solution: 250 ml    Total IN: 250 ml  ---------------------------------------------  OUT:    Indwelling Catheter - Urethral: 575 ml    Total OUT: 575 ml  ---------------------------------------------  Total NET: -325 ml    I & Os for current day (as of 06 Apr 2017 06:29)  =============================================  IN:    Solution: 250 ml    Oral Fluid: 75 ml    Total IN: 325 ml  ---------------------------------------------  OUT:    Indwelling Catheter - Urethral: 650 ml    Total OUT: 650 ml  ---------------------------------------------  Total NET: -325 ml           Discussed with:     Cultures:	        Radiology

## 2017-04-06 NOTE — PROGRESS NOTE ADULT - ASSESSMENT
87y Female with history of chronic atrial fibrillation, AC with Coumadin, prior DVT/PE, resolved pulmonary edema, HMPV:    1.  Dose Coumadin goal INR 2-3  2.  Ventricular rate controlled on current dosages of diltiazem and Toprol.  No further ectopy noted.   3.  Monitor and replete electrolytes as needed  4.  Supplemental oxygen. SOB but does not appear to be volume overloaded.   5.  D/C planning per primary team

## 2017-04-06 NOTE — PROGRESS NOTE ADULT - PROBLEM SELECTOR PLAN 2
Stable  Sp aspiration pneumonia  Resp status stable  Dc planning  Monitor off antibiotics  Dc planning

## 2017-04-06 NOTE — PROGRESS NOTE ADULT - ASSESSMENT
87 y.o. F with PMHx of A fib on coumadin, MDD, HLD, GERD, dysphagia, dementia, prior hx of DVT/PE?, HTN, urinary retention, and constipation brought in by EMS from [facility], admitted with acute hypoxic respiratory failure sec pulmonary edema with HMPV virus infection bronchitis/pna, UTI(E.coli) , and afib with RVR. Clinically stable.

## 2017-04-06 NOTE — PROGRESS NOTE ADULT - PROBLEM SELECTOR PLAN 1
frail, weak, progressive decline  met with son  he is thinking about DNR and ACP, will discuss with siblings  overall prognosis poor  remains on NEBS  cont supportive care, o2, medical regimen and nutrition, pt is full code

## 2017-04-06 NOTE — PROGRESS NOTE ADULT - NSHPATTENDINGPLANDISCUSS_GEN_ALL_CORE
Dr Solis. Will sign off case. Please reconsult if needed.
pt's family. Also discussed with primary team.
Dr Solis and family
Dr Solis and pt's family
Dr Solis and pt's family
Garcia Chandler
resident

## 2017-04-06 NOTE — PROGRESS NOTE ADULT - PROBLEM SELECTOR PLAN 2
-suspect 2/2 dCHF vs infection HMPV , improving  -last TTE was limited study  -Appreciate cardiology and pulm recs

## 2017-04-06 NOTE — PROGRESS NOTE ADULT - SUBJECTIVE AND OBJECTIVE BOX
[facility] -- Marshall Frederick Grossman, Lenin, Kofi [place]      Follow Up: AF, WCT     Subjective/Observations: Patient seen and examined. Events noted. Still complaining of dyspnea. No chest pain or palpitations.      PAST MEDICAL & SURGICAL HISTORY:  Femur neck fracture  Vertebral fracture, pathological  Neurogenic bladder  DVT (deep venous thrombosis)  Afib  GERD (gastroesophageal reflux disease)  Pulmonary embolism  UTI (lower urinary tract infection)  HTN (hypertension)  Pulmonary embolism  Memory impairment  Hypertension  Atrial fibrillation  Hyponatremia  Shortness of breath: etiology probably copd  Urinary retention  Arthritis  DVT (deep venous thrombosis)  No significant past surgical history  History of hip surgery: right gamma nail  No significant past surgical history      MEDICATIONS  (STANDING):  ALBUTerol/ipratropium for Nebulization 3milliLiter(s) Nebulizer every 6 hours  lactobacillus acidophilus 1Tablet(s) Oral three times a day with meals  pantoprazole    Tablet 40milliGRAM(s) Oral before breakfast  tamsulosin 0.4milliGRAM(s) Oral at bedtime  nystatin    Suspension [id number](s) Oral four times a day  fluconAZOLE   Tablet 200milliGRAM(s) Oral daily  diltiazem   CD 240milliGRAM(s) Oral daily  insulin lispro (HumaLOG) corrective regimen sliding scale  SubCutaneous three times a day before meals  insulin lispro (HumaLOG) corrective regimen sliding scale  SubCutaneous at bedtime  dextrose 5%. 1000milliLiter(s) IV Continuous <Continuous>  dextrose 50% Injectable 12.5Gram(s) IV Push once  dextrose 50% Injectable 25Gram(s) IV Push once  dextrose 50% Injectable 25Gram(s) IV Push once  dronabinol 2.5milliGRAM(s) Oral two times a day  metoprolol succinate ER 25milliGRAM(s) Oral daily  warfarin 2.5milliGRAM(s) Oral once    MEDICATIONS  (PRN):  acetaminophen   Tablet. 650milliGRAM(s) Oral every 6 hours PRN Mild Pain (1 - 3)  dextrose Gel 1Dose(s) Oral once PRN Blood Glucose LESS THAN 70 milliGRAM(s)/deciliter  glucagon  Injectable 1milliGRAM(s) IntraMuscular once PRN Glucose LESS THAN 70 milligrams/deciliter      Allergies    No Known Allergies    Intolerances        REVIEW OF SYSTEMS: All other review of systems is negative unless indicated above    Vital Signs Last 24 Hrs  T(C): 37.1, Max: 37.1 (04-05 @ 19:28)  T(F): 98.7, Max: 98.8 (04-05 @ 19:28)  HR: 86 (62 - 103)  BP: 113/72 (101/64 - 119/51)  BP(mean): --  RR: 17 (16 - 19)  SpO2: 100% (94% - 100%)    I&O's Summary    I & Os for current day (as of 06 Apr 2017 14:01)  =============================================  IN: 325 ml / OUT: 650 ml / NET: -325 ml        PHYSICAL EXAM:  TELE: AF 70-90, no further WCT in last 24 hours  Constitutional: Appears mildly dyspneic.   HEENT: Dry Mucous Membranes, Anicteric  Pulmonary: Decreased BS b/l  Cardiovascular: Irregular, S1 and S2, distant sounds  Gastrointestinal: Bowel Sounds present, soft, nontender.   Lymph: No peripheral edema. No lymphadenopathy.  Skin: No visible rashes or ulcers.  Psych:  flat affect and depressed mood    LABS: All Labs Reviewed:                        9.6    9.9   )-----------( 178      ( 06 Apr 2017 07:38 )             30.7                         9.9    10.3  )-----------( 220      ( 05 Apr 2017 07:19 )             31.4                         10.2   9.6   )-----------( 190      ( 04 Apr 2017 06:48 )             31.4     06 Apr 2017 07:38    140    |  106    |  29     ----------------------------<  104    4.3     |  25     |  0.49   05 Apr 2017 07:19    139    |  106    |  24     ----------------------------<  99     4.6     |  25     |  0.41   04 Apr 2017 06:48    138    |  102    |  31     ----------------------------<  260    3.3     |  24     |  0.66     Ca    8.0        06 Apr 2017 07:38  Ca    8.0        05 Apr 2017 07:19  Ca    7.7        04 Apr 2017 06:48  Phos  3.0       06 Apr 2017 07:38  Phos  2.3       05 Apr 2017 07:19  Phos  2.2       04 Apr 2017 06:48  Mg     2.1       06 Apr 2017 07:38  Mg     2.2       05 Apr 2017 07:19  Mg     2.0       04 Apr 2017 06:48      PT/INR - ( 06 Apr 2017 07:38 )   PT: 29.1 sec;   INR: 2.62 ratio

## 2017-04-06 NOTE — PROGRESS NOTE ADULT - PROBLEM SELECTOR PLAN 5
-acute on chronic  -c/w Cardizem PO per cardio  -c/w Toprol   -c/w tele monitor   -cardio following.

## 2017-04-06 NOTE — PROGRESS NOTE ADULT - PROBLEM SELECTOR PLAN 10
-poor prognosis, frailty  -Son updated, family meeting yesterday, son will discuss with the rest of family   -Palliative following

## 2017-04-06 NOTE — PROGRESS NOTE ADULT - PROBLEM SELECTOR PLAN 4
- chronic stable  -c/w Duoneb /O2 supplement  -finished coursed of steroid tapering  -Pulm following.   -overall prognosis poor -chronic stable  -c/w Duoneb /O2 supplement  -finished coursed of steroid tapering  -Pulm following.   -overall prognosis poor

## 2017-04-06 NOTE — PROGRESS NOTE ADULT - ASSESSMENT
87 y.o. F with PMHx of A fib on coumadin, MDD, HLD, GERD, dysphagia, dementia, prior hx of DVT/PE?, HTN, urinary retention, and constipation brought in by EMS from [facility], admitted with acute hypoxic respiratory failure 2/2 pulmonary edema 2/2 HMPV virus, UTI(E.coli) , hypertensive urgency, and afib with RVR.

## 2017-04-06 NOTE — PROGRESS NOTE ADULT - SUBJECTIVE AND OBJECTIVE BOX
KITTY ALCANTARA is a 87yFemale , patient examined and chart reviewed.    Patient being followed for UTI and asp pna.     Subjective;  NAD  Afebrile.  No events.    ROS:  CONSTITUTIONAL:  +fever No chills, feels well  EYES:  Negative  blurry vision or double vision  CARDIOVASCULAR:  Negative for chest pain or palpitations  RESPIRATORY:  Negative for cough, wheezing, or SOB   GASTROINTESTINAL:  Negative for nausea, vomiting, diarrhea, constipation, or abdominal pain  GENITOURINARY:  Negative frequency, urgency or dysuria  NEUROLOGIC:  +Confusion    No Known Allergies    ANTIBIOTICS/RELEVANT:  lactobacillus acidophilus 1Tablet(s) Oral three times a day with meals  nystatin    Suspension 422543Oyng(s) Oral four times a day  fluconAZOLE   Tablet 200milliGRAM(s) Oral daily      ALBUTerol/ipratropium for Nebulization 3milliLiter(s) Nebulizer every 6 hours  acetaminophen   Tablet. 650milliGRAM(s) Oral every 6 hours PRN  pantoprazole    Tablet 40milliGRAM(s) Oral before breakfast  tamsulosin 0.4milliGRAM(s) Oral at bedtime  diltiazem   CD 240milliGRAM(s) Oral daily  insulin lispro (HumaLOG) corrective regimen sliding scale  SubCutaneous three times a day before meals  insulin lispro (HumaLOG) corrective regimen sliding scale  SubCutaneous at bedtime  dextrose 5%. 1000milliLiter(s) IV Continuous <Continuous>  dextrose Gel 1Dose(s) Oral once PRN  dextrose 50% Injectable 12.5Gram(s) IV Push once  dextrose 50% Injectable 25Gram(s) IV Push once  dextrose 50% Injectable 25Gram(s) IV Push once  glucagon  Injectable 1milliGRAM(s) IntraMuscular once PRN  dronabinol 2.5milliGRAM(s) Oral two times a day  metoprolol succinate ER 25milliGRAM(s) Oral daily  warfarin 2.5milliGRAM(s) Oral once      Objective:  I & Os for 24h ending 04-06 @ 07:00  =============================================  IN: 325 ml / OUT: 650 ml / NET: -325 ml    I & Os for current day (as of 04-06 @ 16:30)  =============================================  IN: 0 ml / OUT: 200 ml / NET: -200 ml    T(C): 36.7, Max: 37.1 (04-05 @ 19:28)  HR: 89 (62 - 103)  BP: 109/71 (101/64 - 119/51)  RR: 16 (16 - 19)  SpO2: 95% (94% - 100%)  Wt(kg): --    PHYSICAL EXAM:  Constitutional: Confused  NAD  Eyes:RADHA, EOMI  Ear/Nose/Throat: no oral lesion, no sinus tenderness on percussion	  Neck:no JVD, no lymphadenopathy, supple  Respiratory: Decreased rashida  Cardiovascular: S1S2 RRR, no murmurs  Gastrointestinal:soft, (+) BS, NTND  Extremities:no e/e/c  CNS: Confused    LABS:                        9.6    9.9   )-----------( 178      ( 06 Apr 2017 07:38 )             30.7     04-06    140  |  106  |  29<H>  ----------------------------<  104<H>  4.3   |  25  |  0.49<L>    Ca    8.0<L>      06 Apr 2017 07:38  Phos  3.0     04-06  Mg     2.1     04-06      PT/INR - ( 06 Apr 2017 07:38 )   PT: 29.1 sec;   INR: 2.62 ratio                         RADIOLOGY & ADDITIONAL STUDIES:

## 2017-04-07 VITALS
DIASTOLIC BLOOD PRESSURE: 79 MMHG | SYSTOLIC BLOOD PRESSURE: 115 MMHG | RESPIRATION RATE: 17 BRPM | TEMPERATURE: 97 F | OXYGEN SATURATION: 98 % | HEART RATE: 106 BPM

## 2017-04-07 LAB
ANION GAP SERPL CALC-SCNC: 9 MMOL/L — SIGNIFICANT CHANGE UP (ref 5–17)
BASOPHILS # BLD AUTO: 0.1 K/UL — SIGNIFICANT CHANGE UP (ref 0–0.2)
BASOPHILS NFR BLD AUTO: 1 % — SIGNIFICANT CHANGE UP (ref 0–2)
BUN SERPL-MCNC: 28 MG/DL — HIGH (ref 7–23)
CALCIUM SERPL-MCNC: 8.1 MG/DL — LOW (ref 8.5–10.1)
CHLORIDE SERPL-SCNC: 107 MMOL/L — SIGNIFICANT CHANGE UP (ref 96–108)
CO2 SERPL-SCNC: 25 MMOL/L — SIGNIFICANT CHANGE UP (ref 22–31)
CREAT SERPL-MCNC: 0.51 MG/DL — SIGNIFICANT CHANGE UP (ref 0.5–1.3)
EOSINOPHIL # BLD AUTO: 0.2 K/UL — SIGNIFICANT CHANGE UP (ref 0–0.5)
EOSINOPHIL NFR BLD AUTO: 2.2 % — SIGNIFICANT CHANGE UP (ref 0–6)
GLUCOSE SERPL-MCNC: 91 MG/DL — SIGNIFICANT CHANGE UP (ref 70–99)
HCT VFR BLD CALC: 31 % — LOW (ref 34.5–45)
HGB BLD-MCNC: 9.8 G/DL — LOW (ref 11.5–15.5)
INR BLD: 3.15 RATIO — HIGH (ref 0.88–1.16)
LYMPHOCYTES # BLD AUTO: 1.9 K/UL — SIGNIFICANT CHANGE UP (ref 1–3.3)
LYMPHOCYTES # BLD AUTO: 22.2 % — SIGNIFICANT CHANGE UP (ref 13–44)
MAGNESIUM SERPL-MCNC: 2.1 MG/DL — SIGNIFICANT CHANGE UP (ref 1.8–2.4)
MCHC RBC-ENTMCNC: 27.1 PG — SIGNIFICANT CHANGE UP (ref 27–34)
MCHC RBC-ENTMCNC: 31.7 GM/DL — LOW (ref 32–36)
MCV RBC AUTO: 85.5 FL — SIGNIFICANT CHANGE UP (ref 80–100)
MONOCYTES # BLD AUTO: 0.5 K/UL — SIGNIFICANT CHANGE UP (ref 0–0.9)
MONOCYTES NFR BLD AUTO: 5.8 % — SIGNIFICANT CHANGE UP (ref 1–9)
NEUTROPHILS # BLD AUTO: 5.9 K/UL — SIGNIFICANT CHANGE UP (ref 1.8–7.4)
NEUTROPHILS NFR BLD AUTO: 68.8 % — SIGNIFICANT CHANGE UP (ref 43–77)
PHOSPHATE SERPL-MCNC: 2.6 MG/DL — SIGNIFICANT CHANGE UP (ref 2.5–4.5)
PLATELET # BLD AUTO: 169 K/UL — SIGNIFICANT CHANGE UP (ref 150–400)
POTASSIUM SERPL-MCNC: 4.2 MMOL/L — SIGNIFICANT CHANGE UP (ref 3.5–5.3)
POTASSIUM SERPL-SCNC: 4.2 MMOL/L — SIGNIFICANT CHANGE UP (ref 3.5–5.3)
PROTHROM AB SERPL-ACNC: 35.2 SEC — HIGH (ref 9.8–12.7)
RBC # BLD: 3.63 M/UL — LOW (ref 3.8–5.2)
RBC # FLD: 17.3 % — HIGH (ref 10.3–14.5)
SODIUM SERPL-SCNC: 141 MMOL/L — SIGNIFICANT CHANGE UP (ref 135–145)
WBC # BLD: 8.6 K/UL — SIGNIFICANT CHANGE UP (ref 3.8–10.5)
WBC # FLD AUTO: 8.6 K/UL — SIGNIFICANT CHANGE UP (ref 3.8–10.5)

## 2017-04-07 PROCEDURE — 87040 BLOOD CULTURE FOR BACTERIA: CPT

## 2017-04-07 PROCEDURE — 84484 ASSAY OF TROPONIN QUANT: CPT

## 2017-04-07 PROCEDURE — 80048 BASIC METABOLIC PNL TOTAL CA: CPT

## 2017-04-07 PROCEDURE — 94760 N-INVAS EAR/PLS OXIMETRY 1: CPT

## 2017-04-07 PROCEDURE — 87581 M.PNEUMON DNA AMP PROBE: CPT

## 2017-04-07 PROCEDURE — 83880 ASSAY OF NATRIURETIC PEPTIDE: CPT

## 2017-04-07 PROCEDURE — 86140 C-REACTIVE PROTEIN: CPT

## 2017-04-07 PROCEDURE — 83735 ASSAY OF MAGNESIUM: CPT

## 2017-04-07 PROCEDURE — 73501 X-RAY EXAM HIP UNI 1 VIEW: CPT

## 2017-04-07 PROCEDURE — 99291 CRITICAL CARE FIRST HOUR: CPT | Mod: 25

## 2017-04-07 PROCEDURE — 84443 ASSAY THYROID STIM HORMONE: CPT

## 2017-04-07 PROCEDURE — 82553 CREATINE MB FRACTION: CPT

## 2017-04-07 PROCEDURE — 82803 BLOOD GASES ANY COMBINATION: CPT

## 2017-04-07 PROCEDURE — 82550 ASSAY OF CK (CPK): CPT

## 2017-04-07 PROCEDURE — 96376 TX/PRO/DX INJ SAME DRUG ADON: CPT

## 2017-04-07 PROCEDURE — 94664 DEMO&/EVAL PT USE INHALER: CPT

## 2017-04-07 PROCEDURE — 85027 COMPLETE CBC AUTOMATED: CPT

## 2017-04-07 PROCEDURE — 85730 THROMBOPLASTIN TIME PARTIAL: CPT

## 2017-04-07 PROCEDURE — 99221 1ST HOSP IP/OBS SF/LOW 40: CPT

## 2017-04-07 PROCEDURE — 83036 HEMOGLOBIN GLYCOSYLATED A1C: CPT

## 2017-04-07 PROCEDURE — 87633 RESP VIRUS 12-25 TARGETS: CPT

## 2017-04-07 PROCEDURE — 87186 SC STD MICRODIL/AGAR DIL: CPT

## 2017-04-07 PROCEDURE — 94640 AIRWAY INHALATION TREATMENT: CPT

## 2017-04-07 PROCEDURE — 80053 COMPREHEN METABOLIC PANEL: CPT

## 2017-04-07 PROCEDURE — 93005 ELECTROCARDIOGRAM TRACING: CPT

## 2017-04-07 PROCEDURE — 99239 HOSP IP/OBS DSCHRG MGMT >30: CPT

## 2017-04-07 PROCEDURE — 84100 ASSAY OF PHOSPHORUS: CPT

## 2017-04-07 PROCEDURE — 85610 PROTHROMBIN TIME: CPT

## 2017-04-07 PROCEDURE — 83690 ASSAY OF LIPASE: CPT

## 2017-04-07 PROCEDURE — 71045 X-RAY EXAM CHEST 1 VIEW: CPT

## 2017-04-07 PROCEDURE — 96375 TX/PRO/DX INJ NEW DRUG ADDON: CPT

## 2017-04-07 PROCEDURE — 87486 CHLMYD PNEUM DNA AMP PROBE: CPT

## 2017-04-07 PROCEDURE — 94660 CPAP INITIATION&MGMT: CPT

## 2017-04-07 PROCEDURE — 96372 THER/PROPH/DIAG INJ SC/IM: CPT | Mod: 59

## 2017-04-07 PROCEDURE — 96365 THER/PROPH/DIAG IV INF INIT: CPT

## 2017-04-07 PROCEDURE — 93970 EXTREMITY STUDY: CPT

## 2017-04-07 PROCEDURE — 93306 TTE W/DOPPLER COMPLETE: CPT

## 2017-04-07 PROCEDURE — 87798 DETECT AGENT NOS DNA AMP: CPT

## 2017-04-07 PROCEDURE — 99232 SBSQ HOSP IP/OBS MODERATE 35: CPT

## 2017-04-07 PROCEDURE — 82150 ASSAY OF AMYLASE: CPT

## 2017-04-07 PROCEDURE — 87086 URINE CULTURE/COLONY COUNT: CPT

## 2017-04-07 PROCEDURE — 83605 ASSAY OF LACTIC ACID: CPT

## 2017-04-07 PROCEDURE — 81001 URINALYSIS AUTO W/SCOPE: CPT

## 2017-04-07 PROCEDURE — 84145 PROCALCITONIN (PCT): CPT

## 2017-04-07 PROCEDURE — 71250 CT THORAX DX C-: CPT

## 2017-04-07 RX ORDER — WARFARIN SODIUM 2.5 MG/1
1 TABLET ORAL
Qty: 0 | Refills: 0 | COMMUNITY

## 2017-04-07 RX ADMIN — FLUCONAZOLE 200 MILLIGRAM(S): 150 TABLET ORAL at 12:26

## 2017-04-07 RX ADMIN — Medication 2.5 MILLIGRAM(S): at 06:09

## 2017-04-07 RX ADMIN — Medication 25 MILLIGRAM(S): at 06:10

## 2017-04-07 RX ADMIN — Medication 3 MILLILITER(S): at 07:45

## 2017-04-07 RX ADMIN — Medication 500000 UNIT(S): at 06:09

## 2017-04-07 RX ADMIN — PANTOPRAZOLE SODIUM 40 MILLIGRAM(S): 20 TABLET, DELAYED RELEASE ORAL at 06:09

## 2017-04-07 RX ADMIN — Medication 3 MILLILITER(S): at 13:31

## 2017-04-07 RX ADMIN — Medication 1 TABLET(S): at 08:32

## 2017-04-07 NOTE — PROGRESS NOTE ADULT - SUBJECTIVE AND OBJECTIVE BOX
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 No shortness of breath    MEDICATIONS  (STANDING):  ALBUTerol/ipratropium for Nebulization 3milliLiter(s) Nebulizer every 6 hours  lactobacillus acidophilus 1Tablet(s) Oral three times a day with meals  pantoprazole    Tablet 40milliGRAM(s) Oral before breakfast  tamsulosin 0.4milliGRAM(s) Oral at bedtime  nystatin    Suspension 456035Bttf(s) Oral four times a day  fluconAZOLE   Tablet 200milliGRAM(s) Oral daily  diltiazem   CD 240milliGRAM(s) Oral daily  insulin lispro (HumaLOG) corrective regimen sliding scale  SubCutaneous three times a day before meals  insulin lispro (HumaLOG) corrective regimen sliding scale  SubCutaneous at bedtime  dextrose 5%. 1000milliLiter(s) IV Continuous <Continuous>  dextrose 50% Injectable 12.5Gram(s) IV Push once  dextrose 50% Injectable 25Gram(s) IV Push once  dextrose 50% Injectable 25Gram(s) IV Push once  dronabinol 2.5milliGRAM(s) Oral two times a day  metoprolol succinate ER 25milliGRAM(s) Oral daily    MEDICATIONS  (PRN):  acetaminophen   Tablet. 650milliGRAM(s) Oral every 6 hours PRN Mild Pain (1 - 3)  dextrose Gel 1Dose(s) Oral once PRN Blood Glucose LESS THAN 70 milliGRAM(s)/deciliter  glucagon  Injectable 1milliGRAM(s) IntraMuscular once PRN Glucose LESS THAN 70 milligrams/deciliter      REVIEW OF SYSTEMS:  eye, ent, GI, , allergic, dermatologic, musculoskeletal and neurologic are negative except as described above    Vital Signs Last 24 Hrs  T(C): 36.9, Max: 37.1 (04-06 @ 12:45)  T(F): 98.4, Max: 98.7 (04-06 @ 12:45)  HR: 97 (62 - 97)  BP: 114/68 (109/71 - 126/78)  BP(mean): --  RR: 17 (16 - 17)  SpO2: 97% (95% - 100%)    I&O's Summary    I & Os for current day (as of 07 Apr 2017 11:50)  =============================================  IN: 0 ml / OUT: 450 ml / NET: -450 ml      Telemetry past 24h:    PHYSICAL EXAM:    Constitutional: Appears frail and generally weak  HEENT:  MMM, sclerae anicteric, conjunctivae clear, no oral cyanosis.  Pulmonary: Mild labored breathing with decreased breath sounds bilaterally  Cardiovascular: Decreased heart sound, No murmurs, rubs, gallops or clicks  Gastrointestinal: Bowel Sounds present, soft, nontender.   Lymph: No peripheral edema. No lymphadenopathy.  Neurological: Alert, no focal deficits  Skin: No rashes.  Psych:  Depressed with flat affect    LABS: All Labs Reviewed:                        9.8    8.6   )-----------( 169      ( 07 Apr 2017 07:11 )             31.0                         9.6    9.9   )-----------( 178      ( 06 Apr 2017 07:38 )             30.7                         9.9    10.3  )-----------( 220      ( 05 Apr 2017 07:19 )             31.4     07 Apr 2017 07:11    141    |  107    |  28     ----------------------------<  91     4.2     |  25     |  0.51   06 Apr 2017 07:38    140    |  106    |  29     ----------------------------<  104    4.3     |  25     |  0.49   05 Apr 2017 07:19    139    |  106    |  24     ----------------------------<  99     4.6     |  25     |  0.41     Ca    8.1        07 Apr 2017 07:11  Ca    8.0        06 Apr 2017 07:38  Ca    8.0        05 Apr 2017 07:19  Phos  2.6       07 Apr 2017 07:11  Phos  3.0       06 Apr 2017 07:38  Phos  2.3       05 Apr 2017 07:19  Mg     2.1       07 Apr 2017 07:11  Mg     2.1       06 Apr 2017 07:38  Mg     2.2       05 Apr 2017 07:19      PT/INR - ( 07 Apr 2017 07:11 )   PT: 35.2 sec;   INR: 3.15 ratio               Blood Culture:         RADIOLOGY:    EKG:

## 2017-04-07 NOTE — PROGRESS NOTE ADULT - SUBJECTIVE AND OBJECTIVE BOX
INTERVAL HPI/OVERNIGHT EVENTS:           MEDICATIONS  (STANDING):  ALBUTerol/ipratropium for Nebulization 3milliLiter(s) Nebulizer every 6 hours  lactobacillus acidophilus 1Tablet(s) Oral three times a day with meals  pantoprazole    Tablet 40milliGRAM(s) Oral before breakfast  tamsulosin 0.4milliGRAM(s) Oral at bedtime  nystatin    Suspension 489498Tplo(s) Oral four times a day  fluconAZOLE   Tablet 200milliGRAM(s) Oral daily  diltiazem   CD 240milliGRAM(s) Oral daily  insulin lispro (HumaLOG) corrective regimen sliding scale  SubCutaneous three times a day before meals  insulin lispro (HumaLOG) corrective regimen sliding scale  SubCutaneous at bedtime  dextrose 5%. 1000milliLiter(s) IV Continuous <Continuous>  dextrose 50% Injectable 12.5Gram(s) IV Push once  dextrose 50% Injectable 25Gram(s) IV Push once  dextrose 50% Injectable 25Gram(s) IV Push once  metoprolol succinate ER 50milliGRAM(s) Oral daily  potassium chloride    Tablet ER 40milliEquivalent(s) Oral every 4 hours    MEDICATIONS  (PRN):  acetaminophen   Tablet. 650milliGRAM(s) Oral every 6 hours PRN Mild Pain (1 - 3)  dextrose Gel 1Dose(s) Oral once PRN Blood Glucose LESS THAN 70 milliGRAM(s)/deciliter  glucagon  Injectable 1milliGRAM(s) IntraMuscular once PRN Glucose LESS THAN 70 milligrams/deciliter      Allergies    No Known Allergies    Intolerances      limited 2/2 dementia   All other review of systems is negative unless indicated above.      Vital Signs        General: chronically ill, cachetic  HEENT: oral thrush noticed but improved from yesterday    Neurology: alert and awake   Respiratory: diminished breath sound   CV: irregular rate and Rhythm, S1S2, no murmurs, rubs or gallops  Abdominal: Soft, NT, ND +BS, Last BM  Extremities: No edema, + peripheral pulses      LABS:                               RADIOLOGY & ADDITIONAL TESTS: INTERVAL HPI/OVERNIGHT EVENTS: Pt seen and examed at bedside. No acute event overnight. No acute abnormal tracing per tele monitor.            MEDICATIONS  (STANDING):  ALBUTerol/ipratropium for Nebulization 3milliLiter(s) Nebulizer every 6 hours  lactobacillus acidophilus 1Tablet(s) Oral three times a day with meals  pantoprazole    Tablet 40milliGRAM(s) Oral before breakfast  tamsulosin 0.4milliGRAM(s) Oral at bedtime  nystatin    Suspension 717881Sqik(s) Oral four times a day  fluconAZOLE   Tablet 200milliGRAM(s) Oral daily  diltiazem   CD 240milliGRAM(s) Oral daily  insulin lispro (HumaLOG) corrective regimen sliding scale  SubCutaneous three times a day before meals  insulin lispro (HumaLOG) corrective regimen sliding scale  SubCutaneous at bedtime  dextrose 5%. 1000milliLiter(s) IV Continuous <Continuous>  dextrose 50% Injectable 12.5Gram(s) IV Push once  dextrose 50% Injectable 25Gram(s) IV Push once  dextrose 50% Injectable 25Gram(s) IV Push once  metoprolol succinate ER 50milliGRAM(s) Oral daily  potassium chloride    Tablet ER 40milliEquivalent(s) Oral every 4 hours    MEDICATIONS  (PRN):  acetaminophen   Tablet. 650milliGRAM(s) Oral every 6 hours PRN Mild Pain (1 - 3)  dextrose Gel 1Dose(s) Oral once PRN Blood Glucose LESS THAN 70 milliGRAM(s)/deciliter  glucagon  Injectable 1milliGRAM(s) IntraMuscular once PRN Glucose LESS THAN 70 milligrams/deciliter      Allergies    No Known Allergies    Intolerances      limited 2/2 dementia   All other review of systems is negative unless indicated above.      Vital Signs Last 24 Hrs  T(C): 36.5, Max: 36.9 (04-07 @ 08:03)  T(F): 97.7, Max: 98.4 (04-07 @ 08:03)  HR: 70 (62 - 100)  BP: 94/67 (94/67 - 126/78)  BP(mean): --  RR: 17 (16 - 17)  SpO2: 99% (95% - 100%)Vital Signs        General: chronically ill, cachetic  HEENT: oral thrush noticed but improved  Neurology: alert and awake   Respiratory: diminished breath sound b/l   CV: irregular rate and Rhythm, S1S2, no murmurs, rubs or gallops  Abdominal: Soft, NT, ND +BS, Last BM  Extremities: No edema, + peripheral pulses                            9.8    8.6   )-----------( 169      ( 07 Apr 2017 07:11 )             31.0     04-07    141  |  107  |  28<H>  ----------------------------<  91  4.2   |  25  |  0.51    Ca    8.1<L>      07 Apr 2017 07:11  Phos  2.6     04-07  Mg     2.1     04-07      PT/INR - ( 07 Apr 2017 07:11 )   PT: 35.2 sec;   INR: 3.15 ratio                                 RADIOLOGY & ADDITIONAL TESTS:

## 2017-04-07 NOTE — PROGRESS NOTE ADULT - ASSESSMENT
87y Female with history of chronic atrial fibrillation, AC with Coumadin, prior DVT/PE, resolved pulmonary edema, HMPV:    1.  Dose Coumadin goal INR 2-3  2.  Ventricular rate controlled on current dosages of diltiazem and Toprol.  No further ectopy noted.   3.  Monitor and replete electrolytes as needed  4. General supportive care  4.  Supplemental oxygen. SOB but does not appear to be volume overloaded.   5.  D/C planning per primary team

## 2017-04-07 NOTE — PROGRESS NOTE ADULT - PROBLEM SELECTOR PLAN 10
-poor prognosis, frailty  -Son updated, s/p family meeting, pt's son accept the poor prognosis verbally, still have unrealistic expectation  -Palliative following -poor prognosis, frailty  -Son updated, s/p family meeting, pt's son accept the poor prognosis verbally, still has unrealistic expectation  -d/c planning

## 2017-04-07 NOTE — PROGRESS NOTE ADULT - PROBLEM SELECTOR PLAN 8
-INR within therapeutic range   -2.5 mg coumadin today   -f/u INR in AM -INR within supra therapeutic range    -Hold coumadin today   -f/u INR in AM

## 2017-04-07 NOTE — PROGRESS NOTE ADULT - PROBLEM SELECTOR PROBLEM 1
UTI (urinary tract infection)
Chronic respiratory failure
Heart failure
Leukocytosis, unspecified type
Obstructive airway disease
Oral thrush
Oral thrush
Severe malnutrition
Urinary retention
Acute respiratory failure
UTI (urinary tract infection)
UTI (urinary tract infection)

## 2017-04-07 NOTE — PROGRESS NOTE ADULT - PROBLEM SELECTOR PLAN 4
-chronic stable  -c/w Duoneb /O2 supplement  -finished coursed of steroid tapering  -Pulm following.   -overall prognosis poor

## 2017-04-07 NOTE — PROGRESS NOTE ADULT - ASSESSMENT
87 y.o. F with PMHx of A fib on coumadin, MDD, HLD, GERD, dysphagia, dementia, prior hx of DVT/PE?, HTN, urinary retention, and constipation brought in by EMS from [facility], admitted with acute hypoxic respiratory failure 2/2 pulmonary edema 2/2 HMPV virus, UTI(E.coli) , hypertensive urgency, and afib with RVR.

## 2017-04-07 NOTE — PROGRESS NOTE ADULT - SUBJECTIVE AND OBJECTIVE BOX
Date/Time Patient Seen:  		  Referring MD:   Data Reviewed	       Patient is a 87y old  Female who presents with a chief complaint of SOB (30 Mar 2017 14:20)    pt in bed  seen and examined  on oxygen  frail  weak      Subjective/HPI       Medication list         MEDICATIONS  (STANDING):  ALBUTerol/ipratropium for Nebulization 3milliLiter(s) Nebulizer every 6 hours  lactobacillus acidophilus 1Tablet(s) Oral three times a day with meals  pantoprazole    Tablet 40milliGRAM(s) Oral before breakfast  tamsulosin 0.4milliGRAM(s) Oral at bedtime  nystatin    Suspension [id number](s) Oral four times a day  fluconAZOLE   Tablet 200milliGRAM(s) Oral daily  diltiazem   CD 240milliGRAM(s) Oral daily  insulin lispro (HumaLOG) corrective regimen sliding scale  SubCutaneous three times a day before meals  insulin lispro (HumaLOG) corrective regimen sliding scale  SubCutaneous at bedtime  dextrose 5%. 1000milliLiter(s) IV Continuous <Continuous>  dextrose 50% Injectable 12.5Gram(s) IV Push once  dextrose 50% Injectable 25Gram(s) IV Push once  dextrose 50% Injectable 25Gram(s) IV Push once  dronabinol 2.5milliGRAM(s) Oral two times a day  metoprolol succinate ER 25milliGRAM(s) Oral daily    MEDICATIONS  (PRN):  acetaminophen   Tablet. 650milliGRAM(s) Oral every 6 hours PRN Mild Pain (1 - 3)  dextrose Gel 1Dose(s) Oral once PRN Blood Glucose LESS THAN 70 milliGRAM(s)/deciliter  glucagon  Injectable 1milliGRAM(s) IntraMuscular once PRN Glucose LESS THAN 70 milligrams/deciliter         Vitals log        ICU Vital Signs Last 24 Hrs  T(C): 36.8, Max: 37.1 (04-06 @ 12:45)  T(F): 98.2, Max: 98.7 (04-06 @ 12:45)  HR: 83 (62 - 95)  BP: 120/75 (109/71 - 126/78)  BP(mean): --  ABP: --  ABP(mean): --  RR: 17 (16 - 17)  SpO2: 98% (95% - 100%)           Input and Output:  I&O's Detail  I & Os for 24h ending 06 Apr 2017 07:00  =============================================  IN:    Solution: 250 ml    Oral Fluid: 75 ml    Total IN: 325 ml  ---------------------------------------------  OUT:    Indwelling Catheter - Urethral: 650 ml    Total OUT: 650 ml  ---------------------------------------------  Total NET: -325 ml    I & Os for current day (as of 07 Apr 2017 06:49)  =============================================  IN:    Total IN: 0 ml  ---------------------------------------------  OUT:    Indwelling Catheter - Urethral: 200 ml    Total OUT: 200 ml  ---------------------------------------------  Total NET: -200 ml      Lab Data                        9.6    9.9   )-----------( 178      ( 06 Apr 2017 07:38 )             30.7     04-06    140  |  106  |  29<H>  ----------------------------<  104<H>  4.3   |  25  |  0.49<L>    Ca    8.0<L>      06 Apr 2017 07:38  Phos  3.0     04-06  Mg     2.1     04-06              Review of Systems	      Objective       Resp dec BS, poor effort, no wheezing,     CV       GI    Extremities       Neuro       Skin         Pertinent Lab findings & Imaging      Derrick:  NO   Adequate UO     I&O's Detail  I & Os for 24h ending 06 Apr 2017 07:00  =============================================  IN:    Solution: 250 ml    Oral Fluid: 75 ml    Total IN: 325 ml  ---------------------------------------------  OUT:    Indwelling Catheter - Urethral: 650 ml    Total OUT: 650 ml  ---------------------------------------------  Total NET: -325 ml    I & Os for current day (as of 07 Apr 2017 06:49)  =============================================  IN:    Total IN: 0 ml  ---------------------------------------------  OUT:    Indwelling Catheter - Urethral: 200 ml    Total OUT: 200 ml  ---------------------------------------------  Total NET: -200 ml           Discussed with:     Cultures:	        Radiology 4

## 2017-04-07 NOTE — PROGRESS NOTE ADULT - PROBLEM SELECTOR PLAN 7
-acute on chronic  -c/w mechanical  soft diet with ensure  -apprec dietary recs.  -c/w Marinol per pt's family for appetite stimulate

## 2017-04-07 NOTE — PROGRESS NOTE ADULT - PROBLEM SELECTOR PLAN 9
-c/w nystatin and diflucan(For total 7 days)   -ID following -improved  -nystatin   -done with 7 days of diflucan course of treatment  -ID following

## 2017-04-07 NOTE — PROGRESS NOTE ADULT - PROBLEM SELECTOR PROBLEM 2
Pulmonary edema, acute
Pneumonia, viral
Pulmonary edema, acute
Atrial fibrillation
Chronic respiratory failure
Chronic respiratory failure
Hypertension
Obstructive lung disease
Paroxysmal atrial fibrillation
Acute pulmonary edema
Pneumonia, viral
Pneumonia, viral

## 2017-04-07 NOTE — PROGRESS NOTE ADULT - PROBLEM SELECTOR PLAN 1
multiple medica issues  failure to thrive, kyphosis, obstructive and restr lung pathology  nebs  chest pt  oral and skin care  supportive care  monitor sats > 88 pct  asst with ADL  out of bed  prognosis very poor  son is aware, he is hedging on making decisions and shows inconsistencies in his dealings with mother's medical situation

## 2017-04-11 DIAGNOSIS — B97.81 HUMAN METAPNEUMOVIRUS AS THE CAUSE OF DISEASES CLASSIFIED ELSEWHERE: ICD-10-CM

## 2017-04-11 DIAGNOSIS — E87.1 HYPO-OSMOLALITY AND HYPONATREMIA: ICD-10-CM

## 2017-04-11 DIAGNOSIS — E87.6 HYPOKALEMIA: ICD-10-CM

## 2017-04-11 DIAGNOSIS — R73.9 HYPERGLYCEMIA, UNSPECIFIED: ICD-10-CM

## 2017-04-11 DIAGNOSIS — B96.20 UNSPECIFIED ESCHERICHIA COLI [E. COLI] AS THE CAUSE OF DISEASES CLASSIFIED ELSEWHERE: ICD-10-CM

## 2017-04-11 DIAGNOSIS — N39.0 URINARY TRACT INFECTION, SITE NOT SPECIFIED: ICD-10-CM

## 2017-04-11 DIAGNOSIS — R91.8 OTHER NONSPECIFIC ABNORMAL FINDING OF LUNG FIELD: ICD-10-CM

## 2017-04-11 DIAGNOSIS — M40.209 UNSPECIFIED KYPHOSIS, SITE UNSPECIFIED: ICD-10-CM

## 2017-04-11 DIAGNOSIS — N31.9 NEUROMUSCULAR DYSFUNCTION OF BLADDER, UNSPECIFIED: ICD-10-CM

## 2017-04-11 DIAGNOSIS — Z79.01 LONG TERM (CURRENT) USE OF ANTICOAGULANTS: ICD-10-CM

## 2017-04-11 DIAGNOSIS — Z86.718 PERSONAL HISTORY OF OTHER VENOUS THROMBOSIS AND EMBOLISM: ICD-10-CM

## 2017-04-11 DIAGNOSIS — M81.0 AGE-RELATED OSTEOPOROSIS WITHOUT CURRENT PATHOLOGICAL FRACTURE: ICD-10-CM

## 2017-04-11 DIAGNOSIS — I50.1 LEFT VENTRICULAR FAILURE, UNSPECIFIED: ICD-10-CM

## 2017-04-11 DIAGNOSIS — J44.9 CHRONIC OBSTRUCTIVE PULMONARY DISEASE, UNSPECIFIED: ICD-10-CM

## 2017-04-11 DIAGNOSIS — J98.11 ATELECTASIS: ICD-10-CM

## 2017-04-11 DIAGNOSIS — I50.31 ACUTE DIASTOLIC (CONGESTIVE) HEART FAILURE: ICD-10-CM

## 2017-04-11 DIAGNOSIS — I48.91 UNSPECIFIED ATRIAL FIBRILLATION: ICD-10-CM

## 2017-04-11 DIAGNOSIS — R13.10 DYSPHAGIA, UNSPECIFIED: ICD-10-CM

## 2017-04-11 DIAGNOSIS — K21.9 GASTRO-ESOPHAGEAL REFLUX DISEASE WITHOUT ESOPHAGITIS: ICD-10-CM

## 2017-04-11 DIAGNOSIS — I16.1 HYPERTENSIVE EMERGENCY: ICD-10-CM

## 2017-04-11 DIAGNOSIS — E43 UNSPECIFIED SEVERE PROTEIN-CALORIE MALNUTRITION: ICD-10-CM

## 2017-04-11 DIAGNOSIS — J20.9 ACUTE BRONCHITIS, UNSPECIFIED: ICD-10-CM

## 2017-04-11 DIAGNOSIS — D68.9 COAGULATION DEFECT, UNSPECIFIED: ICD-10-CM

## 2017-04-11 DIAGNOSIS — F03.90 UNSPECIFIED DEMENTIA, UNSPECIFIED SEVERITY, WITHOUT BEHAVIORAL DISTURBANCE, PSYCHOTIC DISTURBANCE, MOOD DISTURBANCE, AND ANXIETY: ICD-10-CM

## 2017-04-11 DIAGNOSIS — M19.90 UNSPECIFIED OSTEOARTHRITIS, UNSPECIFIED SITE: ICD-10-CM

## 2017-04-11 DIAGNOSIS — Z16.24 RESISTANCE TO MULTIPLE ANTIBIOTICS: ICD-10-CM

## 2017-04-11 DIAGNOSIS — J96.01 ACUTE RESPIRATORY FAILURE WITH HYPOXIA: ICD-10-CM

## 2017-04-11 DIAGNOSIS — Z86.711 PERSONAL HISTORY OF PULMONARY EMBOLISM: ICD-10-CM

## 2018-01-11 ENCOUNTER — INPATIENT (INPATIENT)
Facility: HOSPITAL | Age: 83
LOS: 6 days | Discharge: NURSING, REHAB OR LONG-TERM CARE | DRG: 308 | End: 2018-01-18
Attending: HOSPITALIST | Admitting: HOSPITALIST
Payer: MEDICARE

## 2018-01-11 VITALS
DIASTOLIC BLOOD PRESSURE: 82 MMHG | OXYGEN SATURATION: 96 % | WEIGHT: 117.95 LBS | HEIGHT: 62 IN | TEMPERATURE: 97 F | SYSTOLIC BLOOD PRESSURE: 115 MMHG | HEART RATE: 90 BPM | RESPIRATION RATE: 20 BRPM

## 2018-01-11 LAB
ALBUMIN SERPL ELPH-MCNC: 3.1 G/DL — LOW (ref 3.3–5)
ALP SERPL-CCNC: 97 U/L — SIGNIFICANT CHANGE UP (ref 40–120)
ALT FLD-CCNC: 22 U/L — SIGNIFICANT CHANGE UP (ref 12–78)
ANION GAP SERPL CALC-SCNC: 6 MMOL/L — SIGNIFICANT CHANGE UP (ref 5–17)
APTT BLD: 44.4 SEC — HIGH (ref 27.5–37.4)
AST SERPL-CCNC: 34 U/L — SIGNIFICANT CHANGE UP (ref 15–37)
BASOPHILS # BLD AUTO: 0.2 K/UL — SIGNIFICANT CHANGE UP (ref 0–0.2)
BASOPHILS NFR BLD AUTO: 1.7 % — SIGNIFICANT CHANGE UP (ref 0–2)
BILIRUB SERPL-MCNC: 0.5 MG/DL — SIGNIFICANT CHANGE UP (ref 0.2–1.2)
BUN SERPL-MCNC: 28 MG/DL — HIGH (ref 7–23)
CALCIUM SERPL-MCNC: 8.4 MG/DL — LOW (ref 8.5–10.1)
CHLORIDE SERPL-SCNC: 105 MMOL/L — SIGNIFICANT CHANGE UP (ref 96–108)
CO2 SERPL-SCNC: 25 MMOL/L — SIGNIFICANT CHANGE UP (ref 22–31)
CREAT SERPL-MCNC: 0.59 MG/DL — SIGNIFICANT CHANGE UP (ref 0.5–1.3)
EOSINOPHIL # BLD AUTO: 1.3 K/UL — HIGH (ref 0–0.5)
EOSINOPHIL NFR BLD AUTO: 13.5 % — HIGH (ref 0–6)
GLUCOSE SERPL-MCNC: 102 MG/DL — HIGH (ref 70–99)
HCT VFR BLD CALC: 34.9 % — SIGNIFICANT CHANGE UP (ref 34.5–45)
HGB BLD-MCNC: 10.8 G/DL — LOW (ref 11.5–15.5)
INR BLD: 2.22 RATIO — HIGH (ref 0.88–1.16)
LYMPHOCYTES # BLD AUTO: 2.1 K/UL — SIGNIFICANT CHANGE UP (ref 1–3.3)
LYMPHOCYTES # BLD AUTO: 21.3 % — SIGNIFICANT CHANGE UP (ref 13–44)
MCHC RBC-ENTMCNC: 25 PG — LOW (ref 27–34)
MCHC RBC-ENTMCNC: 31.1 GM/DL — LOW (ref 32–36)
MCV RBC AUTO: 80.6 FL — SIGNIFICANT CHANGE UP (ref 80–100)
MONOCYTES # BLD AUTO: 0.8 K/UL — SIGNIFICANT CHANGE UP (ref 0–0.9)
MONOCYTES NFR BLD AUTO: 8.3 % — SIGNIFICANT CHANGE UP (ref 1–9)
NEUTROPHILS # BLD AUTO: 5.5 K/UL — SIGNIFICANT CHANGE UP (ref 1.8–7.4)
NEUTROPHILS NFR BLD AUTO: 55.3 % — SIGNIFICANT CHANGE UP (ref 43–77)
PLATELET # BLD AUTO: 237 K/UL — SIGNIFICANT CHANGE UP (ref 150–400)
POTASSIUM SERPL-MCNC: 5.1 MMOL/L — SIGNIFICANT CHANGE UP (ref 3.5–5.3)
POTASSIUM SERPL-SCNC: 5.1 MMOL/L — SIGNIFICANT CHANGE UP (ref 3.5–5.3)
PROT SERPL-MCNC: 6.6 G/DL — SIGNIFICANT CHANGE UP (ref 6–8.3)
PROTHROM AB SERPL-ACNC: 24.6 SEC — HIGH (ref 9.8–12.7)
RBC # BLD: 4.33 M/UL — SIGNIFICANT CHANGE UP (ref 3.8–5.2)
RBC # FLD: 16.6 % — HIGH (ref 10.3–14.5)
SODIUM SERPL-SCNC: 136 MMOL/L — SIGNIFICANT CHANGE UP (ref 135–145)
WBC # BLD: 9.9 K/UL — SIGNIFICANT CHANGE UP (ref 3.8–10.5)
WBC # FLD AUTO: 9.9 K/UL — SIGNIFICANT CHANGE UP (ref 3.8–10.5)

## 2018-01-11 PROCEDURE — 71275 CT ANGIOGRAPHY CHEST: CPT | Mod: 26

## 2018-01-11 NOTE — ED PROVIDER NOTE - OBJECTIVE STATEMENT
pt bib ems from snf for b/l dvt on US done today. pt poor historian, denies ha, cp, sob, abd pain, d/n/v.  pmd - lesli

## 2018-01-11 NOTE — ED ADULT NURSE NOTE - OBJECTIVE STATEMENT
Pt was received A&OX1, anxious and confused and with B/L LE +3 edema, redness and multiple bruises that looks like scratch marks. Pt denied pain and discomfort. Pt was evaluated by Dr. Villeda who is aware of the above assessments and said that pt has Hx. of dementia. No s/s of distress noted. Continue to monitor pt. Pt was received A&OX1, anxious and confused and with B/L LE +3 edema, redness and multiple bruises that looks like scratch marks. Pt denied pain and discomfort. Pt was evaluated by Dr. Villeda who is aware of the above assessments and said that pt has Hx. of dementia. Pt has no PU but has healed sacral PU. No s/s of distress noted. Continue to monitor pt.

## 2018-01-11 NOTE — ED PROVIDER NOTE - CARE PLAN
Principal Discharge DX:	DVT (deep venous thrombosis) Principal Discharge DX:	CHF (congestive heart failure)  Secondary Diagnosis:	Atrial fibrillation

## 2018-01-11 NOTE — ED PROVIDER NOTE - MEDICAL DECISION MAKING DETAILS
pt bib ems from snf with b/l le dvt on outpt us pt bib ems from snf with b/l le dvt on outpt us Lasix 20mg IV Diltiazem 10mg IV

## 2018-01-12 ENCOUNTER — TRANSCRIPTION ENCOUNTER (OUTPATIENT)
Age: 83
End: 2018-01-12

## 2018-01-12 DIAGNOSIS — I48.1 PERSISTENT ATRIAL FIBRILLATION: ICD-10-CM

## 2018-01-12 DIAGNOSIS — I82.409 ACUTE EMBOLISM AND THROMBOSIS OF UNSPECIFIED DEEP VEINS OF UNSPECIFIED LOWER EXTREMITY: ICD-10-CM

## 2018-01-12 DIAGNOSIS — I50.9 HEART FAILURE, UNSPECIFIED: ICD-10-CM

## 2018-01-12 DIAGNOSIS — Z29.9 ENCOUNTER FOR PROPHYLACTIC MEASURES, UNSPECIFIED: ICD-10-CM

## 2018-01-12 DIAGNOSIS — E87.1 HYPO-OSMOLALITY AND HYPONATREMIA: ICD-10-CM

## 2018-01-12 DIAGNOSIS — K21.9 GASTRO-ESOPHAGEAL REFLUX DISEASE WITHOUT ESOPHAGITIS: ICD-10-CM

## 2018-01-12 DIAGNOSIS — K59.00 CONSTIPATION, UNSPECIFIED: ICD-10-CM

## 2018-01-12 LAB
ANION GAP SERPL CALC-SCNC: 7 MMOL/L — SIGNIFICANT CHANGE UP (ref 5–17)
BUN SERPL-MCNC: 24 MG/DL — HIGH (ref 7–23)
CALCIUM SERPL-MCNC: 8.6 MG/DL — SIGNIFICANT CHANGE UP (ref 8.5–10.1)
CHLORIDE SERPL-SCNC: 99 MMOL/L — SIGNIFICANT CHANGE UP (ref 96–108)
CK MB BLD-MCNC: 3.2 % — SIGNIFICANT CHANGE UP (ref 0–3.5)
CK MB CFR SERPL CALC: 1.4 NG/ML — SIGNIFICANT CHANGE UP (ref 0–3.6)
CK SERPL-CCNC: 44 U/L — SIGNIFICANT CHANGE UP (ref 26–192)
CK SERPL-CCNC: 74 U/L — SIGNIFICANT CHANGE UP (ref 26–192)
CO2 SERPL-SCNC: 26 MMOL/L — SIGNIFICANT CHANGE UP (ref 22–31)
CREAT SERPL-MCNC: 0.64 MG/DL — SIGNIFICANT CHANGE UP (ref 0.5–1.3)
GLUCOSE SERPL-MCNC: 83 MG/DL — SIGNIFICANT CHANGE UP (ref 70–99)
HCT VFR BLD CALC: 36.6 % — SIGNIFICANT CHANGE UP (ref 34.5–45)
HGB BLD-MCNC: 11.3 G/DL — LOW (ref 11.5–15.5)
INR BLD: 2.06 RATIO — HIGH (ref 0.88–1.16)
MCHC RBC-ENTMCNC: 25.1 PG — LOW (ref 27–34)
MCHC RBC-ENTMCNC: 30.8 GM/DL — LOW (ref 32–36)
MCV RBC AUTO: 81.4 FL — SIGNIFICANT CHANGE UP (ref 80–100)
NT-PROBNP SERPL-SCNC: 4812 PG/ML — HIGH (ref 0–450)
PLATELET # BLD AUTO: 239 K/UL — SIGNIFICANT CHANGE UP (ref 150–400)
POTASSIUM SERPL-MCNC: 5 MMOL/L — SIGNIFICANT CHANGE UP (ref 3.5–5.3)
POTASSIUM SERPL-SCNC: 5 MMOL/L — SIGNIFICANT CHANGE UP (ref 3.5–5.3)
PROTHROM AB SERPL-ACNC: 22.8 SEC — HIGH (ref 9.8–12.7)
RBC # BLD: 4.5 M/UL — SIGNIFICANT CHANGE UP (ref 3.8–5.2)
RBC # FLD: 17.1 % — HIGH (ref 10.3–14.5)
SODIUM SERPL-SCNC: 132 MMOL/L — LOW (ref 135–145)
TROPONIN I SERPL-MCNC: <.015 NG/ML — SIGNIFICANT CHANGE UP (ref 0.01–0.04)
TROPONIN I SERPL-MCNC: <.015 NG/ML — SIGNIFICANT CHANGE UP (ref 0.01–0.04)
WBC # BLD: 9.1 K/UL — SIGNIFICANT CHANGE UP (ref 3.8–10.5)
WBC # FLD AUTO: 9.1 K/UL — SIGNIFICANT CHANGE UP (ref 3.8–10.5)

## 2018-01-12 PROCEDURE — 71045 X-RAY EXAM CHEST 1 VIEW: CPT | Mod: 26

## 2018-01-12 PROCEDURE — 93306 TTE W/DOPPLER COMPLETE: CPT | Mod: 26

## 2018-01-12 PROCEDURE — 12345: CPT | Mod: GC,NC

## 2018-01-12 PROCEDURE — 99223 1ST HOSP IP/OBS HIGH 75: CPT

## 2018-01-12 PROCEDURE — 93970 EXTREMITY STUDY: CPT | Mod: 26

## 2018-01-12 PROCEDURE — 99223 1ST HOSP IP/OBS HIGH 75: CPT | Mod: AI,GC

## 2018-01-12 PROCEDURE — 93010 ELECTROCARDIOGRAM REPORT: CPT

## 2018-01-12 PROCEDURE — 99284 EMERGENCY DEPT VISIT MOD MDM: CPT

## 2018-01-12 RX ORDER — MAGNESIUM HYDROXIDE 400 MG/1
30 TABLET, CHEWABLE ORAL
Qty: 0 | Refills: 0 | COMMUNITY

## 2018-01-12 RX ORDER — FERROUS SULFATE 325(65) MG
325 TABLET ORAL DAILY
Qty: 0 | Refills: 0 | Status: DISCONTINUED | OUTPATIENT
Start: 2018-01-12 | End: 2018-01-18

## 2018-01-12 RX ORDER — WARFARIN SODIUM 2.5 MG/1
3 TABLET ORAL ONCE
Qty: 0 | Refills: 0 | Status: COMPLETED | OUTPATIENT
Start: 2018-01-12 | End: 2018-01-12

## 2018-01-12 RX ORDER — PANTOPRAZOLE SODIUM 20 MG/1
40 TABLET, DELAYED RELEASE ORAL DAILY
Qty: 0 | Refills: 0 | Status: DISCONTINUED | OUTPATIENT
Start: 2018-01-12 | End: 2018-01-18

## 2018-01-12 RX ORDER — HYDROCORTISONE 1 %
1 OINTMENT (GRAM) TOPICAL ONCE
Qty: 0 | Refills: 0 | Status: COMPLETED | OUTPATIENT
Start: 2018-01-12 | End: 2018-01-12

## 2018-01-12 RX ORDER — POTASSIUM CHLORIDE 20 MEQ
1 PACKET (EA) ORAL
Qty: 0 | Refills: 0 | COMMUNITY
Start: 2018-01-12 | End: 2018-01-15

## 2018-01-12 RX ORDER — ALBUTEROL 90 UG/1
2 AEROSOL, METERED ORAL EVERY 6 HOURS
Qty: 0 | Refills: 0 | Status: DISCONTINUED | OUTPATIENT
Start: 2018-01-12 | End: 2018-01-18

## 2018-01-12 RX ORDER — FUROSEMIDE 40 MG
40 TABLET ORAL DAILY
Qty: 0 | Refills: 0 | Status: DISCONTINUED | OUTPATIENT
Start: 2018-01-12 | End: 2018-01-12

## 2018-01-12 RX ORDER — FUROSEMIDE 40 MG
20 TABLET ORAL ONCE
Qty: 0 | Refills: 0 | Status: COMPLETED | OUTPATIENT
Start: 2018-01-12 | End: 2018-01-12

## 2018-01-12 RX ORDER — FUROSEMIDE 40 MG
20 TABLET ORAL DAILY
Qty: 0 | Refills: 0 | Status: DISCONTINUED | OUTPATIENT
Start: 2018-01-12 | End: 2018-01-18

## 2018-01-12 RX ORDER — TIOTROPIUM BROMIDE 18 UG/1
1 CAPSULE ORAL; RESPIRATORY (INHALATION) DAILY
Qty: 0 | Refills: 0 | Status: DISCONTINUED | OUTPATIENT
Start: 2018-01-12 | End: 2018-01-18

## 2018-01-12 RX ORDER — POTASSIUM CHLORIDE 20 MEQ
10 PACKET (EA) ORAL DAILY
Qty: 0 | Refills: 0 | Status: DISCONTINUED | OUTPATIENT
Start: 2018-01-12 | End: 2018-01-18

## 2018-01-12 RX ORDER — POLYETHYLENE GLYCOL 3350 17 G/17G
17 POWDER, FOR SOLUTION ORAL DAILY
Qty: 0 | Refills: 0 | Status: DISCONTINUED | OUTPATIENT
Start: 2018-01-12 | End: 2018-01-18

## 2018-01-12 RX ORDER — DOCUSATE SODIUM 100 MG
100 CAPSULE ORAL
Qty: 0 | Refills: 0 | Status: DISCONTINUED | OUTPATIENT
Start: 2018-01-12 | End: 2018-01-18

## 2018-01-12 RX ADMIN — TIOTROPIUM BROMIDE 1 CAPSULE(S): 18 CAPSULE ORAL; RESPIRATORY (INHALATION) at 13:56

## 2018-01-12 RX ADMIN — Medication 1 TABLET(S): at 13:58

## 2018-01-12 RX ADMIN — Medication 100 MILLIGRAM(S): at 07:09

## 2018-01-12 RX ADMIN — POLYETHYLENE GLYCOL 3350 17 GRAM(S): 17 POWDER, FOR SOLUTION ORAL at 13:57

## 2018-01-12 RX ADMIN — Medication 325 MILLIGRAM(S): at 13:56

## 2018-01-12 RX ADMIN — PANTOPRAZOLE SODIUM 40 MILLIGRAM(S): 20 TABLET, DELAYED RELEASE ORAL at 13:56

## 2018-01-12 RX ADMIN — Medication 10 MILLIGRAM(S): at 07:09

## 2018-01-12 RX ADMIN — Medication 1 APPLICATION(S): at 13:59

## 2018-01-12 RX ADMIN — Medication 100 MILLIGRAM(S): at 17:51

## 2018-01-12 RX ADMIN — WARFARIN SODIUM 3 MILLIGRAM(S): 2.5 TABLET ORAL at 22:02

## 2018-01-12 RX ADMIN — Medication 10 MILLIEQUIVALENT(S): at 13:56

## 2018-01-12 RX ADMIN — Medication 20 MILLIGRAM(S): at 02:10

## 2018-01-12 NOTE — PROGRESS NOTE ADULT - ASSESSMENT
88F w/pmh of Afib on Coumadin, GERD, HTN, HLD, Depression, urinary retention, constipation presenting from [facility] with b/l leg swelling admitted with CHF exacerbation, Afib RVR.

## 2018-01-12 NOTE — PROGRESS NOTE ADULT - PROBLEM SELECTOR PLAN 1
Acute on chronic diastolic CHF   -  c/w torsemide 10mg PO   - started on Lasix 20mg IV daily, with hold restrictions   - Monitor I&O's, restrict fluid intake   - follow up Echo, assess EF%   - on 2L NC   - consulted Cardio, recs appreciated Acute on chronic diastolic CHF   -  c/w torsemide 10mg PO   - started on Lasix 20mg IV daily, with hold restrictions   - Monitor I&O's, restrict fluid intake   - follow up Echo, assess EF%   - on 2L NC   - ProBNP: 4812   - consulted Cardio, recs appreciated

## 2018-01-12 NOTE — PROGRESS NOTE ADULT - PROBLEM SELECTOR PLAN 7
- anticoagulated with warfarin  - F/u PT/INR         IMPROVE VTE Individual Risk Assessment          RISK                                                          Points  [x  ] Previous VTE                                                3  [  ] Thrombophilia                                             2  [x  ] Lower limb paralysis                                   2        (unable to hold up >15 seconds)    [  ] Current Cancer                                             2         (within 6 months)  [x  ] Immobilization > 24 hrs                              1  [  ] ICU/CCU stay > 24 hours                             1  [ x ] Age > 60                                                         1    IMPROVE VTE Score: 7

## 2018-01-12 NOTE — PROGRESS NOTE ADULT - PROBLEM SELECTOR PLAN 5
patient is anticoagulated with warfarin. follow up pt/inr  protonix for GI ppx    IMPROVE VTE Individual Risk Assessment          RISK                                                          Points  [x  ] Previous VTE                                                3  [  ] Thrombophilia                                             2  [x  ] Lower limb paralysis                                   2        (unable to hold up >15 seconds)    [  ] Current Cancer                                             2         (within 6 months)  [x  ] Immobilization > 24 hrs                              1  [  ] ICU/CCU stay > 24 hours                             1  [ x ] Age > 60                                                         1    IMPROVE VTE Score: 7 - c/w Protonix 40mg IV push

## 2018-01-12 NOTE — PROGRESS NOTE ADULT - PROBLEM SELECTOR PLAN 4
- started on Hydrocortisone 1% cream to alleviate symptoms -, eu/hypervolemic hyponatremia,   -will monitor BNP AM

## 2018-01-12 NOTE — PROGRESS NOTE ADULT - PROBLEM SELECTOR PLAN 1
Acute on chronic diastolic CHF   -  c/w torsemide 10mg PO   - started on Lasix 20mg IV daily, with hold restrictions   - Monitor I&O's, restrict fluid intake   - follow up Echo, assess EF%   - on 2L NC   - ProBNP: 4812   - consulted Cardio following

## 2018-01-12 NOTE — H&P ADULT - NSHPSOCIALHISTORY_GEN_ALL_CORE
Lives at [facility]. Bedbound for the past 2 years.   Tobacco: denies  Alcohol: denies

## 2018-01-12 NOTE — PROGRESS NOTE ADULT - PROBLEM SELECTOR PLAN 5
- monitor H/H, f/u CBC  - on ferrous sulfate 325mg PO - started on Hydrocortisone 1% cream to alleviate symptoms

## 2018-01-12 NOTE — PROGRESS NOTE ADULT - PROBLEM SELECTOR PLAN 2
- s/p Cardizem 10mg x1  - increased Cardizem to 90mg TID  - obtain LE dopplers  - No clear evidence of acute ischemia, CE neg times 1. Continue to trend, next set due at 3 PM  - INR therapeutic. Follow up PT/INR  - Patient takes Coumadin 3mg at home  - EKG: Atrial fibrillation with rapid ventricular response. Nonspecific ST abnormality  - CT angio chest w/IV cont: no evidence of pulmonary embolism   - consulted Cardio following

## 2018-01-12 NOTE — CONSULT NOTE ADULT - ATTENDING COMMENTS
I have discussed the above plan of care with patient/family in detail. They expressed understanding of the treatment plan . Risks, benefits and alternatives discussed in detail. I have asked if they have any questions or concerns and appropriately addressed them; all questions answered to their satisfactions and in lay terms.     > xxxxxx minutes spent in direct patient care, examining and counseling patient,  reviewing  the notes, lab data/ imaging , discussion with multidisciplinary team.
Seen/examined. Repeat LE dopplers. IV lasix. Increase cardizem. Repeat Echocardiogram. Continue with systemic anticoagulation.

## 2018-01-12 NOTE — DISCHARGE NOTE ADULT - MEDICATION SUMMARY - MEDICATIONS TO TAKE
I will START or STAY ON the medications listed below when I get home from the hospital:    Tylenol 8 Hour 650 mg oral tablet, extended release  -- 2 tab(s) by mouth every 8 hours  -- Indication: For Pain control    dilTIAZem 90 mg oral tablet  -- 1 tab(s) by mouth 3 times a day  -- Indication: For Atrial fibrillation    Coumadin 3 mg oral tablet  -- 1 tab(s) by mouth once a day (at bedtime)  -- Indication: For Atrial fibrillation    DuoNeb 0.5 mg-2.5 mg/3 mL inhalation solution  -- 1 dose(s) inhaled 3 times a day  -- Indication: For SOB or wheezing    Bengay Ultra Strength 4%-10%-30% topical cream  -- Apply on skin to affected area once a day  -- Indication: For Pain control    Dermagran BC topical ointment  -- Apply on skin to affected area 2 times a day to left and right buttock  -- Indication: For skin protectanct    fluocinonide 0.05% topical cream  -- 1 application on skin 2 times a day itchy rash x 5 days.  -- Indication: For Icthy rash    furosemide 40 mg oral tablet  -- 1 tab(s) by mouth once a day  -- Indication: For CHF (congestive heart failure)    ferrous sulfate 325 mg (65 mg elemental iron) oral delayed release tablet  -- 1 tab(s) by mouth once a day  -- Indication: For Anemia    Dulcolax Laxative 10 mg rectal suppository  -- 1 suppository(ies) rectally once a day, As Needed  -- Indication: For Constipation    docusate sodium 100 mg oral capsule  -- 1 cap(s) by mouth 2 times a day  -- Indication: For Constipation    senna 8.6 mg oral tablet  -- 2 tab(s) by mouth once a day (at bedtime)  -- Indication: For Constipation    polyethylene glycol 3350 oral powder for reconstitution  -- 17 gram(s) by mouth once a day  -- Indication: For Constipation    potassium chloride 20 mEq oral tablet, extended release  -- 1 tab(s) by mouth once a day  -- Indication: For supplement    Multiple Vitamins oral tablet  -- 1 tab(s) by mouth once a day  -- Indication: For supplement    Oyster Shell Calcium with Vitamin D 500 mg-200 intl units oral tablet  -- 1 tab(s) by mouth once a day  -- Indication: For supplement

## 2018-01-12 NOTE — H&P ADULT - PROBLEM SELECTOR PLAN 2
s/p Cardizem 10mg x1  Continue Cardizem  INR therapeutic. Follow up PT/INR  Patient takes Coumadin 3mg  Appreciate cardio recs

## 2018-01-12 NOTE — PROGRESS NOTE ADULT - PROBLEM SELECTOR PLAN 8
- anticoagulated with warfarin  - F/u PT/INR         IMPROVE VTE Individual Risk Assessment          RISK                                                          Points  [x  ] Previous VTE                                                3  [  ] Thrombophilia                                             2  [x  ] Lower limb paralysis                                   2        (unable to hold up >15 seconds)    [  ] Current Cancer                                             2         (within 6 months)  [x  ] Immobilization > 24 hrs                              1  [  ] ICU/CCU stay > 24 hours                             1  [ x ] Age > 60                                                         1    IMPROVE VTE Score: 7 - c/w docusate 100mg PO BID and miralax 17grams PO

## 2018-01-12 NOTE — PROGRESS NOTE ADULT - PROBLEM SELECTOR PLAN 2
- s/p Cardizem 10mg x1  - increased Cardizem to 90mg TID  - obtain LE dopplers  - No clear evidence of acute ischemia, CE neg times 1. Continue to trend  - INR therapeutic. Follow up PT/INR  - Patient takes Coumadin 3mg  - consulted Cardio, recs appreciated - s/p Cardizem 10mg x1  - increased Cardizem to 90mg TID  - obtain LE dopplers  - No clear evidence of acute ischemia, CE neg times 1. Continue to trend  - INR therapeutic. Follow up PT/INR  - Patient takes Coumadin 3mg  - Trops negative   - EKG: Atrial fibrillation with rapid ventricular response. Nonspecific ST abnormality  - CT angio chest w/IV cont: no evidence of pulmonary embolism   - consulted Cardio, recs appreciated

## 2018-01-12 NOTE — CONSULT NOTE ADULT - ASSESSMENT
Afib, on coumadin with therapeutic INR.   reported LE edema wit positive duplex yesterday.   Multiple other medical conditions.     Repeat duplex today with negative DVT on R.   Unable to eval on Left.    Dawn Mckenzie called and left message to call back.     No evidence for PE.     RECOMMMEND:   Continue Coumadin at current dose.   Repeat duplex tomorrow.   Obtain report from Dawn Mckenzie.

## 2018-01-12 NOTE — H&P ADULT - ATTENDING COMMENTS
88F w/pmh of Afib on Coumadin, GERD, HTN, HLD, Depression, urinary retention, constipation being admitted for CHF exacerbation and Rapid AFib.  Pt initially came in 2/2 lower ext swelling, u/s from outside facility showed a DVT, however pt was already coagulated therapeutically.  CTA was done to evaluate for PE.  No PE, but it did show CHF.  Lasix given in the ED.  Pt improved.  Cardio consulted

## 2018-01-12 NOTE — PHYSICAL THERAPY INITIAL EVALUATION ADULT - SPECIFY REASON(S)
Pt is a LTC resident at [facility]. Pt is bedbound and confused. Pt is not a candidate for PT.

## 2018-01-12 NOTE — H&P ADULT - PROBLEM SELECTOR PLAN 1
Admit to telemetry  Continue torsemide 10mg   Monitor I&O's  Fluid restriction  Follow up ECHO  Cardio- [facility]

## 2018-01-12 NOTE — DISCHARGE NOTE ADULT - CARE PLAN
Principal Discharge DX:	CHF (congestive heart failure)  Goal:	resolution of symptoms  Secondary Diagnosis:	Atrial fibrillation  Secondary Diagnosis:	DVT (deep venous thrombosis)  Secondary Diagnosis:	Hypertension Principal Discharge DX:	CHF (congestive heart failure)  Goal:	resolution of symptoms  Instructions for follow-up, activity and diet:	- follow up with your outpatient cardiologist  - take all recommended medications  Secondary Diagnosis:	Atrial fibrillation  Instructions for follow-up, activity and diet:	- follow up with outpatient cardiologist   - take medications that will control heart rate  - take the blood thinners  Secondary Diagnosis:	DVT (deep venous thrombosis)  Instructions for follow-up, activity and diet:	-  Secondary Diagnosis:	Hypertension Principal Discharge DX:	CHF (congestive heart failure)  Goal:	resolution of symptoms  Assessment and plan of treatment:	- follow up with your outpatient cardiologist  - take all recommended medications  Secondary Diagnosis:	Atrial fibrillation  Assessment and plan of treatment:	- follow up with outpatient cardiologist   - take medications that will control heart rate  - take the blood thinners.  Please check INR 1/22/18 goal INR 2-3.  Secondary Diagnosis:	DVT (deep venous thrombosis)  Goal:	negative on ultrasound  Assessment and plan of treatment:	-  Secondary Diagnosis:	Hypertension  Goal:	continue cardizem

## 2018-01-12 NOTE — DISCHARGE NOTE ADULT - MEDICATION SUMMARY - MEDICATIONS TO STOP TAKING
I will STOP taking the medications listed below when I get home from the hospital:    albuterol 2.5 mg/3 mL (0.083%) inhalation solution  -- 3 milliliter(s) inhaled every 8 hours    Marinol 2.5 mg oral capsule  -- 1 cap(s) by mouth 2 times a day    metoprolol succinate 25 mg oral tablet, extended release  -- 1 tab(s) by mouth once a day    torsemide 10 mg oral tablet  -- 1 tab(s) by mouth once a day for 3 days

## 2018-01-12 NOTE — H&P ADULT - PMH
Afib    Arthritis    Atrial fibrillation    DVT (deep venous thrombosis)    Femur neck fracture    GERD (gastroesophageal reflux disease)    HTN (hypertension)    Hypertension    Hyponatremia    Memory impairment    Neurogenic bladder    Pulmonary embolism    Pulmonary embolism    Shortness of breath  etiology probably copd  Urinary retention    UTI (lower urinary tract infection)    Vertebral fracture, pathological

## 2018-01-12 NOTE — ED ADULT NURSE REASSESSMENT NOTE - NS ED NURSE REASSESS COMMENT FT1
Pt remained confused and  pulled out her med lock at approximately 0430 and frequent pulls of her cardiac monitor. Dr. Shankar was notified who evaluated pt. Pt had BM X2 and pt was cleaned and maintained dry. No s/s of distress was noted. Continue to monitor pt.

## 2018-01-12 NOTE — PROGRESS NOTE ADULT - ASSESSMENT
88F w/pmh of Afib on Coumadin, GERD, HTN, HLD, Depression, urinary retention, constipation presenting from [facility] with b/l leg swelling admitted with CHF exacerbation, Afib RVR, b/l LE DVTs.

## 2018-01-12 NOTE — H&P ADULT - ASSESSMENT
88F w/pmh of Afib on Coumadin, GERD, HTN, HLD, Depression, urinary retention, constipation presenting from [facility] with b/l leg swelling admitted with CHF exacerbation, Afib RVR.

## 2018-01-12 NOTE — CONSULT NOTE ADULT - ASSESSMENT
88F w/pmh of Afib on Coumadin, GERD, HTN, HLD, Depression, urinary retention, constipation presenting from [facility] with b/l leg swelling admitted with CHF exacerbation, Afib RVR. 88F w/pmh of Afib on Coumadin, GERD, HTN, HLD, Depression, urinary retention, constipation presenting from [facility] with b/l leg swelling admitted with CHF exacerbation, Afib RVR.    - Evidence of volume overload on exam. ProBNP elevated. Will give LAsix 40 mg IV x 1. Obtain ECHO. Continue to monitor on tele.   - Increase to cardizem 90 mg TID.  - No clear evidence of acute ischemia, CE neg times 1. Continue to trend  - No acute changes on EKG   -Previous ECHO March 2017: normal left ventricular systolic function, normal rt ventricular size and function   - BP well controlled, continue home BP meds, monitor routine hemodynamics  - Monitor and replete lytes, keep K>4, Mg>2  - Other cardiovascular workup will depend on clinical course.  - All other workup per primary team  - Will follow 87 yo F pmh of Afib on Coumadin, GERD, HTN, HLD, Depression, urinary retention, constipation presenting from [facility] with b/l leg swelling admitted with CHF exacerbation, Afib RVR.    - Evidence of volume overload on exam. ProBNP elevated. Will give Lasix 40 mg IV daily with close monitoring of renal function.  Obtain ECHO. Continue to monitor on tele.   - Increase to cardizem 90 mg TID.  - No clear evidence of acute ischemia, CE neg times 1. Continue to trend  - No acute changes on EKG   - Previous ECHO March 2017: normal left ventricular systolic function, normal rt ventricular size and function   - BP well controlled, continue home BP meds, monitor routine hemodynamics  - Monitor and replete lytes, keep K>4, Mg>2  - Other cardiovascular workup will depend on clinical course.  - All other workup per primary team  - Will follow 87 yo F pmh of Afib on Coumadin, GERD, HTN, HLD, Depression, urinary retention, constipation presenting from [facility] with b/l leg swelling admitted with CHF exacerbation, Afib RVR.    - Evidence of volume overload on exam. ProBNP elevated. Will give Lasix 20 mg IV daily with close monitoring of renal function.  Obtain ECHO. Continue to monitor on tele.   - Increase to cardizem 90 mg TID.  - Obtain LE dopplers  - No clear evidence of acute ischemia, CE neg times 1. Continue to trend  - No acute changes on EKG   - Previous ECHO March 2017: normal left ventricular systolic function, normal rt ventricular size and function   - BP well controlled, continue home BP meds, monitor routine hemodynamics  - Monitor and replete lytes, keep K>4, Mg>2  - Other cardiovascular workup will depend on clinical course.  - All other workup per primary team  - Will follow

## 2018-01-12 NOTE — H&P ADULT - HISTORY OF PRESENT ILLNESS
88F w/pmh of Afib on Coumadin, GERD, HTN, HLD, Depression, urinary retention, constipation presenting from [facility] with b/l leg swelling. Difficult to obtain accurate history as patient is poor historian. History obtained from charts. As per chart review from [facility], venous doppler showed bilateral dvt on study done on 1/11/18. Patient denies any pain. Called son (emergency contact) for further history but he is unsure of patient's current or past medical history. Patient has been living at [facility] for 3 years.     In the ED, vitals were: T97.2F, HR 90, /82, RR 20, SpO2 96% on 3L nasal cannula. Significant labs include: INR therapeutic at 2.22  CT Chest: No evidence of pulmonary embolism. CHF with probable right heart failure with left greater than right pleural effusions, interstitial edema, and reflux of contrast into the   suprahepatic IVC and hepatic veins. Cardiomegaly. Relate with echocardiogram.  EKG: Afib 's 88F w/pmh of Afib on Coumadin, GERD, HTN, HLD, Depression, urinary retention, constipation presenting from [facility] with b/l leg swelling. Difficult to obtain accurate history as patient is poor historian. History obtained from charts. As per chart review from [facility], venous doppler showed bilateral dvt on study done on 1/11/18. Patient denies any pain. Called son (emergency contact) for further history but he is unsure of patient's current or past medical history. Patient has been living at [facility] for 3 years.     In the ED, vitals were: T97.2F, HR 90, /82, RR 20, SpO2 96% on 3L nasal cannula. Significant labs include: INR therapeutic at 2.22  CTA Chest: No evidence of pulmonary embolism. CHF with probable right heart failure with left greater than right pleural effusions, interstitial edema, and reflux of contrast into the   suprahepatic IVC and hepatic veins. Cardiomegaly. Relate with echocardiogram.  EKG: Afib 's

## 2018-01-12 NOTE — H&P ADULT - NSHPPHYSICALEXAM_GEN_ALL_CORE
Vital Signs Last 24 Hrs  T(C): 36.6 (12 Jan 2018 02:05), Max: 36.7 (11 Jan 2018 23:45)  T(F): 97.9 (12 Jan 2018 02:05), Max: 98 (11 Jan 2018 23:45)  HR: 97 (12 Jan 2018 03:10) (86 - 125)  BP: 114/72 (12 Jan 2018 03:10) (113/76 - 119/76)  RR: 17 (12 Jan 2018 03:10) (17 - 20)  SpO2: 100% (12 Jan 2018 03:10) (96% - 100%)    Physical Exam:  General: elderly  female, NAD, lying in bed comfortably  HEENT: NCAT, PERRLA, moist mucous membranes   Neurology: A&Ox1, nonfocal  Respiratory: CTA B/L, No W/R/R  CV: irregular rate and rhythm, +RLL crackles, +S1/S2, no murmurs, rubs or gallops  Abdominal: Soft, NT, ND +BSx4, no guarding, no rebound  Extremities: 3+ pitting edema of feet, ankles and lower leg, +multiple abrasions/scratches on b/l legs, no active bleeding or drainage,   MSK: decreased ROM  Skin: +erythema of b/l feet, ankles and calves, as above

## 2018-01-12 NOTE — PROGRESS NOTE ADULT - PROBLEM SELECTOR PLAN 3
-reported to have b/L LE DVT 1/11/18   INR therapeutic,   -Awaiting in house US Doppler LE results, will start full dose AC  -Heme /onc consulted Dr. Romeo  -Consider Vascular surgery Dr. Kruse   -consider consult IR for DVT filter

## 2018-01-12 NOTE — DISCHARGE NOTE ADULT - HOSPITAL COURSE
88F w/PMHx of Afib on Coumadin, GERD, HTN, HLD, Depression, urinary retention, constipation presented from [facility] with b/l leg swelling. In ED, it was difficult to obtain accurate history as patient was a poor historian. History obtained from charts. As per chart review from [facility], venous doppler showed bilateral DVT on study done on 1/11/18. In the ED, patient denied any pain. Called son (emergency contact) for further history but he was unsure of patient's current or past medical history. Patient has been living at [facility] for 3 years.     In the ED, vitals were: T97.2F, HR 90, /82, RR 20, SpO2 96% on 3L nasal cannula. Significant labs include: INR therapeutic at 2.22 CTA Chest showed no evidence of pulmonary embolism. CHF with probable right heart failure with left greater than right pleural effusions, interstitial edema, and reflux of contrast into the  suprahepatic IVC and hepatic veins. Cardiomegaly. Relate with echocardiogram. EKG: Afib 's.    Patient was admitted to [facility]. Cardiology was consulted. There was evidence of volume overload on exam. ProBNP was elevated. Cardio recommended giving Lasix 20 mg IV daily with close monitoring of renal function. Cardizem was increased to 90mg TID. 88F w/PMHx of Afib on Coumadin, GERD, HTN, HLD, Depression, urinary retention, constipation presented from [facility] with b/l leg swelling. In ED, it was difficult to obtain accurate history as patient was a poor historian. History obtained from charts. As per chart review from [facility], venous doppler showed bilateral DVT on study done on 1/11/18. In the ED, patient denied any pain. Called son (emergency contact) for further history but he was unsure of patient's current or past medical history. Patient has been living at [facility] for 3 years.     In the ED, vitals were: T97.2F, HR 90, /82, RR 20, SpO2 96% on 3L nasal cannula. Significant labs include: INR therapeutic at 2.22 CTA Chest showed no evidence of pulmonary embolism. CHF with probable right heart failure with left greater than right pleural effusions, interstitial edema, and reflux of contrast into the  suprahepatic IVC and hepatic veins. Cardiomegaly. Relate with echocardiogram. EKG: Afib 's.    Patient was admitted to [facility]. Cardiology was consulted. There was evidence of volume overload on exam. ProBNP was elevated. Cardio recommended giving Lasix 20 mg IV daily with close monitoring of renal function. Cardizem was increased to 90mg TID.   Pt has Atrial fibrillation, now on cardizem. No clear evidence of acute ischemia, CE negative. INR therapeutic (3 coumadin at home). EKG: Atrial fibrillation with rapid ventricular response. Nonspecific ST abnormality.  CT angio chest w/IV cont: no evidence of pulmonary embolism. Cardio following_______    Pt reported to have b/L LE DVT 1/11/18 likely on coumadin, INR therapeutic. Awaiting in house US Doppler LE results, will start full dose AC. Heme /onc consulted Dr. Romeo _______. Consider Vascular surgery Dr. Kruse ________. IR consult for DVT filter.   Pt hyponatremia has been monitored and resolved. Pt given hydrocortotisone crease for itchy skin. Given protonix for GERD. On bowel regimen for consitpation. Pt was given ferrous sulfate for anemia 88F w/PMHx of Afib on Coumadin, GERD, HTN, HLD, Depression, urinary retention, constipation presented from [facility] with b/l leg swelling. In ED, it was difficult to obtain accurate history as patient was a poor historian. History obtained from charts. As per chart review from [facility], venous doppler showed bilateral DVT on study done on 1/11/18. In the ED, patient denied any pain. Called son (emergency contact) for further history but he was unsure of patient's current or past medical history. Patient has been living at [facility] for 3 years.     In the ED, vitals were: T97.2F, HR 90, /82, RR 20, SpO2 96% on 3L nasal cannula. Significant labs include: INR therapeutic at 2.22 CTA Chest showed no evidence of pulmonary embolism. CHF with probable right heart failure with left greater than right pleural effusions, interstitial edema, and reflux of contrast into the  suprahepatic IVC and hepatic veins. Cardiomegaly. Relate with echocardiogram. EKG: Afib 's.    Patient was admitted to [facility]. Cardiology was consulted. There was evidence of volume overload on exam. ProBNP was elevated. Cardio recommended giving Lasix 20 mg IV daily with close monitoring of renal function. Cardizem was increased to 90mg TID.   Pt has Atrial fibrillation, now on cardizem. No clear evidence of acute ischemia, CE negative. INR therapeutic (3 coumadin at home). EKG: Atrial fibrillation with rapid ventricular response. Nonspecific ST abnormality.  CT angio chest w/IV cont: no evidence of pulmonary embolism. Cardio following.    Pt reported to have b/L LE DVT 1/11/18 likely on coumadin, INR therapeutic. Awaiting in house US Doppler LE results, will start full dose AC. Heme /onc consulted Dr. Romeo and Vascular surgery Dr. Kruse. IR consult for DVT filter.   Pt hyponatremia has been monitored and resolved. Pt given hydrocortotisone cream for itchy skin. Given protonix for GERD. On bowel regimen for consitpation. Pt was given ferrous sulfate for anemia.     Doppler of LE was negative for DVT and patient was continued on coumadin.  Echo showed grossly normal LV systolic function, moderate to severe MR, and diastolic dysfunction.  HR better controlled as patient has been more cooperative in taking her medications.      On day of discharge patient is feeling well and in no distress.  Afebrile and hemodynamically stable.      Completion of discharge in 35 minutes.

## 2018-01-12 NOTE — H&P ADULT - PROBLEM SELECTOR PLAN 5
patient is anticoagulated with warfarin. follow up pt/inr  protonix for GI ppx    IMPROVE VTE Individual Risk Assessment          RISK                                                          Points  [x  ] Previous VTE                                                3  [  ] Thrombophilia                                             2  [x  ] Lower limb paralysis                                   2        (unable to hold up >15 seconds)    [  ] Current Cancer                                             2         (within 6 months)  [x  ] Immobilization > 24 hrs                              1  [  ] ICU/CCU stay > 24 hours                             1  [ x ] Age > 60                                                         1    IMPROVE VTE Score: 7

## 2018-01-12 NOTE — CONSULT NOTE ADULT - SUBJECTIVE AND OBJECTIVE BOX
INCOMPLETE NOTE    Patient is a 88y old  Female who presents with a chief complaint of leg swelling (12 Jan 2018 13:44)    HPI:  88F w/pmh of Afib on Coumadin, GERD, HTN, HLD, Depression, urinary retention, constipation presenting from [facility] with b/l leg swelling. Difficult to obtain accurate history as patient is poor historian. History obtained from charts. As per chart review from [facility], venous doppler showed bilateral dvt on study done on 1/11/18. Patient denies any pain. Called son (emergency contact) for further history but he is unsure of patient's current or past medical history. Patient has been living at [facility] for 3 years.     In the ED, vitals were: T97.2F, HR 90, /82, RR 20, SpO2 96% on 3L nasal cannula. Significant labs include: INR therapeutic at 2.22  CTA Chest: No evidence of pulmonary embolism. CHF with probable right heart failure with left greater than right pleural effusions, interstitial edema, and reflux of contrast into the   suprahepatic IVC and hepatic veins. Cardiomegaly. Relate with echocardiogram.  EKG: Afib 's (12 Jan 2018 04:31)        PAST MEDICAL & SURGICAL HISTORY:  Femur neck fracture  Vertebral fracture, pathological  Neurogenic bladder  Afib  GERD (gastroesophageal reflux disease)  Pulmonary embolism  UTI (lower urinary tract infection)  HTN (hypertension)  Pulmonary embolism  Memory impairment  Hypertension  Atrial fibrillation  Hyponatremia  Shortness of breath: etiology probably copd  Urinary retention  Arthritis  DVT (deep venous thrombosis)  History of hip surgery: right gamma nail        HEALTH ISSUES - PROBLEM Dx:  Hyponatremia: Hyponatremia  DVT (deep venous thrombosis): DVT (deep venous thrombosis)  Prophylactic measure: Prophylactic measure  Gastroesophageal reflux disease without esophagitis: Gastroesophageal reflux disease without esophagitis  Persistent atrial fibrillation: Persistent atrial fibrillation  Acute on chronic congestive heart failure, unspecified congestive heart failure type: Acute on chronic congestive heart failure, unspecified congestive heart failure type            FAMILY HISTORY:  No pertinent family history in first degree relatives        [SOCIAL HISTORY: ]  smoking:   EtOH:   illicit drugs:   occupation:   marital status:         [ALLERGIES/INTOLERANCES:]  Allergies    No Known Allergies    Intolerances          MEDICATIONS  (STANDING):  ALBUTerol    90 MICROgram(s) HFA Inhaler 2 Puff(s) Inhalation every 6 hours  calcium carbonate 1250 mG + Vitamin D (OsCal 500 + D) 1 Tablet(s) Oral daily  diltiazem    Tablet 90 milliGRAM(s) Oral three times a day  docusate sodium 100 milliGRAM(s) Oral two times a day  ferrous    sulfate 325 milliGRAM(s) Oral daily  furosemide   Injectable 20 milliGRAM(s) IV Push daily  multivitamin 1 Tablet(s) Oral daily  pantoprazole  Injectable 40 milliGRAM(s) IV Push daily  polyethylene glycol 3350 17 Gram(s) Oral daily  potassium chloride    Tablet ER 10 milliEquivalent(s) Oral daily  tiotropium 18 MICROgram(s) Capsule 1 Capsule(s) Inhalation daily  torsemide 10 milliGRAM(s) Oral daily    MEDICATIONS  (PRN):        [REVIEW OF SYSTEMS: ]  CONSTITUTIONAL: normal   EYES: No eye pain, no visual disturbances, no discharge  ENMT:  no discharge  NECK: No pain, no stiffness  BREASTS: No pain, no masses, no nipple discharge  RESPIRATORY: No cough, no wheezing, no chills, no hemoptysis; No shortness of breath  CARDIOVASCULAR: No chest pain, no palpitations, no dizziness, no leg swelling  GASTROINTESTINAL: No abdominal or epigastric pain. No nausea, no vomiting, no hematemesis; No diarrhea , no constipation. No melena, no hematochezia.  GENITOURINARY: No dysuria, no frequency, no hematuria, no incontinence  NEUROLOGICAL: No headaches, no memory loss, no loss of strength, no numbness, no tremors  SKIN: No itching, no burning, no rashes, no lesions   LYMPH NODES: No enlarged glands  ENDOCRINE: No heat or cold intolerance; No hair loss  MUSCULOSKELETAL: No joint pain or swelling; No muscle, no back, or extremity pain  PSYCHIATRIC: No depression, no anxiety, no mood swings, no difficulty sleeping  HEME/LYMPH: No easy bruising, no bleeding gums      ICU Vital Signs Last 24 Hrs  T(C): 36.2 (12 Jan 2018 07:45), Max: 36.7 (11 Jan 2018 23:45)  T(F): 97.2 (12 Jan 2018 07:45), Max: 98.1 (12 Jan 2018 06:52)  HR: 113 (12 Jan 2018 07:45) (86 - 125)  BP: 120/78 (12 Jan 2018 07:45) (113/76 - 121/69)  BP(mean): --  ABP: --  ABP(mean): --  RR: 18 (12 Jan 2018 07:45) (17 - 20)  SpO2: 96% (12 Jan 2018 07:45) (96% - 100%)      Vital Signs Last 24 Hrs  T(C): 36.2 (12 Jan 2018 07:45), Max: 36.7 (11 Jan 2018 23:45)  T(F): 97.2 (12 Jan 2018 07:45), Max: 98.1 (12 Jan 2018 06:52)  HR: 113 (12 Jan 2018 07:45) (86 - 125)  BP: 120/78 (12 Jan 2018 07:45) (113/76 - 121/69)  BP(mean): --  RR: 18 (12 Jan 2018 07:45) (17 - 20)  SpO2: 96% (12 Jan 2018 07:45) (96% - 100%)    Last Menstrual Period      I&O's Summary        [PHYSICAL EXAM]  General: WN, WD, adult in NAD  HEENT: clear oropharynx, anicteric sclera, pink conjunctivae  Neck: supple, no thryoid mass  CV: normal S1S2 RRR, nomurmur, no rubs, no gallops  Lungs: clear to auscultation BL, no wheezes, no rales, no rhonchi  Abdomen: soft non-tender non-distended, no hepatomegaly, no splenomegaly  Ext: no clubbing, no cyanosis, no edema  Skin: no rashes, no petichiae  Neuro: alert and oriented X3, no focal deficits      [LABS:]                        11.3   9.1   )-----------( 239      ( 12 Jan 2018 07:29 )             36.6     CBC Full  -  ( 12 Jan 2018 07:29 )  WBC Count : 9.1 K/uL  Hemoglobin : 11.3 g/dL  Hematocrit : 36.6 %  Platelet Count - Automated : 239 K/uL  Mean Cell Volume : 81.4 fl  Mean Cell Hemoglobin : 25.1 pg  Mean Cell Hemoglobin Concentration : 30.8 gm/dL  Auto Neutrophil # : x  Auto Lymphocyte # : x  Auto Monocyte # : x  Auto Eosinophil # : x  Auto Basophil # : x  Auto Neutrophil % : x  Auto Lymphocyte % : x  Auto Monocyte % : x  Auto Eosinophil % : x  Auto Basophil % : x    01-12    132<L>  |  99  |  24<H>  ----------------------------<  83  5.0   |  26  |  0.64    Ca    8.6      12 Jan 2018 07:29    TPro  6.6  /  Alb  3.1<L>  /  TBili  0.5  /  DBili  x   /  AST  34  /  ALT  22  /  AlkPhos  97  01-11    PT/INR - ( 11 Jan 2018 22:07 )   PT: 24.6 sec;   INR: 2.22 ratio         PTT - ( 11 Jan 2018 22:07 )  PTT:44.4 sec  LIVER FUNCTIONS - ( 11 Jan 2018 22:07 )  Alb: 3.1 g/dL / Pro: 6.6 g/dL / ALK PHOS: 97 U/L / ALT: 22 U/L / AST: 34 U/L / GGT: x           CARDIAC MARKERS ( 12 Jan 2018 07:29 )  <.015 ng/mL / x     / 74 U/L / x     / x               WBC  TREND (5 Days)  WBC Count: 9.1 K/uL (01-12 @ 07:29)  WBC Count: 9.9 K/uL (01-11 @ 22:07)    HGB  TREND (5 Days)  Hemoglobin: 11.3 g/dL (01-12 @ 07:29)  Hemoglobin: 10.8 g/dL (01-11 @ 22:07)    HCT  TREND (5 Days)  Hematocrit: 36.6 % (01-12 @ 07:29)  Hematocrit: 34.9 % (01-11 @ 22:07)    PLT  TREND (5 Days)  Platelet Count - Automated: 239 K/uL (01-12 @ 07:29)  Platelet Count - Automated: 237 K/uL (01-11 @ 22:07)        [BLOOD SMEAR INTERPRETATION: ]  RBC:   WBC:   Plts:       [RADIOLOGY & ADDITIONAL STUDIES:]        I have discussed the above plan of care with patient/family in detail. They expressed understanding of the treatment plan . Risks, benefits and alternatives discussed in detail. I have asked if they have any questions or concerns and appropriately addressed them; all questions answered to their satisfactions and in lay terms.     > xxxxxx minutes spent in direct patient care, examining and counseling patient,  reviewing  the notes, lab data/ imaging , discussion with multidisciplinary team. INCOMPLETE NOTE    Patient is a 88y old  Female who presents with a chief complaint of leg swelling (12 Jan 2018 13:44)    HPI:  88F w/pmh of Afib on Coumadin, GERD, HTN, HLD, Depression, urinary retention, constipation presenting from [facility] with b/l leg swelling. Difficult to obtain accurate history as patient is poor historian. History obtained from charts. As per chart review from [facility], venous doppler showed bilateral dvt on study done on 1/11/18. Patient denies any pain. Called son (emergency contact) for further history but he is unsure of patient's current or past medical history. Patient has been living at [facility] for 3 years.     In the ED, vitals were: T97.2F, HR 90, /82, RR 20, SpO2 96% on 3L nasal cannula. Significant labs include: INR therapeutic at 2.22  CTA Chest: No evidence of pulmonary embolism. CHF with probable right heart failure with left greater than right pleural effusions, interstitial edema, and reflux of contrast into the   suprahepatic IVC and hepatic veins. Cardiomegaly. Relate with echocardiogram.  EKG: Afib 's (12 Jan 2018 04:31)        PAST MEDICAL & SURGICAL HISTORY:  Femur neck fracture  Vertebral fracture, pathological  Neurogenic bladder  Afib  GERD (gastroesophageal reflux disease)  Pulmonary embolism  UTI (lower urinary tract infection)  HTN (hypertension)  Pulmonary embolism  Memory impairment  Hypertension  Atrial fibrillation  Hyponatremia  Shortness of breath: etiology probably copd  Urinary retention  Arthritis  DVT (deep venous thrombosis)  History of hip surgery: right gamma nail        HEALTH ISSUES - PROBLEM Dx:  Hyponatremia: Hyponatremia  DVT (deep venous thrombosis): DVT (deep venous thrombosis)  Prophylactic measure: Prophylactic measure  Gastroesophageal reflux disease without esophagitis: Gastroesophageal reflux disease without esophagitis  Persistent atrial fibrillation: Persistent atrial fibrillation  Acute on chronic congestive heart failure, unspecified congestive heart failure type: Acute on chronic congestive heart failure, unspecified congestive heart failure type            FAMILY HISTORY:  No pertinent family history in first degree relatives        [SOCIAL HISTORY: ]  smoking:   EtOH:   illicit drugs:   occupation:   marital status:         [ALLERGIES/INTOLERANCES:]  Allergies    No Known Allergies    Intolerances          MEDICATIONS  (STANDING):  ALBUTerol    90 MICROgram(s) HFA Inhaler 2 Puff(s) Inhalation every 6 hours  calcium carbonate 1250 mG + Vitamin D (OsCal 500 + D) 1 Tablet(s) Oral daily  diltiazem    Tablet 90 milliGRAM(s) Oral three times a day  docusate sodium 100 milliGRAM(s) Oral two times a day  ferrous    sulfate 325 milliGRAM(s) Oral daily  furosemide   Injectable 20 milliGRAM(s) IV Push daily  multivitamin 1 Tablet(s) Oral daily  pantoprazole  Injectable 40 milliGRAM(s) IV Push daily  polyethylene glycol 3350 17 Gram(s) Oral daily  potassium chloride    Tablet ER 10 milliEquivalent(s) Oral daily  tiotropium 18 MICROgram(s) Capsule 1 Capsule(s) Inhalation daily  torsemide 10 milliGRAM(s) Oral daily    MEDICATIONS  (PRN):        [REVIEW OF SYSTEMS: ]  CONSTITUTIONAL: normal   EYES: No eye pain, no visual disturbances, no discharge  ENMT:  no discharge  NECK: No pain, no stiffness  BREASTS: No pain, no masses, no nipple discharge  RESPIRATORY: No cough, no wheezing, no chills, no hemoptysis; No shortness of breath  CARDIOVASCULAR: No chest pain, no palpitations, no dizziness, no leg swelling  GASTROINTESTINAL: No abdominal or epigastric pain. No nausea, no vomiting, no hematemesis; No diarrhea , no constipation. No melena, no hematochezia.  GENITOURINARY: No dysuria, no frequency, no hematuria, no incontinence  NEUROLOGICAL: No headaches, no memory loss, no loss of strength, no numbness, no tremors  SKIN: No itching, no burning, no rashes, no lesions   LYMPH NODES: No enlarged glands  ENDOCRINE: No heat or cold intolerance; No hair loss  MUSCULOSKELETAL: No joint pain or swelling; No muscle, no back, or extremity pain  PSYCHIATRIC: No depression, no anxiety, no mood swings, no difficulty sleeping  HEME/LYMPH: No easy bruising, no bleeding gums      ICU Vital Signs Last 24 Hrs  T(C): 36.2 (12 Jan 2018 07:45), Max: 36.7 (11 Jan 2018 23:45)  T(F): 97.2 (12 Jan 2018 07:45), Max: 98.1 (12 Jan 2018 06:52)  HR: 113 (12 Jan 2018 07:45) (86 - 125)  BP: 120/78 (12 Jan 2018 07:45) (113/76 - 121/69)  BP(mean): --  ABP: --  ABP(mean): --  RR: 18 (12 Jan 2018 07:45) (17 - 20)  SpO2: 96% (12 Jan 2018 07:45) (96% - 100%)      Vital Signs Last 24 Hrs  T(C): 36.2 (12 Jan 2018 07:45), Max: 36.7 (11 Jan 2018 23:45)  T(F): 97.2 (12 Jan 2018 07:45), Max: 98.1 (12 Jan 2018 06:52)  HR: 113 (12 Jan 2018 07:45) (86 - 125)  BP: 120/78 (12 Jan 2018 07:45) (113/76 - 121/69)  BP(mean): --  RR: 18 (12 Jan 2018 07:45) (17 - 20)  SpO2: 96% (12 Jan 2018 07:45) (96% - 100%)    Last Menstrual Period      I&O's Summary        [PHYSICAL EXAM]  General: WN, WD, adult in NAD  HEENT: clear oropharynx, anicteric sclera, pink conjunctivae  Neck: supple, no thryoid mass  CV: normal S1S2 RRR, nomurmur, no rubs, no gallops  Lungs: clear to auscultation BL, no wheezes, no rales, no rhonchi  Abdomen: soft non-tender non-distended, no hepatomegaly, no splenomegaly  Ext: no clubbing, no cyanosis, no edema  Skin: no rashes, no petichiae  Neuro: alert and oriented X3, no focal deficits      [LABS:]                        11.3   9.1   )-----------( 239      ( 12 Jan 2018 07:29 )             36.6     CBC Full  -  ( 12 Jan 2018 07:29 )  WBC Count : 9.1 K/uL  Hemoglobin : 11.3 g/dL  Hematocrit : 36.6 %  Platelet Count - Automated : 239 K/uL  Mean Cell Volume : 81.4 fl  Mean Cell Hemoglobin : 25.1 pg  Mean Cell Hemoglobin Concentration : 30.8 gm/dL  Auto Neutrophil # : x  Auto Lymphocyte # : x  Auto Monocyte # : x  Auto Eosinophil # : x  Auto Basophil # : x  Auto Neutrophil % : x  Auto Lymphocyte % : x  Auto Monocyte % : x  Auto Eosinophil % : x  Auto Basophil % : x    01-12    132<L>  |  99  |  24<H>  ----------------------------<  83  5.0   |  26  |  0.64    Ca    8.6      12 Jan 2018 07:29    TPro  6.6  /  Alb  3.1<L>  /  TBili  0.5  /  DBili  x   /  AST  34  /  ALT  22  /  AlkPhos  97  01-11    PT/INR - ( 11 Jan 2018 22:07 )   PT: 24.6 sec;   INR: 2.22 ratio         PTT - ( 11 Jan 2018 22:07 )  PTT:44.4 sec  LIVER FUNCTIONS - ( 11 Jan 2018 22:07 )  Alb: 3.1 g/dL / Pro: 6.6 g/dL / ALK PHOS: 97 U/L / ALT: 22 U/L / AST: 34 U/L / GGT: x           CARDIAC MARKERS ( 12 Jan 2018 07:29 )  <.015 ng/mL / x     / 74 U/L / x     / x               WBC  TREND (5 Days)  WBC Count: 9.1 K/uL (01-12 @ 07:29)  WBC Count: 9.9 K/uL (01-11 @ 22:07)    HGB  TREND (5 Days)  Hemoglobin: 11.3 g/dL (01-12 @ 07:29)  Hemoglobin: 10.8 g/dL (01-11 @ 22:07)    HCT  TREND (5 Days)  Hematocrit: 36.6 % (01-12 @ 07:29)  Hematocrit: 34.9 % (01-11 @ 22:07)    PLT  TREND (5 Days)  Platelet Count - Automated: 239 K/uL (01-12 @ 07:29)  Platelet Count - Automated: 237 K/uL (01-11 @ 22:07)        [BLOOD SMEAR INTERPRETATION: ]  RBC:   WBC:   Plts:       [RADIOLOGY & ADDITIONAL STUDIES:]  < from: CT Angio Chest w/ IV Cont (01.11.18 @ 23:45) >    EXAM:  CT ANGIO CHEST (W)AW IC                            PROCEDURE DATE:  01/11/2018          INTERPRETATION:  CLINICAL HISTORY: Shortness of breath. DVT. Rule out   pulmonary embolism.     COMPARISON: CT chest 9/1/2015, 4/17/2014    TECHNIQUE: Thoracic CT scan with pulmonary embolism protocol was   performed with the administration of intravenous contrast.    Total of 95 cc ofOmnipaque 350 was injected intravenously. Coronal,   sagittal, and sagittal oblique reformations were obtained. Axial MIP   images were obtained.    FINDINGS:     There is no evidence of pulmonary embolism.     Small to moderate-sized left greater than right pleural effusions with   possible small loculated component on the left side is noted. Overlying   compressiveatelectasis is present. Mild bilateral interstitial   prominence may reflect mild interstitial edema.    Reflux contrast into the suprahepatic IVC and hepatic veins is likely due   to right heart failure versus rapid rate of contrast injection.    The thoracic aorta and main pulmonary artery are normal in caliber. The   heart is mild enlarged. Atheromatous calcifications along the aorta and   coronary arteries is evident.    There is no pericardial or pleural effusion. There is no pneumothorax or  pneumomediastinum.    There is no axillary, mediastinal, or hilar lymphadenopathy.     Limited images of the upper abdomen are unremarkable in appearance.    Chronic T11 wedge compression fracture deformity with mild bony   retropulsion into the thoracic canal is unchanged.    IMPRESSION:     No evidence of pulmonary embolism.    CHF with probable right heart failure with left greater than right   pleural effusions, interstitial edema, and reflux of contrast into the   suprahepatic IVC and hepatic veins. Cardiomegaly. Relate with   echocardiogram.      PEEWEE TORRES M.D., ATTENDING RADIOLOGIST  This document has been electronically signed. Jan 12 2018 12:50AM    <  end of copied text >          < from: Xray Chest 1 View AP-PORTABLE IMMEDIATE (01.12.18 @ 02:23) >  EXAM:  XR CHEST PORTABLE IMMED 1V                            PROCEDURE DATE:  01/12/2018          INTERPRETATION:  PORTABLE CHEST X-RAY    HISTORY: chf.    COMPARISON: 4/2/2017.    FINDINGS:  Study is limited by portable technique and reversal lordotic view.   Patient is rotated. Patient's chin obscures the upper lungs.    There is mild diffuse interstitial prominence and scattered airspace   opacities. Possible trace bilateral pleural effusions. No pneumothorax.    The cardiac silhouette is not accurately assessed by AP technique.    Degenerative changes of the right shoulder.    IMPRESSION:  Mild diffuse interstitial prominence and scattered airspace opacities.   Possible trace bilateral pleural effusions.                KRISTIE GARCIA MD., ATTENDING RADIOLOGIST  This document has been electronically signed. Jan 12 2018  8:26AM        < end of copied text > Patient is a 88y old  Female who presents with a chief complaint of leg swelling (12 Jan 2018 13:44)    HPI:  88F w/pmh of Afib on Coumadin, GERD, HTN, HLD, Depression, urinary retention, constipation presenting from [facility] with b/l leg swelling. Difficult to obtain accurate history as patient is poor historian. History obtained from charts. As per chart review from [facility], venous doppler showed bilateral dvt on study done on 1/11/18. Patient denies any pain. Called son (emergency contact) for further history but he is unsure of patient's current or past medical history. Patient has been living at [facility] for 3 years.     In the ED, vitals were: T97.2F, HR 90, /82, RR 20, SpO2 96% on 3L nasal cannula. Significant labs include: INR therapeutic at 2.22  CTA Chest: No evidence of pulmonary embolism. CHF with probable right heart failure with left greater than right pleural effusions, interstitial edema, and reflux of contrast into the   suprahepatic IVC and hepatic veins. Cardiomegaly. Relate with echocardiogram.  EKG: Afib 's (12 Jan 2018 04:31)    Heme asked to see pt for DVT. hx of PE, Afib on coumadin.  No family at bedside. pt resident of [facility], for last 3 yrs.   Sent for BL LE edema. Had LE duplex 01/11/18 at [facility] which showed DVT per [facility] report, no report available.   On eval in ED, duplex LE with no RLE DVT. Pt unable to cooperate for eval of L.   Pt appears to have dementia. Unable to get relaible hx.   [facility] called. [facility] RN contacted. left message.             PAST MEDICAL & SURGICAL HISTORY:  Femur neck fracture  Vertebral fracture, pathological  Neurogenic bladder  Afib  GERD (gastroesophageal reflux disease)  Pulmonary embolism  UTI (lower urinary tract infection)  HTN (hypertension)  Pulmonary embolism  Memory impairment  Hypertension  Atrial fibrillation  Hyponatremia  Shortness of breath: etiology probably copd  Urinary retention  Arthritis  DVT (deep venous thrombosis)  History of hip surgery: right gamma nail        HEALTH ISSUES - PROBLEM Dx:  Hyponatremia: Hyponatremia  DVT (deep venous thrombosis): DVT (deep venous thrombosis)  Prophylactic measure: Prophylactic measure  Gastroesophageal reflux disease without esophagitis: Gastroesophageal reflux disease without esophagitis  Persistent atrial fibrillation: Persistent atrial fibrillation  Acute on chronic congestive heart failure, unspecified congestive heart failure type: Acute on chronic congestive heart failure, unspecified congestive heart failure type            FAMILY HISTORY:  No pertinent family history in first degree relatives        [SOCIAL HISTORY: ]  smoking: unable to obtain  EtOH: unable to obtain  illicit drugs: unable to obtain  occupation: unable to obtain  marital status: unable to obtain        [ALLERGIES/INTOLERANCES:]  Allergies     No Known Allergies  Intolerances          MEDICATIONS  (STANDING):  ALBUTerol    90 MICROgram(s) HFA Inhaler 2 Puff(s) Inhalation every 6 hours  calcium carbonate 1250 mG + Vitamin D (OsCal 500 + D) 1 Tablet(s) Oral daily  diltiazem    Tablet 90 milliGRAM(s) Oral three times a day  docusate sodium 100 milliGRAM(s) Oral two times a day  ferrous    sulfate 325 milliGRAM(s) Oral daily  furosemide   Injectable 20 milliGRAM(s) IV Push daily  multivitamin 1 Tablet(s) Oral daily  pantoprazole  Injectable 40 milliGRAM(s) IV Push daily  polyethylene glycol 3350 17 Gram(s) Oral daily  potassium chloride    Tablet ER 10 milliEquivalent(s) Oral daily  tiotropium 18 MICROgram(s) Capsule 1 Capsule(s) Inhalation daily  torsemide 10 milliGRAM(s) Oral daily    MEDICATIONS  (PRN):        [REVIEW OF SYSTEMS:] unable to obtain reliably  CONSTITUTIONAL: normal   EYES: No eye pain, no visual disturbances, no discharge  ENMT:  no discharge  NECK: No pain, no stiffness  BREASTS: No pain, no masses, no nipple discharge  RESPIRATORY: No cough, no wheezing, no chills, no hemoptysis; No shortness of breath  CARDIOVASCULAR: No chest pain, no palpitations, no dizziness, no leg swelling  GASTROINTESTINAL: No abdominal or epigastric pain. No nausea, no vomiting, no hematemesis; No diarrhea , no constipation. No melena, no hematochezia.  GENITOURINARY: No dysuria, no frequency, no hematuria, no incontinence  NEUROLOGICAL: No headaches, no memory loss, no loss of strength, no numbness, no tremors  SKIN: No itching, no burning, no rashes, no lesions   LYMPH NODES: No enlarged glands  ENDOCRINE: No heat or cold intolerance; No hair loss  MUSCULOSKELETAL: No joint pain or swelling; No muscle, no back, or extremity pain  PSYCHIATRIC: No depression, no anxiety, no mood swings, no difficulty sleeping  HEME/LYMPH: No easy bruising, no bleeding gums      Vital Signs Last 24 Hrs  T(C): 36.2 (12 Jan 2018 07:45), Max: 36.7 (11 Jan 2018 23:45)  T(F): 97.2 (12 Jan 2018 07:45), Max: 98.1 (12 Jan 2018 06:52)  HR: 113 (12 Jan 2018 07:45) (86 - 125)  BP: 120/78 (12 Jan 2018 07:45) (113/76 - 121/69)  BP(mean): --  RR: 18 (12 Jan 2018 07:45) (17 - 20)  SpO2: 96% (12 Jan 2018 07:45) (96% - 100%)      [PHYSICAL EXAM]  General: WN, WD, adult in NAD, eldfderly Frial.   HEENT: clear oropharynx, anicteric sclera, pink conjunctivae  Neck: supple, no thryoid mass  CV: normal S1S2 RRR, no murmur, no rubs, no gallops  Lungs: clear to auscultation BL, no wheezes, no rales, no rhonchi  Abdomen: soft non-tender non-distended, no hepatomegaly, no splenomegaly  Ext: no clubbing, no cyanosis, no edema  Skin: no rashes, no petichiae  Neuro: alert and oriented X1, no focal deficits  No cervical and SC BERTA. Refused to cooperate for inguinal LN exam, axilla exam and breast exam.       [LABS:]                        11.3   9.1   )-----------( 239      ( 12 Jan 2018 07:29 )             36.6     CBC Full  -  ( 12 Jan 2018 07:29 )  WBC Count : 9.1 K/uL  Hemoglobin : 11.3 g/dL  Hematocrit : 36.6 %  Platelet Count - Automated : 239 K/uL  Mean Cell Volume : 81.4 fl  Mean Cell Hemoglobin : 25.1 pg  Mean Cell Hemoglobin Concentration : 30.8 gm/dL  Auto Neutrophil # : x  Auto Lymphocyte # : x  Auto Monocyte # : x  Auto Eosinophil # : x  Auto Basophil # : x  Auto Neutrophil % : x  Auto Lymphocyte % : x  Auto Monocyte % : x  Auto Eosinophil % : x  Auto Basophil % : x    01-12    132<L>  |  99  |  24<H>  ----------------------------<  83  5.0   |  26  |  0.64    Ca    8.6      12 Jan 2018 07:29    TPro  6.6  /  Alb  3.1<L>  /  TBili  0.5  /  DBili  x   /  AST  34  /  ALT  22  /  AlkPhos  97  01-11    PT/INR - ( 11 Jan 2018 22:07 )   PT: 24.6 sec;   INR: 2.22 ratio         PTT - ( 11 Jan 2018 22:07 )  PTT:44.4 sec  LIVER FUNCTIONS - ( 11 Jan 2018 22:07 )  Alb: 3.1 g/dL / Pro: 6.6 g/dL / ALK PHOS: 97 U/L / ALT: 22 U/L / AST: 34 U/L / GGT: x           CARDIAC MARKERS ( 12 Jan 2018 07:29 )  <.015 ng/mL / x     / 74 U/L / x     / x               WBC  TREND (5 Days)  WBC Count: 9.1 K/uL (01-12 @ 07:29)  WBC Count: 9.9 K/uL (01-11 @ 22:07)    HGB  TREND (5 Days)  Hemoglobin: 11.3 g/dL (01-12 @ 07:29)  Hemoglobin: 10.8 g/dL (01-11 @ 22:07)    HCT  TREND (5 Days)  Hematocrit: 36.6 % (01-12 @ 07:29)  Hematocrit: 34.9 % (01-11 @ 22:07)    PLT  TREND (5 Days)  Platelet Count - Automated: 239 K/uL (01-12 @ 07:29)  Platelet Count - Automated: 237 K/uL (01-11 @ 22:07)          [RADIOLOGY & ADDITIONAL STUDIES:]    < from: CT Angio Chest w/ IV Cont (01.11.18 @ 23:45) >  EXAM:  CT ANGIO CHEST (W)AW IC                        PROCEDURE DATE:  01/11/2018    INTERPRETATION:  CLINICAL HISTORY: Shortness of breath. DVT. Rule out pulmonary embolism.   COMPARISON: CT chest 9/1/2015, 4/17/2014  TECHNIQUE: Thoracic CT scan with pulmonary embolism protocol was performed with the administration of intravenous contrast.  Total of 95 cc ofOmnipaque 350 was injected intravenously. Coronal, sagittal, and sagittal oblique reformations were obtained. Axial MIP images were obtained.    FINDINGS:   There is no evidence of pulmonary embolism.   Small to moderate-sized left greater than right pleural effusions with possible small loculated component on the left side is noted. Overlying compressive atelectasis is present. Mild bilateral interstitial prominence may reflect mild interstitial edema.  Reflux contrast into the suprahepatic IVC and hepatic veins is likely due to right heart failure versus rapid rate of contrast injection.  The thoracic aorta and main pulmonary artery are normal in caliber. The heart is mild enlarged. Atheromatous calcifications along the aorta and coronary arteries is evident.  There is no pericardial or pleural effusion. There is no pneumothorax or pneumomediastinum.  There is no axillary, mediastinal, or hilar lymphadenopathy.   Limited images of the upper abdomen are unremarkable in appearance.  Chronic T11 wedge compression fracture deformity with mild bony retropulsion into the thoracic canal is unchanged.    IMPRESSION:   No evidence of pulmonary embolism.  CHF with probable right heart failure with left greater than right pleural effusions, interstitial edema, and reflux of contrast into the suprahepatic IVC and hepatic veins. Cardiomegaly. Relate with echocardiogram.    PEEWEE TORRES M.D., ATTENDING RADIOLOGIST  This document has been electronically signed. Jan 12 2018 12:50AM  <  end of copied text >          < from: Xray Chest 1 View AP-PORTABLE IMMEDIATE (01.12.18 @ 02:23) >  EXAM:  XR CHEST PORTABLE IMMED 1V                        PROCEDURE DATE:  01/12/2018    INTERPRETATION:  PORTABLE CHEST X-RAY  HISTORY: chf.  COMPARISON: 4/2/2017.    FINDINGS:  Study is limited by portable technique and reversal lordotic view.   Patient is rotated. Patient's chin obscures the upper lungs.    There is mild diffuse interstitial prominence and scattered airspace opacities. Possible trace bilateral pleural effusions. No pneumothorax.    The cardiac silhouette is not accurately assessed by AP technique.    Degenerative changes of the right shoulder.    IMPRESSION:  Mild diffuse interstitial prominence and scattered airspace opacities.   Possible trace bilateral pleural effusions.    KRISTIE GARCIA MD., ATTENDING RADIOLOGIST  This document has been electronically signed. Jan 12 2018  8:26AM  < end of copied text >          < from: US Duplex Venous Lower Ext Complete, Bilateral (01.12.18 @ 14:46) >  EXAM:  US DPLX LWR EXT VEINS COMPL BI                          PROCEDURE DATE:  01/12/2018    INTERPRETATION:  CLINICAL INFORMATION: Bilateral leg swelling.  COMPARISON: Doppler venous sonogram 4/5/2017.    TECHNIQUE: Duplex sonography ofthe BILATERAL LOWER extremities with color and spectral Doppler, with and without compression.      FINDINGS:  There is normal compressibility of the right common femoral, femoral and popliteal veins. No calf vein thrombosis is detected.  Doppler examination shows normal spontaneous and phasic flow.  The left lower extremity is not examined, patient cannot cooperate for the exam.    IMPRESSION:   No evidence of right lower extremity deep venous thrombosis.    REVA DEE, ATTENDING RADIOLOGIST  This document has been electronically signed. Jan 12 2018  2:51PM  < end of copied text >

## 2018-01-12 NOTE — DISCHARGE NOTE ADULT - PLAN OF CARE
resolution of symptoms - follow up with your outpatient cardiologist  - take all recommended medications - follow up with outpatient cardiologist   - take medications that will control heart rate  - take the blood thinners - - follow up with outpatient cardiologist   - take medications that will control heart rate  - take the blood thinners.  Please check INR 1/22/18 goal INR 2-3. negative on ultrasound continue cardizem

## 2018-01-12 NOTE — PHYSICAL THERAPY INITIAL EVALUATION ADULT - PERTINENT HX OF CURRENT PROBLEM, REHAB EVAL
88F w/pmh of Afib on Coumadin, GERD, HTN, HLD, Depression, urinary retention, constipation presenting from [facility] with b/l leg swelling.

## 2018-01-12 NOTE — DISCHARGE NOTE ADULT - CARE PROVIDER_API CALL
Lena Augustin), Internal Medicine; Rheumatology  [address] [address]  Phone: [phone]  Fax: [phone]    Raoul Burciaga), Internal Medicine  [address] [place], [place] [id number]  Phone: [phone]  Fax: [phone]

## 2018-01-12 NOTE — DISCHARGE NOTE ADULT - PATIENT PORTAL LINK FT
“You can access the Follow[facility] Patient Portal, offered by [facility], by registering with the following website: [email or web address]”

## 2018-01-12 NOTE — CONSULT NOTE ADULT - SUBJECTIVE AND OBJECTIVE BOX
[facility] Consultation    CHIEF COMPLAINT: Patient is a 88y old  Female who presents with a chief complaint of leg swelling (12 Jan 2018 04:31)      HPI:  88F w/pmh of Afib on Coumadin, GERD, HTN, HLD, Depression, urinary retention, constipation presenting from [facility] with b/l leg swelling. Difficult to obtain accurate history as patient is poor historian. History obtained from charts. As per chart review from [facility], venous doppler showed bilateral dvt on study done on 1/11/18. Patient denies any pain. Called son (emergency contact) for further history but he is unsure of patient's current or past medical history. Patient has been living at [facility] for 3 years.     In the ED, vitals were: T97.2F, HR 90, /82, RR 20, SpO2 96% on 3L nasal cannula. Significant labs include: INR therapeutic at 2.22  CTA Chest: No evidence of pulmonary embolism. CHF with probable right heart failure with left greater than right pleural effusions, interstitial edema, and reflux of contrast into the   suprahepatic IVC and hepatic veins. Cardiomegaly. Relate with echocardiogram.  EKG: Afib 's (12 Jan 2018 04:31)      PAST MEDICAL & SURGICAL HISTORY:  Femur neck fracture  Vertebral fracture, pathological  Neurogenic bladder  Afib  GERD (gastroesophageal reflux disease)  Pulmonary embolism  UTI (lower urinary tract infection)  HTN (hypertension)  Pulmonary embolism  Memory impairment  Hypertension  Atrial fibrillation  Hyponatremia  Shortness of breath: etiology probably copd  Urinary retention  Arthritis  DVT (deep venous thrombosis)  History of hip surgery: right gamma nail      SOCIAL HISTORY: No history of smoking, alcohol or illicit drugs    FAMILY HISTORY: FAMILY HISTORY:  No pertinent family history in first degree relatives      MEDICATIONS  (STANDING):  ALBUTerol    90 MICROgram(s) HFA Inhaler 2 Puff(s) Inhalation every 6 hours  calcium carbonate 1250 mG + Vitamin D (OsCal 500 + D) 1 Tablet(s) Oral daily  diltiazem    Tablet 60 milliGRAM(s) Oral three times a day  docusate sodium 100 milliGRAM(s) Oral two times a day  ferrous    sulfate 325 milliGRAM(s) Oral daily  multivitamin 1 Tablet(s) Oral daily  pantoprazole  Injectable 40 milliGRAM(s) IV Push daily  polyethylene glycol 3350 17 Gram(s) Oral daily  potassium chloride    Tablet ER 10 milliEquivalent(s) Oral daily  tiotropium 18 MICROgram(s) Capsule 1 Capsule(s) Inhalation daily  torsemide 10 milliGRAM(s) Oral daily    MEDICATIONS  (PRN):      Allergies    No Known Allergies    Intolerances        REVIEW OF SYSTEMS:  Unable to obtain    VITAL SIGNS:   Vital Signs Last 24 Hrs  T(C): 36.7 (12 Jan 2018 06:52), Max: 36.7 (11 Jan 2018 23:45)  T(F): 98.1 (12 Jan 2018 06:52), Max: 98.1 (12 Jan 2018 06:52)  HR: 102 (12 Jan 2018 06:52) (86 - 125)  BP: 121/69 (12 Jan 2018 06:52) (113/76 - 121/69)  BP(mean): --  RR: 19 (12 Jan 2018 06:52) (17 - 20)  SpO2: 100% (12 Jan 2018 06:52) (96% - 100%)    I&O's Summary      PHYSICAL EXAM:    General: elderly  female, NAD, lying in bed comfortably  HEENT: NCAT, PERRLA, moist mucous membranes   Neurology: A&Ox1, nonfocal  Respiratory: +RLL crackles, No W/R/R  CV: irregular rate and rhythm, , +S1/S2, no murmurs, rubs or gallops  Abdominal: Soft, NT, ND +BSx4, no guarding, no rebound  Extremities: 3+ pitting edema of feet, ankles and lower leg, +multiple abrasions/scratches on b/l legs, no active bleeding or drainage,   MSK: decreased ROM  Skin: +erythema of b/l feet, ankles and calves, as above    LABS: All Labs Reviewed:                        11.3   9.1   )-----------( 239      ( 12 Jan 2018 07:29 )             36.6                         10.8   9.9   )-----------( 237      ( 11 Jan 2018 22:07 )             34.9     11 Jan 2018 22:07    136    |  105    |  28     ----------------------------<  102    5.1     |  25     |  0.59     Ca    8.4        11 Jan 2018 22:07    TPro  6.6    /  Alb  3.1    /  TBili  0.5    /  DBili  x      /  AST  34     /  ALT  22     /  AlkPhos  97     11 Jan 2018 22:07    PT/INR - ( 11 Jan 2018 22:07 )   PT: 24.6 sec;   INR: 2.22 ratio         PTT - ( 11 Jan 2018 22:07 )  PTT:44.4 sec      Blood Culture:         RADIOLOGY/EKG:  CTA Chest: No evidence of pulmonary embolism. CHF with probable right heart failure with left greater than right pleural effusions, interstitial edema, and reflux of contrast into the   suprahepatic IVC and hepatic veins. Cardiomegaly. Relate with echocardiogram.  EKG: Afib 's [facility] Consultation    CHIEF COMPLAINT: Patient is a 88y old  Female who presents with a chief complaint of leg swelling (12 Jan 2018 04:31)      HPI:  88F w/pmh of Afib on Coumadin, GERD, HTN, HLD, Depression, urinary retention, constipation presenting from [facility] with b/l leg swelling. Difficult to obtain accurate history as patient is poor historian. History obtained from charts. As per chart review from [facility], venous doppler showed bilateral dvt on study done on 1/11/18. Patient denies any pain. Called son (emergency contact) for further history but he is unsure of patient's current or past medical history. Patient has been living at [facility] for 3 years.     In the ED, vitals were: T97.2F, HR 90, /82, RR 20, SpO2 96% on 3L nasal cannula. Significant labs include: INR therapeutic at 2.22  CTA Chest: No evidence of pulmonary embolism. CHF with probable right heart failure with left greater than right pleural effusions, interstitial edema, and reflux of contrast into the   suprahepatic IVC and hepatic veins. Cardiomegaly. Relate with echocardiogram.  EKG: Afib 's (12 Jan 2018 04:31)      PAST MEDICAL & SURGICAL HISTORY:  Femur neck fracture  Vertebral fracture, pathological  Neurogenic bladder  Afib  GERD (gastroesophageal reflux disease)  Pulmonary embolism  UTI (lower urinary tract infection)  HTN (hypertension)  Pulmonary embolism  Memory impairment  Hypertension  Atrial fibrillation  Hyponatremia  Shortness of breath: etiology probably copd  Urinary retention  Arthritis  DVT (deep venous thrombosis)  History of hip surgery: right gamma nail      SOCIAL HISTORY: No history of smoking, alcohol or illicit drugs    FAMILY HISTORY: FAMILY HISTORY:  No pertinent family history in first degree relatives      MEDICATIONS  (STANDING):  ALBUTerol    90 MICROgram(s) HFA Inhaler 2 Puff(s) Inhalation every 6 hours  calcium carbonate 1250 mG + Vitamin D (OsCal 500 + D) 1 Tablet(s) Oral daily  diltiazem    Tablet 60 milliGRAM(s) Oral three times a day  docusate sodium 100 milliGRAM(s) Oral two times a day  ferrous    sulfate 325 milliGRAM(s) Oral daily  multivitamin 1 Tablet(s) Oral daily  pantoprazole  Injectable 40 milliGRAM(s) IV Push daily  polyethylene glycol 3350 17 Gram(s) Oral daily  potassium chloride    Tablet ER 10 milliEquivalent(s) Oral daily  tiotropium 18 MICROgram(s) Capsule 1 Capsule(s) Inhalation daily  torsemide 10 milliGRAM(s) Oral daily    MEDICATIONS  (PRN):      Allergies    No Known Allergies    Intolerances        REVIEW OF SYSTEMS:  Unable to obtain    VITAL SIGNS:   Vital Signs Last 24 Hrs  T(C): 36.7 (12 Jan 2018 06:52), Max: 36.7 (11 Jan 2018 23:45)  T(F): 98.1 (12 Jan 2018 06:52), Max: 98.1 (12 Jan 2018 06:52)  HR: 102 (12 Jan 2018 06:52) (86 - 125)  BP: 121/69 (12 Jan 2018 06:52) (113/76 - 121/69)  BP(mean): --  RR: 19 (12 Jan 2018 06:52) (17 - 20)  SpO2: 100% (12 Jan 2018 06:52) (96% - 100%)    I&O's Summary      PHYSICAL EXAM:    General: elderly  female, NAD  HEENT: NCAT, PERRLA, moist mucous membranes   Neurology: A&Ox1, nonfocal  Respiratory: positive RLL crackles, No W/R/R  CV: irregular rate and rhythm, , +S1/S2, no murmurs, rubs or gallops  Abdominal: Soft, NT, ND +BSx4, no guarding, no rebound  Extremities: 3+ pitting edema of feet, ankles and lower leg  Skin: erythema of b/l feet, ankles and calves, as above    LABS: All Labs Reviewed:                        11.3   9.1   )-----------( 239      ( 12 Jan 2018 07:29 )             36.6                         10.8   9.9   )-----------( 237      ( 11 Jan 2018 22:07 )             34.9     11 Jan 2018 22:07    136    |  105    |  28     ----------------------------<  102    5.1     |  25     |  0.59     Ca    8.4        11 Jan 2018 22:07    TPro  6.6    /  Alb  3.1    /  TBili  0.5    /  DBili  x      /  AST  34     /  ALT  22     /  AlkPhos  97     11 Jan 2018 22:07    PT/INR - ( 11 Jan 2018 22:07 )   PT: 24.6 sec;   INR: 2.22 ratio         PTT - ( 11 Jan 2018 22:07 )  PTT:44.4 sec      Blood Culture:         RADIOLOGY/EKG:  CTA Chest: No evidence of pulmonary embolism. CHF with probable right heart failure with left greater than right pleural effusions, interstitial edema, and reflux of contrast into the   suprahepatic IVC and hepatic veins. Cardiomegaly. Relate with echocardiogram.  EKG: Afib 's [facility] Consultation    CHIEF COMPLAINT: Patient is a 88y old  Female who presents with a chief complaint of leg swelling (12 Jan 2018 04:31)      HPI:  88F w/pmh of Afib on Coumadin, GERD, HTN, HLD, Depression, urinary retention, constipation presenting from [facility] with b/l leg swelling. Difficult to obtain accurate history as patient is poor historian. History obtained from charts. As per chart review from [facility], venous doppler showed bilateral dvt on study done on 1/11/18. Patient denies any pain. Called son (emergency contact) for further history but he is unsure of patient's current or past medical history. Patient has been living at [facility] for 3 years.     In the ED, vitals were: T97.2F, HR 90, /82, RR 20, SpO2 96% on 3L nasal cannula. Significant labs include: INR therapeutic at 2.22  CTA Chest: No evidence of pulmonary embolism. CHF with probable right heart failure with left greater than right pleural effusions, interstitial edema, and reflux of contrast into the   suprahepatic IVC and hepatic veins. Cardiomegaly. Relate with echocardiogram.  EKG: Afib 's (12 Jan 2018 04:31)      PAST MEDICAL & SURGICAL HISTORY:  Femur neck fracture  Vertebral fracture, pathological  Neurogenic bladder  Afib  GERD (gastroesophageal reflux disease)  Pulmonary embolism  UTI (lower urinary tract infection)  HTN (hypertension)  Pulmonary embolism  Memory impairment  Hypertension  Atrial fibrillation  Hyponatremia  Shortness of breath: etiology probably copd  Urinary retention  Arthritis  DVT (deep venous thrombosis)  History of hip surgery: right gamma nail      SOCIAL HISTORY: No history of smoking, alcohol or illicit drugs    FAMILY HISTORY: FAMILY HISTORY:  No pertinent family history in first degree relatives      MEDICATIONS  (STANDING):  ALBUTerol    90 MICROgram(s) HFA Inhaler 2 Puff(s) Inhalation every 6 hours  calcium carbonate 1250 mG + Vitamin D (OsCal 500 + D) 1 Tablet(s) Oral daily  diltiazem    Tablet 60 milliGRAM(s) Oral three times a day  docusate sodium 100 milliGRAM(s) Oral two times a day  ferrous    sulfate 325 milliGRAM(s) Oral daily  multivitamin 1 Tablet(s) Oral daily  pantoprazole  Injectable 40 milliGRAM(s) IV Push daily  polyethylene glycol 3350 17 Gram(s) Oral daily  potassium chloride    Tablet ER 10 milliEquivalent(s) Oral daily  tiotropium 18 MICROgram(s) Capsule 1 Capsule(s) Inhalation daily  torsemide 10 milliGRAM(s) Oral daily    MEDICATIONS  (PRN):      Allergies    No Known Allergies    Intolerances        REVIEW OF SYSTEMS:  Unable to obtain    VITAL SIGNS:   Vital Signs Last 24 Hrs  T(C): 36.7 (12 Jan 2018 06:52), Max: 36.7 (11 Jan 2018 23:45)  T(F): 98.1 (12 Jan 2018 06:52), Max: 98.1 (12 Jan 2018 06:52)  HR: 102 (12 Jan 2018 06:52) (86 - 125)  BP: 121/69 (12 Jan 2018 06:52) (113/76 - 121/69)  BP(mean): --  RR: 19 (12 Jan 2018 06:52) (17 - 20)  SpO2: 100% (12 Jan 2018 06:52) (96% - 100%)    I&O's Summary      PHYSICAL EXAM:    General: elderly  female, NAD  HEENT: NCAT, PERRLA, moist mucous membranes   Neurology: A&Ox1, nonfocal  Respiratory: positive RLL crackles, No W/R/R  CV: irregular rate and rhythm, , +S1/S2, no murmurs, rubs or gallops  Abdominal: Soft, NT, ND +BSx4, no guarding, no rebound  Extremities: 3+ pitting edema LE  Skin: erythema of b/l feet, ankles and calves    LABS: All Labs Reviewed:                        11.3   9.1   )-----------( 239      ( 12 Jan 2018 07:29 )             36.6                         10.8   9.9   )-----------( 237      ( 11 Jan 2018 22:07 )             34.9     11 Jan 2018 22:07    136    |  105    |  28     ----------------------------<  102    5.1     |  25     |  0.59     Ca    8.4        11 Jan 2018 22:07    TPro  6.6    /  Alb  3.1    /  TBili  0.5    /  DBili  x      /  AST  34     /  ALT  22     /  AlkPhos  97     11 Jan 2018 22:07    PT/INR - ( 11 Jan 2018 22:07 )   PT: 24.6 sec;   INR: 2.22 ratio         PTT - ( 11 Jan 2018 22:07 )  PTT:44.4 sec      Blood Culture:         RADIOLOGY/EKG:  CTA Chest: No evidence of pulmonary embolism. CHF with probable right heart failure with left greater than right pleural effusions, interstitial edema, and reflux of contrast into the   suprahepatic IVC and hepatic veins. Cardiomegaly. Relate with echocardiogram.  EKG: Afib 's

## 2018-01-12 NOTE — DISCHARGE NOTE ADULT - MEDICATION SUMMARY - MEDICATIONS TO CHANGE
I will SWITCH the dose or number of times a day I take the medications listed below when I get home from the hospital:    diltiazem 60 mg oral tablet  -- 1 tab(s) by mouth 3 times a day    docusate sodium 100 mg oral capsule  -- 2 cap(s) by mouth once a day (at bedtime), As Needed    potassium chloride 10 mEq oral tablet, extended release  -- 1 tab(s) by mouth once a day for 3 days

## 2018-01-13 LAB
ANION GAP SERPL CALC-SCNC: 9 MMOL/L — SIGNIFICANT CHANGE UP (ref 5–17)
APTT BLD: 32.6 SEC — SIGNIFICANT CHANGE UP (ref 27.5–37.4)
BASOPHILS # BLD AUTO: 0.1 K/UL — SIGNIFICANT CHANGE UP (ref 0–0.2)
BASOPHILS NFR BLD AUTO: 1.1 % — SIGNIFICANT CHANGE UP (ref 0–2)
BUN SERPL-MCNC: 25 MG/DL — HIGH (ref 7–23)
CALCIUM SERPL-MCNC: 8.2 MG/DL — LOW (ref 8.5–10.1)
CHLORIDE SERPL-SCNC: 104 MMOL/L — SIGNIFICANT CHANGE UP (ref 96–108)
CO2 SERPL-SCNC: 30 MMOL/L — SIGNIFICANT CHANGE UP (ref 22–31)
CREAT SERPL-MCNC: 0.74 MG/DL — SIGNIFICANT CHANGE UP (ref 0.5–1.3)
EOSINOPHIL # BLD AUTO: 1.1 K/UL — HIGH (ref 0–0.5)
EOSINOPHIL NFR BLD AUTO: 12.4 % — HIGH (ref 0–6)
GLUCOSE SERPL-MCNC: 87 MG/DL — SIGNIFICANT CHANGE UP (ref 70–99)
HCT VFR BLD CALC: 34.4 % — LOW (ref 34.5–45)
HGB BLD-MCNC: 10.6 G/DL — LOW (ref 11.5–15.5)
INR BLD: 1.74 RATIO — HIGH (ref 0.88–1.16)
LYMPHOCYTES # BLD AUTO: 1.6 K/UL — SIGNIFICANT CHANGE UP (ref 1–3.3)
LYMPHOCYTES # BLD AUTO: 18.1 % — SIGNIFICANT CHANGE UP (ref 13–44)
MCHC RBC-ENTMCNC: 25.3 PG — LOW (ref 27–34)
MCHC RBC-ENTMCNC: 30.9 GM/DL — LOW (ref 32–36)
MCV RBC AUTO: 81.7 FL — SIGNIFICANT CHANGE UP (ref 80–100)
MONOCYTES # BLD AUTO: 0.5 K/UL — SIGNIFICANT CHANGE UP (ref 0–0.9)
MONOCYTES NFR BLD AUTO: 6.1 % — SIGNIFICANT CHANGE UP (ref 1–9)
NEUTROPHILS # BLD AUTO: 5.4 K/UL — SIGNIFICANT CHANGE UP (ref 1.8–7.4)
NEUTROPHILS NFR BLD AUTO: 62.3 % — SIGNIFICANT CHANGE UP (ref 43–77)
PLATELET # BLD AUTO: 231 K/UL — SIGNIFICANT CHANGE UP (ref 150–400)
POTASSIUM SERPL-MCNC: 4.1 MMOL/L — SIGNIFICANT CHANGE UP (ref 3.5–5.3)
POTASSIUM SERPL-SCNC: 4.1 MMOL/L — SIGNIFICANT CHANGE UP (ref 3.5–5.3)
PROTHROM AB SERPL-ACNC: 19.2 SEC — HIGH (ref 9.8–12.7)
RBC # BLD: 4.21 M/UL — SIGNIFICANT CHANGE UP (ref 3.8–5.2)
RBC # FLD: 16.6 % — HIGH (ref 10.3–14.5)
SODIUM SERPL-SCNC: 143 MMOL/L — SIGNIFICANT CHANGE UP (ref 135–145)
WBC # BLD: 8.7 K/UL — SIGNIFICANT CHANGE UP (ref 3.8–10.5)
WBC # FLD AUTO: 8.7 K/UL — SIGNIFICANT CHANGE UP (ref 3.8–10.5)

## 2018-01-13 PROCEDURE — 99233 SBSQ HOSP IP/OBS HIGH 50: CPT

## 2018-01-13 RX ORDER — WARFARIN SODIUM 2.5 MG/1
4 TABLET ORAL ONCE
Qty: 0 | Refills: 0 | Status: COMPLETED | OUTPATIENT
Start: 2018-01-13 | End: 2018-01-13

## 2018-01-13 RX ORDER — ENOXAPARIN SODIUM 100 MG/ML
50 INJECTION SUBCUTANEOUS EVERY 12 HOURS
Qty: 0 | Refills: 0 | Status: DISCONTINUED | OUTPATIENT
Start: 2018-01-13 | End: 2018-01-16

## 2018-01-13 RX ORDER — METOPROLOL TARTRATE 50 MG
2.5 TABLET ORAL EVERY 6 HOURS
Qty: 0 | Refills: 0 | Status: DISCONTINUED | OUTPATIENT
Start: 2018-01-13 | End: 2018-01-15

## 2018-01-13 RX ORDER — PERMETHRIN CREAM 5% W/W 50 MG/G
1 CREAM TOPICAL ONCE
Qty: 0 | Refills: 0 | Status: COMPLETED | OUTPATIENT
Start: 2018-01-13 | End: 2018-01-13

## 2018-01-13 RX ADMIN — PERMETHRIN CREAM 5% W/W 1 APPLICATION(S): 50 CREAM TOPICAL at 10:10

## 2018-01-13 RX ADMIN — Medication 20 MILLIGRAM(S): at 06:17

## 2018-01-13 RX ADMIN — Medication 2.5 MILLIGRAM(S): at 17:04

## 2018-01-13 RX ADMIN — ENOXAPARIN SODIUM 50 MILLIGRAM(S): 100 INJECTION SUBCUTANEOUS at 17:03

## 2018-01-13 RX ADMIN — Medication 2.5 MILLIGRAM(S): at 23:46

## 2018-01-13 NOTE — PROGRESS NOTE ADULT - ASSESSMENT
87 yo F pmh of Afib on Coumadin, GERD, HTN, HLD, Depression, urinary retention, constipation presenting from [facility] with b/l leg swelling admitted with CHF exacerbation, Afib RVR.    - Tele - AF with rates to 104 when compliant.  HR  per flow sheet.    - ProBNP elevated started on Lasix 20 mg IV daily.  Pt with rales left base and peripheral edema lying flat more comfortable.  Will continue with Lasix IV 2/2 pt refusing oral medications.    - Increase to cardizem 90 mg TID.  Pt non-compliant with medications "spitting them out" as per RN.  Will start Lopressor 2.5mg IVP q6h for rate control while non-compliant.    - RLE negative for DVT.  Pt refused LE doppler on LLE.  Vascular Surgery consulted pt w/phlebitic legs but no evidence of acute DVT.    - No clear evidence of acute ischemia, CE neg times 1. Continue to trend  - No acute changes on EKG   - c/w coumadin now subtherapeutic INR 1.74 may be 2/2 non-compliance with oral medications.  Goal INR 2-3.  May need to consider bridging with Lovenox if remains subtherapeutic and non-compliant 2/2 hx of DVT and AF.    - Previous ECHO March 2017: normal left ventricular systolic function, normal rt ventricular size and function   - Echo completed will follow up.  - BP soft not accepting oral medications.  If compliant give as per parameters.  Monitor routine hemodynamics  - Monitor and replete lytes, keep K>4, Mg>2  - Other cardiovascular workup will depend on clinical course.  - All other workup per primary team  - Will follow     Olga Mathis NP-TAMMIE  Cardiology 87 yo F pmh of Afib on Coumadin, GERD, HTN, HLD, Depression, urinary retention, constipation presenting from [facility] with b/l leg swelling admitted with CHF exacerbation, Afib RVR.    - Tele - AF with rates to 104 when compliant.  HR  per flow sheet.    - ProBNP elevated started on Lasix 20 mg IV daily.  Pt with rales left base and peripheral edema lying flat more comfortable.  Will continue with Lasix IV 2/2 pt refusing oral medications.    - Increase to cardizem 90 mg TID.  Pt non-compliant with medications "spitting them out" as per RN.  Will start Lopressor 2.5mg IVP q6h for rate control while non-compliant.    - RLE negative for DVT.  Pt refused LE doppler on LLE.  Vascular Surgery consulted pt w/phlebitic legs but no evidence of acute DVT.    - No clear evidence of acute ischemia, CE neg times 1. Continue to trend  - No acute changes on EKG   - c/w coumadin now subtherapeutic INR 1.74 may be 2/2 non-compliance with oral medications.  Goal INR 2-3.  May need to consider bridging with Lovenox if remains subtherapeutic and non-compliant 2/2 hx of DVT/PE and AF.    - Previous ECHO March 2017: normal left ventricular systolic function, normal rt ventricular size and function   - Echo completed will follow up.  - BP soft not accepting oral medications.  If compliant give as per parameters.  Monitor routine hemodynamics  - Monitor and replete lytes, keep K>4, Mg>2  - Other cardiovascular workup will depend on clinical course.  - All other workup per primary team  - Will follow     THONG Gabriel  Cardiology

## 2018-01-13 NOTE — CONSULT NOTE ADULT - SUBJECTIVE AND OBJECTIVE BOX
History of Present Illness:  88y Female with a history of DVT and PE in the past was admitted 1/12/18 with CHF and bilateral LE edema. She also has scabies. She is on long term Coumadin therapy. Venous doppler of right leg is negative for DVT . She was not cooperative for venous doppler of left leg. I was requested to evaluate her LE circulation.    PAST MEDICAL & SURGICAL HISTORY:  Femur neck fracture  Vertebral fracture, pathological  Neurogenic bladder  Afib  GERD (gastroesophageal reflux disease)  Pulmonary embolism  UTI (lower urinary tract infection)  HTN (hypertension)  Pulmonary embolism  Memory impairment  Hypertension  Atrial fibrillation  Hyponatremia  Shortness of breath: etiology probably copd  Urinary retention  Arthritis  DVT (deep venous thrombosis)  History of hip surgery: right gamma nail      Allergies    No Known Allergies    Intolerances        MEDICATIONS  (STANDING):  ALBUTerol    90 MICROgram(s) HFA Inhaler 2 Puff(s) Inhalation every 6 hours  calcium carbonate 1250 mG + Vitamin D (OsCal 500 + D) 1 Tablet(s) Oral daily  diltiazem    Tablet 90 milliGRAM(s) Oral three times a day  docusate sodium 100 milliGRAM(s) Oral two times a day  ferrous    sulfate 325 milliGRAM(s) Oral daily  furosemide   Injectable 20 milliGRAM(s) IV Push daily  multivitamin 1 Tablet(s) Oral daily  pantoprazole  Injectable 40 milliGRAM(s) IV Push daily  permethrin 5% Cream 1 Application(s) Topical once  polyethylene glycol 3350 17 Gram(s) Oral daily  potassium chloride    Tablet ER 10 milliEquivalent(s) Oral daily  tiotropium 18 MICROgram(s) Capsule 1 Capsule(s) Inhalation daily  torsemide 10 milliGRAM(s) Oral daily  warfarin 4 milliGRAM(s) Oral once    MEDICATIONS  (PRN):      Social History:  Smoking History: denies  Alcohol Use: none    REVIEW OF SYSTEMS:  CONSTITUTIONAL: No weakness, fevers or chills  EYES/ENT: No visual changes;  No vertigo or throat pain   NECK: No pain or stiffness  RESPIRATORY: No cough, wheezing, hemoptysis; ++ shortness of breath  CARDIOVASCULAR: No chest pain or palpitations  GASTROINTESTINAL: No abdominal or epigastric pain. No nausea, vomiting, or hematemesis; No diarrhea or constipation. No melena or hematochezia.  GENITOURINARY: No dysuria, frequency or hematuria  NEUROLOGICAL: No numbness or weakness  SKIN: ++++ itching f upper and lower extremities  Vascular:  No lower extremity claudication, pedal rest pain or digital ulcers  All other review of systems is negative unless indicated above.    PHYSICAL EXAM:  General:  On exam, the patient is  nontoxic appearing Female in no acute distress.  Vital Signs Last 24 Hrs  T(C): 36.6 (13 Jan 2018 08:55), Max: 36.6 (12 Jan 2018 19:51)  T(F): 97.9 (13 Jan 2018 08:55), Max: 97.9 (12 Jan 2018 19:51)  HR: 102 (13 Jan 2018 08:55) (91 - 119)  BP: 129/88 (13 Jan 2018 08:55) (108/62 - 129/88)  BP(mean): --  RR: 16 (13 Jan 2018 08:55) (16 - 18)  SpO2: 98% (13 Jan 2018 08:55) (97% - 99%    Neck:  4+/4+ bilateral carotid pulses; no carotid bruit, no palpable cervical masses.  Heart:  Regular, no murmurs, rubs or gallops.    Lungs:  decreased BS at both bases  Chest:  No chest wall deformities  Symmetrical chest expansion.   Abdomen: Soft and nontender.  No palpable masses.  No rebound, guarding or rigidity.  Extremities:  Feet are warm, pink with normal capillary refill times.  There are no digital ulcers or heel decubiti.  The calf and thigh muscles are soft and nontender.  There are no palpable cords or limb cellulitis.  Juan's sign  is negative bilaterally.  There is no lower extremity  cyanosis, or rubor. There is 1+ edema of both ankles with multiple scratches of both legs.  On examination of the peripheral pulses:  Left leg femoral pulse is 4/4  , popliteal pulse is 3/4   ,PT Pulse is 2/4, DP Pulse is 2/4   Right leg femoral pulse is 4/4  ,popliteal pulse is  3/4  , PT Pulse is 2/4  , DP Pulse is 2/4   Neurological:  There are no motor or sensory deficits in either lower extremity.                          10.6   8.7   )-----------( 231      ( 13 Jan 2018 06:56 )             34.4     01-13    143  |  104  |  25<H>  ----------------------------<  87  4.1   |  30  |  0.74    Ca    8.2<L>      13 Jan 2018 06:56    TPro  6.6  /  Alb  3.1<L>  /  TBili  0.5  /  DBili  x   /  AST  34  /  ALT  22  /  AlkPhos  97  01-11    CARDIAC MARKERS ( 12 Jan 2018 16:22 )  <.015 ng/mL / x     / 44 U/L / x     / 1.4 ng/mL  CARDIAC MARKERS ( 12 Jan 2018 07:29 )  <.015 ng/mL / x     / 74 U/L / x     / x          PT/INR - ( 13 Jan 2018 06:56 )   PT: 19.2 sec;   INR: 1.74 ratio         PTT - ( 13 Jan 2018 06:56 )  PTT:32.6 sec    Radiology:

## 2018-01-13 NOTE — CHART NOTE - NSCHARTNOTEFT_GEN_A_CORE
Called by RN for patient having an itchy rash    Pt seen and examined at bedside. Multiple lesions across chest, b/l lower extremities and arms at different stages, burrowing, excoriations and open areas with mild colorless exudate.    88F w/pmh of Afib on Coumadin, GERD, HTN, HLD, Depression, urinary retention, constipation presenting from [facility] with b/l leg swelling admitted with CHF exacerbation, Afib RVR, b/l LE DVTs now with itchy rash on chest, b/l lower ext and arms  - Will start pt on permethrin cream 5% to be applied head to toes and soles of feet, under fingernails and toenails for 8-14hrs and then pt to shower  - Pt placed on contact precautions  - Plan discussed with House Doc Dr Orlin Mena  PGY1  [phone] Called by RN for patient having an itchy rash    Pt seen and examined at bedside. Multiple small pruritic, erythematous papules, with some excoriated and hemorrhagic crusts present with small vesicles and pustules across chest, b/l upper and lower extremities.    88F w/pmh of Afib on Coumadin, GERD, HTN, HLD, Depression, urinary retention, constipation presenting from [facility] with b/l leg swelling admitted with CHF exacerbation, Afib RVR, b/l LE DVTs now with itchy rash on chest, b/l lower ext and arms  - Will start pt on permethrin cream 5% to be applied head to toes and soles of feet, under fingernails and toenails for 8-14hrs and then pt to shower  - Pt placed on contact precautions  - Plan discussed with House Doc Dr Orlin Mena  PGY1  [phone]

## 2018-01-13 NOTE — DIETITIAN INITIAL EVALUATION ADULT. - OTHER INFO
patient seen sleeping with breakfast tray at bedside. from facility [facility] soft thin liquids followed. appears with dementia per MD note.

## 2018-01-13 NOTE — CONSULT NOTE ADULT - ASSESSMENT
88 y.o. female with prior bilateral DVT and PE has post phlebitic legs but no evidence for acute DVT. The  patient has  ASHD and atrial fibrillation and will need long term AC therapy.  No vascular interventions are needed.

## 2018-01-14 LAB
ANION GAP SERPL CALC-SCNC: 7 MMOL/L — SIGNIFICANT CHANGE UP (ref 5–17)
BUN SERPL-MCNC: 25 MG/DL — HIGH (ref 7–23)
CALCIUM SERPL-MCNC: 8.8 MG/DL — SIGNIFICANT CHANGE UP (ref 8.5–10.1)
CHLORIDE SERPL-SCNC: 102 MMOL/L — SIGNIFICANT CHANGE UP (ref 96–108)
CO2 SERPL-SCNC: 31 MMOL/L — SIGNIFICANT CHANGE UP (ref 22–31)
CREAT SERPL-MCNC: 0.69 MG/DL — SIGNIFICANT CHANGE UP (ref 0.5–1.3)
GLUCOSE SERPL-MCNC: 87 MG/DL — SIGNIFICANT CHANGE UP (ref 70–99)
HCT VFR BLD CALC: 36.4 % — SIGNIFICANT CHANGE UP (ref 34.5–45)
HGB BLD-MCNC: 11.4 G/DL — LOW (ref 11.5–15.5)
INR BLD: 1.69 RATIO — HIGH (ref 0.88–1.16)
MAGNESIUM SERPL-MCNC: 1.9 MG/DL — SIGNIFICANT CHANGE UP (ref 1.6–2.6)
MCHC RBC-ENTMCNC: 25.8 PG — LOW (ref 27–34)
MCHC RBC-ENTMCNC: 31.2 GM/DL — LOW (ref 32–36)
MCV RBC AUTO: 82.5 FL — SIGNIFICANT CHANGE UP (ref 80–100)
PLATELET # BLD AUTO: 211 K/UL — SIGNIFICANT CHANGE UP (ref 150–400)
POTASSIUM SERPL-MCNC: 4.1 MMOL/L — SIGNIFICANT CHANGE UP (ref 3.5–5.3)
POTASSIUM SERPL-SCNC: 4.1 MMOL/L — SIGNIFICANT CHANGE UP (ref 3.5–5.3)
PROTHROM AB SERPL-ACNC: 18.6 SEC — HIGH (ref 9.8–12.7)
RBC # BLD: 4.41 M/UL — SIGNIFICANT CHANGE UP (ref 3.8–5.2)
RBC # FLD: 16.6 % — HIGH (ref 10.3–14.5)
SODIUM SERPL-SCNC: 140 MMOL/L — SIGNIFICANT CHANGE UP (ref 135–145)
WBC # BLD: 7.9 K/UL — SIGNIFICANT CHANGE UP (ref 3.8–10.5)
WBC # FLD AUTO: 7.9 K/UL — SIGNIFICANT CHANGE UP (ref 3.8–10.5)

## 2018-01-14 PROCEDURE — 99233 SBSQ HOSP IP/OBS HIGH 50: CPT

## 2018-01-14 RX ORDER — FLUOCINONIDE/EMOLLIENT BASE 0.05 %
1 CREAM (GRAM) TOPICAL
Qty: 0 | Refills: 0 | Status: DISCONTINUED | OUTPATIENT
Start: 2018-01-14 | End: 2018-01-18

## 2018-01-14 RX ORDER — WARFARIN SODIUM 2.5 MG/1
4 TABLET ORAL ONCE
Qty: 0 | Refills: 0 | Status: COMPLETED | OUTPATIENT
Start: 2018-01-14 | End: 2018-01-14

## 2018-01-14 RX ADMIN — Medication 2.5 MILLIGRAM(S): at 06:52

## 2018-01-14 RX ADMIN — Medication 2.5 MILLIGRAM(S): at 23:45

## 2018-01-14 RX ADMIN — Medication 2.5 MILLIGRAM(S): at 13:39

## 2018-01-14 RX ADMIN — ENOXAPARIN SODIUM 50 MILLIGRAM(S): 100 INJECTION SUBCUTANEOUS at 17:38

## 2018-01-14 RX ADMIN — WARFARIN SODIUM 4 MILLIGRAM(S): 2.5 TABLET ORAL at 22:05

## 2018-01-14 RX ADMIN — Medication 20 MILLIGRAM(S): at 06:51

## 2018-01-14 RX ADMIN — ENOXAPARIN SODIUM 50 MILLIGRAM(S): 100 INJECTION SUBCUTANEOUS at 06:51

## 2018-01-14 RX ADMIN — ALBUTEROL 2 PUFF(S): 90 AEROSOL, METERED ORAL at 13:54

## 2018-01-14 RX ADMIN — PANTOPRAZOLE SODIUM 40 MILLIGRAM(S): 20 TABLET, DELAYED RELEASE ORAL at 13:39

## 2018-01-14 RX ADMIN — Medication 1 APPLICATION(S): at 17:38

## 2018-01-14 NOTE — PROGRESS NOTE ADULT - ASSESSMENT
Afib, on coumadin with therapeutic INR.   reported LE edema wit positive duplex yesterday.   Multiple other medical conditions.     Repeat duplex today with negative DVT on R.   Unable to eval on Left.    Dawn Villasenor called and left message to call back.     No evidence for PE.   has been evaluated by Dr. Kruse    RECOMMMEND:   Continue Coumadin at current dose.   Repeat duplex tomorrow.   Obtain report from Dawn Villasenor.   observe for any evidence of bleeding Afib, on coumadin with therapeutic INR.   reported LE edema wit positive duplex yesterday.   Multiple other medical conditions.     Repeat duplex today with negative DVT on R.   Unable to eval on Left.    Dawn Villasenor called and left message to call back.     No evidence for PE.   has been evaluated by Dr. Kruse    RECOMMMEND:   Continue Coumadin at current dose.   Repeat duplex tomorrow.   Obtain report from Dawn Villasenor.   observe for any evidence of bleeding  no further heme evaluation required  please call if additional evaluation required

## 2018-01-14 NOTE — PROGRESS NOTE ADULT - ATTENDING COMMENTS
Seen/examined. Agree with above. Was not tolerating po medications, but seems to be taking them today.
Seen/examined. Agree with above. Echo with MR and TR.  Not taking po medications. IV metoprolol for rate control.  Lovenox for a/c if cannot tolerate po. Continue with IV Lasix daily
88F w/pmh of Afib on Coumadin, GERD, HTN, HLD, Depression, urinary retention, constipation presenting from [facility] with b/l leg swelling admitted with acute on chronic diastolic CHF exacerbation, Afib RVR, with ? outpt b/l LE DVTs. Plan: R leg u/s neg for dvt, unable to assess left, will retry tomorrow, apprec hemat/onco recs, cardio recs, diurese,  monitor renal functions, monitor clinical course, Dr. Wiley to round this weekend for hemat/onco, therpeutic inr, cont coumadin

## 2018-01-14 NOTE — PROGRESS NOTE ADULT - ASSESSMENT
87 yo F pmh of Afib on Coumadin, GERD, HTN, HLD, Depression, urinary retention, constipation presenting from [facility] with b/l leg swelling admitted with CHF exacerbation, Afib RVR.    - Tele - JEAN with rates 140s now low 100s     - ProBNP elevated started on Lasix 20 mg IV daily.  Difficult to get accurate lung assessment pt looks more comfortable lying flat with no peripheral edema would attempt to change to oral torsemide tomorrow if pt more compliant with medications.      - Cardizem increased to 90mg TID pt has been non-compliant with taking oral meds started Lopressor 2.5mg IVP q6h for rate control while non-compliant.  Pt took this afternoon dose of Cardizem with son present. If continues then will d/c lopressor and will have better rate control.    - RLE negative for DVT.  Pt refused LE doppler on LLE as per son pt will comply as of today.  Vascular Surgery consulted pt w/phlebitic legs but no evidence of acute DVT.    - No clear evidence of acute ischemia, CE neg times 1. Continue to trend  - No acute changes on EKG   - c/w coumadin now subtherapeutic INR 1.69 has been non-compliant with oral medications.  Bridging with Lovenox 2/2 hx of DVT/PE and AF may d/c when Goal INR 2-3.  Pt did take her coumadin today with son present.   - Previous ECHO March 2017: normal left ventricular systolic function, normal rt ventricular size and function   - Echo technically difficult limited study grossly normal EF with moderate to severe MR, severe LAE, mild Rt Ventricular enlargement with decreased systolic function and evidence of diastolic dysfunction.    - BP stable give medications as per parameters.  Monitor routine hemodynamics  - Monitor and replete lytes, keep K>4, Mg>2  - Other cardiovascular workup will depend on clinical course.  - All other workup per primary team  - Will follow     Olga Mathis NP-C  Cardiology

## 2018-01-15 LAB
ANION GAP SERPL CALC-SCNC: 7 MMOL/L — SIGNIFICANT CHANGE UP (ref 5–17)
BUN SERPL-MCNC: 37 MG/DL — HIGH (ref 7–23)
CALCIUM SERPL-MCNC: 8.3 MG/DL — LOW (ref 8.5–10.1)
CHLORIDE SERPL-SCNC: 98 MMOL/L — SIGNIFICANT CHANGE UP (ref 96–108)
CO2 SERPL-SCNC: 32 MMOL/L — HIGH (ref 22–31)
CREAT SERPL-MCNC: 0.84 MG/DL — SIGNIFICANT CHANGE UP (ref 0.5–1.3)
GLUCOSE SERPL-MCNC: 111 MG/DL — HIGH (ref 70–99)
HCT VFR BLD CALC: 33 % — LOW (ref 34.5–45)
HGB BLD-MCNC: 10.5 G/DL — LOW (ref 11.5–15.5)
INR BLD: 1.93 RATIO — HIGH (ref 0.88–1.16)
MAGNESIUM SERPL-MCNC: 1.8 MG/DL — SIGNIFICANT CHANGE UP (ref 1.6–2.6)
MCHC RBC-ENTMCNC: 25.9 PG — LOW (ref 27–34)
MCHC RBC-ENTMCNC: 31.8 GM/DL — LOW (ref 32–36)
MCV RBC AUTO: 81.3 FL — SIGNIFICANT CHANGE UP (ref 80–100)
PLATELET # BLD AUTO: 232 K/UL — SIGNIFICANT CHANGE UP (ref 150–400)
POTASSIUM SERPL-MCNC: 3.9 MMOL/L — SIGNIFICANT CHANGE UP (ref 3.5–5.3)
POTASSIUM SERPL-SCNC: 3.9 MMOL/L — SIGNIFICANT CHANGE UP (ref 3.5–5.3)
PROTHROM AB SERPL-ACNC: 21.3 SEC — HIGH (ref 9.8–12.7)
RBC # BLD: 4.05 M/UL — SIGNIFICANT CHANGE UP (ref 3.8–5.2)
RBC # FLD: 16.3 % — HIGH (ref 10.3–14.5)
SODIUM SERPL-SCNC: 137 MMOL/L — SIGNIFICANT CHANGE UP (ref 135–145)
WBC # BLD: 9.3 K/UL — SIGNIFICANT CHANGE UP (ref 3.8–10.5)
WBC # FLD AUTO: 9.3 K/UL — SIGNIFICANT CHANGE UP (ref 3.8–10.5)

## 2018-01-15 PROCEDURE — 99233 SBSQ HOSP IP/OBS HIGH 50: CPT

## 2018-01-15 RX ORDER — WARFARIN SODIUM 2.5 MG/1
3 TABLET ORAL ONCE
Qty: 0 | Refills: 0 | Status: COMPLETED | OUTPATIENT
Start: 2018-01-15 | End: 2018-01-15

## 2018-01-15 RX ORDER — METOPROLOL TARTRATE 50 MG
5 TABLET ORAL EVERY 6 HOURS
Qty: 0 | Refills: 0 | Status: DISCONTINUED | OUTPATIENT
Start: 2018-01-15 | End: 2018-01-18

## 2018-01-15 RX ADMIN — Medication 5 MILLIGRAM(S): at 18:52

## 2018-01-15 RX ADMIN — Medication 20 MILLIGRAM(S): at 05:50

## 2018-01-15 RX ADMIN — Medication 1 APPLICATION(S): at 18:59

## 2018-01-15 RX ADMIN — Medication 10 MILLIEQUIVALENT(S): at 13:30

## 2018-01-15 RX ADMIN — ENOXAPARIN SODIUM 50 MILLIGRAM(S): 100 INJECTION SUBCUTANEOUS at 05:49

## 2018-01-15 RX ADMIN — Medication 5 MILLIGRAM(S): at 13:30

## 2018-01-15 RX ADMIN — Medication 1 APPLICATION(S): at 05:48

## 2018-01-15 RX ADMIN — TIOTROPIUM BROMIDE 1 CAPSULE(S): 18 CAPSULE ORAL; RESPIRATORY (INHALATION) at 08:18

## 2018-01-15 RX ADMIN — PANTOPRAZOLE SODIUM 40 MILLIGRAM(S): 20 TABLET, DELAYED RELEASE ORAL at 13:30

## 2018-01-15 RX ADMIN — ALBUTEROL 2 PUFF(S): 90 AEROSOL, METERED ORAL at 13:30

## 2018-01-15 RX ADMIN — ALBUTEROL 2 PUFF(S): 90 AEROSOL, METERED ORAL at 08:18

## 2018-01-15 RX ADMIN — ENOXAPARIN SODIUM 50 MILLIGRAM(S): 100 INJECTION SUBCUTANEOUS at 18:52

## 2018-01-15 RX ADMIN — Medication 2.5 MILLIGRAM(S): at 05:50

## 2018-01-15 NOTE — SWALLOW BEDSIDE ASSESSMENT ADULT - SWALLOW EVAL: ORAL MUSCULATURE
OME completed during functional swallow task, as patient with difficulty following commands for formal examination/generally intact

## 2018-01-15 NOTE — SWALLOW BEDSIDE ASSESSMENT ADULT - COMMENTS
Pt is 87 y/o female presenting to this facility with b/l leg swelling. Baseline diagnosed memory impairment. Pt with poor participation in evaluation. CXR indicative of b/l pleural effusions. Swallow eval ordered secondary to coughing with thin liquids. Pt was downgraded to dysphagia 2/nectar thickened liquids pending swallow eval. Pt presents with oropharyngeal dysphagia marked by poor oral management of soft/hard solids, reduced AP movement and delayed swallow trigger .Given small cup sips thin liquids, pt presents with delay in pharyngeal swallow, coughing post swallow and multiple swallows. Recommend dysphagia 2 mechanical soft/nectar thickened liquids. Poor appetite as per RD w/supplements in place. Spoke to MD.

## 2018-01-15 NOTE — SWALLOW BEDSIDE ASSESSMENT ADULT - PHARYNGEAL PHASE
Delayed pharyngeal swallow/Multiple swallows/Cough post oral intake Delayed pharyngeal swallow Delayed pharyngeal swallow/Multiple swallows

## 2018-01-15 NOTE — PROGRESS NOTE ADULT - ASSESSMENT
89 yo F pmh of Afib on Coumadin, GERD, HTN, HLD, Depression, urinary retention, constipation presenting from [facility] with b/l leg swelling admitted with CHF exacerbation, Afib RVR.    - AF is relatively rate controlled. but pt is not taking oral medications. I would increase meteoprolol to 5mg q6 hours and stop it once consistently taking her Dilt.   - Appears to be compensated from HF POV. Cont Lasix 20mg IV Qday but transition to home diuretics once taking PO  - No clear evidence of acute ischemia, No acute changes on EKG Vamsi are neaative  - Cont full dose lovenox. Dose Coumadin. Goal INR 2-3.  - Previous ECHO March 2017: normal left ventricular systolic function, normal rt ventricular size and function   - Echo technically difficult limited study grossly normal EF with moderate to severe MR, severe LAE, mild Rt Ventricular enlargement with decreased systolic function and evidence of diastolic dysfunction.    - Monitor and replete lytes, keep K>4, Mg>2  - Other cardiovascular workup will depend on clinical course.  - All other workup per primary team  - Will follow

## 2018-01-15 NOTE — SWALLOW BEDSIDE ASSESSMENT ADULT - ORAL PHASE
Within functional limits Decreased anterior-posterior movement of the bolus/Delayed oral transit time Delayed oral transit time/Decreased anterior-posterior movement of the bolus/Lingual stasis

## 2018-01-16 LAB
ANION GAP SERPL CALC-SCNC: 7 MMOL/L — SIGNIFICANT CHANGE UP (ref 5–17)
BUN SERPL-MCNC: 30 MG/DL — HIGH (ref 7–23)
CALCIUM SERPL-MCNC: 8.2 MG/DL — LOW (ref 8.5–10.1)
CHLORIDE SERPL-SCNC: 100 MMOL/L — SIGNIFICANT CHANGE UP (ref 96–108)
CO2 SERPL-SCNC: 34 MMOL/L — HIGH (ref 22–31)
CREAT SERPL-MCNC: 0.73 MG/DL — SIGNIFICANT CHANGE UP (ref 0.5–1.3)
GLUCOSE SERPL-MCNC: 125 MG/DL — HIGH (ref 70–99)
HCT VFR BLD CALC: 34.8 % — SIGNIFICANT CHANGE UP (ref 34.5–45)
HGB BLD-MCNC: 10.6 G/DL — LOW (ref 11.5–15.5)
INR BLD: 2.3 RATIO — HIGH (ref 0.88–1.16)
MCHC RBC-ENTMCNC: 25.3 PG — LOW (ref 27–34)
MCHC RBC-ENTMCNC: 30.6 GM/DL — LOW (ref 32–36)
MCV RBC AUTO: 82.9 FL — SIGNIFICANT CHANGE UP (ref 80–100)
PLATELET # BLD AUTO: 203 K/UL — SIGNIFICANT CHANGE UP (ref 150–400)
POTASSIUM SERPL-MCNC: 3.7 MMOL/L — SIGNIFICANT CHANGE UP (ref 3.5–5.3)
POTASSIUM SERPL-SCNC: 3.7 MMOL/L — SIGNIFICANT CHANGE UP (ref 3.5–5.3)
PROTHROM AB SERPL-ACNC: 25.5 SEC — HIGH (ref 9.8–12.7)
RBC # BLD: 4.2 M/UL — SIGNIFICANT CHANGE UP (ref 3.8–5.2)
RBC # FLD: 16.8 % — HIGH (ref 10.3–14.5)
SODIUM SERPL-SCNC: 141 MMOL/L — SIGNIFICANT CHANGE UP (ref 135–145)
WBC # BLD: 7.3 K/UL — SIGNIFICANT CHANGE UP (ref 3.8–10.5)
WBC # FLD AUTO: 7.3 K/UL — SIGNIFICANT CHANGE UP (ref 3.8–10.5)

## 2018-01-16 PROCEDURE — 99233 SBSQ HOSP IP/OBS HIGH 50: CPT

## 2018-01-16 RX ORDER — WARFARIN SODIUM 2.5 MG/1
1 TABLET ORAL ONCE
Qty: 0 | Refills: 0 | Status: COMPLETED | OUTPATIENT
Start: 2018-01-16 | End: 2018-01-16

## 2018-01-16 RX ADMIN — Medication 100 MILLIGRAM(S): at 18:48

## 2018-01-16 RX ADMIN — WARFARIN SODIUM 1 MILLIGRAM(S): 2.5 TABLET ORAL at 21:33

## 2018-01-16 RX ADMIN — Medication 1 TABLET(S): at 16:14

## 2018-01-16 RX ADMIN — Medication 5 MILLIGRAM(S): at 00:41

## 2018-01-16 RX ADMIN — ALBUTEROL 2 PUFF(S): 90 AEROSOL, METERED ORAL at 00:42

## 2018-01-16 RX ADMIN — POLYETHYLENE GLYCOL 3350 17 GRAM(S): 17 POWDER, FOR SOLUTION ORAL at 16:16

## 2018-01-16 RX ADMIN — Medication 325 MILLIGRAM(S): at 16:14

## 2018-01-16 RX ADMIN — Medication 5 MILLIGRAM(S): at 07:01

## 2018-01-16 RX ADMIN — ENOXAPARIN SODIUM 50 MILLIGRAM(S): 100 INJECTION SUBCUTANEOUS at 07:00

## 2018-01-16 RX ADMIN — Medication 10 MILLIEQUIVALENT(S): at 16:14

## 2018-01-16 RX ADMIN — Medication 20 MILLIGRAM(S): at 07:01

## 2018-01-16 RX ADMIN — Medication 1 APPLICATION(S): at 18:49

## 2018-01-16 RX ADMIN — ALBUTEROL 2 PUFF(S): 90 AEROSOL, METERED ORAL at 16:18

## 2018-01-16 RX ADMIN — PANTOPRAZOLE SODIUM 40 MILLIGRAM(S): 20 TABLET, DELAYED RELEASE ORAL at 16:16

## 2018-01-16 RX ADMIN — Medication 5 MILLIGRAM(S): at 16:30

## 2018-01-16 NOTE — PROGRESS NOTE ADULT - ASSESSMENT
89 yo F pmh of Afib on Coumadin, GERD, HTN, HLD, Depression, urinary retention, constipation presenting from [facility] with b/l leg swelling admitted with CHF exacerbation, Afib RVR.    - AF is relatively rate controlled with increase in Metoprolol 5mg IV q6h would stop once consistently taking her dilt. Pt is still not taking oral medications and refusing Tele.  HR 70-90s   - Appears to be compensated from HF POV. Cont Lasix 20mg IV Qday but transition to home diuretics once taking PO  - No clear evidence of acute ischemia, No acute changes on EKG Vamsi are negative  - Cont full dose lovenox. Dose Coumadin. Goal INR 2-3.  Pt refused her coumadin last night.  INR 1.93 today.   - Previous ECHO March 2017: normal left ventricular systolic function, normal rt ventricular size and function   - Echo technically difficult limited study grossly normal EF with moderate to severe MR, severe LAE, mild Rt Ventricular enlargement with decreased systolic function and evidence of diastolic dysfunction.    - Monitor and replete lytes, keep K>4, Mg>2  - Other cardiovascular workup will depend on clinical course.  - All other workup per primary team  - Will follow

## 2018-01-17 LAB
ANION GAP SERPL CALC-SCNC: 8 MMOL/L — SIGNIFICANT CHANGE UP (ref 5–17)
BUN SERPL-MCNC: 28 MG/DL — HIGH (ref 7–23)
CALCIUM SERPL-MCNC: 8.3 MG/DL — LOW (ref 8.5–10.1)
CHLORIDE SERPL-SCNC: 100 MMOL/L — SIGNIFICANT CHANGE UP (ref 96–108)
CO2 SERPL-SCNC: 32 MMOL/L — HIGH (ref 22–31)
CREAT SERPL-MCNC: 0.73 MG/DL — SIGNIFICANT CHANGE UP (ref 0.5–1.3)
GLUCOSE SERPL-MCNC: 133 MG/DL — HIGH (ref 70–99)
HCT VFR BLD CALC: 36.4 % — SIGNIFICANT CHANGE UP (ref 34.5–45)
HGB BLD-MCNC: 11 G/DL — LOW (ref 11.5–15.5)
INR BLD: 2.2 RATIO — HIGH (ref 0.88–1.16)
MAGNESIUM SERPL-MCNC: 2 MG/DL — SIGNIFICANT CHANGE UP (ref 1.6–2.6)
MCHC RBC-ENTMCNC: 24.9 PG — LOW (ref 27–34)
MCHC RBC-ENTMCNC: 30.3 GM/DL — LOW (ref 32–36)
MCV RBC AUTO: 82.3 FL — SIGNIFICANT CHANGE UP (ref 80–100)
PLATELET # BLD AUTO: 193 K/UL — SIGNIFICANT CHANGE UP (ref 150–400)
POTASSIUM SERPL-MCNC: 3.4 MMOL/L — LOW (ref 3.5–5.3)
POTASSIUM SERPL-SCNC: 3.4 MMOL/L — LOW (ref 3.5–5.3)
PROTHROM AB SERPL-ACNC: 24.4 SEC — HIGH (ref 9.8–12.7)
RBC # BLD: 4.42 M/UL — SIGNIFICANT CHANGE UP (ref 3.8–5.2)
RBC # FLD: 16.8 % — HIGH (ref 10.3–14.5)
SODIUM SERPL-SCNC: 140 MMOL/L — SIGNIFICANT CHANGE UP (ref 135–145)
WBC # BLD: 7.1 K/UL — SIGNIFICANT CHANGE UP (ref 3.8–10.5)
WBC # FLD AUTO: 7.1 K/UL — SIGNIFICANT CHANGE UP (ref 3.8–10.5)

## 2018-01-17 PROCEDURE — 99233 SBSQ HOSP IP/OBS HIGH 50: CPT

## 2018-01-17 PROCEDURE — 93971 EXTREMITY STUDY: CPT | Mod: 26,LT

## 2018-01-17 RX ORDER — POTASSIUM CHLORIDE 20 MEQ
40 PACKET (EA) ORAL ONCE
Qty: 0 | Refills: 0 | Status: DISCONTINUED | OUTPATIENT
Start: 2018-01-17 | End: 2018-01-17

## 2018-01-17 RX ORDER — POTASSIUM CHLORIDE 20 MEQ
40 PACKET (EA) ORAL ONCE
Qty: 0 | Refills: 0 | Status: COMPLETED | OUTPATIENT
Start: 2018-01-17 | End: 2018-01-17

## 2018-01-17 RX ORDER — WARFARIN SODIUM 2.5 MG/1
2 TABLET ORAL ONCE
Qty: 0 | Refills: 0 | Status: COMPLETED | OUTPATIENT
Start: 2018-01-17 | End: 2018-01-17

## 2018-01-17 RX ADMIN — PANTOPRAZOLE SODIUM 40 MILLIGRAM(S): 20 TABLET, DELAYED RELEASE ORAL at 13:33

## 2018-01-17 RX ADMIN — Medication 1 APPLICATION(S): at 06:48

## 2018-01-17 RX ADMIN — Medication 5 MILLIGRAM(S): at 06:48

## 2018-01-17 RX ADMIN — Medication 40 MILLIEQUIVALENT(S): at 23:47

## 2018-01-17 RX ADMIN — Medication 1 APPLICATION(S): at 17:34

## 2018-01-17 RX ADMIN — Medication 5 MILLIGRAM(S): at 13:34

## 2018-01-17 RX ADMIN — Medication 5 MILLIGRAM(S): at 17:30

## 2018-01-17 RX ADMIN — Medication 100 MILLIGRAM(S): at 16:01

## 2018-01-17 RX ADMIN — Medication 10 MILLIEQUIVALENT(S): at 16:01

## 2018-01-17 RX ADMIN — Medication 20 MILLIGRAM(S): at 06:48

## 2018-01-17 RX ADMIN — WARFARIN SODIUM 2 MILLIGRAM(S): 2.5 TABLET ORAL at 22:39

## 2018-01-17 RX ADMIN — Medication 325 MILLIGRAM(S): at 16:00

## 2018-01-17 NOTE — PROGRESS NOTE ADULT - ASSESSMENT
89 yo F pmh of Afib on Coumadin, GERD, HTN, HLD, Depression, urinary retention, constipation presenting from [facility] with b/l leg swelling admitted with CHF exacerbation, Afib RVR.    - AF is relatively rate controlled with increase in Metoprolol 5mg IV q6h would stop once consistently taking her dilt. Pt is still not taking oral medications and refusing Tele.  HR 70-90s   - If able to take oral medications, d/c IV lopressor and switch back to po dosing. Continue with po cardizem.  - Appears to be compensated from HF POV. Cont Lasix 20mg IV Qday but transition to home diuretics once taking PO (40 mg po daily)  - No clear evidence of acute ischemia, No acute changes on EKG Vamsi are negative  - Can d/c Lovenox. Dose Coumadin. Goal INR 2-3.  2.3 today.  - Previous ECHO March 2017: normal left ventricular systolic function, normal rt ventricular size and function   - Echo technically difficult limited study grossly normal EF with moderate to severe MR, severe LAE, mild Rt Ventricular enlargement with decreased systolic function and evidence of diastolic dysfunction.    - Monitor and replete lytes, keep K>4, Mg>2  - Other cardiovascular workup will depend on clinical course.  - All other workup per primary team  - Will follow

## 2018-01-18 VITALS
SYSTOLIC BLOOD PRESSURE: 108 MMHG | DIASTOLIC BLOOD PRESSURE: 74 MMHG | HEART RATE: 72 BPM | OXYGEN SATURATION: 100 % | TEMPERATURE: 98 F | RESPIRATION RATE: 18 BRPM

## 2018-01-18 LAB
CHOLEST SERPL-MCNC: 184 MG/DL — SIGNIFICANT CHANGE UP (ref 10–199)
HDLC SERPL-MCNC: 48 MG/DL — SIGNIFICANT CHANGE UP (ref 40–125)
INR BLD: 1.87 RATIO — HIGH (ref 0.88–1.16)
LIPID PNL WITH DIRECT LDL SERPL: 118 MG/DL — SIGNIFICANT CHANGE UP
PROTHROM AB SERPL-ACNC: 20.7 SEC — HIGH (ref 9.8–12.7)
TOTAL CHOLESTEROL/HDL RATIO MEASUREMENT: 3.8 RATIO — SIGNIFICANT CHANGE UP (ref 3.3–7.1)
TRIGL SERPL-MCNC: 92 MG/DL — SIGNIFICANT CHANGE UP (ref 10–149)

## 2018-01-18 PROCEDURE — 84484 ASSAY OF TROPONIN QUANT: CPT

## 2018-01-18 PROCEDURE — G8978: CPT | Mod: CN

## 2018-01-18 PROCEDURE — 97162 PT EVAL MOD COMPLEX 30 MIN: CPT

## 2018-01-18 PROCEDURE — 85610 PROTHROMBIN TIME: CPT

## 2018-01-18 PROCEDURE — 71275 CT ANGIOGRAPHY CHEST: CPT

## 2018-01-18 PROCEDURE — 80061 LIPID PANEL: CPT

## 2018-01-18 PROCEDURE — 83735 ASSAY OF MAGNESIUM: CPT

## 2018-01-18 PROCEDURE — 71045 X-RAY EXAM CHEST 1 VIEW: CPT

## 2018-01-18 PROCEDURE — 80053 COMPREHEN METABOLIC PANEL: CPT

## 2018-01-18 PROCEDURE — 94640 AIRWAY INHALATION TREATMENT: CPT

## 2018-01-18 PROCEDURE — 96374 THER/PROPH/DIAG INJ IV PUSH: CPT

## 2018-01-18 PROCEDURE — 83880 ASSAY OF NATRIURETIC PEPTIDE: CPT

## 2018-01-18 PROCEDURE — 82553 CREATINE MB FRACTION: CPT

## 2018-01-18 PROCEDURE — G8979: CPT | Mod: CN

## 2018-01-18 PROCEDURE — 99232 SBSQ HOSP IP/OBS MODERATE 35: CPT

## 2018-01-18 PROCEDURE — G8980: CPT | Mod: CM

## 2018-01-18 PROCEDURE — 96375 TX/PRO/DX INJ NEW DRUG ADDON: CPT

## 2018-01-18 PROCEDURE — 93970 EXTREMITY STUDY: CPT

## 2018-01-18 PROCEDURE — 85730 THROMBOPLASTIN TIME PARTIAL: CPT

## 2018-01-18 PROCEDURE — 99285 EMERGENCY DEPT VISIT HI MDM: CPT | Mod: 25

## 2018-01-18 PROCEDURE — 93971 EXTREMITY STUDY: CPT

## 2018-01-18 PROCEDURE — 93306 TTE W/DOPPLER COMPLETE: CPT

## 2018-01-18 PROCEDURE — 85027 COMPLETE CBC AUTOMATED: CPT

## 2018-01-18 PROCEDURE — 93005 ELECTROCARDIOGRAM TRACING: CPT

## 2018-01-18 PROCEDURE — 80048 BASIC METABOLIC PNL TOTAL CA: CPT

## 2018-01-18 PROCEDURE — 99239 HOSP IP/OBS DSCHRG MGMT >30: CPT

## 2018-01-18 PROCEDURE — 82550 ASSAY OF CK (CPK): CPT

## 2018-01-18 RX ORDER — DOCUSATE SODIUM 100 MG
1 CAPSULE ORAL
Qty: 0 | Refills: 0 | COMMUNITY
Start: 2018-01-18

## 2018-01-18 RX ORDER — ALBUTEROL 90 UG/1
3 AEROSOL, METERED ORAL
Qty: 0 | Refills: 0 | COMMUNITY

## 2018-01-18 RX ORDER — FUROSEMIDE 40 MG
1 TABLET ORAL
Qty: 0 | Refills: 0 | COMMUNITY
Start: 2018-01-18

## 2018-01-18 RX ORDER — METOPROLOL TARTRATE 50 MG
5 TABLET ORAL EVERY 6 HOURS
Qty: 0 | Refills: 0 | Status: DISCONTINUED | OUTPATIENT
Start: 2018-01-18 | End: 2018-01-18

## 2018-01-18 RX ORDER — DILTIAZEM HCL 120 MG
1 CAPSULE, EXT RELEASE 24 HR ORAL
Qty: 0 | Refills: 0 | COMMUNITY
Start: 2018-01-18

## 2018-01-18 RX ORDER — FUROSEMIDE 40 MG
40 TABLET ORAL DAILY
Qty: 0 | Refills: 0 | Status: DISCONTINUED | OUTPATIENT
Start: 2018-01-18 | End: 2018-01-18

## 2018-01-18 RX ORDER — PANTOPRAZOLE SODIUM 20 MG/1
40 TABLET, DELAYED RELEASE ORAL
Qty: 0 | Refills: 0 | Status: DISCONTINUED | OUTPATIENT
Start: 2018-01-18 | End: 2018-01-18

## 2018-01-18 RX ORDER — FUROSEMIDE 40 MG
20 TABLET ORAL DAILY
Qty: 0 | Refills: 0 | Status: DISCONTINUED | OUTPATIENT
Start: 2018-01-18 | End: 2018-01-18

## 2018-01-18 RX ORDER — FLUOCINONIDE/EMOLLIENT BASE 0.05 %
1 CREAM (GRAM) TOPICAL
Qty: 0 | Refills: 0 | COMMUNITY
Start: 2018-01-18

## 2018-01-18 RX ORDER — WARFARIN SODIUM 2.5 MG/1
3 TABLET ORAL ONCE
Qty: 0 | Refills: 0 | Status: DISCONTINUED | OUTPATIENT
Start: 2018-01-18 | End: 2018-01-18

## 2018-01-18 RX ORDER — METOPROLOL TARTRATE 50 MG
100 TABLET ORAL EVERY 12 HOURS
Qty: 0 | Refills: 0 | Status: DISCONTINUED | OUTPATIENT
Start: 2018-01-18 | End: 2018-01-18

## 2018-01-18 RX ADMIN — Medication 1 TABLET(S): at 13:51

## 2018-01-18 RX ADMIN — Medication 20 MILLIGRAM(S): at 06:00

## 2018-01-18 RX ADMIN — Medication 1 APPLICATION(S): at 06:01

## 2018-01-18 RX ADMIN — Medication 325 MILLIGRAM(S): at 13:51

## 2018-01-18 RX ADMIN — PANTOPRAZOLE SODIUM 40 MILLIGRAM(S): 20 TABLET, DELAYED RELEASE ORAL at 06:00

## 2018-01-18 RX ADMIN — Medication 1 TABLET(S): at 13:55

## 2018-01-18 RX ADMIN — Medication 100 MILLIGRAM(S): at 06:00

## 2018-01-18 NOTE — PROGRESS NOTE ADULT - SUBJECTIVE AND OBJECTIVE BOX
CHIEF COMPLAINT/INTERVAL HISTORY:  Pt. seen and evaluated for acute CHF exacerbation.  Appears comfortable lying flat in bed.  Tolerating diuresis.  No gross bleeding.      REVIEW OF SYSTEMS:  No fever, CP, or SOB.    Vital Signs Last 24 Hrs  T(C): 36.6 (15 Masood 2018 08:21), Max: 36.6 (15 Masood 2018 08:21)  T(F): 97.9 (15 Masood 2018 08:21), Max: 97.9 (15 Masood 2018 08:21)  HR: 78 (15 Masood 2018 08:21) (68 - 90)  BP: 102/68 (15 Masood 2018 08:21) (93/66 - 116/70)  BP(mean): --  RR: 16 (15 Masood 2018 08:21) (16 - 18)  SpO2: 99% (15 Masood 2018 08:21) (98% - 99%)    PHYSICAL EXAM:  GENERAL: NAD  HEENT: EOMI, hearing normal, conjunctiva and sclera clear  Chest: CTA bilaterally, no wheezing  CV: S1S2, irregular,   GI: soft, +BS, NT/ND  Musculoskeletal: trace LE edema  Psychiatric: affect nL, mood nL  Skin: warm and dry    LABS:                        10.5   9.3   )-----------( 232      ( 15 Masood 2018 06:08 )             33.0     01-15    137  |  98  |  37<H>  ----------------------------<  111<H>  3.9   |  32<H>  |  0.84    Ca    8.3<L>      15 Masood 2018 06:08  Mg     1.8     01-15      PT/INR - ( 15 Masood 2018 06:08 )   PT: 21.3 sec;   INR: 1.93 ratio        Assessment and Plan:  -Acute on chronic diastolic CHF exacerbation:  EF 55-60%  Lasix 20mg IV daily.  Cardiology f/u  -Afib:  continue Cardizem 90mg PO TID.  Coumadin 3mg PO tonight and lovenox 50mg SQ Q12h.  -Question of bilateral LE DVT:  RLE US negative for DVT.  Awaiting LLE US.   Appreciated vascular surgery input.  Heme/Onc f/u   -Anemia:  stable.  Continue ferrous sulfate 325mg PO daily  -HTN: continue Cardizem as above  -GERD: Protonix 40mg IV daily  -VTE ppx: coumadin tx and lovenox as above.
CHIEF COMPLAINT/INTERVAL HISTORY:  Pt. seen and evaluated for acute CHF exacerbation.  Pt. is lying in bed in no distress.  Tolerating diuretics.  Denies having SOB.    REVIEW OF SYSTEMS:  No fever, CP, or SOB.    Vital Signs Last 24 Hrs  T(C): 36.6 (13 Jan 2018 08:55), Max: 36.6 (12 Jan 2018 19:51)  T(F): 97.9 (13 Jan 2018 08:55), Max: 97.9 (12 Jan 2018 19:51)  HR: 102 (13 Jan 2018 08:55) (91 - 119)  BP: 129/88 (13 Jan 2018 08:55) (108/62 - 129/88)  BP(mean): --  RR: 16 (13 Jan 2018 08:55) (16 - 18)  SpO2: 98% (13 Jan 2018 08:55) (97% - 99%)    PHYSICAL EXAM:  GENERAL: NAD  HEENT: EOMI, hearing normal, conjunctiva and sclera clear  Chest: mild crackles at bases, no wheezing  CV: S1S2, irregular,   GI: soft, +BS, NT/ND  Musculoskeletal: 2+ pedal edema  Psychiatric: affect nL  Skin: warm and dry    LABS:                        10.6   8.7   )-----------( 231      ( 13 Jan 2018 06:56 )             34.4     01-13    143  |  104  |  25<H>  ----------------------------<  87  4.1   |  30  |  0.74    Ca    8.2<L>      13 Jan 2018 06:56    TPro  6.6  /  Alb  3.1<L>  /  TBili  0.5  /  DBili  x   /  AST  34  /  ALT  22  /  AlkPhos  97  01-11    PT/INR - ( 13 Jan 2018 06:56 )   PT: 19.2 sec;   INR: 1.74 ratio         PTT - ( 13 Jan 2018 06:56 )  PTT:32.6 sec      Assessment and Plan:  -Acute on chronic CHF exacerbation:  Lasix 20mg IV daily and Torsemide 10mg PO daily.  Check echo.  Cardiology f/u  -Afib:  continue Cardizem 90mg PO TID.  Coumadin 4mg PO tonight.    -Question of bilateral LE DVT:  RLE US negative for DVT.  Awaiting LLE US.   Heme/Onc f/u   -Anemia:  stable.  Continue ferrous sulfate 325mg PO daily  -HTN: continue Cardizem as above  -GERD: Protonix 40mg IV daily  -VTE ppx: coumadin tx
CHIEF COMPLAINT/INTERVAL HISTORY:  Pt. seen and evaluated for acute on chronic CHF exacerbation.  Pt. is in no distress.  Reports feeling well.  "Everything is okay."  Tolerating IV diuretic.  No gross bleeding.     REVIEW OF SYSTEMS:  No fever, CP, or SOB.    Vital Signs Last 24 Hrs  T(C): 36.6 (16 Jan 2018 04:40), Max: 36.9 (15 Masood 2018 15:39)  T(F): 97.9 (16 Jan 2018 04:40), Max: 98.5 (15 Masood 2018 15:39)  HR: 70 (16 Jan 2018 04:40) (65 - 96)  BP: 120/66 (16 Jan 2018 04:40) (104/67 - 143/81)  BP(mean): --  RR: 18 (16 Jan 2018 04:40) (18 - 18)  SpO2: 100% (16 Jan 2018 04:40) (98% - 100%)    PHYSICAL EXAM:  GENERAL: NAD  HEENT: EOMI, hearing normal, conjunctiva and sclera clear  Chest: CTA bilaterally, no wheezing  CV: S1S2, irregular,   GI: soft, +BS, NT/ND  Musculoskeletal: trace LE edema  Psychiatric: affect nL, mood nL  Skin: warm and dry    LABS:                        10.5   9.3   )-----------( 232      ( 15 Masood 2018 06:08 )             33.0     01-15    137  |  98  |  37<H>  ----------------------------<  111<H>  3.9   |  32<H>  |  0.84    Ca    8.3<L>      15 Masood 2018 06:08  Mg     1.8     01-15      PT/INR - ( 15 Masood 2018 06:08 )   PT: 21.3 sec;   INR: 1.93 ratio        Assessment and Plan:  -Acute on chronic diastolic CHF exacerbation:  EF 55-60%  Lasix 20mg IV daily.  Cardiology f/u  -Afib:  continue Cardizem 90mg PO TID.  Awaiting AM INR for dosing of coumadin.  On lovenox 50mg SQ Q12h.  -Question of bilateral LE DVT:  RLE US negative for DVT.  Awaiting LLE US.   Appreciated vascular surgery input.  Heme/Onc f/u   -Anemia:  stable.  Continue ferrous sulfate 325mg PO daily  -HTN: continue Cardizem as above  -GERD: Protonix 40mg IV daily  -VTE ppx: coumadin tx and lovenox.
CHIEF COMPLAINT/INTERVAL HISTORY:  Pt. seen and evaluated for acute on chronic CHF exacerbation.  Pt. reports having itching of the back.  Otherwise denies having any pain.      REVIEW OF SYSTEMS:  No fever, CP, or abdominal pain.     Vital Signs Last 24 Hrs  T(C): 36.4 (14 Jan 2018 08:30), Max: 36.7 (13 Jan 2018 12:33)  T(F): 97.6 (14 Jan 2018 08:30), Max: 98 (13 Jan 2018 12:33)  HR: 99 (14 Jan 2018 08:30) (99 - 128)  BP: 119/82 (14 Jan 2018 08:30) (104/70 - 123/81)  BP(mean): --  RR: 16 (14 Jan 2018 08:30) (15 - 18)  SpO2: 100% (14 Jan 2018 08:30) (96% - 100%)    PHYSICAL EXAM:  GENERAL: NAD  HEENT: EOMI, hearing normal, conjunctiva and sclera clear  Chest: mild crackles at bases, no wheezing  CV: S1S2, irregular,   GI: soft, +BS, NT/ND  Musculoskeletal: 1+ LE edema  Psychiatric: affect nL  Skin: warm and dry    LABS:                        11.4   7.9   )-----------( 211      ( 14 Jan 2018 06:18 )             36.4     01-14    140  |  102  |  25<H>  ----------------------------<  87  4.1   |  31  |  0.69    Ca    8.8      14 Jan 2018 06:18  Mg     1.9     01-14      PT/INR - ( 14 Jan 2018 06:18 )   PT: 18.6 sec;   INR: 1.69 ratio         PTT - ( 13 Jan 2018 06:56 )  PTT:32.6 sec      Assessment and Plan:  -Acute on chronic CHF exacerbation:  EF 55-60%  Lasix 20mg IV daily.  Cardiology f/u  -Afib:  continue Cardizem 90mg PO TID.  Coumadin 4mg PO tonight and lovenox 50mg SQ Q12h.  -Question of bilateral LE DVT:  RLE US negative for DVT.  Awaiting LLE US.   Appreciated vascular surgery input.  Heme/Onc f/u   -Anemia:  stable.  Continue ferrous sulfate 325mg PO daily  -HTN: continue Cardizem as above  -GERD: Protonix 40mg IV daily  -VTE ppx: coumadin tx and lovenox as above.
[facility]    Marshall Frederick, Elias, Lenin, Valencia, Kofi, Gualberto      [phone]    CHIEF COMPLAINT: Patient is a 88y old  Female who presents with a chief complaint of leg swelling (12 Jan 2018 13:44)      Follow Up: af, chf    Interim history: reports feeling better, no cp. improved sob    MEDICATIONS  (STANDING):  ALBUTerol    90 MICROgram(s) HFA Inhaler 2 Puff(s) Inhalation every 6 hours  calcium carbonate 1250 mG + Vitamin D (OsCal 500 + D) 1 Tablet(s) Oral daily  diltiazem    Tablet 90 milliGRAM(s) Oral three times a day  docusate sodium 100 milliGRAM(s) Oral two times a day  ferrous    sulfate 325 milliGRAM(s) Oral daily  fluocinonide 0.05% Cream 1 Application(s) Topical two times a day  furosemide    Tablet 40 milliGRAM(s) Oral daily  metoprolol    tartrate Injectable 5 milliGRAM(s) IV Push every 6 hours  multivitamin 1 Tablet(s) Oral daily  pantoprazole    Tablet 40 milliGRAM(s) Oral before breakfast  polyethylene glycol 3350 17 Gram(s) Oral daily  potassium chloride    Tablet ER 10 milliEquivalent(s) Oral daily  tiotropium 18 MICROgram(s) Capsule 1 Capsule(s) Inhalation daily  warfarin 3 milliGRAM(s) Oral once    MEDICATIONS  (PRN):      REVIEW OF SYSTEMS:  eye, ent, GI, , allergic, dermatologic, musculoskeletal and neurologic are negative except as described above    Vital Signs Last 24 Hrs  T(C): 36.8 (18 Jan 2018 11:37), Max: 36.8 (17 Jan 2018 20:52)  T(F): 98.2 (18 Jan 2018 11:37), Max: 98.3 (17 Jan 2018 20:52)  HR: 72 (18 Jan 2018 11:37) (72 - 99)  BP: 108/74 (18 Jan 2018 11:37) (96/61 - 154/82)  BP(mean): --  RR: 18 (18 Jan 2018 11:37) (17 - 19)  SpO2: 100% (18 Jan 2018 11:37) (91% - 100%)    I&O's Summary    17 Jan 2018 07:01  -  18 Jan 2018 07:00  --------------------------------------------------------  IN: 960 mL / OUT: 200 mL / NET: 760 mL        Telemetry past 24h: af improved vr    PHYSICAL EXAM:    Constitutional: well-nourished, well-developed, NAD   HEENT:  MMM, sclerae anicteric, conjunctivae clear, no oral cyanosis.  Pulmonary: Non-labored, breath sounds are clear bilaterally, No wheezing, rales or rhonchi  Cardiovascular: irregular, S1 and S2.  No murmur.  No rubs, gallops or clicks  Gastrointestinal: Bowel Sounds present, soft, nontender.   Lymph: No peripheral edema.   Neurological: Alert, no focal deficits  Skin: No rashes.  Psych:  Mood & affect appropriate    LABS: All Labs Reviewed:                        11.0   7.1   )-----------( 193      ( 17 Jan 2018 17:02 )             36.4                         10.6   7.3   )-----------( 203      ( 16 Jan 2018 14:38 )             34.8     17 Jan 2018 17:02    140    |  100    |  28     ----------------------------<  133    3.4     |  32     |  0.73   16 Jan 2018 14:38    141    |  100    |  30     ----------------------------<  125    3.7     |  34     |  0.73     Ca    8.3        17 Jan 2018 17:02  Ca    8.2        16 Jan 2018 14:38  Mg     2.0       17 Jan 2018 17:02      PT/INR - ( 18 Jan 2018 06:41 )   PT: 20.7 sec;   INR: 1.87 ratio               Blood Culture:         RADIOLOGY:    EKG:    Echo:  < from: TTE Echo Doppler w/o Cont (01.12.18 @ 18:55) >     EXAM:  ECHO TTE W/O CON COMP W/DOPPLR         PROCEDURE DATE:  01/12/2018        INTERPRETATION:  Ordering Physician: LYNNE GARDNER [id number]    Indication: CHF    Study Quality: Technically difficult   A complete echocardiographic study was performed utilizing standard   protocol including spectral and color Doppler in all echocardiographic   windows.    Height: 159 cm  Weight: 53 kg  BSA: 1.5  Blood Pressure: 120/78    MEASUREMENTS  IVS: 0.9cm  PWT: 0.8cm  LA: 6.4cm  AO: 3.2cm  LVIDd: 4.2cm  LVIDs: 2.8cm    LVEF: 55-60%  RVSP: 31mmHg    FINDINGS  Left Ventricle: Endocardium not well-visualized. Grossly normal left   ventricular systolic function. Small left ventricle  Aortic Valve: Calcified trileaflet aortic valve. Mild aortic insufficiency  Mitral Valve: Mitral annular calcification with tethered mitral valve   leaflets. Moderate to severe mitral regurgitation  Tricuspid Valve: Tricuspid valve is not well-visualized. Mild to moderate   tricuspid regurgitation  Pulmonic Valve: Not well-visualized. Trace pulmonic insufficiency  Left Atrium: Severe left atrial enlargement  Right Ventricle: Probably decreased right ventricular systolic function.   Mild right ventricular enlargement  Right Atrium: Right atrial enlargement  Diastolic Function: There is evidence of diastolic dysfunction  Pericardium/Pleura: Normal pericardium with no pericardial effusion  Hemodynamics: Pulmonary artery systolic pressure is estimated to be 31   mmHg, assuming the right atrial pressure is equalto 8 mmHg, consistent   with normal pulmonary pressures.    CONCLUSIONS:  1. Technically limited study. Patient is not cooperative.  2. Endocardium not well-visualized. Grossly normal left ventricular   systolic function.  3. Mitral annular calcification with tethered mitral valve leaflets.   Moderate to severe mitral regurgitation  4. Mild to moderate tricuspid regurgitation  5. Severe left atrial enlargement  6. Mild right ventricular enlargement. Probably decreased right   ventricular systolicfunction.   7. Evidence of diastolic dysfunction  8. No pericardial effusion.                  MONTANA ROTHMAN   This document has been electronically signed. Jan 13 2018  4:35PM                < end of copied text >
[facility] -- Marshall Frederick, Lenin Griffin, Kofi [place] Savella  Office # [phone]      Follow Up:  AF    Subjective/Observations: Patient seen and examined. Events noted. Resting comfortably in bed. No complaints of chest pain, dyspnea, or palpitations reported. No signs of orthopnea or PND. only complaining of back pruritis       REVIEW OF SYSTEMS: All other review of systems is negative unless indicated above    PAST MEDICAL & SURGICAL HISTORY:  Femur neck fracture  Vertebral fracture, pathological  Neurogenic bladder  Afib  GERD (gastroesophageal reflux disease)  Pulmonary embolism  UTI (lower urinary tract infection)  HTN (hypertension)  Pulmonary embolism  Memory impairment  Hypertension  Atrial fibrillation  Hyponatremia  Shortness of breath: etiology probably copd  Urinary retention  Arthritis  DVT (deep venous thrombosis)  History of hip surgery: right gamma nail      MEDICATIONS  (STANDING):  ALBUTerol    90 MICROgram(s) HFA Inhaler 2 Puff(s) Inhalation every 6 hours  calcium carbonate 1250 mG + Vitamin D (OsCal 500 + D) 1 Tablet(s) Oral daily  diltiazem    Tablet 90 milliGRAM(s) Oral three times a day  docusate sodium 100 milliGRAM(s) Oral two times a day  enoxaparin Injectable 50 milliGRAM(s) SubCutaneous every 12 hours  ferrous    sulfate 325 milliGRAM(s) Oral daily  fluocinonide 0.05% Cream 1 Application(s) Topical two times a day  furosemide   Injectable 20 milliGRAM(s) IV Push daily  metoprolol    tartrate Injectable 2.5 milliGRAM(s) IV Push every 6 hours  multivitamin 1 Tablet(s) Oral daily  pantoprazole  Injectable 40 milliGRAM(s) IV Push daily  polyethylene glycol 3350 17 Gram(s) Oral daily  potassium chloride    Tablet ER 10 milliEquivalent(s) Oral daily  tiotropium 18 MICROgram(s) Capsule 1 Capsule(s) Inhalation daily  warfarin 3 milliGRAM(s) Oral once    MEDICATIONS  (PRN):      Allergies    No Known Allergies    Intolerances            Vital Signs Last 24 Hrs  T(C): 36.6 (15 Masood 2018 08:21), Max: 36.6 (15 Masood 2018 08:21)  T(F): 97.9 (15 Masood 2018 08:21), Max: 97.9 (15 Masood 2018 08:21)  HR: 78 (15 Masood 2018 08:21) (68 - 90)  BP: 102/68 (15 Masood 2018 08:21) (93/66 - 116/70)  BP(mean): --  RR: 16 (15 Masood 2018 08:21) (16 - 18)  SpO2: 99% (15 Masood 2018 08:21) (98% - 99%)    I&O's Summary    14 Jan 2018 07:01  -  15 Masood 2018 07:00  --------------------------------------------------------  IN: 550 mL / OUT: 0 mL / NET: 550 mL          PHYSICAL EXAM:  TELE: -110  Constitutional: NAD, awake and alert   HEENT: Moist Mucous Membranes, Anicteric  Pulmonary: Decreased breath sounds b/l. No rales, crackles or wheeze appreciated.   Cardiovascular: IRRR, tachy, S1 and S2, distant  Gastrointestinal: Bowel Sounds present, soft, nontender.   Lymph: No peripheral edema. No lymphadenopathy.  Skin: No visible rashes or ulcers.  Psych:  Mood & affect appropriate    LABS: All Labs Reviewed:                        10.5   9.3   )-----------( 232      ( 15 Masood 2018 06:08 )             33.0                         11.4   7.9   )-----------( 211      ( 14 Jan 2018 06:18 )             36.4                         10.6   8.7   )-----------( 231      ( 13 Jan 2018 06:56 )             34.4     15 Masood 2018 06:08    137    |  98     |  37     ----------------------------<  111    3.9     |  32     |  0.84   14 Jan 2018 06:18    140    |  102    |  25     ----------------------------<  87     4.1     |  31     |  0.69   13 Jan 2018 06:56    143    |  104    |  25     ----------------------------<  87     4.1     |  30     |  0.74     Ca    8.3        15 Masood 2018 06:08  Ca    8.8        14 Jan 2018 06:18  Ca    8.2        13 Jan 2018 06:56  Mg     1.8       15 Masood 2018 06:08  Mg     1.9       14 Jan 2018 06:18      PT/INR - ( 15 Masood 2018 06:08 )   PT: 21.3 sec;   INR: 1.93 ratio
HPI:  88F w/pmh of Afib on Coumadin, GERD, HTN, HLD, Depression, urinary retention, constipation presenting from [facility] with b/l leg swelling. Difficult to obtain accurate history as patient is poor historian. History obtained from charts. As per chart review from [facility], venous doppler showed bilateral dvt on study done on 1/11/18. Patient denies any pain. Called son (emergency contact) for further history but he is unsure of patient's current or past medical history. Patient has been living at [facility] for 3 years.     In the ED, vitals were: T97.2F, HR 90, /82, RR 20, SpO2 96% on 3L nasal cannula. Significant labs include: INR therapeutic at 2.22  CTA Chest: No evidence of pulmonary embolism. CHF with probable right heart failure with left greater than right pleural effusions, interstitial edema, and reflux of contrast into the   suprahepatic IVC and hepatic veins. Cardiomegaly. Relate with echocardiogram.  EKG: Afib 's (12 Jan 2018 04:31)      SUBJECTIVE:  Patient is a 88y old  Female who presents with a chief complaint of leg swelling (12 Jan 2018 13:44)          OBJECTIVE:  Review Of Systems:  Constitutional: [ ] Fever [ ] Chills [ ] Fatigue [ ] Weight change   HEENT: [ ] Blurred vision [ ] Eye Pain [ ] Headache [ ] Runny nose [ ] Sore Throat   Respiratory: [ ] Cough [ ] Wheezing [ ] Shortness of breath  Cardiovascular: [ ] Chest Pain [ ] Palpitations [ ] RICHARDSON [ ] PND [ ] Orthopnea  Gastrointestinal: [ ] Abdominal Pain [ ] Diarrhea [ ] Constipation [ ] Hemorrhoids [ ] Nausea [ ] Vomiting  Genitourinary: [ ] Nocturia [ ] Dysuria [ ] Incontinence  Extremities: [ ] Swelling [ ] Joint Pain  Neurologic: [ ] Focal deficit [ ] Paresthesias [ ] Syncope  Lymphatic: [ ] Swelling [ ] Lymphadenopathy   Skin: [ ] Rash [ ] Ecchymoses [ ] Wounds [ ] Lesions  Psychiatry: [ ] Depression [ ] Suicidal/Homicidal Ideation [ ] Anxiety [ ] Sleep Disturbances  [ ] 10 point review of systems is otherwise negative except as mentioned above            [x ]Unable to obtain    Allergy:  Allergies    No Known Allergies    Intolerances        Medications:  MEDICATIONS  (STANDING):  ALBUTerol    90 MICROgram(s) HFA Inhaler 2 Puff(s) Inhalation every 6 hours  calcium carbonate 1250 mG + Vitamin D (OsCal 500 + D) 1 Tablet(s) Oral daily  diltiazem    Tablet 90 milliGRAM(s) Oral three times a day  docusate sodium 100 milliGRAM(s) Oral two times a day  enoxaparin Injectable 50 milliGRAM(s) SubCutaneous every 12 hours  ferrous    sulfate 325 milliGRAM(s) Oral daily  fluocinonide 0.05% Cream 1 Application(s) Topical two times a day  furosemide   Injectable 20 milliGRAM(s) IV Push daily  metoprolol    tartrate Injectable 5 milliGRAM(s) IV Push every 6 hours  multivitamin 1 Tablet(s) Oral daily  pantoprazole  Injectable 40 milliGRAM(s) IV Push daily  polyethylene glycol 3350 17 Gram(s) Oral daily  potassium chloride    Tablet ER 10 milliEquivalent(s) Oral daily  tiotropium 18 MICROgram(s) Capsule 1 Capsule(s) Inhalation daily    MEDICATIONS  (PRN):      PMH/PSH/FH/SH: [ ] Unchanged    Vitals:  T(C): 36.7 (01-16-18 @ 11:49), Max: 36.9 (01-15-18 @ 15:39)  HR: 85 (01-16-18 @ 11:49) (65 - 97)  BP: 112/72 (01-16-18 @ 11:49) (104/67 - 143/81)  BP(mean): --  RR: 19 (01-16-18 @ 11:49) (18 - 19)  SpO2: 98% (01-16-18 @ 11:49) (98% - 100%)  Wt(kg): --  Daily     Daily   I&O's Summary      Labs:                        10.5   9.3   )-----------( 232      ( 15 Masood 2018 06:08 )             33.0     01-15    137  |  98  |  37<H>  ----------------------------<  111<H>  3.9   |  32<H>  |  0.84    Ca    8.3<L>      15 Masood 2018 06:08  Mg     1.8     01-15      PT/INR - ( 15 Masood 2018 06:08 )   PT: 21.3 sec;   INR: 1.93 ratio                           ECG:  < from: 12 Lead ECG (01.12.18 @ 02:37) >  Ventricular Rate 120 BPM    Atrial Rate 156 BPM    QRS Duration 86 ms    Q-T Interval 334 ms    QTC Calculation(Bezet) 472 ms    R Axis 59 degrees    T Axis 24 degrees    Diagnosis Line Atrial fibrillation with rapid ventricular response  Nonspecific ST abnormality  Abnormal ECG    Confirmed by Kiana Michaels ([id number]) on 1/12/2018 10:31:46 AM    < end of copied text >    Echo:  < from: TTE Echo Doppler w/o Cont (01.12.18 @ 18:55) >   EXAM:  ECHO TTE W/O CON COMP W/DOPPLR         PROCEDURE DATE:  01/12/2018        INTERPRETATION:  Ordering Physician: LYNNE GARDNER [id number]    Indication: CHF    Study Quality: Technically difficult   A complete echocardiographic study was performed utilizing standard   protocol including spectral and color Doppler in all echocardiographic   windows.    Height: 159 cm  Weight: 53 kg  BSA: 1.5  Blood Pressure: 120/78    MEASUREMENTS  IVS: 0.9cm  PWT: 0.8cm  LA: 6.4cm  AO: 3.2cm  LVIDd: 4.2cm  LVIDs: 2.8cm    LVEF: 55-60%  RVSP: 31mmHg    FINDINGS  Left Ventricle: Endocardium not well-visualized. Grossly normal left   ventricular systolic function. Small left ventricle  Aortic Valve: Calcified trileaflet aortic valve. Mild aortic insufficiency  Mitral Valve: Mitral annular calcification with tethered mitral valve   leaflets. Moderate to severe mitral regurgitation  Tricuspid Valve: Tricuspid valve is not well-visualized. Mild to moderate   tricuspid regurgitation  Pulmonic Valve: Not well-visualized. Trace pulmonic insufficiency  Left Atrium: Severe left atrial enlargement  Right Ventricle: Probably decreased right ventricular systolic function.   Mild right ventricular enlargement  Right Atrium: Right atrial enlargement  Diastolic Function: There is evidence of diastolic dysfunction  Pericardium/Pleura: Normal pericardium with no pericardial effusion  Hemodynamics: Pulmonary artery systolic pressure is estimated to be 31   mmHg, assuming the right atrial pressure is equalto 8 mmHg, consistent   with normal pulmonary pressures.    CONCLUSIONS:  1. Technically limited study. Patient is not cooperative.  2. Endocardium not well-visualized. Grossly normal left ventricular   systolic function.  3. Mitral annular calcification with tethered mitral valve leaflets.   Moderate to severe mitral regurgitation  4. Mild to moderate tricuspid regurgitation  5. Severe left atrial enlargement  6. Mild right ventricular enlargement. Probably decreased right   ventricular systolicfunction.   7. Evidence of diastolic dysfunction  8. No pericardial effusion.                  MONTANA SAVELLA   This document has been electronically signed. Jan 13 2018  4:35PM          < end of copied text >    Stress Testing:     Cath:    Imaging:  < from: US Duplex Venous Lower Ext Complete, Bilateral (01.12.18 @ 14:46) >  EXAM:  US DPLX LWR EXT VEINS COMPL BI                            PROCEDURE DATE:  01/12/2018          INTERPRETATION:  CLINICAL INFORMATION: Bilateral leg swelling.    COMPARISON: Doppler venous sonogram 4/5/2017.    TECHNIQUE: Duplex sonography ofthe BILATERAL LOWER extremities with   color and spectral Doppler, with and without compression.      FINDINGS:    There is normal compressibility of the right common femoral, femoral and   popliteal veins. No calf vein thrombosis is detected.    Doppler examination shows normal spontaneous and phasic flow.    The left lower extremity is not examined, patient cannot cooperate for   the exam.    IMPRESSION:     No evidence of right lower extremity deep venous thrombosis.                    REVA DEE, ATTENDING RADIOLOGIST  This document has been electronically signed. Jan 12 2018  2:51PM    < end of copied text >  < from: Xray Chest 1 View AP-PORTABLE IMMEDIATE (01.12.18 @ 02:23) >  EXAM:  XR CHEST PORTABLE IMMED 1V                            PROCEDURE DATE:  01/12/2018          INTERPRETATION:  PORTABLE CHEST X-RAY    HISTORY: chf.    COMPARISON: 4/2/2017.    FINDINGS:  Study is limited by portable technique and reversal lordotic view.   Patient is rotated. Patient's chin obscures the upper lungs.    There is mild diffuse interstitial prominence and scattered airspace   opacities. Possible trace bilateral pleural effusions. No pneumothorax.    The cardiac silhouette is not accurately assessed by AP technique.    Degenerative changes of the right shoulder.    IMPRESSION:  Mild diffuse interstitial prominence and scattered airspace opacities.   Possible trace bilateral pleural effusions.                KRISTIE GARCIA MD., ATTENDING RADIOLOGIST  This document has been electronically signed. Jan 12 2018  8:26AM    < end of copied text >  < from: CT Angio Chest w/ IV Cont (01.11.18 @ 23:45) >  EXAM:  CT ANGIO CHEST (W)AW IC                            PROCEDURE DATE:  01/11/2018          INTERPRETATION:  CLINICAL HISTORY: Shortness of breath. DVT. Rule out   pulmonary embolism.     COMPARISON: CT chest 9/1/2015, 4/17/2014    TECHNIQUE: Thoracic CT scan with pulmonary embolism protocol was   performed with the administration of intravenous contrast.    Total of 95 cc ofOmnipaque 350 was injected intravenously. Coronal,   sagittal, and sagittal oblique reformations were obtained. Axial MIP   images were obtained.    FINDINGS:     There is no evidence of pulmonary embolism.     Small to moderate-sized left greater than right pleural effusions with   possible small loculated component on the left side is noted. Overlying   compressiveatelectasis is present. Mild bilateral interstitial   prominence may reflect mild interstitial edema.    Reflux contrast into the suprahepatic IVC and hepatic veins is likely due   to right heart failure versus rapid rate of contrast injection.    The thoracic aorta and main pulmonary artery are normal in caliber. The   heart is mild enlarged. Atheromatous calcifications along the aorta and   coronary arteries is evident.    There is no pericardial or pleural effusion. There is no pneumothorax or  pneumomediastinum.    There is no axillary, mediastinal, or hilar lymphadenopathy.     Limited images of the upper abdomen are unremarkable in appearance.    Chronic T11 wedge compression fracture deformity with mild bony   retropulsion into the thoracic canal is unchanged.    IMPRESSION:     No evidence of pulmonary embolism.    CHF with probable right heart failure with left greater than right   pleural effusions, interstitial edema, and reflux of contrast into the   suprahepatic IVC and hepatic veins. Cardiomegaly. Relate with   echocardiogram.                PEEWEE TORRES M.D., ATTENDING RADIOLOGIST  This document has been electronically signed. Jan 12 2018 12:50AM    < end of copied text >    Interpretation of Telemetry:  Refusing tele lst noted AF     Physical Exam:  Appearance: [ ] Normal  [ ] abnormal [x ] NAD [x] Frail  Eyes: [x ] PERRL [ ] EOMI  HENT: [ x] Normal [ ] Abnormal oral muscosa [x ]NC/AT  Cardiovascular: [ x] S1 [x ] S2 [ ] RRR [x] irregularly irregular [ ] m/r/g [ ]edema [ ] JVP  Procedural Access Site: [ ]  hematoma [ ] tender to palpation [ ] 2+ pulse [ ] bruit [ ] Ecchymosis  Respiratory: [x ] Clear to auscultation bilaterally  Gastrointestinal: [ x] Soft [ ] tenderness[ ] distension [x ] BS  Musculoskeletal: [ ] clubbing [ ] joint deformity   Neurologic: [x ] Non-focal  Lymphatic: [ ] lymphadenopathy [x] No peripheral edema  Psychiatry: [ ] Awake and alert cooperative with exam [ ] confused [ ] disoriented [x ] Mood & affect appropriate  Skin: [ ]  rashes [ ] ecchymoses [ ] cyanosis
HPI:  88F w/pmh of Afib on Coumadin, GERD, HTN, HLD, Depression, urinary retention, constipation presenting from [facility] with b/l leg swelling. Difficult to obtain accurate history as patient is poor historian. History obtained from charts. As per chart review from [facility], venous doppler showed bilateral dvt on study done on 18. Patient denies any pain. Called son (emergency contact) for further history but he is unsure of patient's current or past medical history. Patient has been living at [facility] for 3 years.     In the ED, vitals were: T97.2F, HR 90, /82, RR 20, SpO2 96% on 3L nasal cannula. Significant labs include: INR therapeutic at 2.22  CTA Chest: No evidence of pulmonary embolism. CHF with probable right heart failure with left greater than right pleural effusions, interstitial edema, and reflux of contrast into the   suprahepatic IVC and hepatic veins. Cardiomegaly. Relate with echocardiogram.  EKG: Afib 's (2018 04:31)      SUBJECTIVE:  Patient is a 88y old  Female who presents with a chief complaint of leg swelling (2018 13:44)          OBJECTIVE:  Review Of Systems:  Constitutional: [ ] Fever [ ] Chills [ ] Fatigue [ ] Weight change   HEENT: [ ] Blurred vision [ ] Eye Pain [ ] Headache [ ] Runny nose [ ] Sore Throat   Respiratory: [ ] Cough [ ] Wheezing [ ] Shortness of breath  Cardiovascular: [ ] Chest Pain [ ] Palpitations [ ] RICHARDSON [ ] PND [ ] Orthopnea  Gastrointestinal: [ ] Abdominal Pain [ ] Diarrhea [ ] Constipation [ ] Hemorrhoids [ ] Nausea [ ] Vomiting  Genitourinary: [ ] Nocturia [ ] Dysuria [ ] Incontinence  Extremities: [ ] Swelling [ ] Joint Pain  Neurologic: [ ] Focal deficit [ ] Paresthesias [ ] Syncope  Lymphatic: [ ] Swelling [ ] Lymphadenopathy   Skin: [ ] Rash [ ] Ecchymoses [ ] Wounds [ ] Lesions  Psychiatry: [ ] Depression [ ] Suicidal/Homicidal Ideation [ ] Anxiety [ ] Sleep Disturbances  [ ] 10 point review of systems is otherwise negative except as mentioned above            [x ]Unable to obtain    Allergy:  Allergies    No Known Allergies    Intolerances        Medications:  MEDICATIONS  (STANDING):  ALBUTerol    90 MICROgram(s) HFA Inhaler 2 Puff(s) Inhalation every 6 hours  calcium carbonate 1250 mG + Vitamin D (OsCal 500 + D) 1 Tablet(s) Oral daily  diltiazem    Tablet 90 milliGRAM(s) Oral three times a day  docusate sodium 100 milliGRAM(s) Oral two times a day  enoxaparin Injectable 50 milliGRAM(s) SubCutaneous every 12 hours  ferrous    sulfate 325 milliGRAM(s) Oral daily  fluocinonide 0.05% Cream 1 Application(s) Topical two times a day  furosemide   Injectable 20 milliGRAM(s) IV Push daily  metoprolol    tartrate Injectable 2.5 milliGRAM(s) IV Push every 6 hours  multivitamin 1 Tablet(s) Oral daily  pantoprazole  Injectable 40 milliGRAM(s) IV Push daily  polyethylene glycol 3350 17 Gram(s) Oral daily  potassium chloride    Tablet ER 10 milliEquivalent(s) Oral daily  tiotropium 18 MICROgram(s) Capsule 1 Capsule(s) Inhalation daily  warfarin 4 milliGRAM(s) Oral once    MEDICATIONS  (PRN):      PMH/PSH/FH/SH: [ ] Unchanged    Vitals:  T(C): 36.4 (18 @ 08:30), Max: 36.7 (18 @ 16:34)  HR: 99 (18 @ 08:30) (99 - 128)  BP: 119/82 (18 @ 08:30) (104/70 - 123/81)  BP(mean): --  RR: 16 (18 @ 08:30) (15 - 17)  SpO2: 100% (18 @ 08:30) (96% - 100%)  Wt(kg): --  Daily     Daily Weight in k.1 (2018 04:43)  I&O's Summary      Labs:                        11.4   7.9   )-----------( 211      ( 2018 06:18 )             36.4         140  |  102  |  25<H>  ----------------------------<  87  4.1   |  31  |  0.69    Ca    8.8      2018 06:18  Mg     1.9           PT/INR - ( 2018 06:18 )   PT: 18.6 sec;   INR: 1.69 ratio         PTT - ( 2018 06:56 )  PTT:32.6 sec  CARDIAC MARKERS ( 2018 16:22 )  <.015 ng/mL / x     / 44 U/L / x     / 1.4 ng/mL      Magnesium, Serum: 1.9 mg/dL ( @ 06:18)              ECG:  < from: 12 Lead ECG (18 @ 02:37) >  Ventricular Rate 120 BPM    Atrial Rate 156 BPM    QRS Duration 86 ms    Q-T Interval 334 ms    QTC Calculation(Bezet) 472 ms    R Axis 59 degrees    T Axis 24 degrees    Diagnosis Line Atrial fibrillation with rapid ventricular response  Nonspecific ST abnormality  Abnormal ECG    Confirmed by Kiana Michaels ([id number]) on 2018 10:31:46 AM    < end of copied text >    Echo:  < from: TTE Echo Doppler w/o Cont (18 @ 18:55) >   EXAM:  ECHO TTE W/O CON COMP W/DOPPLR         PROCEDURE DATE:  2018        INTERPRETATION:  Ordering Physician: LYNNE GARDNER [id number]    Indication: CHF    Study Quality: Technically difficult   A complete echocardiographic study was performed utilizing standard   protocol including spectral and color Doppler in all echocardiographic   windows.    Height: 159 cm  Weight: 53 kg  BSA: 1.5  Blood Pressure: 120/78    MEASUREMENTS  IVS: 0.9cm  PWT: 0.8cm  LA: 6.4cm  AO: 3.2cm  LVIDd: 4.2cm  LVIDs: 2.8cm    LVEF: 55-60%  RVSP: 31mmHg    FINDINGS  Left Ventricle: Endocardium not well-visualized. Grossly normal left   ventricular systolic function. Small left ventricle  Aortic Valve: Calcified trileaflet aortic valve. Mild aortic insufficiency  Mitral Valve: Mitral annular calcification with tethered mitral valve   leaflets. Moderate to severe mitral regurgitation  Tricuspid Valve: Tricuspid valve is not well-visualized. Mild to moderate   tricuspid regurgitation  Pulmonic Valve: Not well-visualized. Trace pulmonic insufficiency  Left Atrium: Severe left atrial enlargement  Right Ventricle: Probably decreased right ventricular systolic function.   Mild right ventricular enlargement  Right Atrium: Right atrial enlargement  Diastolic Function: There is evidence of diastolic dysfunction  Pericardium/Pleura: Normal pericardium with no pericardial effusion  Hemodynamics: Pulmonary artery systolic pressure is estimated to be 31   mmHg, assuming the right atrial pressure is equalto 8 mmHg, consistent   with normal pulmonary pressures.    CONCLUSIONS:  1. Technically limited study. Patient is not cooperative.  2. Endocardium not well-visualized. Grossly normal left ventricular   systolic function.  3. Mitral annular calcification with tethered mitral valve leaflets.   Moderate to severe mitral regurgitation  4. Mild to moderate tricuspid regurgitation  5. Severe left atrial enlargement  6. Mild right ventricular enlargement. Probably decreased right   ventricular systolicfunction.   7. Evidence of diastolic dysfunction  8. No pericardial effusion.                  MONTANA ROTHMAN   This document has been electronically signed. 2018  4:35PM        < end of copied text >    Stress Testing:     Cath:    Imaging:  < from: US Duplex Venous Lower Ext Complete, Bilateral (18 @ 14:46) >  EXAM:  US DPLX LWR EXT VEINS COMPL BI                            PROCEDURE DATE:  2018          INTERPRETATION:  CLINICAL INFORMATION: Bilateral leg swelling.    COMPARISON: Doppler venous sonogram 2017.    TECHNIQUE: Duplex sonography ofthe BILATERAL LOWER extremities with   color and spectral Doppler, with and without compression.      FINDINGS:    There is normal compressibility of the right common femoral, femoral and   popliteal veins. No calf vein thrombosis is detected.    Doppler examination shows normal spontaneous and phasic flow.    The left lower extremity is not examined, patient cannot cooperate for   the exam.    IMPRESSION:     No evidence of right lower extremity deep venous thrombosis.                    REVA DEE, ATTENDING RADIOLOGIST  This document has been electronically signed. 2018  2:51PM        < end of copied text >    Interpretation of Telemetry:  JEAN to 130s    Physical Exam:  Appearance: [ ] Normal  [ ] abnormal [x ] NAD [x] frail  Eyes: [x ] PERRL [ ] EOMI  HENT: [ x] Normal [ ] Abnormal oral muscosa [x ]NC/AT  Cardiovascular: [x ] S1 [x ] S2 [ ] RRR [x] irregularly irregular[ ] m/r/g [ ]edema [ ] JVP  Procedural Access Site: [ ]  hematoma [ ] tender to palpation [ ] 2+ pulse [ ] bruit [ ] Ecchymosis  Respiratory: [ ] Clear to auscultation bilaterally [x] rt clear difficult to assess left pt not very cooperative with exam  Gastrointestinal: [x ] Soft [ ] tenderness[ ] distension [ x] BS  Musculoskeletal: [ ] clubbing [ ] joint deformity   Neurologic: [x ] Non-focal  Lymphatic: [ ] lymphadenopathy [x] no peripheral edema  Psychiatry: [ ] AAOx3  [ x] confused [ ] disoriented [x ] Mood & affect appropriate with son present  Skin: [ ]  rashes [ ] ecchymoses [ ] cyanosis
HPI:  88F w/pmh of Afib on Coumadin, GERD, HTN, HLD, Depression, urinary retention, constipation presenting from [facility] with b/l leg swelling. Difficult to obtain accurate history as patient is poor historian. History obtained from charts. As per chart review from [facility], venous doppler showed bilateral dvt on study done on 18. Patient denies any pain. Called son (emergency contact) for further history but he is unsure of patient's current or past medical history. Patient has been living at [facility] for 3 years.     In the ED, vitals were: T97.2F, HR 90, /82, RR 20, SpO2 96% on 3L nasal cannula. Significant labs include: INR therapeutic at 2.22  CTA Chest: No evidence of pulmonary embolism. CHF with probable right heart failure with left greater than right pleural effusions, interstitial edema, and reflux of contrast into the   suprahepatic IVC and hepatic veins. Cardiomegaly. Relate with echocardiogram.  EKG: Afib 's (2018 04:31)      SUBJECTIVE:  Patient is a 88y old  Female who presents with a chief complaint of leg swelling (2018 13:44)          OBJECTIVE:  Review Of Systems:  Constitutional: [ ] Fever [ ] Chills [ ] Fatigue [ ] Weight change   HEENT: [ ] Blurred vision [ ] Eye Pain [ ] Headache [ ] Runny nose [ ] Sore Throat   Respiratory: [ ] Cough [ ] Wheezing [ ] Shortness of breath  Cardiovascular: [ ] Chest Pain [ ] Palpitations [ ] RICHARDSON [ ] PND [ ] Orthopnea  Gastrointestinal: [ ] Abdominal Pain [ ] Diarrhea [ ] Constipation [ ] Hemorrhoids [ ] Nausea [ ] Vomiting  Genitourinary: [ ] Nocturia [ ] Dysuria [ ] Incontinence  Extremities: [ ] Swelling [ ] Joint Pain  Neurologic: [ ] Focal deficit [ ] Paresthesias [ ] Syncope  Lymphatic: [ ] Swelling [ ] Lymphadenopathy   Skin: [ ] Rash [ ] Ecchymoses [ ] Wounds [ ] Lesions  Psychiatry: [ ] Depression [ ] Suicidal/Homicidal Ideation [ ] Anxiety [ ] Sleep Disturbances  [ ] 10 point review of systems is otherwise negative except as mentioned above            [x ]Unable to obtain    Allergy:  Allergies    No Known Allergies    Intolerances        Medications:  MEDICATIONS  (STANDING):  ALBUTerol    90 MICROgram(s) HFA Inhaler 2 Puff(s) Inhalation every 6 hours  calcium carbonate 1250 mG + Vitamin D (OsCal 500 + D) 1 Tablet(s) Oral daily  diltiazem    Tablet 90 milliGRAM(s) Oral three times a day  docusate sodium 100 milliGRAM(s) Oral two times a day  ferrous    sulfate 325 milliGRAM(s) Oral daily  furosemide   Injectable 20 milliGRAM(s) IV Push daily  metoprolol    tartrate Injectable 2.5 milliGRAM(s) IV Push every 6 hours  multivitamin 1 Tablet(s) Oral daily  pantoprazole  Injectable 40 milliGRAM(s) IV Push daily  polyethylene glycol 3350 17 Gram(s) Oral daily  potassium chloride    Tablet ER 10 milliEquivalent(s) Oral daily  tiotropium 18 MICROgram(s) Capsule 1 Capsule(s) Inhalation daily  torsemide 10 milliGRAM(s) Oral daily  warfarin 4 milliGRAM(s) Oral once    MEDICATIONS  (PRN):      PMH/PSH/FH/SH: [ ] Unchanged    Vitals:  T(C): 36.7 (18 @ 12:33), Max: 36.7 (18 @ 12:33)  HR: 102 (18 @ 12:33) (91 - 119)  BP: 106/72 (18 @ 12:33) (106/72 - 129/88)  BP(mean): --  RR: 18 (18 @ 12:33) (16 - 18)  SpO2: 98% (18 @ 08:55) (97% - 99%)  Wt(kg): --  Daily     Daily Weight in k (2018 08:28)  I&O's Summary      Labs:                        10.6   8.7   )-----------( 231      ( 2018 06:56 )             34.4         143  |  104  |  25<H>  ----------------------------<  87  4.1   |  30  |  0.74    Ca    8.2<L>      2018 06:56    TPro  6.6  /  Alb  3.1<L>  /  TBili  0.5  /  DBili  x   /  AST  34  /  ALT  22  /  AlkPhos  97  -11    PT/INR - ( 2018 06:56 )   PT: 19.2 sec;   INR: 1.74 ratio         PTT - ( 2018 06:56 )  PTT:32.6 sec  CARDIAC MARKERS ( 2018 16:22 )  <.015 ng/mL / x     / 44 U/L / x     / 1.4 ng/mL  CARDIAC MARKERS ( 2018 07:29 )  <.015 ng/mL / x     / 74 U/L / x     / x                      ECG:  < from: 12 Lead ECG (18 @ 02:37) >    Ventricular Rate 120 BPM    Atrial Rate 156 BPM    QRS Duration 86 ms    Q-T Interval 334 ms    QTC Calculation(Bezet) 472 ms    R Axis 59 degrees    T Axis 24 degrees    Diagnosis Line Atrial fibrillation with rapid ventricular response  Nonspecific ST abnormality  Abnormal ECG    Confirmed by Kiana Michaels ([id number]) on 2018 10:31:46 AM    < end of copied text >    Echo:  < from: TTE Echo Doppler w/o Cont (17 @ 10:18) >  EXAM:  ECHO TTE W/O CON COMP W/DOPPLR         PROCEDURE DATE:  2017        INTERPRETATION:  Ordering Physician: LYNNE GARDNER [id number]    Indication: SVT    Study Quality: Technically difficult and limited   A limited echocardiographicstudy was performed.      Height: 157 cm  Weight: 41 kg  BSA: 1.36 sq m  Blood Pressure: 114/73    RVSP: 39mmHg    FINDINGS  Left Ventricle: Endocardium is not well-visualized. Grossly, normal left   ventricular systolic function.  Aortic Valve: Calcified trileaflet aortic valve. Minimal aortic   insufficiency.  Mitral Valve: Mitral annular calcification and calcified mitral leaflets.   Mild mitral insufficiency.  Tricuspid Valve: Tricuspid valve is not well-visualized. Mild tricuspid   insufficiency.  Pulmonic Valve: Not well visualized. Trace pulmonic insufficiency.  Left Atrium: Severe left atrial enlargement.  Right Ventricle: Normal right ventricular size and systolic function.  Right Atrium: Moderately enlarged.  Pericardium/Pleura: Normal pericardium with no pericardial effusion.                    JOSE GRIJALVA M.D., ATTENDING CARDIOLOGIST  This document has been electronically signed. Mar 26 2017  1:47PM        < end of copied text >    Stress Testing:     Cath:    Imaging:  < from: US Duplex Venous Lower Ext Complete, Bilateral (18 @ 14:46) >  EXAM:  US DPLX LWR EXT VEINS COMPL BI                            PROCEDURE DATE:  2018          INTERPRETATION:  CLINICAL INFORMATION: Bilateral leg swelling.    COMPARISON: Doppler venous sonogram 2017.    TECHNIQUE: Duplex sonography ofthe BILATERAL LOWER extremities with   color and spectral Doppler, with and without compression.      FINDINGS:    There is normal compressibility of the right common femoral, femoral and   popliteal veins. No calf vein thrombosis is detected.    Doppler examination shows normal spontaneous and phasic flow.    The left lower extremity is not examined, patient cannot cooperate for   the exam.    IMPRESSION:     No evidence of right lower extremity deep venous thrombosis.                    REVA DEE, ATTENDING RADIOLOGIST  This document has been electronically signed. 2018  2:51PM        < end of copied text >  < from: Xray Chest 1 View AP-PORTABLE IMMEDIATE (18 @ 02:23) >  EXAM:  XR CHEST PORTABLE IMMED 1V                            PROCEDURE DATE:  2018          INTERPRETATION:  PORTABLE CHEST X-RAY    HISTORY: chf.    COMPARISON: 2017.    FINDINGS:  Study is limited by portable technique and reversal lordotic view.   Patient is rotated. Patient's chin obscures the upper lungs.    There is mild diffuse interstitial prominence and scattered airspace   opacities. Possible trace bilateral pleural effusions. No pneumothorax.    The cardiac silhouette is not accurately assessed by AP technique.    Degenerative changes of the right shoulder.    IMPRESSION:  Mild diffuse interstitial prominence and scattered airspace opacities.   Possible trace bilateral pleural effusions.                KRISTIE GARCIA MD., ATTENDING RADIOLOGIST  This document has been electronically signed. 2018  8:26AM    < end of copied text >    Interpretation of Telemetry:  AV paced 60    Physical Exam:  Appearance: [x ] Normal  [ ] abnormal [x ] NAD   Eyes: [x ] PERRL [ ] EOMI  HENT: [ ] Normal [ ] Abnormal oral muscosa [x ]NC/AT  Cardiovascular: [x ] S1 [x ] S2 [ ] RRR [x] irregularly irregular [ ] m/r/g [ ]edema [ ] JVP  Procedural Access Site: [ ]  hematoma [ ] tender to palpation [ ] 2+ pulse [ ] bruit [ ] Ecchymosis  Respiratory: [x ] left basal rales   Gastrointestinal: [x ] Soft [ ] tenderness[ ] distension [x ] BS  Musculoskeletal: [ ] clubbing [ ] joint deformity   Neurologic: [x ] Non-focal [x] +2 Pedal edema  Lymphatic: [ ] lymphadenopathy [x] +2 Pedal edema  Psychiatry: [ ] Alert and awake   [ ] confused [ ] disoriented [ ] Mood & affect appropriate [x] flat affect follows commands  Skin: [ ]  rashes [ ] ecchymoses [ ] cyanosis
Interval History: remains weak and confused    Chart reviewed and events noted;   Overnight events:    MEDICATIONS  (STANDING):  ALBUTerol    90 MICROgram(s) HFA Inhaler 2 Puff(s) Inhalation every 6 hours  calcium carbonate 1250 mG + Vitamin D (OsCal 500 + D) 1 Tablet(s) Oral daily  diltiazem    Tablet 90 milliGRAM(s) Oral three times a day  docusate sodium 100 milliGRAM(s) Oral two times a day  enoxaparin Injectable 50 milliGRAM(s) SubCutaneous every 12 hours  ferrous    sulfate 325 milliGRAM(s) Oral daily  fluocinonide 0.05% Cream 1 Application(s) Topical two times a day  furosemide   Injectable 20 milliGRAM(s) IV Push daily  metoprolol    tartrate Injectable 2.5 milliGRAM(s) IV Push every 6 hours  multivitamin 1 Tablet(s) Oral daily  pantoprazole  Injectable 40 milliGRAM(s) IV Push daily  polyethylene glycol 3350 17 Gram(s) Oral daily  potassium chloride    Tablet ER 10 milliEquivalent(s) Oral daily  tiotropium 18 MICROgram(s) Capsule 1 Capsule(s) Inhalation daily  warfarin 4 milliGRAM(s) Oral once    MEDICATIONS  (PRN):      Vital Signs Last 24 Hrs  T(C): 36.5 (14 Jan 2018 16:48), Max: 36.6 (14 Jan 2018 04:43)  T(F): 97.7 (14 Jan 2018 16:48), Max: 97.9 (14 Jan 2018 04:43)  HR: 86 (14 Jan 2018 16:48) (86 - 128)  BP: 93/66 (14 Jan 2018 16:48) (93/66 - 123/81)  BP(mean): --  RR: 18 (14 Jan 2018 16:48) (16 - 18)  SpO2: 98% (14 Jan 2018 16:48) (98% - 100%)    PHYSICAL EXAM  General: lethargic, chronically ill appearing  HEENT: clear oropharynx, anicteric sclera, pink conjunctivae  Neck: supple  CV: normal S1S2 with no murmur rubs or gallops  Lungs: clear to auscultation, no wheezes, no rhales  Abdomen: soft non-tender non-distended, no hepato/splenomegaly  Ext: no clubbing cyanosis or edema  Skin: no rashes and no petichiae  Neuro: alelethargic confused      LABS:  CBC Full  -  ( 14 Jan 2018 06:18 )  WBC Count : 7.9 K/uL  Hemoglobin : 11.4 g/dL  Hematocrit : 36.4 %  Platelet Count - Automated : 211 K/uL  Mean Cell Volume : 82.5 fl  Mean Cell Hemoglobin : 25.8 pg  Mean Cell Hemoglobin Concentration : 31.2 gm/dL  Auto Neutrophil # : x  Auto Lymphocyte # : x  Auto Monocyte # : x  Auto Eosinophil # : x  Auto Basophil # : x  Auto Neutrophil % : x  Auto Lymphocyte % : x  Auto Monocyte % : x  Auto Eosinophil % : x  Auto Basophil % : x    01-14    140  |  102  |  25<H>  ----------------------------<  87  4.1   |  31  |  0.69    Ca    8.8      14 Jan 2018 06:18  Mg     1.9     01-14      PT/INR - ( 14 Jan 2018 06:18 )   PT: 18.6 sec;   INR: 1.69 ratio         PTT - ( 13 Jan 2018 06:56 )  PTT:32.6 sec    fe studies      WBC trend  7.9 K/uL (01-14-18 @ 06:18)  8.7 K/uL (01-13-18 @ 06:56)  9.1 K/uL (01-12-18 @ 07:29)  9.9 K/uL (01-11-18 @ 22:07)      Hgb trend  11.4 g/dL (01-14-18 @ 06:18)  10.6 g/dL (01-13-18 @ 06:56)  11.3 g/dL (01-12-18 @ 07:29)  10.8 g/dL (01-11-18 @ 22:07)      plt trend  211 K/uL (01-14-18 @ 06:18)  231 K/uL (01-13-18 @ 06:56)  239 K/uL (01-12-18 @ 07:29)  237 K/uL (01-11-18 @ 22:07)        RADIOLOGY & ADDITIONAL STUDIES:
[facility]# [id number]    CHIEF COMPLAINT/INTERVAL HISTORY:  Pt. seen and evaluated for acute on chronic CHF exacerbation.  Pt. is feeling well in no distress.  Tolerating diuretic.     REVIEW OF SYSTEMS:  No fever, CP, or SOB.    Vital Signs Last 24 Hrs  T(C): 36.3 (17 Jan 2018 07:55), Max: 36.9 (16 Jan 2018 10:33)  T(F): 97.4 (17 Jan 2018 07:55), Max: 98.5 (16 Jan 2018 10:33)  HR: 66 (17 Jan 2018 07:55) (63 - 97)  BP: 110/72 (17 Jan 2018 07:55) (94/50 - 132/82)  BP(mean): --  RR: 18 (17 Jan 2018 07:55) (16 - 19)  SpO2: 100% (17 Jan 2018 07:55) (98% - 100%)    PHYSICAL EXAM:  GENERAL: NAD  HEENT: EOMI, hearing normal, conjunctiva and sclera clear  Chest: CTA bilaterally, no wheezing  CV: S1S2, irregular,   GI: soft, +BS, NT/ND  Musculoskeletal: trace LE edema  Psychiatric: affect nL, mood nL  Skin: warm and dry    LABS:                        10.6   7.3   )-----------( 203      ( 16 Jan 2018 14:38 )             34.8     01-16    141  |  100  |  30<H>  ----------------------------<  125<H>  3.7   |  34<H>  |  0.73    Ca    8.2<L>      16 Jan 2018 14:38      PT/INR - ( 16 Jan 2018 14:38 )   PT: 25.5 sec;   INR: 2.30 ratio        Assessment and Plan:  -Acute on chronic diastolic CHF exacerbation:  EF 55-60%  Lasix 20mg IV daily.  Cardiology f/u  -Afib:  continue Cardizem 90mg PO TID.  Awaiting AM INR for dosing of coumadin.   -Question of bilateral LE DVT:  RLE US negative for DVT.  Awaiting LLE US.   Appreciated vascular surgery input.  Heme/Onc f/u   -Anemia:  stable.  Continue ferrous sulfate 325mg PO daily  -HTN: continue Cardizem as above  -GERD: Protonix 40mg IV daily  -VTE ppx: coumadin tx.
[facility] [place] [facility] - Marshall Frederick, Elias, Lenin, [place], Gualberto Kirby  Office Number:  [phone]    Patient resting comfortably in bed in NAD.  Very tired but awake and arousable.    ROS: negative unless otherwise mentioned.    Telemetry:  AF 80's    MEDICATIONS  (STANDING):  ALBUTerol    90 MICROgram(s) HFA Inhaler 2 Puff(s) Inhalation every 6 hours  calcium carbonate 1250 mG + Vitamin D (OsCal 500 + D) 1 Tablet(s) Oral daily  diltiazem    Tablet 90 milliGRAM(s) Oral three times a day  docusate sodium 100 milliGRAM(s) Oral two times a day  ferrous    sulfate 325 milliGRAM(s) Oral daily  fluocinonide 0.05% Cream 1 Application(s) Topical two times a day  furosemide   Injectable 20 milliGRAM(s) IV Push daily  metoprolol    tartrate Injectable 5 milliGRAM(s) IV Push every 6 hours  multivitamin 1 Tablet(s) Oral daily  pantoprazole  Injectable 40 milliGRAM(s) IV Push daily  polyethylene glycol 3350 17 Gram(s) Oral daily  potassium chloride    Tablet ER 10 milliEquivalent(s) Oral daily  tiotropium 18 MICROgram(s) Capsule 1 Capsule(s) Inhalation daily    MEDICATIONS  (PRN):      Allergies    No Known Allergies    Intolerances        Vital Signs Last 24 Hrs  T(C): 36.4 (17 Jan 2018 11:55), Max: 36.6 (16 Jan 2018 21:28)  T(F): 97.5 (17 Jan 2018 11:55), Max: 97.8 (16 Jan 2018 21:28)  HR: 99 (17 Jan 2018 13:34) (63 - 99)  BP: 106/68 (17 Jan 2018 13:34) (94/50 - 132/82)  BP(mean): --  RR: 18 (17 Jan 2018 11:55) (16 - 18)  SpO2: 98% (17 Jan 2018 11:55) (98% - 100%)    I&O's Summary    16 Jan 2018 07:01  -  17 Jan 2018 07:00  --------------------------------------------------------  IN: 240 mL / OUT: 0 mL / NET: 240 mL        ON EXAM:    Appearance: [ ] Normal  [ ] abnormal [x ] NAD [x] Frail  Eyes: [x ] PERRL [ ] EOMI  HENT: [ x] Normal [ ] Abnormal oral muscosa [x ]NC/AT  Cardiovascular: [ x] S1 [x ] S2 [ ] RRR [x] irregularly irregular [ ] m/r/g [ ]edema [ ] JVP  Procedural Access Site: [ ]  hematoma [ ] tender to palpation [ ] 2+ pulse [ ] bruit [ ] Ecchymosis  Respiratory: [x ] Clear to auscultation bilaterally  Gastrointestinal: [ x] Soft [ ] tenderness[ ] distension [x ] BS  Musculoskeletal: [ ] clubbing [ ] joint deformity   Neurologic: [x ] Non-focal  Lymphatic: [ ] lymphadenopathy [x] No peripheral edema  Psychiatry: [ ] Awake and alert cooperative with exam [ ] confused [ ] disoriented [x ] Mood & affect appropriate  Skin: [ ]  rashes [ ] ecchymoses [ ] cyanosis    LABS: All Labs Reviewed:                        10.6   7.3   )-----------( 203      ( 16 Jan 2018 14:38 )             34.8                         10.5   9.3   )-----------( 232      ( 15 Masood 2018 06:08 )             33.0     16 Jan 2018 14:38    141    |  100    |  30     ----------------------------<  125    3.7     |  34     |  0.73   15 Masood 2018 06:08    137    |  98     |  37     ----------------------------<  111    3.9     |  32     |  0.84     Ca    8.2        16 Jan 2018 14:38  Ca    8.3        15 Masood 2018 06:08  Mg     1.8       15 Masood 2018 06:08      PT/INR - ( 16 Jan 2018 14:38 )   PT: 25.5 sec;   INR: 2.30 ratio               Blood Culture:
[employer] interpretor# [id number]    CHIEF COMPLAINT/INTERVAL HISTORY:  Pt. seen and evaluated for acute on chronic chf exacerbation.  Pt. is in no distress.  Reports feeling well.  No difficulty with breathing.  Andrew having any pain.      REVIEW OF SYSTEMS:  No fever, CP, or SOB.    Vital Signs Last 24 Hrs  T(C): 36.4 (18 Jan 2018 07:45), Max: 36.8 (17 Jan 2018 20:52)  T(F): 97.6 (18 Jan 2018 07:45), Max: 98.3 (17 Jan 2018 20:52)  HR: 79 (18 Jan 2018 07:45) (79 - 99)  BP: 114/70 (18 Jan 2018 07:45) (96/61 - 154/82)  BP(mean): --  RR: 18 (18 Jan 2018 07:45) (17 - 19)  SpO2: 99% (18 Jan 2018 07:45) (91% - 100%)    PHYSICAL EXAM:  GENERAL: NAD  HEENT: EOMI, hearing normal, conjunctiva and sclera clear  Chest: CTA bilaterally, no wheezing  CV: S1S2, irregular,   GI: soft, +BS, NT/ND  Musculoskeletal: trace LE edema  Psychiatric: affect nL, mood nL  Skin: warm and dry    LABS:                        11.0   7.1   )-----------( 193      ( 17 Jan 2018 17:02 )             36.4     01-17    140  |  100  |  28<H>  ----------------------------<  133<H>  3.4<L>   |  32<H>  |  0.73    Ca    8.3<L>      17 Jan 2018 17:02  Mg     2.0     01-17      PT/INR - ( 18 Jan 2018 06:41 )   PT: 20.7 sec;   INR: 1.87 ratio         Assessment and Plan:  -Acute on chronic diastolic CHF exacerbation:  EF 55-60%  Lasix 20mg PO daily.  Cardiology f/u  -Afib:  continue Cardizem 90mg PO TID.  Coumadin 3mg PO tonight.  -Question of bilateral LE DVT:  LE negative for DVT.   Appreciated vascular surgery input.  Heme/Onc f/u   -Anemia:  stable.  Continue ferrous sulfate 325mg PO daily  -HTN: continue Cardizem as above  -GERD: Protonix 40mg PO daily  -VTE ppx: coumadin tx.
CHIEF COMPLAINT/INTERVAL HISTORY: CHF exacerbation   Patient was seen and examined at bedside. On 2L NC. Laying in bed. Patient is in pain, from swelling in the foot B/L.     REVIEW OF SYSTEMS:  Denies fever, nausea, vomiting, chest pain, abdominal pain    Vital Signs Last 24 Hrs  T(C): 36.2 (12 Jan 2018 07:45), Max: 36.7 (11 Jan 2018 23:45)  T(F): 97.2 (12 Jan 2018 07:45), Max: 98.1 (12 Jan 2018 06:52)  HR: 113 (12 Jan 2018 07:45) (86 - 125)  BP: 120/78 (12 Jan 2018 07:45) (113/76 - 121/69)  BP(mean): --  RR: 18 (12 Jan 2018 07:45) (17 - 20)  SpO2: 96% (12 Jan 2018 07:45) (96% - 100%)    PHYSICAL EXAM:  GENERAL: elderly frail female, laying in bed   Chest: Crackles heard B/L, no wheezing   CV: irregular rate and rhythm, no murmurs or rubs   GI: soft, +BS, NT/ND, no rebounding or guarding   Musculoskeletal: B/L LE swelling in distal dorsal aspect of foot, +2 pitting edema       LABS:                        11.3   9.1   )-----------( 239      ( 12 Jan 2018 07:29 )             36.6     01-12    132<L>  |  99  |  24<H>  ----------------------------<  83  5.0   |  26  |  0.64    Ca    8.6      12 Jan 2018 07:29    TPro  6.6  /  Alb  3.1<L>  /  TBili  0.5  /  DBili  x   /  AST  34  /  ALT  22  /  AlkPhos  97  01-11    PT/INR - ( 11 Jan 2018 22:07 )   PT: 24.6 sec;   INR: 2.22 ratio         PTT - ( 11 Jan 2018 22:07 )  PTT:44.4 sec      Assessment and Plan:
CHIEF COMPLAINT/INTERVAL HISTORY: CHF exacerbation   Patient was seen and examined at bedside. On 2L NC. Laying in bed. Patient endorses generalized pain, from swelling in the feet B/L.     REVIEW OF SYSTEMS:  Denies fever, nausea, vomiting, chest pain, abdominal pain    Vital Signs Last 24 Hrs  T(C): 36.2 (12 Jan 2018 07:45), Max: 36.7 (11 Jan 2018 23:45)  T(F): 97.2 (12 Jan 2018 07:45), Max: 98.1 (12 Jan 2018 06:52)  HR: 113 (12 Jan 2018 07:45) (86 - 125)  BP: 120/78 (12 Jan 2018 07:45) (113/76 - 121/69)  BP(mean): --  RR: 18 (12 Jan 2018 07:45) (17 - 20)  SpO2: 96% (12 Jan 2018 07:45) (96% - 100%)    PHYSICAL EXAM:  GENERAL: elderly frail female, laying in bed   Chest: Crackles heard B/L, no wheezing rhonchi  CV: irregular rate and rhythm, no murmurs or rubs   GI: soft, +BS, NT/ND, no rebounding or guarding   Musculoskeletal: B/L LE swelling in distal dorsal aspect of foot, 2+ pitting edema       LABS:                        11.3   9.1   )-----------( 239      ( 12 Jan 2018 07:29 )             36.6     01-12    132<L>  |  99  |  24<H>  ----------------------------<  83  5.0   |  26  |  0.64    Ca    8.6      12 Jan 2018 07:29    TPro  6.6  /  Alb  3.1<L>  /  TBili  0.5  /  DBili  x   /  AST  34  /  ALT  22  /  AlkPhos  97  01-11    PT/INR - ( 11 Jan 2018 22:07 )   PT: 24.6 sec;   INR: 2.22 ratio         PTT - ( 11 Jan 2018 22:07 )  PTT:44.4 sec

## 2018-01-18 NOTE — PROGRESS NOTE ADULT - ASSESSMENT
87 yo F pmh of Afib on Coumadin, GERD, HTN, HLD, Depression, urinary retention, constipation presenting from [facility] with b/l leg swelling admitted with CHF exacerbation, Afib RVR.    - AF is relatively rate controlled with iv metoprolol and po diltiazem   - If able to take oral medications, d/c IV lopressor and switch back to po dosing. Continue with po cardizem.  - Appears to be compensated from HF POV.   -cont po lasix  - No clear evidence of acute ischemia, No acute changes on EKG Vamsi are negative  - dose Coumadin. Goal INR 2-3.  1.87 today.  - Echo technically difficult limited study grossly normal EF with moderate to severe MR, severe LAE, mild Rt Ventricular enlargement with decreased systolic function and evidence of diastolic dysfunction.    - Monitor and replete lytes, keep K>4, Mg>2  - Other cardiovascular workup will depend on clinical course.  - All other workup and dc planning per primary team  - Will follow

## 2018-01-18 NOTE — PROGRESS NOTE ADULT - PROVIDER SPECIALTY LIST ADULT
Cardiology
Heme/Onc
Hospitalist

## 2018-09-20 ENCOUNTER — INPATIENT (INPATIENT)
Facility: HOSPITAL | Age: 83
LOS: 5 days | Discharge: HOME | DRG: 607 | End: 2018-09-26
Attending: HOSPITALIST | Admitting: HOSPITALIST
Payer: MEDICARE

## 2018-09-20 VITALS
HEIGHT: 64 IN | DIASTOLIC BLOOD PRESSURE: 83 MMHG | HEART RATE: 97 BPM | WEIGHT: 119.93 LBS | RESPIRATION RATE: 16 BRPM | SYSTOLIC BLOOD PRESSURE: 118 MMHG | OXYGEN SATURATION: 95 % | TEMPERATURE: 98 F

## 2018-09-20 LAB
ALBUMIN SERPL ELPH-MCNC: 3.2 G/DL — LOW (ref 3.3–5)
ALP SERPL-CCNC: 151 U/L — HIGH (ref 40–120)
ALT FLD-CCNC: 32 U/L — SIGNIFICANT CHANGE UP (ref 12–78)
ANION GAP SERPL CALC-SCNC: 9 MMOL/L — SIGNIFICANT CHANGE UP (ref 5–17)
AST SERPL-CCNC: 53 U/L — HIGH (ref 15–37)
BILIRUB SERPL-MCNC: 0.6 MG/DL — SIGNIFICANT CHANGE UP (ref 0.2–1.2)
BUN SERPL-MCNC: 54 MG/DL — HIGH (ref 7–23)
CALCIUM SERPL-MCNC: 8.7 MG/DL — SIGNIFICANT CHANGE UP (ref 8.5–10.1)
CHLORIDE SERPL-SCNC: 107 MMOL/L — SIGNIFICANT CHANGE UP (ref 96–108)
CO2 SERPL-SCNC: 23 MMOL/L — SIGNIFICANT CHANGE UP (ref 22–31)
CREAT SERPL-MCNC: 0.82 MG/DL — SIGNIFICANT CHANGE UP (ref 0.5–1.3)
GLUCOSE SERPL-MCNC: 129 MG/DL — HIGH (ref 70–99)
HCT VFR BLD CALC: 36.7 % — SIGNIFICANT CHANGE UP (ref 34.5–45)
HGB BLD-MCNC: 11.1 G/DL — LOW (ref 11.5–15.5)
LACTATE SERPL-SCNC: 1.5 MMOL/L — SIGNIFICANT CHANGE UP (ref 0.7–2)
MCHC RBC-ENTMCNC: 25.8 PG — LOW (ref 27–34)
MCHC RBC-ENTMCNC: 30.2 GM/DL — LOW (ref 32–36)
MCV RBC AUTO: 85.2 FL — SIGNIFICANT CHANGE UP (ref 80–100)
NRBC # BLD: 0 /100 WBCS — SIGNIFICANT CHANGE UP (ref 0–0)
PLATELET # BLD AUTO: 250 K/UL — SIGNIFICANT CHANGE UP (ref 150–400)
POTASSIUM SERPL-MCNC: 5.5 MMOL/L — HIGH (ref 3.5–5.3)
POTASSIUM SERPL-SCNC: 5.5 MMOL/L — HIGH (ref 3.5–5.3)
PROT SERPL-MCNC: 6.8 G/DL — SIGNIFICANT CHANGE UP (ref 6–8.3)
RBC # BLD: 4.31 M/UL — SIGNIFICANT CHANGE UP (ref 3.8–5.2)
RBC # FLD: 18.6 % — HIGH (ref 10.3–14.5)
SODIUM SERPL-SCNC: 139 MMOL/L — SIGNIFICANT CHANGE UP (ref 135–145)
WBC # BLD: 9.47 K/UL — SIGNIFICANT CHANGE UP (ref 3.8–10.5)
WBC # FLD AUTO: 9.47 K/UL — SIGNIFICANT CHANGE UP (ref 3.8–10.5)

## 2018-09-20 PROCEDURE — 71045 X-RAY EXAM CHEST 1 VIEW: CPT | Mod: 26

## 2018-09-20 RX ORDER — SODIUM CHLORIDE 9 MG/ML
1000 INJECTION INTRAMUSCULAR; INTRAVENOUS; SUBCUTANEOUS ONCE
Qty: 0 | Refills: 0 | Status: COMPLETED | OUTPATIENT
Start: 2018-09-20 | End: 2018-09-20

## 2018-09-20 NOTE — ED PROVIDER NOTE - SKIN, MLM
Skin normal color for race, diffuse maculapapular rash with excoriations with several hemorrhagic vesicles on palms, and large clear vesicles on b/l ankles with skin breakdown over dorsum of right foot and 3 separate 1cm square sacrate decubes stage 2

## 2018-09-20 NOTE — ED PROVIDER NOTE - CARE PLAN
Principal Discharge DX:	Rash Principal Discharge DX:	Rash  Secondary Diagnosis:	Ulcers of both lower legs Principal Discharge DX:	Rash  Secondary Diagnosis:	Ulcers of both lower legs  Secondary Diagnosis:	Dehydration

## 2018-09-20 NOTE — ED PROVIDER NOTE - PSH
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

## 2018-09-20 NOTE — ED ADULT NURSE NOTE - INTERVENTIONS DEFINITIONS
[place] to call system/Instruct patient to call for assistance/Call bell, personal items and telephone within reach/Physically safe environment: no spills, clutter or unnecessary equipment

## 2018-09-20 NOTE — ED PROVIDER NOTE - OBJECTIVE STATEMENT
pt is a 87 yo female at [facility] on coumadin.  pt brought to er for worsening chronic rash as well as decreased oral intact.  pt now with skin breakdown over pt is a 89 yo female at [facility] on coumadin.  pt brought to er for worsening chronic rash as well as decreased oral intake.  pt now with skin breakdown over feet, constant itching and despite cream prescribed by derm.  pt scratching self

## 2018-09-20 NOTE — ED PROVIDER NOTE - CONSTITUTIONAL, MLM
normal... elderly, thin, awake, alert, oriented to person, place, time/situation and scratching at skin

## 2018-09-20 NOTE — ED PROVIDER NOTE - MEDICAL DECISION MAKING DETAILS
elderly female with chronic rash, worsening, now foot ulcers, and decub with profound dehydration, malnutrition per son, ivf, abx, admit

## 2018-09-20 NOTE — ED ADULT NURSE NOTE - OBJECTIVE STATEMENT
BIBEMS from nursing home. Pt was sent to ED for generalized rash. Denies any fever. Pt has stage 2 sacral ulcer. Pt is poor historian.

## 2018-09-21 DIAGNOSIS — R21 RASH AND OTHER NONSPECIFIC SKIN ERUPTION: ICD-10-CM

## 2018-09-21 DIAGNOSIS — I48.2 CHRONIC ATRIAL FIBRILLATION: ICD-10-CM

## 2018-09-21 DIAGNOSIS — L97.919 NON-PRESSURE CHRONIC ULCER OF UNSPECIFIED PART OF RIGHT LOWER LEG WITH UNSPECIFIED SEVERITY: ICD-10-CM

## 2018-09-21 DIAGNOSIS — D64.9 ANEMIA, UNSPECIFIED: ICD-10-CM

## 2018-09-21 DIAGNOSIS — Z29.9 ENCOUNTER FOR PROPHYLACTIC MEASURES, UNSPECIFIED: ICD-10-CM

## 2018-09-21 DIAGNOSIS — I10 ESSENTIAL (PRIMARY) HYPERTENSION: ICD-10-CM

## 2018-09-21 DIAGNOSIS — L89.152 PRESSURE ULCER OF SACRAL REGION, STAGE 2: ICD-10-CM

## 2018-09-21 DIAGNOSIS — E86.0 DEHYDRATION: ICD-10-CM

## 2018-09-21 LAB
ANION GAP SERPL CALC-SCNC: 9 MMOL/L — SIGNIFICANT CHANGE UP (ref 5–17)
APTT BLD: 38.5 SEC — HIGH (ref 27.5–37.4)
BASOPHILS # BLD AUTO: 0.08 K/UL — SIGNIFICANT CHANGE UP (ref 0–0.2)
BASOPHILS NFR BLD AUTO: 0.9 % — SIGNIFICANT CHANGE UP (ref 0–2)
BUN SERPL-MCNC: 47 MG/DL — HIGH (ref 7–23)
CALCIUM SERPL-MCNC: 7.9 MG/DL — LOW (ref 8.5–10.1)
CHLORIDE SERPL-SCNC: 109 MMOL/L — HIGH (ref 96–108)
CO2 SERPL-SCNC: 23 MMOL/L — SIGNIFICANT CHANGE UP (ref 22–31)
CREAT SERPL-MCNC: 0.68 MG/DL — SIGNIFICANT CHANGE UP (ref 0.5–1.3)
EOSINOPHIL # BLD AUTO: 0.44 K/UL — SIGNIFICANT CHANGE UP (ref 0–0.5)
EOSINOPHIL NFR BLD AUTO: 5 % — SIGNIFICANT CHANGE UP (ref 0–6)
ERYTHROCYTE [SEDIMENTATION RATE] IN BLOOD: 1 MM/HR — SIGNIFICANT CHANGE UP (ref 0–20)
GLUCOSE SERPL-MCNC: 99 MG/DL — SIGNIFICANT CHANGE UP (ref 70–99)
HCT VFR BLD CALC: 36 % — SIGNIFICANT CHANGE UP (ref 34.5–45)
HGB BLD-MCNC: 10.8 G/DL — LOW (ref 11.5–15.5)
IMM GRANULOCYTES NFR BLD AUTO: 0.8 % — SIGNIFICANT CHANGE UP (ref 0–1.5)
INR BLD: 3.65 RATIO — HIGH (ref 0.88–1.16)
INR BLD: 3.7 RATIO — HIGH (ref 0.88–1.16)
LYMPHOCYTES # BLD AUTO: 2.11 K/UL — SIGNIFICANT CHANGE UP (ref 1–3.3)
LYMPHOCYTES # BLD AUTO: 24 % — SIGNIFICANT CHANGE UP (ref 13–44)
MCHC RBC-ENTMCNC: 25.7 PG — LOW (ref 27–34)
MCHC RBC-ENTMCNC: 30 GM/DL — LOW (ref 32–36)
MCV RBC AUTO: 85.7 FL — SIGNIFICANT CHANGE UP (ref 80–100)
MONOCYTES # BLD AUTO: 0.75 K/UL — SIGNIFICANT CHANGE UP (ref 0–0.9)
MONOCYTES NFR BLD AUTO: 8.5 % — SIGNIFICANT CHANGE UP (ref 2–14)
NEUTROPHILS # BLD AUTO: 5.36 K/UL — SIGNIFICANT CHANGE UP (ref 1.8–7.4)
NEUTROPHILS NFR BLD AUTO: 60.8 % — SIGNIFICANT CHANGE UP (ref 43–77)
PLATELET # BLD AUTO: 222 K/UL — SIGNIFICANT CHANGE UP (ref 150–400)
POTASSIUM SERPL-MCNC: 4.7 MMOL/L — SIGNIFICANT CHANGE UP (ref 3.5–5.3)
POTASSIUM SERPL-SCNC: 4.7 MMOL/L — SIGNIFICANT CHANGE UP (ref 3.5–5.3)
PROCALCITONIN SERPL-MCNC: <0.05 — SIGNIFICANT CHANGE UP (ref 0–0.04)
PROTHROM AB SERPL-ACNC: 40.9 SEC — HIGH (ref 9.8–12.7)
PROTHROM AB SERPL-ACNC: 41.4 SEC — HIGH (ref 9.8–12.7)
RBC # BLD: 4.2 M/UL — SIGNIFICANT CHANGE UP (ref 3.8–5.2)
RBC # FLD: 18.5 % — HIGH (ref 10.3–14.5)
SODIUM SERPL-SCNC: 141 MMOL/L — SIGNIFICANT CHANGE UP (ref 135–145)
WBC # BLD: 8.81 K/UL — SIGNIFICANT CHANGE UP (ref 3.8–10.5)
WBC # FLD AUTO: 8.81 K/UL — SIGNIFICANT CHANGE UP (ref 3.8–10.5)

## 2018-09-21 PROCEDURE — 71250 CT THORAX DX C-: CPT | Mod: 26

## 2018-09-21 PROCEDURE — 99285 EMERGENCY DEPT VISIT HI MDM: CPT

## 2018-09-21 PROCEDURE — 99223 1ST HOSP IP/OBS HIGH 75: CPT

## 2018-09-21 PROCEDURE — 99223 1ST HOSP IP/OBS HIGH 75: CPT | Mod: GC,AI

## 2018-09-21 PROCEDURE — 12345: CPT | Mod: NC

## 2018-09-21 PROCEDURE — 93010 ELECTROCARDIOGRAM REPORT: CPT

## 2018-09-21 RX ORDER — MIRTAZAPINE 45 MG/1
7.5 TABLET, ORALLY DISINTEGRATING ORAL AT BEDTIME
Qty: 0 | Refills: 0 | Status: DISCONTINUED | OUTPATIENT
Start: 2018-09-21 | End: 2018-09-26

## 2018-09-21 RX ORDER — FERROUS SULFATE 325(65) MG
1 TABLET ORAL
Qty: 0 | Refills: 0 | COMMUNITY

## 2018-09-21 RX ORDER — WARFARIN SODIUM 2.5 MG/1
0 TABLET ORAL
Qty: 0 | Refills: 0 | COMMUNITY

## 2018-09-21 RX ORDER — ACETAMINOPHEN 500 MG
2 TABLET ORAL
Qty: 0 | Refills: 0 | COMMUNITY

## 2018-09-21 RX ORDER — ACETAMINOPHEN 500 MG
500 TABLET ORAL EVERY 6 HOURS
Qty: 0 | Refills: 0 | Status: DISCONTINUED | OUTPATIENT
Start: 2018-09-21 | End: 2018-09-26

## 2018-09-21 RX ORDER — METOPROLOL TARTRATE 50 MG
5 TABLET ORAL ONCE
Qty: 0 | Refills: 0 | Status: COMPLETED | OUTPATIENT
Start: 2018-09-21 | End: 2018-09-21

## 2018-09-21 RX ORDER — POTASSIUM CHLORIDE 20 MEQ
1 PACKET (EA) ORAL
Qty: 0 | Refills: 0 | COMMUNITY

## 2018-09-21 RX ORDER — WARFARIN SODIUM 2.5 MG/1
1 TABLET ORAL
Qty: 0 | Refills: 0 | COMMUNITY

## 2018-09-21 RX ORDER — IPRATROPIUM/ALBUTEROL SULFATE 18-103MCG
1 AEROSOL WITH ADAPTER (GRAM) INHALATION
Qty: 0 | Refills: 0 | COMMUNITY

## 2018-09-21 RX ORDER — FERROUS SULFATE 325(65) MG
325 TABLET ORAL DAILY
Qty: 0 | Refills: 0 | Status: DISCONTINUED | OUTPATIENT
Start: 2018-09-21 | End: 2018-09-26

## 2018-09-21 RX ORDER — PIPERACILLIN AND TAZOBACTAM 4; .5 G/20ML; G/20ML
3.38 INJECTION, POWDER, LYOPHILIZED, FOR SOLUTION INTRAVENOUS ONCE
Qty: 0 | Refills: 0 | Status: COMPLETED | OUTPATIENT
Start: 2018-09-21 | End: 2018-09-21

## 2018-09-21 RX ORDER — LACTOBACILLUS ACIDOPHILUS 100MM CELL
1 CAPSULE ORAL
Qty: 0 | Refills: 0 | Status: DISCONTINUED | OUTPATIENT
Start: 2018-09-21 | End: 2018-09-26

## 2018-09-21 RX ORDER — CHOLECALCIFEROL (VITAMIN D3) 125 MCG
1000 CAPSULE ORAL
Qty: 0 | Refills: 0 | Status: DISCONTINUED | OUTPATIENT
Start: 2018-09-21 | End: 2018-09-26

## 2018-09-21 RX ORDER — LACTOBACILLUS ACIDOPHILUS 100MM CELL
1 CAPSULE ORAL
Qty: 0 | Refills: 0 | COMMUNITY

## 2018-09-21 RX ORDER — METOPROLOL TARTRATE 50 MG
12.5 TABLET ORAL DAILY
Qty: 0 | Refills: 0 | Status: DISCONTINUED | OUTPATIENT
Start: 2018-09-21 | End: 2018-09-21

## 2018-09-21 RX ORDER — ACETAMINOPHEN 500 MG
1 TABLET ORAL
Qty: 0 | Refills: 0 | COMMUNITY

## 2018-09-21 RX ORDER — INFLUENZA VIRUS VACCINE 15; 15; 15; 15 UG/.5ML; UG/.5ML; UG/.5ML; UG/.5ML
0.5 SUSPENSION INTRAMUSCULAR ONCE
Qty: 0 | Refills: 0 | Status: DISCONTINUED | OUTPATIENT
Start: 2018-09-21 | End: 2018-09-26

## 2018-09-21 RX ORDER — ALBUTEROL 90 UG/1
2.5 AEROSOL, METERED ORAL EVERY 6 HOURS
Qty: 0 | Refills: 0 | Status: DISCONTINUED | OUTPATIENT
Start: 2018-09-21 | End: 2018-09-26

## 2018-09-21 RX ORDER — LIDOCAINE 4 G/100G
1 CREAM TOPICAL
Qty: 0 | Refills: 0 | COMMUNITY

## 2018-09-21 RX ORDER — KETOROLAC TROMETHAMINE 30 MG/ML
15 SYRINGE (ML) INJECTION ONCE
Qty: 0 | Refills: 0 | Status: DISCONTINUED | OUTPATIENT
Start: 2018-09-21 | End: 2018-09-21

## 2018-09-21 RX ORDER — RANITIDINE HYDROCHLORIDE 150 MG/1
1 TABLET, FILM COATED ORAL
Qty: 0 | Refills: 0 | COMMUNITY

## 2018-09-21 RX ORDER — FUROSEMIDE 40 MG
20 TABLET ORAL DAILY
Qty: 0 | Refills: 0 | Status: DISCONTINUED | OUTPATIENT
Start: 2018-09-21 | End: 2018-09-26

## 2018-09-21 RX ORDER — FUROSEMIDE 40 MG
40 TABLET ORAL ONCE
Qty: 0 | Refills: 0 | Status: COMPLETED | OUTPATIENT
Start: 2018-09-21 | End: 2018-09-21

## 2018-09-21 RX ADMIN — Medication 5 MILLIGRAM(S): at 17:49

## 2018-09-21 RX ADMIN — PIPERACILLIN AND TAZOBACTAM 200 GRAM(S): 4; .5 INJECTION, POWDER, LYOPHILIZED, FOR SOLUTION INTRAVENOUS at 02:12

## 2018-09-21 RX ADMIN — Medication 500 MILLIGRAM(S): at 21:15

## 2018-09-21 RX ADMIN — Medication 500 MILLIGRAM(S): at 22:30

## 2018-09-21 RX ADMIN — SODIUM CHLORIDE 2000 MILLILITER(S): 9 INJECTION INTRAMUSCULAR; INTRAVENOUS; SUBCUTANEOUS at 02:13

## 2018-09-21 RX ADMIN — Medication 40 MILLIGRAM(S): at 06:28

## 2018-09-21 RX ADMIN — SODIUM CHLORIDE 2000 MILLILITER(S): 9 INJECTION INTRAMUSCULAR; INTRAVENOUS; SUBCUTANEOUS at 01:24

## 2018-09-21 RX ADMIN — SODIUM CHLORIDE 1000 MILLILITER(S): 9 INJECTION INTRAMUSCULAR; INTRAVENOUS; SUBCUTANEOUS at 01:54

## 2018-09-21 RX ADMIN — PIPERACILLIN AND TAZOBACTAM 3.38 GRAM(S): 4; .5 INJECTION, POWDER, LYOPHILIZED, FOR SOLUTION INTRAVENOUS at 03:12

## 2018-09-21 RX ADMIN — Medication 40 MILLIGRAM(S): at 18:51

## 2018-09-21 RX ADMIN — Medication 1 TABLET(S): at 06:27

## 2018-09-21 RX ADMIN — Medication 1 APPLICATION(S): at 17:13

## 2018-09-21 RX ADMIN — Medication 1000 UNIT(S): at 06:27

## 2018-09-21 RX ADMIN — MIRTAZAPINE 7.5 MILLIGRAM(S): 45 TABLET, ORALLY DISINTEGRATING ORAL at 21:14

## 2018-09-21 RX ADMIN — Medication 1 APPLICATION(S): at 06:28

## 2018-09-21 RX ADMIN — Medication 15 MILLIGRAM(S): at 18:40

## 2018-09-21 RX ADMIN — Medication 15 MILLIGRAM(S): at 18:10

## 2018-09-21 NOTE — H&P ADULT - PROBLEM SELECTOR PLAN 6
continue Metoprolol, Cardizem with holding parameter for rate control   Supratherapeutic INR, dose coumadin with goal INR 2-3  f/u am INR

## 2018-09-21 NOTE — H&P ADULT - NSHPSOCIALHISTORY_GEN_ALL_CORE
Lives at [facility]. Bedbound for the past 2 years.   Tobacco: denies  Alcohol: denies

## 2018-09-21 NOTE — CONSULT NOTE ADULT - ASSESSMENT
88F w/pmh of Afib on Coumadin, GERD, HTN, HLD, Depression, PE/DVT, COPD, UTIs, urinary retention, constipation, normal lvef with mod-sev mr (echo 1/2018)  presenting from [facility] with b/l worsening b/l leg rash with skin breakdown. Difficult to obtain accurate history as patient is poor historian likely baseline dementia. History obtained from medical record. As per chart review pt with chronic b/l leg rash with multiple skins breakdown over the b/l foot and decreased oral intake. Son brought pt to ED for further evaluation of the rash but not present at bedside a the time of evaluation. Pt has been receiving sulfasalazine topical, Benadryl and prednisone oral for rash as per  chart review, pt scratching herself due to constant itching despite cream prescribed by derm at . No documented fever or recent change in medications.      In the ED, pt afebrile, labs pertinent for hemoglobin 11.2, PT 41.4, INR 3.7, K 5.5(likely hemolyzed), BUN 54.       no history of HLD and HTN (21 Sep 2018 02:54)    Over the day she has been spitting out her diltiazem, and therefore has missed two consecutive doses.  Her heart rate was noted to be more rapid, to the 120s.  She was then administered iv diltiazem and was directly observed to take her diltiazem po.  Cardiology consultation is now being obtained.    -there is no evidence of acute ischemia.    -there is evidence for volume overload.  -she does have mild edema, which may reflect volume or be related to her skin lesions  -she has a large left pleural effusion, and a small right effusion, of uncertain etiology  -will order iv lasix given the above and watch carefully    -chronic af on coumadin  -tachycardia likely from not taking meds properly  -hopefully no further intervention beyond taking her meds will be needed  -cont tele  -no need for repeat echo at this time     -monitor electrolytes, keep k>4, Mg>2   -evaluation of skin lesions as per med  -will follow

## 2018-09-21 NOTE — PROGRESS NOTE ADULT - PROBLEM SELECTOR PLAN 6
continue Metoprolol, Cardizem with holding parameter for rate control   Supratherapeutic INR, dose coumadin with goal INR 2-3  f/u am INR acute on chronic, encourage medication compliance,   remote tele may need upgrade to tele if rate control becomes a challenge  continue Metoprolol, Cardizem with holding parameter for rate control   Supratherapeutic INR, dose coumadin with goal INR 2-3  apprec cardio recs Dr. Phelps  f/u am INR

## 2018-09-21 NOTE — H&P ADULT - PROBLEM SELECTOR PLAN 1
diffuse maculopapular rash with excoriations with several hemorrhagic vesicles on palms, and large clear vesicles on b/l ankles with skin breakdown over dorsum of right foot diffuse maculopapular rash with excoriations with several hemorrhagic vesicles on palms, and large clear vesicles on b/l ankles with skin breakdown over dorsum of right foot  unknown etiology allergic vs inflammatory vs medication induced   continue silver sulfadiazine cream, Prednisone  Will need Derm consult in the am

## 2018-09-21 NOTE — H&P ADULT - NSHPPHYSICALEXAM_GEN_ALL_CORE
Height (cm): 162.56 (09-20 @ 19:01)  Weight (kg): 54.4 (09-20 @ 19:01)  BMI (kg/m2): 20.6 (09-20 @ 19:01)  BSA (m2): 1.57 (09-20 @ 19:01)  Vital Signs Last 24 Hrs  T(C): 37.1 (21 Sep 2018 01:00), Max: 37.1 (21 Sep 2018 01:00)  T(F): 98.7 (21 Sep 2018 01:00), Max: 98.7 (21 Sep 2018 01:00)  HR: 79 (21 Sep 2018 01:00) (79 - 97)  BP: 105/85 (21 Sep 2018 01:00) (105/85 - 118/83)  BP(mean): --  RR: 20 (21 Sep 2018 01:00) (15 - 20)  SpO2: 97% (21 Sep 2018 01:00) (95% - 99%)  [x ] room air   [ ] 02    PHYSICAL EXAM:  GENERAL:  No acute distress, well appearing, resting comfortably in bed  HEAD:  atraumatic, normocephalic   ENMT: PERRLA, EOMI, moist , no erythema, or exudate   NECK:  Suppled, No JVD   NERVOUS SYSTEM:  Alert & Oriented X1, no focal deficits   CHEST/LUNG: Clear to auscultation bilaterally, no wheezing,  rhonchi, rales or crackles  HEART:  Regular rate and rhythm, No murmurs, rubs, or gallops  ABDOMEN:  soft, nontender, nondistended, positive bowel soundsx4, no rebound tenderness or guarding    EXTREMITIES: diffuse maculopapular rash with excoriations with several hemorrhagic vesicles on palms, and large clear vesicles on b/l ankles with skin breakdown over dorsum of right foot and 3 separate 1cm square sacral decubitus stage 2 wound   SKIN: see ext above

## 2018-09-21 NOTE — PROGRESS NOTE ADULT - ASSESSMENT
88F w/pmh of Afib on Coumadin, GERD, HTN, HLD, Depression, PE/DVT, COPD, UTIs, urinary retention, constipation presenting from [facility] with b/l worsening b/l leg rash with skin breakdown admitted for diffuse b/l LE maculopapular rash with excoriations.  and now rapid afib due to medication noncompliance in inpatient

## 2018-09-21 NOTE — CHART NOTE - NSCHARTNOTEFT_GEN_A_CORE
Called by RN, patient in rapid afib with HR 105bpm. Patient initially had HR of 130s, received her cardizem medication, and now her HR maintained in 100-110s, /87. Lopressor 5mg IVP x1 was ordered by Dr. Solis. Cardiologist (Dr. Phelps) at bedside. Lopressor IVP 5mg was given with IV NS flush. Tolerated medication well, HR decreased to 80s-90s. Will continue to monitor, RN to call if any changes. Called by RN, patient in rapid afib with HR 105bpm. Patient initially had HR of 130s, received her cardizem medication, and now her HR maintained in 100-110s, /87. Lopressor 5mg IVP x1 was ordered by Dr. Solis. Cardiologist (Dr. Phelps) at bedside. Lopressor IVP 5mg was given with IV NS flush. Tolerated medication well, HR decreased to 80s-90s. Repeat /93. Will continue to monitor, RN to call if any changes.

## 2018-09-21 NOTE — H&P ADULT - ASSESSMENT
88F w/pmh of Afib on Coumadin, GERD, HTN, HLD, Depression, PE/DVT, COPD, UTIs, urinary retention, constipation presenting from [facility] with b/l worsening b/l leg rash with skin breakdown admitted for diffuse b/l LE maculopapular rash with excoriations.

## 2018-09-21 NOTE — PROGRESS NOTE ADULT - PROBLEM SELECTOR PLAN 1
diffuse maculopapular rash with excoriations with several hemorrhagic vesicles on palms, and large clear vesicles on b/l ankles with skin breakdown over dorsum of right foot  unknown etiology allergic vs inflammatory vs medication induced   continue silver sulfadiazine cream, Prednisone  Will need Derm consult in the am diffuse maculopapular rash with excoriations with several hemorrhagic vesicles on palms, and large clear vesicles on b/l ankles with skin breakdown over dorsum of right foot  unknown etiology allergic vs inflammatory vs medication induced   apprec ID recs: not infectious, likely dermatitis vs allergic  continue silver sulfadiazine cream, Prednisone  will obtain palliative and/or wound care consult

## 2018-09-21 NOTE — H&P ADULT - ATTENDING COMMENTS
rash of unknown etiology.  Worsening at the nursing home.  Brought to the hospital to be evaluated.  Continue cream from derm recommendations in the nursing home.    Consult Dermatology here.

## 2018-09-21 NOTE — H&P ADULT - HISTORY OF PRESENT ILLNESS
88F w/pmh of Afib on Coumadin, GERD, HTN, HLD, Depression, PE/DVT, COPD, UTIs, urinary retention, constipation presenting from [facility] with b/l worsening b/l leg rash with skin breakdown. Difficult to obtain accurate history as patient is poor historian likely baseline dementia. History obtained from charts. As per chart review pt with chronic b/l leg rash with multiple skins breakdown over the b/l foot and decreased oral intake. Son brought pt to ED for further evaluation of the rash but not present at bedside a the time of evaluation. Pt has been receiving sulfasalazine topical, Benadryl and prednisone oral for rash as per  chart review, pt scratching herself due to constant itching despite cream prescribed by derm at . No documented fever or recent change in medications.      In the ED, pt afebrile, labs pertinent for hemoglobin 11.2, PT 41.4, INR 3.7, K 5.5(likely hemolyzed), BUN 54. 88F w/pmh of Afib on Coumadin, GERD, HTN, HLD, Depression, PE/DVT, COPD, UTIs, urinary retention, constipation presenting from [facility] with b/l worsening b/l leg rash with skin breakdown. Difficult to obtain accurate history as patient is poor historian likely baseline dementia. History obtained from charts. As per chart review pt with chronic b/l leg rash with multiple skins breakdown over the b/l foot and decreased oral intake. Son brought pt to ED for further evaluation of the rash but not present at bedside a the time of evaluation. Pt has been receiving sulfasalazine topical, Benadryl and prednisone oral for rash as per  chart review, pt scratching herself due to constant itching despite cream prescribed by derm at . No documented fever or recent change in medications.      In the ED, pt afebrile, labs pertinent for hemoglobin 11.2, PT 41.4, INR 3.7, K 5.5(likely hemolyzed), BUN 54.       no history of HLD and HTN

## 2018-09-21 NOTE — PROGRESS NOTE ADULT - ATTENDING COMMENTS
spoke with son who is concerned that his mother lesions are not improving and may be seeking higher level of care as he has tried outpatient derm and no further management was recommended as per son, plan is to see recommendations from palliative team and wound care MD, [facility] if dermatology is not available to come to [place]

## 2018-09-21 NOTE — PROGRESS NOTE ADULT - SUBJECTIVE AND OBJECTIVE BOX
Patient is a 88y old  Female who presents with a chief complaint of rash (21 Sep 2018 18:03)      INTERVAL HPI: Pt seen and examined. unable to participate due to dementia. Pt did not take meds today due to uncooperative with rn for meds. Pt went int o rapid afib.    OVERNIGHT EVENTS:  T(F): 97.4 (09-21-18 @ 21:02), Max: 98.7 (09-21-18 @ 01:00)  HR: 96 (09-21-18 @ 21:02) (73 - 105)  BP: 126/76 (09-21-18 @ 21:02) (102/70 - 146/81)  RR: 18 (09-21-18 @ 21:02) (16 - 20)  SpO2: 96% (09-21-18 @ 21:02) (86% - 99%)  I&O's Summary      REVIEW OF SYSTEMS:  CONSTITUTIONAL: No fever, weight loss, or fatigue  EYES: No eye pain, visual disturbances, or discharge  ENMT:  No difficulty hearing, tinnitus, vertigo; No sinus or throat pain  NECK: No pain or stiffness  BREASTS: No pain, masses, or nipple discharge  RESPIRATORY: No cough, wheezing, chills or hemoptysis; No shortness of breath  CARDIOVASCULAR: No chest pain, palpitations, dizziness, or leg swelling  GASTROINTESTINAL: No abdominal or epigastric pain. No nausea, vomiting, or hematemesis; No diarrhea or constipation. No melena or hematochezia.  GENITOURINARY: No dysuria, frequency, hematuria, or incontinence  NEUROLOGICAL: No headaches, memory loss, loss of strength, numbness, or tremors  SKIN: No itching, burning, rashes, or lesions   LYMPH NODES: No enlarged glands  ENDOCRINE: No heat or cold intolerance; No hair loss  MUSCULOSKELETAL: No joint pain or swelling; No muscle, back, or extremity pain  PSYCHIATRIC: No depression, anxiety, mood swings, or difficulty sleeping  HEME/LYMPH: No easy bruising, or bleeding gums  ALLERY AND IMMUNOLOGIC: No hives or eczema    PHYSICAL EXAM:  GENERAL: NAD, well-groomed, well-developed  HEAD:  Atraumatic, Normocephalic  EYES: EOMI, PERRLA, conjunctiva and sclera clear  ENMT: No tonsillar erythema, exudates, or enlargement; Moist mucous membranes, Good dentition, No lesions  NECK: Supple, No JVD, Normal thyroid  NERVOUS SYSTEM:  Alert & Oriented X3, Good concentration; Motor Strength 5/5 B/L upper and lower extremities; DTRs 2+ intact and symmetric  CHEST/LUNG: Clear to percussion bilaterally; No rales, rhonchi, wheezing, or rubs  HEART: Regular rate and rhythm; No murmurs, rubs, or gallops  ABDOMEN: Soft, Nontender, Nondistended; Bowel sounds present  EXTREMITIES:  2+ Peripheral Pulses, No clubbing, cyanosis, or edema  LYMPH: No lymphadenopathy noted  SKIN: No rashes or lesions    LABS:                        10.8   8.81  )-----------( 222      ( 21 Sep 2018 05:53 )             36.0     09-21    141  |  109<H>  |  47<H>  ----------------------------<  99  4.7   |  23  |  0.68    Ca    7.9<L>      21 Sep 2018 05:53    TPro  6.8  /  Alb  3.2<L>  /  TBili  0.6  /  DBili  x   /  AST  53<H>  /  ALT  32  /  AlkPhos  151<H>  09-20    PT/INR - ( 21 Sep 2018 05:53 )   PT: 40.9 sec;   INR: 3.65 ratio         PTT - ( 21 Sep 2018 01:09 )  PTT:38.5 sec    CAPILLARY BLOOD GLUCOSE                  MEDICATIONS  (STANDING):  calcium carbonate 1250 mG  + Vitamin D (OsCal 500 + D) 1 Tablet(s) Oral two times a day  cholecalciferol 1000 Unit(s) Oral two times a day  diltiazem    Tablet 90 milliGRAM(s) Oral three times a day  ferrous    sulfate 325 milliGRAM(s) Oral daily  furosemide    Tablet 20 milliGRAM(s) Oral daily  influenza   Vaccine 0.5 milliLiter(s) IntraMuscular once  lactobacillus acidophilus 1 Tablet(s) Oral two times a day with meals  mirtazapine 7.5 milliGRAM(s) Oral at bedtime  predniSONE   Tablet 40 milliGRAM(s) Oral daily  silver sulfADIAZINE 1% Cream 1 Application(s) Topical two times a day    MEDICATIONS  (PRN):  acetaminophen   Tablet .. 500 milliGRAM(s) Oral every 6 hours PRN Moderate Pain (4 - 6)  ALBUTerol   0.5% 2.5 milliGRAM(s) Nebulizer every 6 hours PRN Shortness of Breath and/or Wheezing Patient is a 88y old  Female who presents with a chief complaint of rash (21 Sep 2018 18:03)      INTERVAL HPI: Pt seen and examined. unable to participate due to mci vs  dementia. Pt did not take meds today due to uncooperative with rn for meds. Pt went into rapid afib, was able to finally take po meds including diltiazem.     OVERNIGHT EVENTS:  T(F): 97.4 (09-21-18 @ 21:02), Max: 98.7 (09-21-18 @ 01:00)  HR: 96 (09-21-18 @ 21:02) (73 - 105)  BP: 126/76 (09-21-18 @ 21:02) (102/70 - 146/81)  RR: 18 (09-21-18 @ 21:02) (16 - 20)  SpO2: 96% (09-21-18 @ 21:02) (86% - 99%)  I&O's Summary      REVIEW OF SYSTEMS: unable as pt confused    PHYSICAL EXAM:  GENERAL: appears in slight discomfort, elder, chronically ill appearing  NERVOUS SYSTEM:  Alert & Oriented X1,; Motor Strength 3/5 B/L upper and lower extremities  CHEST/LUNG: Clear to percussion bilaterally; No rales, rhonchi, wheezing, or rubs  HEART: Regular rate and rhythm; No murmurs, rubs, or gallops  ABDOMEN: Soft, Nontender, Nondistended; Bowel sounds present  EXTREMITIES:  2+ Peripheral Pulses, No clubbing, cyanosis, or edema  SKIN: generalized macupapular lesions, acute on chronic    LABS:                        10.8   8.81  )-----------( 222      ( 21 Sep 2018 05:53 )             36.0     09-21    141  |  109<H>  |  47<H>  ----------------------------<  99  4.7   |  23  |  0.68    Ca    7.9<L>      21 Sep 2018 05:53    TPro  6.8  /  Alb  3.2<L>  /  TBili  0.6  /  DBili  x   /  AST  53<H>  /  ALT  32  /  AlkPhos  151<H>  09-20    PT/INR - ( 21 Sep 2018 05:53 )   PT: 40.9 sec;   INR: 3.65 ratio         PTT - ( 21 Sep 2018 01:09 )  PTT:38.5 sec    CAPILLARY BLOOD GLUCOSE                  MEDICATIONS  (STANDING):  calcium carbonate 1250 mG  + Vitamin D (OsCal 500 + D) 1 Tablet(s) Oral two times a day  cholecalciferol 1000 Unit(s) Oral two times a day  diltiazem    Tablet 90 milliGRAM(s) Oral three times a day  ferrous    sulfate 325 milliGRAM(s) Oral daily  furosemide    Tablet 20 milliGRAM(s) Oral daily  influenza   Vaccine 0.5 milliLiter(s) IntraMuscular once  lactobacillus acidophilus 1 Tablet(s) Oral two times a day with meals  mirtazapine 7.5 milliGRAM(s) Oral at bedtime  predniSONE   Tablet 40 milliGRAM(s) Oral daily  silver sulfADIAZINE 1% Cream 1 Application(s) Topical two times a day    MEDICATIONS  (PRN):  acetaminophen   Tablet .. 500 milliGRAM(s) Oral every 6 hours PRN Moderate Pain (4 - 6)  ALBUTerol   0.5% 2.5 milliGRAM(s) Nebulizer every 6 hours PRN Shortness of Breath and/or Wheezing

## 2018-09-22 DIAGNOSIS — J90 PLEURAL EFFUSION, NOT ELSEWHERE CLASSIFIED: ICD-10-CM

## 2018-09-22 LAB
ALBUMIN SERPL ELPH-MCNC: 3.1 G/DL — LOW (ref 3.3–5)
ALP SERPL-CCNC: 147 U/L — HIGH (ref 40–120)
ALT FLD-CCNC: 31 U/L — SIGNIFICANT CHANGE UP (ref 12–78)
ANION GAP SERPL CALC-SCNC: 9 MMOL/L — SIGNIFICANT CHANGE UP (ref 5–17)
AST SERPL-CCNC: 33 U/L — SIGNIFICANT CHANGE UP (ref 15–37)
BASOPHILS # BLD AUTO: 0.04 K/UL — SIGNIFICANT CHANGE UP (ref 0–0.2)
BASOPHILS NFR BLD AUTO: 0.4 % — SIGNIFICANT CHANGE UP (ref 0–2)
BILIRUB SERPL-MCNC: 0.6 MG/DL — SIGNIFICANT CHANGE UP (ref 0.2–1.2)
BUN SERPL-MCNC: 44 MG/DL — HIGH (ref 7–23)
CALCIUM SERPL-MCNC: 8.2 MG/DL — LOW (ref 8.5–10.1)
CHLORIDE SERPL-SCNC: 108 MMOL/L — SIGNIFICANT CHANGE UP (ref 96–108)
CO2 SERPL-SCNC: 25 MMOL/L — SIGNIFICANT CHANGE UP (ref 22–31)
CREAT SERPL-MCNC: 0.8 MG/DL — SIGNIFICANT CHANGE UP (ref 0.5–1.3)
EOSINOPHIL # BLD AUTO: 0.11 K/UL — SIGNIFICANT CHANGE UP (ref 0–0.5)
EOSINOPHIL NFR BLD AUTO: 1.2 % — SIGNIFICANT CHANGE UP (ref 0–6)
GLUCOSE SERPL-MCNC: 131 MG/DL — HIGH (ref 70–99)
HCT VFR BLD CALC: 35.3 % — SIGNIFICANT CHANGE UP (ref 34.5–45)
HGB BLD-MCNC: 10.3 G/DL — LOW (ref 11.5–15.5)
IMM GRANULOCYTES NFR BLD AUTO: 1 % — SIGNIFICANT CHANGE UP (ref 0–1.5)
INR BLD: 2.71 RATIO — HIGH (ref 0.88–1.16)
LYMPHOCYTES # BLD AUTO: 1.53 K/UL — SIGNIFICANT CHANGE UP (ref 1–3.3)
LYMPHOCYTES # BLD AUTO: 16.7 % — SIGNIFICANT CHANGE UP (ref 13–44)
MCHC RBC-ENTMCNC: 25.4 PG — LOW (ref 27–34)
MCHC RBC-ENTMCNC: 29.2 GM/DL — LOW (ref 32–36)
MCV RBC AUTO: 86.9 FL — SIGNIFICANT CHANGE UP (ref 80–100)
MONOCYTES # BLD AUTO: 0.96 K/UL — HIGH (ref 0–0.9)
MONOCYTES NFR BLD AUTO: 10.5 % — SIGNIFICANT CHANGE UP (ref 2–14)
NEUTROPHILS # BLD AUTO: 6.42 K/UL — SIGNIFICANT CHANGE UP (ref 1.8–7.4)
NEUTROPHILS NFR BLD AUTO: 70.2 % — SIGNIFICANT CHANGE UP (ref 43–77)
PLATELET # BLD AUTO: 261 K/UL — SIGNIFICANT CHANGE UP (ref 150–400)
POTASSIUM SERPL-MCNC: 4.7 MMOL/L — SIGNIFICANT CHANGE UP (ref 3.5–5.3)
POTASSIUM SERPL-SCNC: 4.7 MMOL/L — SIGNIFICANT CHANGE UP (ref 3.5–5.3)
PROCALCITONIN SERPL-MCNC: <0.05 — SIGNIFICANT CHANGE UP (ref 0–0.04)
PROT SERPL-MCNC: 6.5 G/DL — SIGNIFICANT CHANGE UP (ref 6–8.3)
PROTHROM AB SERPL-ACNC: 30.2 SEC — HIGH (ref 9.8–12.7)
RBC # BLD: 4.06 M/UL — SIGNIFICANT CHANGE UP (ref 3.8–5.2)
RBC # FLD: 18.5 % — HIGH (ref 10.3–14.5)
SODIUM SERPL-SCNC: 142 MMOL/L — SIGNIFICANT CHANGE UP (ref 135–145)
WBC # BLD: 9.15 K/UL — SIGNIFICANT CHANGE UP (ref 3.8–10.5)
WBC # FLD AUTO: 9.15 K/UL — SIGNIFICANT CHANGE UP (ref 3.8–10.5)

## 2018-09-22 PROCEDURE — 99232 SBSQ HOSP IP/OBS MODERATE 35: CPT

## 2018-09-22 PROCEDURE — 99233 SBSQ HOSP IP/OBS HIGH 50: CPT

## 2018-09-22 RX ORDER — FUROSEMIDE 40 MG
40 TABLET ORAL ONCE
Qty: 0 | Refills: 0 | Status: COMPLETED | OUTPATIENT
Start: 2018-09-22 | End: 2018-09-22

## 2018-09-22 RX ADMIN — Medication 1 TABLET(S): at 17:44

## 2018-09-22 RX ADMIN — Medication 1 TABLET(S): at 05:26

## 2018-09-22 RX ADMIN — Medication 500 MILLIGRAM(S): at 09:07

## 2018-09-22 RX ADMIN — Medication 1 APPLICATION(S): at 05:26

## 2018-09-22 RX ADMIN — Medication 1000 UNIT(S): at 05:28

## 2018-09-22 RX ADMIN — Medication 40 MILLIGRAM(S): at 12:43

## 2018-09-22 RX ADMIN — Medication 20 MILLIGRAM(S): at 05:28

## 2018-09-22 RX ADMIN — MIRTAZAPINE 7.5 MILLIGRAM(S): 45 TABLET, ORALLY DISINTEGRATING ORAL at 21:40

## 2018-09-22 RX ADMIN — Medication 1 APPLICATION(S): at 17:44

## 2018-09-22 RX ADMIN — Medication 500 MILLIGRAM(S): at 08:07

## 2018-09-22 RX ADMIN — Medication 1 TABLET(S): at 08:05

## 2018-09-22 RX ADMIN — Medication 40 MILLIGRAM(S): at 05:26

## 2018-09-22 RX ADMIN — Medication 1000 UNIT(S): at 17:44

## 2018-09-22 NOTE — PROGRESS NOTE ADULT - ASSESSMENT
88F w/pmh of Afib on Coumadin, GERD, HTN, HLD, Depression, PE/DVT, COPD, UTIs, urinary retention, constipation presenting from [facility] with b/l worsening b/l leg rash with skin breakdown admitted for diffuse b/l LE maculopapular rash with excoriations likely eczema, hospital course complicated by  rapid afib due to medication noncompliance, also found to have large pleural effusion that requires thoracentesis once INR is normal

## 2018-09-22 NOTE — PROGRESS NOTE ADULT - SUBJECTIVE AND OBJECTIVE BOX
[facility]    Marshall Frederick, Elias, Lenin, Valencia, Kofi, Gualberto      [phone]    CHIEF COMPLAINT: Patient is a 88y old  Female who presents with a chief complaint of rash (22 Sep 2018 09:09)      Follow Up: af, pleural effusion    Interim history: The patient reports no new symptoms.  Denies chest discomfort and shortness of breath.  No abdominal pain.  No new neurologic symptoms. remains confused      MEDICATIONS  (STANDING):  calcium carbonate 1250 mG  + Vitamin D (OsCal 500 + D) 1 Tablet(s) Oral two times a day  cholecalciferol 1000 Unit(s) Oral two times a day  diltiazem    Tablet 90 milliGRAM(s) Oral three times a day  ferrous    sulfate 325 milliGRAM(s) Oral daily  furosemide    Tablet 20 milliGRAM(s) Oral daily  influenza   Vaccine 0.5 milliLiter(s) IntraMuscular once  lactobacillus acidophilus 1 Tablet(s) Oral two times a day with meals  mirtazapine 7.5 milliGRAM(s) Oral at bedtime  predniSONE   Tablet 40 milliGRAM(s) Oral daily  silver sulfADIAZINE 1% Cream 1 Application(s) Topical two times a day    MEDICATIONS  (PRN):  acetaminophen   Tablet .. 500 milliGRAM(s) Oral every 6 hours PRN Moderate Pain (4 - 6)  ALBUTerol   0.5% 2.5 milliGRAM(s) Nebulizer every 6 hours PRN Shortness of Breath and/or Wheezing      REVIEW OF SYSTEMS:  eye, ent, GI, , allergic, dermatologic, musculoskeletal and neurologic are negative except as described above    Vital Signs Last 24 Hrs  T(C): 36.6 (22 Sep 2018 04:50), Max: 36.6 (22 Sep 2018 04:50)  T(F): 97.8 (22 Sep 2018 04:50), Max: 97.8 (22 Sep 2018 04:50)  HR: 79 (22 Sep 2018 04:50) (79 - 105)  BP: 112/79 (22 Sep 2018 04:50) (112/79 - 144/88)  BP(mean): --  RR: 17 (22 Sep 2018 04:50) (17 - 18)  SpO2: 99% (22 Sep 2018 04:50) (86% - 99%)    I&O's Summary      Telemetry past 24h:    PHYSICAL EXAM:    Constitutional: well-nourished, well-developed, NAD   HEENT:  MMM, sclerae anicteric, conjunctivae clear, no oral cyanosis.  Pulmonary: Non-labored, breath sounds are clear bilaterally, No wheezing, rales or rhonchi  Cardiovascular: irregular, S1 and S2.  No murmur.  No rubs, gallops or clicks  Gastrointestinal: Bowel Sounds present, soft, nontender.   Lymph: mild peripheral edema.   Neurological: Alert, no focal deficits  Skin: rash no change  Psych:  Mood & affect appropriate    LABS: All Labs Reviewed:                        10.3   9.15  )-----------( 261      ( 22 Sep 2018 07:07 )             35.3                         10.8   8.81  )-----------( 222      ( 21 Sep 2018 05:53 )             36.0                         11.1   9.47  )-----------( 250      ( 20 Sep 2018 23:12 )             36.7     22 Sep 2018 07:07    142    |  108    |  44     ----------------------------<  131    4.7     |  25     |  0.80   21 Sep 2018 05:53    141    |  109    |  47     ----------------------------<  99     4.7     |  23     |  0.68   20 Sep 2018 23:12    139    |  107    |  54     ----------------------------<  129    5.5     |  23     |  0.82     Ca    8.2        22 Sep 2018 07:07  Ca    7.9        21 Sep 2018 05:53  Ca    8.7        20 Sep 2018 23:12    TPro  6.5    /  Alb  3.1    /  TBili  0.6    /  DBili  x      /  AST  33     /  ALT  31     /  AlkPhos  147    22 Sep 2018 07:07  TPro  6.8    /  Alb  3.2    /  TBili  0.6    /  DBili  x      /  AST  53     /  ALT  32     /  AlkPhos  151    20 Sep 2018 23:12    PT/INR - ( 22 Sep 2018 07:07 )   PT: 30.2 sec;   INR: 2.71 ratio         PTT - ( 21 Sep 2018 01:09 )  PTT:38.5 sec      Blood Culture: Organism --  Gram Stain Blood -- Gram Stain --  Specimen Source .Blood Blood-Venous  Culture-Blood --            RADIOLOGY:    EKG:    Echo:

## 2018-09-22 NOTE — PROGRESS NOTE ADULT - PROBLEM SELECTOR PLAN 7
acute on chronic, encourage medication compliance,   remote tele may need upgrade to tele if rate control becomes a challenge  continue Metoprolol, Cardizem with holding parameter for rate control   Supratherapeutic INR, dose coumadin with goal INR 2-3, but hold for now for planned thoracentesis on Monday per Dr. Anna   apprec cardio recs Dr. Phelps  f/u am INR

## 2018-09-22 NOTE — PROGRESS NOTE ADULT - SUBJECTIVE AND OBJECTIVE BOX
Patient is a 88y old  Female who presents with a chief complaint of rash (22 Sep 2018 08:33)       INTERVAL HPI/OVERNIGHT EVENTS:88F w/pmh of Afib on Coumadin, GERD, HTN, HLD, Depression, PE/DVT, COPD, UTIs, urinary retention, constipation presenting from [facility] with b/l worsening b/l leg rash with skin breakdown admitted for diffuse b/l LE maculopapular rash with excoriations, looks like eczema. Seen and examined at bedside.      MEDICATIONS  (STANDING):  calcium carbonate 1250 mG  + Vitamin D (OsCal 500 + D) 1 Tablet(s) Oral two times a day  cholecalciferol 1000 Unit(s) Oral two times a day  diltiazem    Tablet 90 milliGRAM(s) Oral three times a day  ferrous    sulfate 325 milliGRAM(s) Oral daily  furosemide    Tablet 20 milliGRAM(s) Oral daily  influenza   Vaccine 0.5 milliLiter(s) IntraMuscular once  lactobacillus acidophilus 1 Tablet(s) Oral two times a day with meals  mirtazapine 7.5 milliGRAM(s) Oral at bedtime  predniSONE   Tablet 40 milliGRAM(s) Oral daily  silver sulfADIAZINE 1% Cream 1 Application(s) Topical two times a day    MEDICATIONS  (PRN):  acetaminophen   Tablet .. 500 milliGRAM(s) Oral every 6 hours PRN Moderate Pain (4 - 6)  ALBUTerol   0.5% 2.5 milliGRAM(s) Nebulizer every 6 hours PRN Shortness of Breath and/or Wheezing      Allergies    No Known Allergies    Intolerances        REVIEW OF SYSTEMS:  Unable to obtain, patient is sleepy but able to be awakened  but does not answer questions.   Vital Signs Last 24 Hrs  T(C): 36.6 (22 Sep 2018 04:50), Max: 36.6 (22 Sep 2018 04:50)  T(F): 97.8 (22 Sep 2018 04:50), Max: 97.8 (22 Sep 2018 04:50)  HR: 79 (22 Sep 2018 04:50) (79 - 105)  BP: 112/79 (22 Sep 2018 04:50) (112/79 - 144/88)  BP(mean): --  RR: 17 (22 Sep 2018 04:50) (17 - 18)  SpO2: 99% (22 Sep 2018 04:50) (86% - 99%)    PHYSICAL EXAM:  GENERAL: NAD, sleepy but arousable.    HEAD:  Atraumatic, Normocephalic  EYES: EOMI, PERRLA, conjunctiva and sclera clear  ENMT: No tonsillar erythema, exudates, or enlargement; Moist mucus membranes.   NECK: Supple, No JVD, Normal thyroid  NERVOUS SYSTEM:  Sleepy but responsive. Grossly non focal   CHEST/LUNG: Decreased  to auscultation bilaterally; No rales, rhonchi, wheezing, or rubs  HEART: S1S2+, Regular rate and rhythm  ABDOMEN: Soft, Nontender, Nondistended; Bowel sounds present  EXTREMITIES: + eczematous rash to extremities.   LYMPH: No lymphadenopathy noted  SKIN: + eczematous rash to extremities, no discharge   LABS:                        10.3   9.15  )-----------( 261      ( 22 Sep 2018 07:07 )             35.3     22 Sep 2018 07:07    142    |  108    |  44     ----------------------------<  131    4.7     |  25     |  0.80     Ca    8.2        22 Sep 2018 07:07    TPro  6.5    /  Alb  3.1    /  TBili  0.6    /  DBili  x      /  AST  33     /  ALT  31     /  AlkPhos  147    22 Sep 2018 07:07    PT/INR - ( 22 Sep 2018 07:07 )   PT: 30.2 sec;   INR: 2.71 ratio         PTT - ( 21 Sep 2018 01:09 )  PTT:38.5 sec  CAPILLARY BLOOD GLUCOSE        BLOOD CULTURE  09-21 @ 08:48   No growth to date.  --  --    RADIOLOGY & ADDITIONAL TESTS:    Imaging Personally Reviewed:  [ ] YES     Consultant(s) Notes Reviewed:      Care Discussed with Consultants/Other Providers:

## 2018-09-22 NOTE — PROGRESS NOTE ADULT - ASSESSMENT
88F w/pmh of Afib on Coumadin, GERD, HTN, HLD, Depression, PE/DVT, COPD, UTIs, urinary retention, constipation, normal lvef with mod-sev mr (echo 1/2018)  presenting from [facility] with b/l worsening b/l leg rash with skin breakdown. Difficult to obtain accurate history as patient is poor historian likely baseline dementia. History obtained from medical record. As per chart review pt with chronic b/l leg rash with multiple skins breakdown over the b/l foot and decreased oral intake. Son brought pt to ED for further evaluation of the rash but not present at bedside a the time of evaluation. Pt has been receiving sulfasalazine topical, Benadryl and prednisone oral for rash as per  chart review, pt scratching herself due to constant itching despite cream prescribed by derm at . No documented fever or recent change in medications.      In the ED, pt afebrile, labs pertinent for hemoglobin 11.2, PT 41.4, INR 3.7, K 5.5(likely hemolyzed), BUN 54.       no history of HLD and HTN (21 Sep 2018 02:54)    Over the day she has been spitting out her diltiazem, and therefore has missed two consecutive doses.  Her heart rate was noted to be more rapid, to the 120s.  She was then administered iv diltiazem and was directly observed to take her diltiazem po.  Cardiology consultation is now being obtained.    -there is no evidence of acute ischemia.    -there is evidence for volume overload.  -she does have mild edema, which may reflect volume or be related to her skin lesions  -she has a large left pleural effusion, and a small right effusion, of uncertain etiology  -she got a dose of iv lasix yesterday, and will give one more today    -chronic af on coumadin  -tachycardia 9/21 likely from not taking meds properly  -hopefully no further intervention beyond taking her meds will be needed  -thus far, his rate is controlled   -no need for repeat echo at this time     -monitor electrolytes, keep k>4, Mg>2   -evaluation of skin lesions as per med  -will follow

## 2018-09-22 NOTE — PROGRESS NOTE ADULT - ATTENDING COMMENTS
Over all prognosis is poor.  Plan for thoracentesis on Monday  Out patient dermatologist evaluation. Over all prognosis is poor.  Plan for thoracentesis on Monday. Hold coumadin. Check INR daily   Out patient dermatologist evaluation.  Called son, number in sunrise, no one answers. I asked RN to let me know when son comes so I can talk to him about plan

## 2018-09-22 NOTE — PROGRESS NOTE ADULT - PROBLEM SELECTOR PLAN 2
Suspect eczema  apprec ID recs: not infectious, likely dermatitis vs allergic  continue silver sulfadiazine cream, Prednisone  will obtain palliative and/or wound care consult

## 2018-09-22 NOTE — CONSULT NOTE ADULT - PROBLEM SELECTOR RECOMMENDATION 9
left effusions, consider thoracentesis, will need to be off AC if ok with cardio  pt is on VKA for hx of PE and AF  cvs regimen and rate control, hx of AF HF and Pulm HTN  I and O, on lasix  on NEBS for COPD hx  overall very frail and weak, well known to me, long standing resident of SNF at   needs assist with all ADL  oral hygiene  skin care  pt is DNR DNI  prognosis poor  will follow and monitor  GOC documented and in the EMR

## 2018-09-22 NOTE — PROGRESS NOTE ADULT - SUBJECTIVE AND OBJECTIVE BOX
KITTY ALCANTARA is a 88yFemale , patient examined and chart reviewed.    INTERVAL HPI/ OVERNIGHT EVENTS:   Confused. Afebrile.    PAST MEDICAL & SURGICAL HISTORY:  Femur neck fracture  Vertebral fracture, pathological  Neurogenic bladder  Afib  GERD (gastroesophageal reflux disease)  Pulmonary embolism  UTI (lower urinary tract infection)  HTN (hypertension)  Pulmonary embolism  Memory impairment  Hypertension  Atrial fibrillation  Hyponatremia  Shortness of breath: etiology probably copd  Urinary retention  Arthritis  DVT (deep venous thrombosis)  History of hip surgery: right gamma nail      For details regarding the patient's social history, family history, and other miscellaneous elements, please refer the initial infectious diseases consultation and/or the admitting history and physical examination for this admission.    ROS:  Unable to obtain due to : confusion    Current inpatient medications :    ANTIBIOTICS/RELEVANT:  lactobacillus acidophilus 1 Tablet(s) Oral two times a day with meals      acetaminophen   Tablet .. 500 milliGRAM(s) Oral every 6 hours PRN  ALBUTerol   0.5% 2.5 milliGRAM(s) Nebulizer every 6 hours PRN  calcium carbonate 1250 mG  + Vitamin D (OsCal 500 + D) 1 Tablet(s) Oral two times a day  cholecalciferol 1000 Unit(s) Oral two times a day  diltiazem    Tablet 90 milliGRAM(s) Oral three times a day  ferrous    sulfate 325 milliGRAM(s) Oral daily  furosemide    Tablet 20 milliGRAM(s) Oral daily  mirtazapine 7.5 milliGRAM(s) Oral at bedtime  predniSONE   Tablet 40 milliGRAM(s) Oral daily  silver sulfADIAZINE 1% Cream 1 Application(s) Topical two times a day      Objective:    T(C): 36.3 (09-22-18 @ 13:50), Max: 36.6 (09-22-18 @ 04:50)  HR: 89 (09-22-18 @ 13:50) (79 - 96)  BP: 123/83 (09-22-18 @ 13:50) (112/79 - 143/96)  RR: 16 (09-22-18 @ 13:50) (16 - 18)  SpO2: 95% (09-22-18 @ 13:50) (95% - 99%)  Wt(kg): --      Physical Exam:  General: no acute distress  Neck: supple, trachea midline  Lungs: Decreased , no wheeze/rhonchi  Cardiovascular: regular rate and rhythm, S1 S2  Abdomen: soft, nontender, no organomegaly present, bowel sounds normal  Neurological: awake Confusion  Skin: diffuse rash  Lymph Nodes: no palpable cervical/supraclavicular lymph nodes enlargements  Extremities:+ rash +edema      LABS:                          10.3   9.15  )-----------( 261      ( 22 Sep 2018 07:07 )             35.3       09-22    142  |  108  |  44<H>  ----------------------------<  131<H>  4.7   |  25  |  0.80    Ca    8.2<L>      22 Sep 2018 07:07    TPro  6.5  /  Alb  3.1<L>  /  TBili  0.6  /  DBili  x   /  AST  33  /  ALT  31  /  AlkPhos  147<H>  09-22      PT/INR - ( 22 Sep 2018 07:07 )   PT: 30.2 sec;   INR: 2.71 ratio         PTT - ( 21 Sep 2018 01:09 )  PTT:38.5 sec      MICROBIOLOGY:    Culture - Blood (collected 21 Sep 2018 08:48)  Source: .Blood Blood-Venous  Preliminary Report (22 Sep 2018 09:01):    No growth to date.    Culture - Blood (collected 21 Sep 2018 08:48)  Source: .Blood Blood-Venous  Preliminary Report (22 Sep 2018 09:01):    No growth to date.    RADIOLOGY & ADDITIONAL STUDIES:   CT Chest Without Intravenous Contrast     EXAM DATE/TIME:    9/21/2018 2:39 AM     CLINICAL HISTORY:   88 years old, female; Pain and signs and symptoms; Other: Pneumonia;   Other:   Back pain     TECHNIQUE:    Axial computed tomography images of the chest without intravenous   contrast.    Coronal and sagittal reformatted images were created and reviewed.    MIP reconstructed images were created and reviewed.     COMPARISON:    1/11/2018    FINDINGS:      Lungs:  Dependent compressive atelectasis left base. No definite   pneumonia.   Pleural space:  Large layering left pleural effusion increased in size.   Small right pleural effusion with small loculated components similar to   prior.   Heart:  Cardiomegaly.    Mediastinum:  Esophagus is unremarkable.    Aorta: Normal. No aortic aneurysm.    Lymph nodes: Unremarkable. No enlarged lymph nodes.    Bones/joints:  Healing fracture of the left third rib. Healing   comminuted   fracture of the neck and head of the right humerus. Healing sternal   fractures.   Age-indeterminate mid thoracic compression deformity and age   indeterminate thoracolumbar compression deformity   Soft tissues: Unremarkable.    Intraperitoneal space:  Incompletely visualized perihepatic and   perisplenic   ascites as well as periportal edema.     IMPRESSION:   Large left pleural effusion with underlying dependent atelectasis. No   definite pneumonia. Small right pleural effusion with loculated   components.  Additional findings as discussed    Assessment :  88F w/pmh of Dementia, Afib on Coumadin, GERD, HTN, HLD, Depression, PE/DVT, COPD, UTIs,   urinary retention, constipation presenting from [facility] with diffuse rash  Not zoster/shingles  Doubt infectious   Poss allergic reaction/dermatitis ?drug rash ie allergy to sulfa/lasix  ?Bullous pemphigus  Ideally will need biopsy- Pt was seen by derm at , unclear if biopsy done  Large left pleural effusion     Plan :   Defer antibiotics  Asp precautions  For IR thoracentesis   Overall prog poor      Continue with present regiment.  Appropriate use of antibiotics and adverse effects reviewed.      I have discussed the above plan of care with patient/ family in detail. They expressed understanding of the  treatment plan . Risks, benefits and alternatives discussed in detail. I have asked if they have any questions or concerns and appropriately addressed them to the best of my ability .    > 35 minutes were spent in direct patient care reviewing notes, medications ,labs data/ imaging , discussion with multidisciplinary team.    Thank you for allowing me to participate in care of your patient .    Daryl Romeo MD  Infectious Disease  [phone]

## 2018-09-22 NOTE — CONSULT NOTE ADULT - SUBJECTIVE AND OBJECTIVE BOX
Date/Time Patient Seen:  		  Referring MD:   Data Reviewed	       Patient is a 88y old  Female who presents with a chief complaint of rash (21 Sep 2018 18:03)      Subjective/HPI    in bed  seen and examined  vs and meds reviewed  frail  weak  non smoker  non drinker  lives in [facility]  poor historian  on AC for hx of AF and PE  ct chest reviewed, large pleural eff noted    H and P reviewed     H&P Adult [Charted Location: [facility] 01] [Authored: 21-Sep-2018 02:54]- for Visit: [id number], Complete, Revised, Signed in Full, General    History and Physical:   Source of Information	Chart(s)  Outpatient Providers	PMD- Dr. Lena Augustin       History of Present Illness:  Reason for Admission: rash  History of Present Illness:   88F w/pmh of Afib on Coumadin, GERD, HTN, HLD, Depression, PE/DVT, COPD, UTIs, urinary retention, constipation presenting from [facility] with b/l worsening b/l leg rash with skin breakdown. Difficult to obtain accurate history as patient is poor historian likely baseline dementia. History obtained from charts. As per chart review pt with chronic b/l leg rash with multiple skins breakdown over the b/l foot and decreased oral intake. Son brought pt to ED for further evaluation of the rash but not present at bedside a the time of evaluation. Pt has been receiving sulfasalazine topical, Benadryl and prednisone oral for rash as per  chart review, pt scratching herself due to constant itching despite cream prescribed by derm at . No documented fever or recent change in medications.      In the ED, pt afebrile, labs pertinent for hemoglobin 11.2, PT 41.4, INR 3.7, K 5.5(likely hemolyzed), BUN 54.       no history of HLD and HTN     Review of Systems:  Review of Systems: unable to obtain with baseline dementia      Allergies and Intolerances:        Allergies:  	No Known Allergies:      PAST MEDICAL & SURGICAL HISTORY:  Femur neck fracture  Vertebral fracture, pathological  Neurogenic bladder  DVT (deep venous thrombosis)  Afib  GERD (gastroesophageal reflux disease)  Pulmonary embolism  UTI (lower urinary tract infection)  HTN (hypertension)  Pulmonary embolism  Memory impairment  Hypertension  Atrial fibrillation  Hyponatremia  Shortness of breath: etiology probably copd  Urinary retention  Arthritis  DVT (deep venous thrombosis)  No significant past surgical history  History of hip surgery: right gamma nail  No significant past surgical history        Medication list         MEDICATIONS  (STANDING):  calcium carbonate 1250 mG  + Vitamin D (OsCal 500 + D) 1 Tablet(s) Oral two times a day  cholecalciferol 1000 Unit(s) Oral two times a day  diltiazem    Tablet 90 milliGRAM(s) Oral three times a day  ferrous    sulfate 325 milliGRAM(s) Oral daily  furosemide    Tablet 20 milliGRAM(s) Oral daily  influenza   Vaccine 0.5 milliLiter(s) IntraMuscular once  lactobacillus acidophilus 1 Tablet(s) Oral two times a day with meals  mirtazapine 7.5 milliGRAM(s) Oral at bedtime  predniSONE   Tablet 40 milliGRAM(s) Oral daily  silver sulfADIAZINE 1% Cream 1 Application(s) Topical two times a day    MEDICATIONS  (PRN):  acetaminophen   Tablet .. 500 milliGRAM(s) Oral every 6 hours PRN Moderate Pain (4 - 6)  ALBUTerol   0.5% 2.5 milliGRAM(s) Nebulizer every 6 hours PRN Shortness of Breath and/or Wheezing         Vitals log        ICU Vital Signs Last 24 Hrs  T(C): 36.6 (22 Sep 2018 04:50), Max: 36.6 (22 Sep 2018 04:50)  T(F): 97.8 (22 Sep 2018 04:50), Max: 97.8 (22 Sep 2018 04:50)  HR: 79 (22 Sep 2018 04:50) (79 - 105)  BP: 112/79 (22 Sep 2018 04:50) (112/79 - 144/88)  BP(mean): --  ABP: --  ABP(mean): --  RR: 17 (22 Sep 2018 04:50) (17 - 18)  SpO2: 99% (22 Sep 2018 04:50) (86% - 99%)           Input and Output:  I&O's Detail      Lab Data                        10.3   9.15  )-----------( 261      ( 22 Sep 2018 07:07 )             35.3     09-22    142  |  108  |  44<H>  ----------------------------<  131<H>  4.7   |  25  |  0.80    Ca    8.2<L>      22 Sep 2018 07:07    TPro  6.5  /  Alb  3.1<L>  /  TBili  0.6  /  DBili  x   /  AST  33  /  ALT  31  /  AlkPhos  147<H>  09-22            Review of Systems	      Objective     Physical Examination    frail  weak  heart s1s2  lung dec BS  abd soft  kyphosis      Pertinent Lab findings & Imaging      Meza:  NO   Adequate UO     I&O's Detail           Discussed with:     Cultures:	        Radiology
[facility]         Marshall Frederick, Elias, Lenin, [place], Kofi, Gualberto        [phone] (office)    CHIEF COMPLAINT: Patient is a 88y old  Female who presents with a chief complaint of rash (21 Sep 2018 05:06)      HPI:  88F w/pmh of Afib on Coumadin, GERD, HTN, HLD, Depression, PE/DVT, COPD, UTIs, urinary retention, constipation, normal lvef with mod-sev mr (echo 1/2018)  presenting from [facility] with b/l worsening b/l leg rash with skin breakdown. Difficult to obtain accurate history as patient is poor historian likely baseline dementia. History obtained from medical record. As per chart review pt with chronic b/l leg rash with multiple skins breakdown over the b/l foot and decreased oral intake. Son brought pt to ED for further evaluation of the rash but not present at bedside a the time of evaluation. Pt has been receiving sulfasalazine topical, Benadryl and prednisone oral for rash as per  chart review, pt scratching herself due to constant itching despite cream prescribed by derm at . No documented fever or recent change in medications.      In the ED, pt afebrile, labs pertinent for hemoglobin 11.2, PT 41.4, INR 3.7, K 5.5(likely hemolyzed), BUN 54.       no history of HLD and HTN (21 Sep 2018 02:54)    Over the day she has been spitting out her diltiazem, and therefore has missed two consecutive doses.  Her heart rate was noted to be more rapid, to the 120s.  She was then administered iv diltiazem and was directly observed to take her diltiazem po.  Cardiology consultation is now being obtained.    PAST MEDICAL & SURGICAL HISTORY:  Femur neck fracture  Vertebral fracture, pathological  Neurogenic bladder  Afib  GERD (gastroesophageal reflux disease)  Pulmonary embolism  UTI (lower urinary tract infection)  HTN (hypertension)  Pulmonary embolism  Memory impairment  Hypertension  Atrial fibrillation  Hyponatremia  Shortness of breath: etiology probably copd  Urinary retention  Arthritis  DVT (deep venous thrombosis)  History of hip surgery: right gamma nail      SOCIAL HISTORY: No active tobacco, alcohol or illicit drug use    FAMILY HISTORY:  No pertinent family history in first degree relatives   No pertinent family history of CAD       MEDICATIONS  (STANDING):  calcium carbonate 1250 mG  + Vitamin D (OsCal 500 + D) 1 Tablet(s) Oral two times a day  cholecalciferol 1000 Unit(s) Oral two times a day  diltiazem    Tablet 90 milliGRAM(s) Oral three times a day  ferrous    sulfate 325 milliGRAM(s) Oral daily  furosemide    Tablet 20 milliGRAM(s) Oral daily  influenza   Vaccine 0.5 milliLiter(s) IntraMuscular once  ketorolac   Injectable 15 milliGRAM(s) IV Push once  lactobacillus acidophilus 1 Tablet(s) Oral two times a day with meals  mirtazapine 7.5 milliGRAM(s) Oral at bedtime  predniSONE   Tablet 40 milliGRAM(s) Oral daily  silver sulfADIAZINE 1% Cream 1 Application(s) Topical two times a day    MEDICATIONS  (PRN):  acetaminophen   Tablet .. 500 milliGRAM(s) Oral every 6 hours PRN Moderate Pain (4 - 6)  ALBUTerol   0.5% 2.5 milliGRAM(s) Nebulizer every 6 hours PRN Shortness of Breath and/or Wheezing      Allergies    No Known Allergies    Intolerances        REVIEW OF SYSTEMS: Is negative for eye, ENT, GI, , allergic, dermatologic, musculoskeletal and neurologic, except as described above.    VITAL SIGNS:   Vital Signs Last 24 Hrs  T(C): 36.4 (21 Sep 2018 13:07), Max: 37.1 (21 Sep 2018 01:00)  T(F): 97.5 (21 Sep 2018 13:07), Max: 98.7 (21 Sep 2018 01:00)  HR: 94 (21 Sep 2018 18:02) (73 - 105)  BP: 132/93 (21 Sep 2018 18:02) (102/70 - 146/81)  BP(mean): --  RR: 17 (21 Sep 2018 13:07) (15 - 20)  SpO2: 99% (21 Sep 2018 13:07) (86% - 99%)    I&O's Summary      PHYSICAL EXAM:    Constitutional: NAD, awake and alert, well-developed  Eyes:  EOMI, no oral cyanosis, conjunctivae clear, anicteric.  Pulmonary: Non-labored, breath sounds are clear bilaterally, no wheezing, rales or rhonchi  Cardiovascular:  irregular S1 and S2. No murmur.  No rubs, gallops or clicks  Gastrointestinal: Bowel Sounds present, soft, nontender.   Lymph: mild peripheral edema.   Neurological: Alert, strength and sensitivity are grossly intact  Skin: diffuse rash with ecoriation  Psych:  alert confused    LABS: All Labs Reviewed:                        10.8   8.81  )-----------( 222      ( 21 Sep 2018 05:53 )             36.0                         11.1   9.47  )-----------( 250      ( 20 Sep 2018 23:12 )             36.7     21 Sep 2018 05:53    141    |  109    |  47     ----------------------------<  99     4.7     |  23     |  0.68   20 Sep 2018 23:12    139    |  107    |  54     ----------------------------<  129    5.5     |  23     |  0.82     Ca    7.9        21 Sep 2018 05:53  Ca    8.7        20 Sep 2018 23:12    TPro  6.8    /  Alb  3.2    /  TBili  0.6    /  DBili  x      /  AST  53     /  ALT  32     /  AlkPhos  151    20 Sep 2018 23:12    PT/INR - ( 21 Sep 2018 05:53 )   PT: 40.9 sec;   INR: 3.65 ratio         PTT - ( 21 Sep 2018 01:09 )  PTT:38.5 sec      Blood Culture:         RADIOLOGY:  < from: TTE Echo Doppler w/o Cont (01.12.18 @ 18:55) >     EXAM:  ECHO TTE W/O CON COMP W/DOPPLR         PROCEDURE DATE:  01/12/2018        INTERPRETATION:  Ordering Physician: LYNNE GARDNER [id number]    Indication: CHF    Study Quality: Technically difficult   A complete echocardiographic study was performed utilizing standard   protocol including spectral and color Doppler in all echocardiographic   windows.    Height: 159 cm  Weight: 53 kg  BSA: 1.5  Blood Pressure: 120/78    MEASUREMENTS  IVS: 0.9cm  PWT: 0.8cm  LA: 6.4cm  AO: 3.2cm  LVIDd: 4.2cm  LVIDs: 2.8cm    LVEF: 55-60%  RVSP: 31mmHg    FINDINGS  Left Ventricle: Endocardium not well-visualized. Grossly normal left   ventricular systolic function. Small left ventricle  Aortic Valve: Calcified trileaflet aortic valve. Mild aortic insufficiency  Mitral Valve: Mitral annular calcification with tethered mitral valve   leaflets. Moderate to severe mitral regurgitation  Tricuspid Valve: Tricuspid valve is not well-visualized. Mild to moderate   tricuspid regurgitation  Pulmonic Valve: Not well-visualized. Trace pulmonic insufficiency  Left Atrium: Severe left atrial enlargement  Right Ventricle: Probably decreased right ventricular systolic function.   Mild right ventricular enlargement  Right Atrium: Right atrial enlargement  Diastolic Function: There is evidence of diastolic dysfunction  Pericardium/Pleura: Normal pericardium with no pericardial effusion  Hemodynamics: Pulmonary artery systolic pressure is estimated to be 31   mmHg, assuming the right atrial pressure is equalto 8 mmHg, consistent   with normal pulmonary pressures.    CONCLUSIONS:  1. Technically limited study. Patient is not cooperative.  2. Endocardium not well-visualized. Grossly normal left ventricular   systolic function.  3. Mitral annular calcification with tethered mitral valve leaflets.   Moderate to severe mitral regurgitation  4. Mild to moderate tricuspid regurgitation  5. Severe left atrial enlargement  6. Mild right ventricular enlargement. Probably decreased right   ventricular systolicfunction.   7. Evidence of diastolic dysfunction  8. No pericardial effusion.                  MONTANA ROTHMAN   This document has been electronically signed. Jan 13 2018  4:35PM                < end of copied text >    EKG: af, rvr, low voltage, septal infarct     < from: CT Chest No Cont (09.21.18 @ 02:48) >    EXAM:  CT CHEST                            PROCEDURE DATE:  09/21/2018          INTERPRETATION:  [facility] RADIOLOGIST PRELIMINARY REPORT    EXAM:    CT Chest Without Intravenous Contrast     EXAM DATE/TIME:    9/21/2018 2:39 AM     CLINICAL HISTORY:   88 years old, female; Pain and signs and symptoms; Other: Pneumonia;   Other:   Back pain     TECHNIQUE:    Axial computed tomography images of the chest without intravenous   contrast.    Coronal and sagittal reformatted images were created and reviewed.    MIP reconstructed images were created and reviewed.     COMPARISON:    1/11/2018    FINDINGS:      Lungs:  Dependent compressive atelectasis left base. No definite   pneumonia.   Pleural space:  Large layering left pleural effusion increased in size.   Small right pleural effusion with small loculated components similar to   prior.   Heart:  Cardiomegaly.    Mediastinum:  Esophagus is unremarkable.    Aorta: Normal. No aortic aneurysm.    Lymph nodes: Unremarkable. No enlarged lymph nodes.    Bones/joints:  Healing fracture of the left third rib. Healing   comminuted   fracture of the neck and head of the right humerus. Healing sternal   fractures.   Age-indeterminate mid thoracic compression deformity and age   indeterminate thoracolumbar compression deformity   Soft tissues: Unremarkable.    Intraperitoneal space:  Incompletely visualized perihepatic and   perisplenic   ascites as well as periportal edema.     IMPRESSION:   Large left pleural effusion with underlying dependent atelectasis. No   definite pneumonia. Small right pleural effusion with loculated   components.  Additional findings as discussed                RADHA POLK M.D., ATTENDING RADIOLOGIST  This document has been electronically signed. Sep 21 2018  9:26AM                < end of copied text >
HPI:  88F w/pmh of Dementia, Afib on Coumadin, GERD, HTN, HLD, Depression, PE/DVT, COPD, UTIs,   urinary retention, constipation presenting from [facility] with diffuse rash in rashida arms  legs post trunk with maculopapular rash with excoriations with some blood filled vesicles on   palms, and large clear vesicles on b/l ankles with skin breakdown over dorsum of right foot. No   fever chills n/v/d. Pt has been receiving sulfasalazine topical, Benadryl and prednisone oral for   rash. Apparently was seen  by derm at . Rash did not get better and pt was sent to the   hospital to be evaluated.    Infectious Disease consult was called to evaluate pt due to rash.    Past Medical & Surgical Hx:  PAST MEDICAL & SURGICAL HISTORY:  Femur neck fracture  Vertebral fracture, pathological  Neurogenic bladder  Afib  GERD (gastroesophageal reflux disease)  Pulmonary embolism  UTI (lower urinary tract infection)  HTN (hypertension)  Pulmonary embolism  Memory impairment  Hypertension  Atrial fibrillation  Hyponatremia  Shortness of breath: etiology probably copd  Urinary retention  Arthritis  DVT (deep venous thrombosis)  History of hip surgery: right gamma nail      Social History--  EtOH: denies   Tobacco: denies   Drug Use: denies    FAMILY HISTORY:  No pertinent family history in first degree relatives    Allergies  No Known Allergies    Home Medications:  albuterol 2.5 mg/3 mL (0.083%) inhalation solution: 3 milliliter(s) inhaled every 6 hours, As Needed (21 Sep 2018 03:11)  Aspercreme with Lidocaine 4% topical cream: Apply topically to affected area once a day (21 Sep 2018 03:11)  Benadryl 25 mg oral tablet: 1 tab(s) orally 2 times a day (21 Sep 2018 03:11)  Bengay Ultra Strength 4%-10%-30% topical cream: Apply topically to affected area once a day (21 Sep 2018 03:11)  cetirizine 5 mg oral tablet: 1 tab(s) orally once a day (21 Sep 2018 03:11)  cholecalciferol 1000 intl units oral tablet: 1 tab(s) orally 2 times a day (21 Sep 2018 03:11)  Coumadin: 0.5 milligram(s) orally once a day (at bedtime) (21 Sep 2018 03:11)  Coumadin 1 mg oral tablet: 1 tab(s) orally every other day (at bedtime) (21 Sep 2018 03:11)  Dermagran BC topical ointment: Apply topically to affected area 2 times a day to left and right buttock (21 Sep 2018 03:11)  dilTIAZem 90 mg oral tablet: 1 tab(s) orally 3 times a day (21 Sep 2018 03:11)  doxycycline hyclate 100 mg oral delayed release tablet: 1 tab(s) orally 2 times a day (21 Sep 2018 03:11)  ferrous sulfate 325 mg (65 mg elemental iron) oral tablet: 1 tab(s) orally once a day (21 Sep 2018 03:11)  lactobacillus acidophilus oral capsule: 1 cap(s) orally once a day (21 Sep 2018 03:11)  Lasix 20 mg oral tablet: 1 tab(s) orally once a day (21 Sep 2018 03:11)  Metoprolol Succinate ER 25 mg oral tablet, extended release: 0.5 tab(s) orally once a day (21 Sep 2018 03:11)  mirtazapine 15 mg oral tablet: 0.5 tab(s) orally once a day (at bedtime) (21 Sep 2018 03:11)  Oyster Shell Calcium with Vitamin D 500 mg-200 intl units oral tablet: 1 tab(s) orally 2 times a day (21 Sep 2018 03:11)  predniSONE 20 mg oral tablet: 2 tab(s) orally once a day (21 Sep 2018 03:11)  silver sulfADIAZINE 1% topical cream: Apply topically to affected area 2 times a day (21 Sep 2018 03:11)  Tylenol 8 Hour 650 mg oral tablet, extended release: 2 tab(s) orally every 8 hours (21 Sep 2018 03:11)  Vitamin D3 1000 intl units oral capsule: 1 cap(s) orally once a day (21 Sep 2018 03:11)  Zantac 150 oral tablet: 1 tab(s) orally 2 times a day (21 Sep 2018 03:11)    Current Inpatient Medications :    ANTIBIOTICS:       OTHER RELEVANT MEDICATIONS :  acetaminophen   Tablet .. 500 milliGRAM(s) Oral every 6 hours PRN  ALBUTerol   0.5% 2.5 milliGRAM(s) Nebulizer every 6 hours PRN  calcium carbonate 1250 mG  + Vitamin D (OsCal 500 + D) 1 Tablet(s) Oral two times a day  cholecalciferol 1000 Unit(s) Oral two times a day  diltiazem    Tablet 90 milliGRAM(s) Oral three times a day  ferrous    sulfate 325 milliGRAM(s) Oral daily  furosemide    Tablet 20 milliGRAM(s) Oral daily  influenza   Vaccine 0.5 milliLiter(s) IntraMuscular once  metoprolol tartrate Injectable 5 milliGRAM(s) IV Push once  mirtazapine 7.5 milliGRAM(s) Oral at bedtime  predniSONE   Tablet 40 milliGRAM(s) Oral daily  silver sulfADIAZINE 1% Cream 1 Application(s) Topical two times a day      ROS:  Unable to obtain due to : Advanced Dementia    I&O's Detail      Physical Exam:  Vital Signs Last 24 Hrs  T(C): 36.4 (21 Sep 2018 13:07), Max: 37.1 (21 Sep 2018 01:00)  T(F): 97.5 (21 Sep 2018 13:07), Max: 98.7 (21 Sep 2018 01:00)  HR: 105 (21 Sep 2018 17:29) (73 - 105)  BP: 134/87 (21 Sep 2018 17:29) (102/70 - 146/81)  BP(mean): --  RR: 17 (21 Sep 2018 13:07) (15 - 20)  SpO2: 99% (21 Sep 2018 13:07) (86% - 99%)  Height (cm): 162.56 (09-20 @ 19:01)  Weight (kg): 54.4 (09-20 @ 19:01)  BMI (kg/m2): 20.6 (09-20 @ 19:01)  BSA (m2): 1.57 (09-20 @ 19:01)    General:  in no acute distress  Eyes: sclera anicteric, pupils equal and reactive to light  ENMT: buccal mucosa moist, pharynx not injected  Neck: supple, trachea midline  Lungs: clear, no wheeze/rhonchi  Cardiovascular: regular rate and rhythm, S1 S2  Abdomen: soft, nontender, no organomegaly present, bowel sounds normal  Neurological:  alert and oriented x3, Cranial Nerves II-XII grossly intact  Skin: Diffuse maculopapular rash rashida upper lower extremities some dry some blisters on palms   with blood filled  Lymph Nodes: no palpable cervical/supraclavicular lymph nodes enlargements  Extremities: no cyanosis/clubbing/edema    Labs:                         10.8   8.81  )-----------( 222      ( 21 Sep 2018 05:53 )             36.0   09-21    141  |  109<H>  |  47<H>  ----------------------------<  99  4.7   |  23  |  0.68    Ca    7.9<L>      21 Sep 2018 05:53    TPro  6.8  /  Alb  3.2<L>  /  TBili  0.6  /  DBili  x   /  AST  53<H>  /  ALT  32  /  AlkPhos  151<H>  09-20      RECENT CULTURES:    RADIOLOGY & ADDITIONAL STUDIES:    EXAM:  CT CHEST                            PROCEDURE DATE:  09/21/2018          INTERPRETATION:  [facility] RADIOLOGIST PRELIMINARY REPORT    EXAM:    CT Chest Without Intravenous Contrast     EXAM DATE/TIME:    9/21/2018 2:39 AM     CLINICAL HISTORY:   88 years old, female; Pain and signs and symptoms; Other: Pneumonia;   Other:   Back pain     TECHNIQUE:    Axial computed tomography images of the chest without intravenous   contrast.    Coronal and sagittal reformatted images were created and reviewed.    MIP reconstructed images were created and reviewed.     COMPARISON:    1/11/2018    FINDINGS:      Lungs:  Dependent compressive atelectasis left base. No definite   pneumonia.   Pleural space:  Large layering left pleural effusion increased in size.   Small right pleural effusion with small loculated components similar to   prior.   Heart:  Cardiomegaly.    Mediastinum:  Esophagus is unremarkable.    Aorta: Normal. No aortic aneurysm.    Lymph nodes: Unremarkable. No enlarged lymph nodes.    Bones/joints:  Healing fracture of the left third rib. Healing   comminuted   fracture of the neck and head of the right humerus. Healing sternal   fractures.   Age-indeterminate mid thoracic compression deformity and age   indeterminate thoracolumbar compression deformity   Soft tissues: Unremarkable.    Intraperitoneal space:  Incompletely visualized perihepatic and   perisplenic   ascites as well as periportal edema.     IMPRESSION:   Large left pleural effusion with underlying dependent atelectasis. No   definite pneumonia. Small right pleural effusion with loculated   components.  Additional findings as discussed      Assessment :   88F w/pmh of Dementia, Afib on Coumadin, GERD, HTN, HLD, Depression, PE/DVT, COPD, UTIs,   urinary retention, constipation presenting from [facility] with diffuse rash  Not zoster/shingles  Doubt infectious   Poss allergic reaction/dermatitis ?drug rash ie allergy to sulfa/lasix  ?Bullous pemphigus  Ideally will need biopsy    Plan :   Defer antibiotics  Derm consult     D/w Dr Solis    Continue with present regime .  Approptiate use of antibiotics and adverse effects reviewed.      I have discussed the above plan of care with patient/family in detail. They expressed understanding of the treatment plan . Risks, benefits and alternatives discussed in detail. I have asked if they have any questions or concerns and appropriately addressed them to the best of my ability .      > 45 minutes spent in direct patient care reviewing  the notes, lab data/ imaging , discussion with multidisciplinary team. All questions were addressed and answered to the best of my capacity .    Thank you for allowing me to participate in the care of your patient .      Daryl Romeo MD  Infectious Disease  [phone]

## 2018-09-23 PROCEDURE — 99232 SBSQ HOSP IP/OBS MODERATE 35: CPT

## 2018-09-23 PROCEDURE — 99233 SBSQ HOSP IP/OBS HIGH 50: CPT

## 2018-09-23 RX ADMIN — Medication 1 APPLICATION(S): at 18:31

## 2018-09-23 RX ADMIN — Medication 325 MILLIGRAM(S): at 11:19

## 2018-09-23 RX ADMIN — Medication 1 TABLET(S): at 16:28

## 2018-09-23 RX ADMIN — Medication 1 APPLICATION(S): at 05:57

## 2018-09-23 RX ADMIN — Medication 1000 UNIT(S): at 16:28

## 2018-09-23 RX ADMIN — MIRTAZAPINE 7.5 MILLIGRAM(S): 45 TABLET, ORALLY DISINTEGRATING ORAL at 21:05

## 2018-09-23 RX ADMIN — Medication 20 MILLIGRAM(S): at 11:19

## 2018-09-23 NOTE — PROGRESS NOTE ADULT - SUBJECTIVE AND OBJECTIVE BOX
Patient is a 88y old  Female who presents with a chief complaint of rash (23 Sep 2018 05:55)      INTERVAL HPI/OVERNIGHT EVENTS: 88F w/pmh of Afib on Coumadin, GERD, HTN, HLD, Depression, PE/DVT, COPD, UTIs, urinary retention, constipation presenting from [facility] with b/l worsening b/l leg rash with skin breakdown admitted for diffuse b/l LE maculopapular rash with excoriations likely eczema, hospital course complicated by  rapid afib due to medication noncompliance, also found to have large pleural effusion that requires thoracentesis once INR is 1.8. Patient is seen and examined at bedside.      MEDICATIONS  (STANDING):  calcium carbonate 1250 mG  + Vitamin D (OsCal 500 + D) 1 Tablet(s) Oral two times a day  cholecalciferol 1000 Unit(s) Oral two times a day  diltiazem    Tablet 90 milliGRAM(s) Oral three times a day  ferrous    sulfate 325 milliGRAM(s) Oral daily  furosemide    Tablet 20 milliGRAM(s) Oral daily  influenza   Vaccine 0.5 milliLiter(s) IntraMuscular once  lactobacillus acidophilus 1 Tablet(s) Oral two times a day with meals  mirtazapine 7.5 milliGRAM(s) Oral at bedtime  predniSONE   Tablet 40 milliGRAM(s) Oral daily  silver sulfADIAZINE 1% Cream 1 Application(s) Topical two times a day    MEDICATIONS  (PRN):  acetaminophen   Tablet .. 500 milliGRAM(s) Oral every 6 hours PRN Moderate Pain (4 - 6)  ALBUTerol   0.5% 2.5 milliGRAM(s) Nebulizer every 6 hours PRN Shortness of Breath and/or Wheezing      Allergies    No Known Allergies    Intolerances        REVIEW OF SYSTEMS:  Unable to obtain, patient is lethargic and wont's answer questions   Vital Signs Last 24 Hrs  T(C): 36.4 (23 Sep 2018 05:19), Max: 36.4 (22 Sep 2018 20:16)  T(F): 97.5 (23 Sep 2018 05:19), Max: 97.5 (22 Sep 2018 20:16)  HR: 93 (23 Sep 2018 05:19) (89 - 96)  BP: 110/75 (23 Sep 2018 05:19) (110/75 - 123/83)  BP(mean): --  RR: 16 (23 Sep 2018 05:19) (16 - 17)  SpO2: 97% (23 Sep 2018 05:19) (95% - 97%)    PHYSICAL EXAM:  GENERAL: NAD, Frail, lethargic   HEAD:  Atraumatic, Normocephalic  EYES: EOMI, PERRLA, conjunctiva and sclera clear  ENMT: No tonsillar erythema, exudates, or enlargement; Moist mucous membranes  NECK: Supple, No JVD, Normal thyroid  NERVOUS SYSTEM: Lethargic, responsive to verbal and tactile stimuli.   CHEST/LUNG: Decrease breath sounds bilat ; No rales, rhonchi, wheezing, or rubs  HEART: S1S2+,  Regular rate and rhythm  ABDOMEN: Soft, Nontender, Nondistended; Bowel sounds present  EXTREMITIES:  2+ Peripheral Pulses, No clubbing, cyanosis, or edema. + eczematous rash   LYMPH: No lymphadenopathy noted  SKIN: + eczema     LABS:                        10.5   7.48  )-----------( 233      ( 23 Sep 2018 08:15 )             36.8     23 Sep 2018 08:15    143    |  109    |  33     ----------------------------<  87     4.3     |  28     |  0.56     Ca    8.1        23 Sep 2018 08:15      PT/INR - ( 23 Sep 2018 08:15 )   PT: 23.9 sec;   INR: 2.16 ratio         PTT - ( 23 Sep 2018 08:15 )  PTT:36.0 sec  CAPILLARY BLOOD GLUCOSE        BLOOD CULTURE  09-21 @ 08:48   No growth to date.  --  --    RADIOLOGY & ADDITIONAL TESTS:    Imaging Personally Reviewed:  [ ] YES     Consultant(s) Notes Reviewed:      Care Discussed with Consultants/Other Providers:

## 2018-09-23 NOTE — PROGRESS NOTE ADULT - PROBLEM SELECTOR PLAN 1
copd pe HF AF  frail and weak  off AC   planned for thoracentesis when INR is 1.8 or less  hopefully on Monday or Tuesday  cont NEBS prn  cont LASIX  supportive care and regimen  prognosis guarded  pt is DNR  assist with all ADL  pt is a long term resident at [facility]

## 2018-09-23 NOTE — PROGRESS NOTE ADULT - SUBJECTIVE AND OBJECTIVE BOX
Date/Time Patient Seen:  		  Referring MD:   Data Reviewed	       Patient is a 88y old  Female who presents with a chief complaint of rash (22 Sep 2018 17:46)  in bed  on o2      Subjective/HPI     PAST MEDICAL & SURGICAL HISTORY:  Femur neck fracture  Vertebral fracture, pathological  Neurogenic bladder  DVT (deep venous thrombosis)  Afib  GERD (gastroesophageal reflux disease)  Pulmonary embolism  UTI (lower urinary tract infection)  HTN (hypertension)  Pulmonary embolism  Memory impairment  Hypertension  Atrial fibrillation  Hyponatremia  Shortness of breath: etiology probably copd  Urinary retention  Arthritis  DVT (deep venous thrombosis)  No significant past surgical history  History of hip surgery: right gamma nail  No significant past surgical history        Medication list         MEDICATIONS  (STANDING):  calcium carbonate 1250 mG  + Vitamin D (OsCal 500 + D) 1 Tablet(s) Oral two times a day  cholecalciferol 1000 Unit(s) Oral two times a day  diltiazem    Tablet 90 milliGRAM(s) Oral three times a day  ferrous    sulfate 325 milliGRAM(s) Oral daily  furosemide    Tablet 20 milliGRAM(s) Oral daily  influenza   Vaccine 0.5 milliLiter(s) IntraMuscular once  lactobacillus acidophilus 1 Tablet(s) Oral two times a day with meals  mirtazapine 7.5 milliGRAM(s) Oral at bedtime  predniSONE   Tablet 40 milliGRAM(s) Oral daily  silver sulfADIAZINE 1% Cream 1 Application(s) Topical two times a day    MEDICATIONS  (PRN):  acetaminophen   Tablet .. 500 milliGRAM(s) Oral every 6 hours PRN Moderate Pain (4 - 6)  ALBUTerol   0.5% 2.5 milliGRAM(s) Nebulizer every 6 hours PRN Shortness of Breath and/or Wheezing         Vitals log        ICU Vital Signs Last 24 Hrs  T(C): 36.4 (23 Sep 2018 05:19), Max: 36.4 (22 Sep 2018 20:16)  T(F): 97.5 (23 Sep 2018 05:19), Max: 97.5 (22 Sep 2018 20:16)  HR: 93 (23 Sep 2018 05:19) (89 - 96)  BP: 110/75 (23 Sep 2018 05:19) (110/75 - 123/83)  BP(mean): --  ABP: --  ABP(mean): --  RR: 16 (23 Sep 2018 05:19) (16 - 17)  SpO2: 97% (23 Sep 2018 05:19) (95% - 97%)           Input and Output:  I&O's Detail      Lab Data                        10.3   9.15  )-----------( 261      ( 22 Sep 2018 07:07 )             35.3     09-22    142  |  108  |  44<H>  ----------------------------<  131<H>  4.7   |  25  |  0.80    Ca    8.2<L>      22 Sep 2018 07:07    TPro  6.5  /  Alb  3.1<L>  /  TBili  0.6  /  DBili  x   /  AST  33  /  ALT  31  /  AlkPhos  147<H>  09-22            Review of Systems	      Objective     Physical Examination    heart s1s2  lung dec BS      Pertinent Lab findings & Imaging      Derrick:  NO   Adequate UO     I&O's Detail           Discussed with:     Cultures:	        Radiology

## 2018-09-23 NOTE — PROGRESS NOTE ADULT - ASSESSMENT
88F w/pmh of Afib on Coumadin, GERD, HTN, HLD, Depression, PE/DVT, COPD, UTIs, urinary retention, constipation, normal lvef with mod-sev mr (echo 1/2018)  presenting from [facility] with b/l worsening b/l leg rash with skin breakdown. Difficult to obtain accurate history as patient is poor historian likely baseline dementia. History obtained from medical record. As per chart review pt with chronic b/l leg rash with multiple skins breakdown over the b/l foot and decreased oral intake. Son brought pt to ED for further evaluation of the rash but not present at bedside a the time of evaluation. Pt has been receiving sulfasalazine topical, Benadryl and prednisone oral for rash as per  chart review, pt scratching herself due to constant itching despite cream prescribed by derm at . No documented fever or recent change in medications.      In the ED, pt afebrile, labs pertinent for hemoglobin 11.2, PT 41.4, INR 3.7, K 5.5(likely hemolyzed), BUN 54.       no history of HLD and HTN (21 Sep 2018 02:54)    Over the day she has been spitting out her diltiazem, and therefore has missed two consecutive doses.  Her heart rate was noted to be more rapid, to the 120s.  She was then administered iv diltiazem and was directly observed to take her diltiazem po.  Cardiology consultation is now being obtained.    -there is no evidence of acute ischemia.    -there is evidence for volume overload.  -she does have mild edema, which may reflect volume or be related to her skin lesions  -she has a large left pleural effusion, and a small right effusion, of uncertain etiology  -she got 2 doses of iv lasix   -as per pulm, is planned for thoracentesis once inr sufficiently low. inr today 2.6    -chronic af on coumadin  -tachycardia 9/21 likely from not taking meds properly  -hopefully no further intervention beyond taking her meds will be needed  -thus far, his rate is controlled   -no need for repeat echo at this time     -monitor electrolytes, keep k>4, Mg>2   -evaluation of skin lesions as per med  -will follow

## 2018-09-23 NOTE — PROGRESS NOTE ADULT - SUBJECTIVE AND OBJECTIVE BOX
[facility]    Marshall Frederick, Elias, Lenin, Valencia, Kofi, Gualberto      [phone]    CHIEF COMPLAINT: Patient is a 88y old  Female who presents with a chief complaint of rash (23 Sep 2018 08:57)      Follow Up: af, left pleural effusion    Interim history: remains confused, lethargic    MEDICATIONS  (STANDING):  calcium carbonate 1250 mG  + Vitamin D (OsCal 500 + D) 1 Tablet(s) Oral two times a day  cholecalciferol 1000 Unit(s) Oral two times a day  diltiazem    Tablet 90 milliGRAM(s) Oral three times a day  ferrous    sulfate 325 milliGRAM(s) Oral daily  furosemide    Tablet 20 milliGRAM(s) Oral daily  influenza   Vaccine 0.5 milliLiter(s) IntraMuscular once  lactobacillus acidophilus 1 Tablet(s) Oral two times a day with meals  mirtazapine 7.5 milliGRAM(s) Oral at bedtime  predniSONE   Tablet 20 milliGRAM(s) Oral daily  silver sulfADIAZINE 1% Cream 1 Application(s) Topical two times a day    MEDICATIONS  (PRN):  acetaminophen   Tablet .. 500 milliGRAM(s) Oral every 6 hours PRN Moderate Pain (4 - 6)  ALBUTerol   0.5% 2.5 milliGRAM(s) Nebulizer every 6 hours PRN Shortness of Breath and/or Wheezing      REVIEW OF SYSTEMS: unable    Vital Signs Last 24 Hrs  T(C): 36.4 (23 Sep 2018 05:19), Max: 36.4 (22 Sep 2018 20:16)  T(F): 97.5 (23 Sep 2018 05:19), Max: 97.5 (22 Sep 2018 20:16)  HR: 93 (23 Sep 2018 05:19) (89 - 96)  BP: 110/75 (23 Sep 2018 05:19) (110/75 - 123/83)  BP(mean): --  RR: 16 (23 Sep 2018 05:19) (16 - 17)  SpO2: 97% (23 Sep 2018 05:19) (95% - 97%)    I&O's Summary      Telemetry past 24h: AF 90s    PHYSICAL EXAM:    Constitutional: thin , NAD   HEENT:  MMM, sclerae anicteric, conjunctivae clear, no oral cyanosis.  Pulmonary: Non-labored, breath sounds are decr left  Cardiovascular: irregular, S1 and S2.  No murmur.  No rubs, gallops or clicks  Gastrointestinal: Bowel Sounds present, soft, nontender.   Lymph: No peripheral edema.   Neurological: leth  Skin: excoriated skin lesions no ch  Psych:  leth    LABS: All Labs Reviewed:                        10.5   7.48  )-----------( 233      ( 23 Sep 2018 08:15 )             36.8                         10.3   9.15  )-----------( 261      ( 22 Sep 2018 07:07 )             35.3                         10.8   8.81  )-----------( 222      ( 21 Sep 2018 05:53 )             36.0     23 Sep 2018 08:15    143    |  109    |  33     ----------------------------<  87     4.3     |  28     |  0.56   22 Sep 2018 07:07    142    |  108    |  44     ----------------------------<  131    4.7     |  25     |  0.80   21 Sep 2018 05:53    141    |  109    |  47     ----------------------------<  99     4.7     |  23     |  0.68     Ca    8.1        23 Sep 2018 08:15  Ca    8.2        22 Sep 2018 07:07  Ca    7.9        21 Sep 2018 05:53    TPro  6.5    /  Alb  3.1    /  TBili  0.6    /  DBili  x      /  AST  33     /  ALT  31     /  AlkPhos  147    22 Sep 2018 07:07  TPro  6.8    /  Alb  3.2    /  TBili  0.6    /  DBili  x      /  AST  53     /  ALT  32     /  AlkPhos  151    20 Sep 2018 23:12    PT/INR - ( 23 Sep 2018 08:15 )   PT: 23.9 sec;   INR: 2.16 ratio         PTT - ( 23 Sep 2018 08:15 )  PTT:36.0 sec      Blood Culture: Organism --  Gram Stain Blood -- Gram Stain --  Specimen Source .Blood Blood-Venous  Culture-Blood --            RADIOLOGY:    EKG:    Echo:

## 2018-09-23 NOTE — PROGRESS NOTE ADULT - PROBLEM SELECTOR PLAN 2
Suspect eczema/ dermatitis   Not infectious per ID   continue silver sulfadiazine cream, Prednisone taper.  F/u with dermatologist as out patient.

## 2018-09-23 NOTE — PROGRESS NOTE ADULT - ASSESSMENT
88F w/pmh of Afib on Coumadin, GERD, HTN, HLD, Depression, PE/DVT, COPD, UTIs, urinary retention, constipation presenting from [facility] with b/l worsening b/l leg rash with skin breakdown admitted for diffuse b/l LE/ UE suspected  rash with excoriations likely eczema, hospital course complicated by  rapid afib due to medication noncompliance, also found to have large pleural effusion that requires thoracentesis once INR is 1.8.

## 2018-09-24 ENCOUNTER — RESULT REVIEW (OUTPATIENT)
Age: 83
End: 2018-09-24

## 2018-09-24 ENCOUNTER — TRANSCRIPTION ENCOUNTER (OUTPATIENT)
Age: 83
End: 2018-09-24

## 2018-09-24 DIAGNOSIS — E87.0 HYPEROSMOLALITY AND HYPERNATREMIA: ICD-10-CM

## 2018-09-24 LAB
ALBUMIN FLD-MCNC: 1.3 G/DL — SIGNIFICANT CHANGE UP
ANION GAP SERPL CALC-SCNC: 5 MMOL/L — SIGNIFICANT CHANGE UP (ref 5–17)
APTT BLD: 33.7 SEC — SIGNIFICANT CHANGE UP (ref 27.5–37.4)
B PERT IGG+IGM PNL SER: CLEAR — SIGNIFICANT CHANGE UP
BUN SERPL-MCNC: 25 MG/DL — HIGH (ref 7–23)
CALCIUM SERPL-MCNC: 8.5 MG/DL — SIGNIFICANT CHANGE UP (ref 8.5–10.1)
CHLORIDE SERPL-SCNC: 108 MMOL/L — SIGNIFICANT CHANGE UP (ref 96–108)
CO2 SERPL-SCNC: 34 MMOL/L — HIGH (ref 22–31)
COLOR FLD: YELLOW — SIGNIFICANT CHANGE UP
CREAT SERPL-MCNC: 0.46 MG/DL — LOW (ref 0.5–1.3)
EOSINOPHIL # FLD: 1 % — SIGNIFICANT CHANGE UP
FLUID INTAKE SUBSTANCE CLASS: SIGNIFICANT CHANGE UP
FLUID SEGMENTED GRANULOCYTES: 42 % — SIGNIFICANT CHANGE UP
GLUCOSE FLD-MCNC: 129 MG/DL — SIGNIFICANT CHANGE UP
GLUCOSE SERPL-MCNC: 92 MG/DL — SIGNIFICANT CHANGE UP (ref 70–99)
GRAM STN FLD: SIGNIFICANT CHANGE UP
HCT VFR BLD CALC: 37.4 % — SIGNIFICANT CHANGE UP (ref 34.5–45)
HGB BLD-MCNC: 10.8 G/DL — LOW (ref 11.5–15.5)
INR BLD: 1.91 RATIO — HIGH (ref 0.88–1.16)
LDH SERPL L TO P-CCNC: 237 U/L — SIGNIFICANT CHANGE UP (ref 50–242)
LDH SERPL L TO P-CCNC: 87 U/L — SIGNIFICANT CHANGE UP
LYMPHOCYTES # FLD: 10 % — SIGNIFICANT CHANGE UP
MCHC RBC-ENTMCNC: 25.1 PG — LOW (ref 27–34)
MCHC RBC-ENTMCNC: 28.9 GM/DL — LOW (ref 32–36)
MCV RBC AUTO: 87 FL — SIGNIFICANT CHANGE UP (ref 80–100)
MONOS+MACROS # FLD: 47 % — SIGNIFICANT CHANGE UP
NRBC # BLD: 0 /100 WBCS — SIGNIFICANT CHANGE UP (ref 0–0)
PH FLD: 7.4 — SIGNIFICANT CHANGE UP
PLATELET # BLD AUTO: 247 K/UL — SIGNIFICANT CHANGE UP (ref 150–400)
POTASSIUM SERPL-MCNC: 3.6 MMOL/L — SIGNIFICANT CHANGE UP (ref 3.5–5.3)
POTASSIUM SERPL-SCNC: 3.6 MMOL/L — SIGNIFICANT CHANGE UP (ref 3.5–5.3)
PROT FLD-MCNC: 2.3 G/DL — SIGNIFICANT CHANGE UP
PROTHROM AB SERPL-ACNC: 21.1 SEC — HIGH (ref 9.8–12.7)
RBC # BLD: 4.3 M/UL — SIGNIFICANT CHANGE UP (ref 3.8–5.2)
RBC # FLD: 18.6 % — HIGH (ref 10.3–14.5)
RCV VOL RI: 25 /UL — HIGH (ref 0–5)
SODIUM SERPL-SCNC: 147 MMOL/L — HIGH (ref 135–145)
SPECIMEN SOURCE: SIGNIFICANT CHANGE UP
TOTAL NUCLEATED CELL COUNT, BODY FLUID: 101 /UL — HIGH (ref 0–5)
TUBE TYPE: SIGNIFICANT CHANGE UP
WBC # BLD: 8.18 K/UL — SIGNIFICANT CHANGE UP (ref 3.8–10.5)
WBC # FLD AUTO: 8.18 K/UL — SIGNIFICANT CHANGE UP (ref 3.8–10.5)

## 2018-09-24 PROCEDURE — 32555 ASPIRATE PLEURA W/ IMAGING: CPT | Mod: LT

## 2018-09-24 PROCEDURE — 88108 CYTOPATH CONCENTRATE TECH: CPT | Mod: 26

## 2018-09-24 PROCEDURE — 88305 TISSUE EXAM BY PATHOLOGIST: CPT | Mod: 26

## 2018-09-24 PROCEDURE — 71045 X-RAY EXAM CHEST 1 VIEW: CPT | Mod: 26

## 2018-09-24 PROCEDURE — 99233 SBSQ HOSP IP/OBS HIGH 50: CPT

## 2018-09-24 RX ORDER — DILTIAZEM HCL 120 MG
10 CAPSULE, EXT RELEASE 24 HR ORAL
Qty: 125 | Refills: 0 | Status: DISCONTINUED | OUTPATIENT
Start: 2018-09-24 | End: 2018-09-25

## 2018-09-24 RX ORDER — ZINC OXIDE 200 MG/G
1 OINTMENT TOPICAL
Qty: 0 | Refills: 0 | COMMUNITY

## 2018-09-24 RX ORDER — ACETAMINOPHEN 500 MG
2 TABLET ORAL
Qty: 0 | Refills: 0 | COMMUNITY

## 2018-09-24 RX ORDER — HYDROCORTISONE 0.5 %
1 CREAM (GRAM) TOPICAL
Qty: 0 | Refills: 0 | COMMUNITY

## 2018-09-24 RX ORDER — ALBUTEROL 90 UG/1
2.5 AEROSOL, METERED ORAL EVERY 8 HOURS
Qty: 0 | Refills: 0 | Status: DISCONTINUED | OUTPATIENT
Start: 2018-09-24 | End: 2018-09-26

## 2018-09-24 RX ORDER — DIPHENHYDRAMINE HCL 50 MG
1 CAPSULE ORAL
Qty: 0 | Refills: 0 | COMMUNITY

## 2018-09-24 RX ORDER — METOPROLOL TARTRATE 50 MG
0.5 TABLET ORAL
Qty: 0 | Refills: 0 | COMMUNITY

## 2018-09-24 RX ORDER — WARFARIN SODIUM 2.5 MG/1
1 TABLET ORAL
Qty: 0 | Refills: 0 | COMMUNITY

## 2018-09-24 RX ORDER — LIDOCAINE 4 G/100G
1 CREAM TOPICAL
Qty: 0 | Refills: 0 | COMMUNITY

## 2018-09-24 RX ORDER — WARFARIN SODIUM 2.5 MG/1
2 TABLET ORAL ONCE
Qty: 0 | Refills: 0 | Status: COMPLETED | OUTPATIENT
Start: 2018-09-24 | End: 2018-09-24

## 2018-09-24 RX ORDER — ACETAMINOPHEN 500 MG
1 TABLET ORAL
Qty: 0 | Refills: 0 | COMMUNITY
Start: 2018-09-24

## 2018-09-24 RX ORDER — ACETAMINOPHEN 500 MG
2 TABLET ORAL
Qty: 0 | Refills: 0 | COMMUNITY
Start: 2018-09-24

## 2018-09-24 RX ORDER — POTASSIUM CHLORIDE 20 MEQ
40 PACKET (EA) ORAL ONCE
Qty: 0 | Refills: 0 | Status: COMPLETED | OUTPATIENT
Start: 2018-09-24 | End: 2018-09-24

## 2018-09-24 RX ORDER — WARFARIN SODIUM 2.5 MG/1
0.5 TABLET ORAL
Qty: 0 | Refills: 0 | COMMUNITY

## 2018-09-24 RX ORDER — CHOLECALCIFEROL (VITAMIN D3) 125 MCG
1 CAPSULE ORAL
Qty: 0 | Refills: 0 | COMMUNITY

## 2018-09-24 RX ADMIN — Medication 1 APPLICATION(S): at 06:00

## 2018-09-24 RX ADMIN — MIRTAZAPINE 7.5 MILLIGRAM(S): 45 TABLET, ORALLY DISINTEGRATING ORAL at 21:42

## 2018-09-24 RX ADMIN — WARFARIN SODIUM 2 MILLIGRAM(S): 2.5 TABLET ORAL at 21:42

## 2018-09-24 RX ADMIN — ALBUTEROL 2.5 MILLIGRAM(S): 90 AEROSOL, METERED ORAL at 20:18

## 2018-09-24 RX ADMIN — Medication 10 MG/HR: at 18:11

## 2018-09-24 RX ADMIN — Medication 1 APPLICATION(S): at 17:59

## 2018-09-24 NOTE — SWALLOW BEDSIDE ASSESSMENT ADULT - SWALLOW EVAL: RECOMMENDED FEEDING/EATING TECHNIQUES
check mouth frequently for oral residue/pocketing/crush medication (when feasible)/alternate food with liquid/small sips/bites

## 2018-09-24 NOTE — PROGRESS NOTE ADULT - SUBJECTIVE AND OBJECTIVE BOX
Date/Time Patient Seen:  		  Referring MD:   Data Reviewed	       Patient is a 88y old  Female who presents with a chief complaint of rash (23 Sep 2018 10:05)  in bed  seen and examined  vs and meds reviewed      Subjective/HPI     PAST MEDICAL & SURGICAL HISTORY:  Femur neck fracture  Vertebral fracture, pathological  Neurogenic bladder  DVT (deep venous thrombosis)  Afib  GERD (gastroesophageal reflux disease)  Pulmonary embolism  UTI (lower urinary tract infection)  HTN (hypertension)  Pulmonary embolism  Memory impairment  Hypertension  Atrial fibrillation  Hyponatremia  Shortness of breath: etiology probably copd  Urinary retention  Arthritis  DVT (deep venous thrombosis)  No significant past surgical history  History of hip surgery: right gamma nail  No significant past surgical history        Medication list         MEDICATIONS  (STANDING):  ALBUTerol    0.083% 2.5 milliGRAM(s) Nebulizer every 8 hours  calcium carbonate 1250 mG  + Vitamin D (OsCal 500 + D) 1 Tablet(s) Oral two times a day  cholecalciferol 1000 Unit(s) Oral two times a day  diltiazem    Tablet 90 milliGRAM(s) Oral three times a day  ferrous    sulfate 325 milliGRAM(s) Oral daily  furosemide    Tablet 20 milliGRAM(s) Oral daily  influenza   Vaccine 0.5 milliLiter(s) IntraMuscular once  lactobacillus acidophilus 1 Tablet(s) Oral two times a day with meals  mirtazapine 7.5 milliGRAM(s) Oral at bedtime  predniSONE   Tablet 20 milliGRAM(s) Oral daily  silver sulfADIAZINE 1% Cream 1 Application(s) Topical two times a day    MEDICATIONS  (PRN):  acetaminophen   Tablet .. 500 milliGRAM(s) Oral every 6 hours PRN Moderate Pain (4 - 6)  ALBUTerol   0.5% 2.5 milliGRAM(s) Nebulizer every 6 hours PRN Shortness of Breath and/or Wheezing         Vitals log        ICU Vital Signs Last 24 Hrs  T(C): 36.4 (24 Sep 2018 04:44), Max: 36.6 (23 Sep 2018 13:03)  T(F): 97.6 (24 Sep 2018 04:44), Max: 97.9 (23 Sep 2018 13:03)  HR: 98 (24 Sep 2018 04:44) (88 - 98)  BP: 131/87 (24 Sep 2018 04:44) (101/53 - 134/83)  BP(mean): --  ABP: --  ABP(mean): --  RR: 17 (24 Sep 2018 04:44) (17 - 19)  SpO2: 95% (24 Sep 2018 04:44) (95% - 99%)           Input and Output:  I&O's Detail      Lab Data                        10.5   7.48  )-----------( 233      ( 23 Sep 2018 08:15 )             36.8     09-23    143  |  109<H>  |  33<H>  ----------------------------<  87  4.3   |  28  |  0.56    Ca    8.1<L>      23 Sep 2018 08:15    TPro  6.5  /  Alb  3.1<L>  /  TBili  0.6  /  DBili  x   /  AST  33  /  ALT  31  /  AlkPhos  147<H>  09-22            Review of Systems	      Objective     Physical Examination    heart s1s2  lung dec BS  abd soft  frail  weak      Pertinent Lab findings & Imaging      Derrick:  NO   Adequate UO     I&O's Detail           Discussed with:     Cultures:	        Radiology

## 2018-09-24 NOTE — DISCHARGE NOTE ADULT - HOSPITAL COURSE
88F w/pmh of Afib on Coumadin, GERD, HTN, HLD, Depression, PE/DVT, COPD, UTIs, urinary retention, constipation presenting from [facility] with b/l worsening b/l leg rash with skin breakdown admitted for diffuse b/l LE/ UE suspected  rash with excoriations likely eczema, hospital course complicated by  rapid afib due to medication noncompliance, also found to have large pleural effusion that required thoracentesis. Patient was seen by ID and suggested not infection, shingle/ Zoster ruled out. Patient was recently seen by dermatologist as out patient per son. Patient had thoracentesis done. Will be discharged with instructions to f/u with dermatologist as out patient. 88F w/pmh of Afib on Coumadin, GERD, HTN, HLD, Depression, PE/DVT, COPD, UTIs, urinary retention, constipation presenting from [facility] with b/l worsening b/l leg rash with skin breakdown admitted for diffuse b/l LE/ UE suspected  rash with excoriations likely eczema, hospital course complicated by  rapid afib due to medication noncompliance, also found to have large pleural effusion that required thoracentesis. Patient was seen by ID and suggested not infection, shingle/ Zoster ruled out. Patient was recently seen by dermatologist as out patient per son. Patient had thoracentesis done. Will be discharged with instructions to f/u with dermatologist as out patient.     Addendum: Patient is non compliant with meds, refuses from time to time. Family aware. Patient at risk of readmission to the hospital secondary to non compliance.

## 2018-09-24 NOTE — DISCHARGE NOTE ADULT - SECONDARY DIAGNOSIS.
Chronic atrial fibrillation DVT (deep venous thrombosis) HTN (hypertension) Pleural effusion Sacral decubitus ulcer, stage II Ulcers of both lower legs

## 2018-09-24 NOTE — PROVIDER CONTACT NOTE (CHANGE IN STATUS NOTIFICATION) - RECOMMENDATIONS
continue with current plan of care cleanse with NS daily, apply adaptic q5d paint over adaptic with silvadene for blisters daily cover with 4x4, secure with heather
continue with current plan of care with silvadene for blisters

## 2018-09-24 NOTE — PROGRESS NOTE ADULT - PROBLEM SELECTOR PLAN 4
likely chronic venous statis ulcer   Continue Lasix 20mg po daily  Wound care team, Dr. Prescott to see patient today

## 2018-09-24 NOTE — PROGRESS NOTE ADULT - ASSESSMENT
88F w/pmh of Afib on Coumadin, GERD, HTN, HLD, Depression, PE/DVT, COPD, UTIs, urinary retention, constipation, normal lvef with mod-sev mr (echo 1/2018)  presenting from [facility] with b/l worsening b/l leg rash with skin breakdown. Difficult to obtain accurate history as patient is poor historian likely baseline dementia. History obtained from medical record. As per chart review pt with chronic b/l leg rash with multiple skins breakdown over the b/l foot and decreased oral intake. Son brought pt to ED for further evaluation of the rash but not present at bedside a the time of evaluation. Pt has been receiving sulfasalazine topical, Benadryl and prednisone oral for rash as per  chart review, pt scratching herself due to constant itching despite cream prescribed by derm at . No documented fever or recent change in medications.      In the ED, pt afebrile, labs pertinent for hemoglobin 11.2, PT 41.4, INR 3.7, K 5.5(likely hemolyzed), BUN 54.       no history of HLD and HTN (21 Sep 2018 02:54)    Over the day she has been spitting out her diltiazem, and therefore has missed two consecutive doses.  Her heart rate was noted to be more rapid, to the 120s.  She was then administered iv diltiazem and was directly observed to take her diltiazem po.  Cardiology consultation is now being obtained.    -there is no evidence of acute ischemia.    -there is evidence for volume overload.  -she does have mild edema, which may reflect volume or be related to her skin lesions  -she has a large left pleural effusion, and a small right effusion, of uncertain etiology  -she got 2 doses of iv lasix 9/21, 9/22  -on oral lasix but refuses her meds  -s/p thoracentesis today US guided with 650cc fluid removed.  Pathology pending.  Pulm following.  To resume Coumadin tonight as per recs.  Tolerated procedure with no CV complications.     -chronic af on coumadin  -tachycardia 9/21 likely from not taking meds properly.  Continues to refuse.    -hopefully no further intervention beyond taking her meds will be needed  -thus far, her rate is controlled   -no need for repeat echo at this time     -monitor electrolytes, keep k>4, Mg>2   -evaluation of skin lesions as per med  -will follow  -D/C planning back to  as per CM notes tomorrow as per primary team    Olga Mathis NP-C   Cardiology 88F w/pmh of Afib on Coumadin, GERD, HTN, HLD, Depression, PE/DVT, COPD, UTIs, urinary retention, constipation, normal lvef with mod-sev mr (echo 1/2018)  presenting from [facility] with b/l worsening b/l leg rash with skin breakdown. Difficult to obtain accurate history as patient is poor historian likely baseline dementia. History obtained from medical record. As per chart review pt with chronic b/l leg rash with multiple skins breakdown over the b/l foot and decreased oral intake. Son brought pt to ED for further evaluation of the rash but not present at bedside a the time of evaluation. Pt has been receiving sulfasalazine topical, Benadryl and prednisone oral for rash as per  chart review, pt scratching herself due to constant itching despite cream prescribed by derm at . No documented fever or recent change in medications.      In the ED, pt afebrile, labs pertinent for hemoglobin 11.2, PT 41.4, INR 3.7, K 5.5(likely hemolyzed), BUN 54.       no history of HLD and HTN (21 Sep 2018 02:54)    Over the day she has been spitting out her diltiazem, and therefore has missed two consecutive doses.  Her heart rate was noted to be more rapid, to the 120s.  She was then administered iv diltiazem and was directly observed to take her diltiazem po.   -there is no evidence of acute ischemia.    -there is evidence for volume overload.  -she does have mild edema, which may reflect volume or be related to her skin lesions  -she has a large left pleural effusion, and a small right effusion, of uncertain etiology  -she got 2 doses of iv lasix 9/21, 9/22  -on oral lasix but refuses her meds  -s/p thoracentesis today US guided with 650cc fluid removed.  Pathology pending.  Pulm following.  To resume Coumadin tonight as per recs.  Tolerated procedure with no CV complications.     -chronic af on coumadin  -tachycardia 9/21 likely from not taking meds properly.  Continues to refuse.    -She is being placed on a diltiazem gtt at 10mg/hr for progressive tachycardia and refusal to take medications.  She is going to be transferred to telemetry  -no need for repeat echo at this time     -monitor electrolytes, keep k>4, Mg>2   -evaluation of skin lesions as per med  -will follow      THONG Gabriel   Cardiology

## 2018-09-24 NOTE — PROGRESS NOTE ADULT - ATTENDING COMMENTS
Wound Care f/u called, Dr. Prescott will see patient  Thoracentesis today  Resume coumadin  INR in an  Encourage oral free water intake  Monitor sodium level  D/w son and daughter, asking to change diet to regular, advised that based upon swallow evaluation she should be on dysphagia diet as ordered, since she can aspirate/ causing aspiration pneumonia/ can choke. Counseling and education provided.

## 2018-09-24 NOTE — PROGRESS NOTE ADULT - SUBJECTIVE AND OBJECTIVE BOX
KITTY ALCANTARA is a 88yFemale , patient examined and chart reviewed.    INTERVAL HPI/ OVERNIGHT EVENTS:   Confused. Afebrile.  Sp thoracentesis.    PAST MEDICAL & SURGICAL HISTORY:  Femur neck fracture  Vertebral fracture, pathological  Neurogenic bladder  Afib  GERD (gastroesophageal reflux disease)  Pulmonary embolism  UTI (lower urinary tract infection)  HTN (hypertension)  Pulmonary embolism  Memory impairment  Hypertension  Atrial fibrillation  Hyponatremia  Shortness of breath: etiology probably copd  Urinary retention  Arthritis  DVT (deep venous thrombosis)  History of hip surgery: right gamma nail    For details regarding the patient's social history, family history, and other miscellaneous elements, please refer the initial infectious diseases consultation and/or the admitting history and physical examination for this admission.    ROS:  Unable to obtain due to : confusion    Current inpatient medications :  MEDICATIONS  (STANDING):  ALBUTerol    0.083% 2.5 milliGRAM(s) Nebulizer every 8 hours  calcium carbonate 1250 mG  + Vitamin D (OsCal 500 + D) 1 Tablet(s) Oral two times a day  cholecalciferol 1000 Unit(s) Oral two times a day  diltiazem    Tablet 90 milliGRAM(s) Oral three times a day  ferrous    sulfate 325 milliGRAM(s) Oral daily  furosemide    Tablet 20 milliGRAM(s) Oral daily  influenza   Vaccine 0.5 milliLiter(s) IntraMuscular once  lactobacillus acidophilus 1 Tablet(s) Oral two times a day with meals  mirtazapine 7.5 milliGRAM(s) Oral at bedtime  predniSONE   Tablet 20 milliGRAM(s) Oral daily  silver sulfADIAZINE 1% Cream 1 Application(s) Topical two times a day    MEDICATIONS  (PRN):  acetaminophen   Tablet .. 500 milliGRAM(s) Oral every 6 hours PRN Moderate Pain (4 - 6)  ALBUTerol   0.5% 2.5 milliGRAM(s) Nebulizer every 6 hours PRN Shortness of Breath and/or Wheezing        Objective:  Vital Signs Last 24 Hrs  T(C): 36.6 (24 Sep 2018 14:36), Max: 36.6 (24 Sep 2018 14:36)  T(F): 97.8 (24 Sep 2018 14:36), Max: 97.8 (24 Sep 2018 14:36)  HR: 86 (24 Sep 2018 14:36) (86 - 98)  BP: 128/81 (24 Sep 2018 14:36) (101/53 - 131/87)  BP(mean): --  RR: 16 (24 Sep 2018 14:36) (16 - 18)  SpO2: 97% (24 Sep 2018 14:36) (95% - 97%)    Physical Exam:  General: no acute distress  Neck: supple, trachea midline  Lungs: Decreased , no wheeze/rhonchi  Cardiovascular: regular rate and rhythm, S1 S2  Abdomen: soft, nontender, no organomegaly present, bowel sounds normal  Neurological: awake Confusion  Skin: diffuse rash improving  Lymph Nodes: no palpable cervical/supraclavicular lymph nodes enlargements  Extremities:+ rash improved +edema      LABS:                        10.8   8.18  )-----------( 247      ( 24 Sep 2018 08:01 )             37.4   09-24    147<H>  |  108  |  25<H>  ----------------------------<  92  3.6   |  34<H>  |  0.46<L>    Ca    8.5      24 Sep 2018 08:01    MICROBIOLOGY:    Culture - Blood (collected 21 Sep 2018 08:48)  Source: .Blood Blood-Venous  Preliminary Report (22 Sep 2018 09:01):    No growth to date.    Culture - Blood (collected 21 Sep 2018 08:48)  Source: .Blood Blood-Venous  Preliminary Report (22 Sep 2018 09:01):    No growth to date.    RADIOLOGY & ADDITIONAL STUDIES:   CT Chest Without Intravenous Contrast     EXAM DATE/TIME:    9/21/2018 2:39 AM     CLINICAL HISTORY:   88 years old, female; Pain and signs and symptoms; Other: Pneumonia;   Other:   Back pain     TECHNIQUE:    Axial computed tomography images of the chest without intravenous   contrast.    Coronal and sagittal reformatted images were created and reviewed.    MIP reconstructed images were created and reviewed.     COMPARISON:    1/11/2018    FINDINGS:      Lungs:  Dependent compressive atelectasis left base. No definite   pneumonia.   Pleural space:  Large layering left pleural effusion increased in size.   Small right pleural effusion with small loculated components similar to   prior.   Heart:  Cardiomegaly.    Mediastinum:  Esophagus is unremarkable.    Aorta: Normal. No aortic aneurysm.    Lymph nodes: Unremarkable. No enlarged lymph nodes.    Bones/joints:  Healing fracture of the left third rib. Healing   comminuted   fracture of the neck and head of the right humerus. Healing sternal   fractures.   Age-indeterminate mid thoracic compression deformity and age   indeterminate thoracolumbar compression deformity   Soft tissues: Unremarkable.    Intraperitoneal space:  Incompletely visualized perihepatic and   perisplenic   ascites as well as periportal edema.     IMPRESSION:   Large left pleural effusion with underlying dependent atelectasis. No   definite pneumonia. Small right pleural effusion with loculated   components.  Additional findings as discussed    Assessment :  88F w/pmh of Dementia, Afib on Coumadin, GERD, HTN, HLD, Depression, PE/DVT, COPD, UTIs,   urinary retention, constipation presenting from [facility] with diffuse rash  Rash is not zoster/shingles  Doubt infectious   Poss allergic reaction/dermatitis ?drug rash ie allergy to sulfa/lasix  ?Bullous pemphigus  Ideally will need biopsy- Pt was seen by derm at , unclear if biopsy done  Large left pleural effusion sp thoracentesis    Plan :   Defer antibiotics  Asp precautions  Overall prog poor    D/w Dr Zepeda    Continue with present regiment.  Appropriate use of antibiotics and adverse effects reviewed.    I have discussed the above plan of care with patient/ family in detail. They expressed understanding of the  treatment plan . Risks, benefits and alternatives discussed in detail. I have asked if they have any questions or concerns and appropriately addressed them to the best of my ability .    > 35 minutes were spent in direct patient care reviewing notes, medications ,labs data/ imaging , discussion with multidisciplinary team.    Thank you for allowing me to participate in care of your patient .    Daryl Romeo MD  Infectious Disease  [phone]

## 2018-09-24 NOTE — CHART NOTE - NSCHARTNOTEFT_GEN_A_CORE
RN reported patient refused to take Cardizem and nor her heart rate is in 130's. I spoke to patient and tried to convince her but she refused. D/w Cardio Dr. Gu, will start Cardizem drip and transfer patient to tele for now. D/w daughter in law, she picked up the phone,  and plan discussed.

## 2018-09-24 NOTE — SWALLOW BEDSIDE ASSESSMENT ADULT - ORAL PHASE
Decreased anterior-posterior movement of the bolus/Delayed oral transit time Delayed oral transit time

## 2018-09-24 NOTE — DISCHARGE NOTE ADULT - PATIENT PORTAL LINK FT
You can access the [facility] Patient Portal, offered by [facility], by registering with the following website: [email or web address]

## 2018-09-24 NOTE — DISCHARGE NOTE ADULT - MEDICATION SUMMARY - MEDICATIONS TO STOP TAKING
I will STOP taking the medications listed below when I get home from the hospital:    Bengay Ultra Strength 4%-10%-30% topical cream  -- Apply on skin to affected area once a day    Dermagran BC topical ointment  -- Apply on skin to affected area 2 times a day to left and right buttock    predniSONE 20 mg oral tablet  -- 2 tab(s) by mouth once a day    Benadryl 25 mg oral tablet  -- 1 tab(s) by mouth 2 times a day    doxycycline hyclate 100 mg oral delayed release tablet  -- 1 tab(s) by mouth 2 times a day    Aspercreme with Lidocaine 4% topical cream  -- Apply on skin to affected area once a day

## 2018-09-24 NOTE — SWALLOW BEDSIDE ASSESSMENT ADULT - COMMENTS
88F w/pmh of Afib on Coumadin, GERD, HTN, HLD, Depression, PE/DVT, COPD, UTIs, urinary retention, constipation presenting from [facility] with b/l worsening b/l leg rash with skin breakdown.  Pt awake, alert, communicates wants/ needs, follows commands, poor historian  Elijah-pharyngeal phase dysphagia marled by reduced mastication of solids, reduced a-p lingual transport c untimely trigger of swallow c thin liquids c throat clear/ cough reflex response indicative of aspiration of thin liquids. Reduced Po intake noted, Spoke c VERONICA Cardenas

## 2018-09-24 NOTE — PROGRESS NOTE ADULT - SUBJECTIVE AND OBJECTIVE BOX
Patient is a 88y old  Female who presents with a chief complaint of rash (24 Sep 2018 06:29)       INTERVAL HPI/OVERNIGHT EVENTS: 88F w/pmh of Afib on Coumadin, GERD, HTN, HLD, Depression, PE/DVT, COPD, UTIs, urinary retention, constipation presenting from [facility] with b/l worsening b/l leg rash with skin breakdown admitted for diffuse b/l LE/ UE suspected  rash with excoriations likely eczema, hospital course complicated by  rapid afib due to medication noncompliance, also found to have large pleural effusion that requires thoracentesis. Seen and examined at bedside. Denies any symptoms, complaints       MEDICATIONS  (STANDING):  ALBUTerol    0.083% 2.5 milliGRAM(s) Nebulizer every 8 hours  calcium carbonate 1250 mG  + Vitamin D (OsCal 500 + D) 1 Tablet(s) Oral two times a day  cholecalciferol 1000 Unit(s) Oral two times a day  diltiazem    Tablet 90 milliGRAM(s) Oral three times a day  ferrous    sulfate 325 milliGRAM(s) Oral daily  furosemide    Tablet 20 milliGRAM(s) Oral daily  influenza   Vaccine 0.5 milliLiter(s) IntraMuscular once  lactobacillus acidophilus 1 Tablet(s) Oral two times a day with meals  mirtazapine 7.5 milliGRAM(s) Oral at bedtime  predniSONE   Tablet 20 milliGRAM(s) Oral daily  silver sulfADIAZINE 1% Cream 1 Application(s) Topical two times a day    MEDICATIONS  (PRN):  acetaminophen   Tablet .. 500 milliGRAM(s) Oral every 6 hours PRN Moderate Pain (4 - 6)  ALBUTerol   0.5% 2.5 milliGRAM(s) Nebulizer every 6 hours PRN Shortness of Breath and/or Wheezing      Allergies    No Known Allergies    Intolerances        REVIEW OF SYSTEMS:  CONSTITUTIONAL: No fever, or fatigue  EYES: No eye pain, visual disturbances, or discharge  ENMT:  No difficulty hearing, tinnitus, vertigo; No sinus or throat pain  NECK: No pain or stiffness  RESPIRATORY: No cough, wheezing, chills or hemoptysis; No shortness of breath  CARDIOVASCULAR: No chest pain, palpitations, dizziness, or leg swelling  GASTROINTESTINAL: No abdominal or epigastric pain. No nausea, vomiting, or hematemesis; No diarrhea or constipation. No melena or hematochezia.  GENITOURINARY: No dysuria, frequency, hematuria, or incontinence  NEUROLOGICAL: No headaches, weakness.   SKIN: No itching, burning, rashes, or lesions   LYMPH NODES: No enlarged glands  ENDOCRINE: No heat or cold intolerance; No hair loss; No polydipsia or polyuria  MUSCULOSKELETAL: No joint pain or swelling; No muscle, back, or extremity pain  HEME/LYMPH: No easy bruising, or bleeding gums  ALLERGY AND IMMUNOLOGIC: No hives or eczema    Vital Signs Last 24 Hrs  T(C): 36.4 (24 Sep 2018 04:44), Max: 36.6 (23 Sep 2018 13:03)  T(F): 97.6 (24 Sep 2018 04:44), Max: 97.9 (23 Sep 2018 13:03)  HR: 98 (24 Sep 2018 04:44) (88 - 98)  BP: 131/87 (24 Sep 2018 04:44) (101/53 - 134/83)  BP(mean): --  RR: 17 (24 Sep 2018 04:44) (17 - 19)  SpO2: 95% (24 Sep 2018 04:44) (95% - 99%)    PHYSICAL EXAM:  GENERAL: NAD, Awake, Alert   HEAD:  Atraumatic, Normocephalic  EYES: EOMI, PERRLA, conjunctiva and sclera clear  ENMT: No tonsillar erythema, exudates, or enlargement; Moist mucous membranes  NECK: Supple, No JVD, Normal thyroid  NERVOUS SYSTEM:  Alert & Alert, non focal   CHEST/LUNG: Clear to auscultation bilaterally; No rales, rhonchi, wheezing, or rubs  HEART: S1S2+, Regular rate and rhythm  ABDOMEN: Soft, Nontender, Nondistended; Bowel sounds present  EXTREMITIES:  2+ Peripheral Pulses, No clubbing, cyanosis, or edema  LYMPH: No lymphadenopathy noted    LABS:                        10.8   8.18  )-----------( 247      ( 24 Sep 2018 08:01 )             37.4     24 Sep 2018 08:01    147    |  108    |  25     ----------------------------<  92     3.6     |  34     |  0.46     Ca    8.5        24 Sep 2018 08:01      PT/INR - ( 24 Sep 2018 08:01 )   PT: 21.1 sec;   INR: 1.91 ratio         PTT - ( 24 Sep 2018 08:01 )  PTT:33.7 sec  CAPILLARY BLOOD GLUCOSE        BLOOD CULTURE  09-21 @ 08:48   No growth to date.  --  --    RADIOLOGY & ADDITIONAL TESTS:    Imaging Personally Reviewed:  [ ] YES     Consultant(s) Notes Reviewed:      Care Discussed with Consultants/Other Providers:

## 2018-09-24 NOTE — DISCHARGE NOTE ADULT - MEDICATION SUMMARY - MEDICATIONS TO TAKE
I will START or STAY ON the medications listed below when I get home from the hospital:    acetaminophen 500 mg oral tablet  -- 1 tab(s) by mouth every 6 hours, As needed, Moderate Pain (4 - 6)  -- Indication: For As needed for moderate pain    dilTIAZem 90 mg oral tablet  -- 1 tab(s) by mouth 3 times a day  -- Indication: For Afib    Coumadin 1 mg oral tablet  -- 1 tab(s) by mouth every other day (at bedtime). Continue home dose and frequency of coumadin. Check INR and hold for supratherapeutic  INR   -- Indication: For Afib    mirtazapine 15 mg oral tablet  -- 0.5 tab(s) by mouth once a day (at bedtime)  -- Indication: For Home Med    cetirizine 5 mg oral tablet  -- 1 tab(s) by mouth once a day  -- Indication: For Home Med    albuterol 2.5 mg/3 mL (0.083%) inhalation solution  -- 3 milliliter(s) inhaled every 6 hours, As Needed  -- Indication: For As needed     silver sulfADIAZINE 1% topical cream  -- Apply on skin to affected area 2 times a day  -- Indication: For For skin    Lasix 20 mg oral tablet  -- 1 tab(s) by mouth once a day  -- Indication: For Pleural effusion    Zantac 150 oral tablet  -- 1 tab(s) by mouth 2 times a day  -- Indication: For Home Med    ferrous sulfate 325 mg (65 mg elemental iron) oral tablet  -- 1 tab(s) by mouth once a day  -- Indication: For Supplemnt     lactobacillus acidophilus oral capsule  -- 1 cap(s) by mouth once a day  -- Indication: For Home Med    Oyster Shell Calcium with Vitamin D 500 mg-200 intl units oral tablet  -- 1 tab(s) by mouth 2 times a day  -- Indication: For Home Med    Vitamin D3 1000 intl units oral capsule  -- 1 cap(s) by mouth once a day  -- Indication: For Supplement

## 2018-09-24 NOTE — PROGRESS NOTE ADULT - ASSESSMENT
88F w/pmh of Afib on Coumadin, GERD, HTN, HLD, Depression, PE/DVT, COPD, UTIs, urinary retention, constipation presenting from [facility] with b/l worsening b/l leg rash with skin breakdown admitted for diffuse b/l LE/ UE suspected  rash with excoriations likely eczema, hospital course complicated by  rapid afib due to medication noncompliance, also found to have large pleural effusion that requires thoracentesis

## 2018-09-24 NOTE — PROGRESS NOTE ADULT - PROBLEM SELECTOR PLAN 1
HF and Pulm HTN and COPD and PE and Pleural eff  waiting for INR to be 1.8 or less for thoracentesis  on LASIX  on NEBS  on Steroids for Skin Dermatitis  assist with ADL  oral hygiene  skin care  fall prec  out of bed as tolerated  prognosis poor, pt is DNR  will follow  poss thoracentesis today

## 2018-09-24 NOTE — DISCHARGE NOTE ADULT - CARE PROVIDER_API CALL
Rory Prescott (MD), [facility]  [address]  [place] [address]  Phone: [phone]  Fax: [phone] Rory Prescott), [facility]  [address]  [place] [address]  Phone: [phone]  Fax: [phone]    Jay Lopez), Critical Care Medicine; HospicePalliative Medicine; Internal Medicine; Pulmonary Disease  [address] [place] [address]  Phone: [phone]  Fax: [phone]

## 2018-09-24 NOTE — SWALLOW BEDSIDE ASSESSMENT ADULT - ORAL PREPARATORY PHASE
Reduced oral grading/Decreased mastication ability Decreased mastication ability/Reduced oral grading/Refuses to accept bolus into oral cavity

## 2018-09-24 NOTE — PROGRESS NOTE ADULT - SUBJECTIVE AND OBJECTIVE BOX
[facility] -- Marshall Frederick, Elias, Lenin, Kofi Birmingham Savella  Office # [phone]      Follow Up:  af, left pleural effusion    Subjective/Observations: Seen and examined.  Sitting up in bed resting comfortably with supplemental NC O2 in place.  No reports of cp, sob or palpitations.  NAD.        REVIEW OF SYSTEMS: All other review of systems is negative unless indicated above    PAST MEDICAL & SURGICAL HISTORY:  Femur neck fracture  Vertebral fracture, pathological  Neurogenic bladder  Afib  GERD (gastroesophageal reflux disease)  Pulmonary embolism  UTI (lower urinary tract infection)  HTN (hypertension)  Pulmonary embolism  Memory impairment  Hypertension  Atrial fibrillation  Hyponatremia  Shortness of breath: etiology probably copd  Urinary retention  Arthritis  DVT (deep venous thrombosis)  History of hip surgery: right gamma nail      MEDICATIONS  (STANDING):  ALBUTerol    0.083% 2.5 milliGRAM(s) Nebulizer every 8 hours  calcium carbonate 1250 mG  + Vitamin D (OsCal 500 + D) 1 Tablet(s) Oral two times a day  cholecalciferol 1000 Unit(s) Oral two times a day  diltiazem    Tablet 90 milliGRAM(s) Oral three times a day  ferrous    sulfate 325 milliGRAM(s) Oral daily  furosemide    Tablet 20 milliGRAM(s) Oral daily  influenza   Vaccine 0.5 milliLiter(s) IntraMuscular once  lactobacillus acidophilus 1 Tablet(s) Oral two times a day with meals  mirtazapine 7.5 milliGRAM(s) Oral at bedtime  potassium chloride    Tablet ER 40 milliEquivalent(s) Oral once  predniSONE   Tablet 20 milliGRAM(s) Oral daily  silver sulfADIAZINE 1% Cream 1 Application(s) Topical two times a day  warfarin 2 milliGRAM(s) Oral once    MEDICATIONS  (PRN):  acetaminophen   Tablet .. 500 milliGRAM(s) Oral every 6 hours PRN Moderate Pain (4 - 6)  ALBUTerol   0.5% 2.5 milliGRAM(s) Nebulizer every 6 hours PRN Shortness of Breath and/or Wheezing      Allergies    No Known Allergies    Intolerances            Vital Signs Last 24 Hrs  T(C): 36.6 (24 Sep 2018 14:36), Max: 36.6 (24 Sep 2018 14:36)  T(F): 97.8 (24 Sep 2018 14:36), Max: 97.8 (24 Sep 2018 14:36)  HR: 120 (24 Sep 2018 16:16) (86 - 120)  BP: 120/82 (24 Sep 2018 16:16) (101/53 - 131/87)  BP(mean): --  RR: 16 (24 Sep 2018 14:36) (16 - 18)  SpO2: 97% (24 Sep 2018 14:36) (95% - 97%)    I&O's Summary    24 Sep 2018 07:01  -  24 Sep 2018 16:22  --------------------------------------------------------  IN: 360 mL / OUT: 0 mL / NET: 360 mL          PHYSICAL EXAM:  TELE: AF 100s  Constitutional: NAD, awake and alert, frail, cachectic  HEENT: Moist Mucous Membranes, Anicteric  Pulmonary: Non-labored, breath sounds with scattered wheezing bilaterally coarse bs Left post with decreased in bases  Cardiovascular: Irregular, S1 and S2, 3/6 SM No rubs, gallops or clicks  Gastrointestinal: Bowel Sounds present, soft, nontender.   Lymph: No peripheral edema. No lymphadenopathy.  Skin: No visible rashes or ulcers. B/L lower extremity wounds wrapped  Psych:  Mood & affect flat    LABS: All Labs Reviewed:                        10.8   8.18  )-----------( 247      ( 24 Sep 2018 08:01 )             37.4                         10.5   7.48  )-----------( 233      ( 23 Sep 2018 08:15 )             36.8                         10.3   9.15  )-----------( 261      ( 22 Sep 2018 07:07 )             35.3     24 Sep 2018 08:01    147    |  108    |  25     ----------------------------<  92     3.6     |  34     |  0.46   23 Sep 2018 08:15    143    |  109    |  33     ----------------------------<  87     4.3     |  28     |  0.56   22 Sep 2018 07:07    142    |  108    |  44     ----------------------------<  131    4.7     |  25     |  0.80     Ca    8.5        24 Sep 2018 08:01  Ca    8.1        23 Sep 2018 08:15  Ca    8.2        22 Sep 2018 07:07    TPro  6.5    /  Alb  3.1    /  TBili  0.6    /  DBili  x      /  AST  33     /  ALT  31     /  AlkPhos  147    22 Sep 2018 07:07    PT/INR - ( 24 Sep 2018 08:01 )   PT: 21.1 sec;   INR: 1.91 ratio         PTT - ( 24 Sep 2018 08:01 )  PTT:33.7 sec    < from: 12 Lead ECG (09.21.18 @ 02:19) >  Ventricular Rate 117 BPM    Atrial Rate 125 BPM    QRS Duration 90 ms    Q-T Interval 282 ms    QTC Calculation(Bezet) 393 ms    R Axis 35 degrees    T Axis -9 degrees    Diagnosis Line Atrial fibrillation with rapid ventricular response with premature ventricular or aberrantly conducted complexes  Low voltage QRS  Septal infarct , age undetermined  Abnormal ECG  When compared with ECG of 12-JAN-2018 02:37,  Septal infarct is now present  Nonspecific T wave abnormality now evident in Anterolateral leads  Confirmed by Miles Phelps MD ([id number]) on 9/21/2018 5:19:12 PM    < end of copied text >    < from: TTE Echo Doppler w/o Cont (01.12.18 @ 18:55) >     EXAM:  ECHO TTE W/O CON COMP W/DOPPLR         PROCEDURE DATE:  01/12/2018        INTERPRETATION:  Ordering Physician: LYNNE GARDNER [id number]    Indication: CHF    Study Quality: Technically difficult   A complete echocardiographic study was performed utilizing standard   protocol including spectral and color Doppler in all echocardiographic   windows.    Height: 159 cm  Weight: 53 kg  BSA: 1.5  Blood Pressure: 120/78    MEASUREMENTS  IVS: 0.9cm  PWT: 0.8cm  LA: 6.4cm  AO: 3.2cm  LVIDd: 4.2cm  LVIDs: 2.8cm    LVEF: 55-60%  RVSP: 31mmHg    FINDINGS  Left Ventricle: Endocardium not well-visualized. Grossly normal left   ventricular systolic function. Small left ventricle  Aortic Valve: Calcified trileaflet aortic valve. Mild aortic insufficiency  Mitral Valve: Mitral annular calcification with tethered mitral valve   leaflets. Moderate to severe mitral regurgitation  Tricuspid Valve: Tricuspid valve is not well-visualized. Mild to moderate   tricuspid regurgitation  Pulmonic Valve: Not well-visualized. Trace pulmonic insufficiency  Left Atrium: Severe left atrial enlargement  Right Ventricle: Probably decreased right ventricular systolic function.   Mild right ventricular enlargement  Right Atrium: Right atrial enlargement  Diastolic Function: There is evidence of diastolic dysfunction  Pericardium/Pleura: Normal pericardium with no pericardial effusion  Hemodynamics: Pulmonary artery systolic pressure is estimated to be 31   mmHg, assuming the right atrial pressure is equalto 8 mmHg, consistent   with normal pulmonary pressures.    CONCLUSIONS:  1. Technically limited study. Patient is not cooperative.  2. Endocardium not well-visualized. Grossly normal left ventricular   systolic function.  3. Mitral annular calcification with tethered mitral valve leaflets.   Moderate to severe mitral regurgitation  4. Mild to moderate tricuspid regurgitation  5. Severe left atrial enlargement  6. Mild right ventricular enlargement. Probably decreased right   ventricular systolicfunction.   7. Evidence of diastolic dysfunction  8. No pericardial effusion.                  MONTANA ROTHMAN   This document has been electronically signed. Jan 13 2018  4:35PM    < end of copied text >    < from: CT Chest No Cont (09.21.18 @ 02:48) >  EXAM:  CT CHEST                            PROCEDURE DATE:  09/21/2018          INTERPRETATION:  [facility] RADIOLOGIST PRELIMINARY REPORT    EXAM:    CT Chest Without Intravenous Contrast     EXAM DATE/TIME:    9/21/2018 2:39 AM     CLINICAL HISTORY:   88 years old, female; Pain and signs and symptoms; Other: Pneumonia;   Other:   Back pain     TECHNIQUE:    Axial computed tomography images of the chest without intravenous   contrast.    Coronal and sagittal reformatted images were created and reviewed.    MIP reconstructed images were created and reviewed.     COMPARISON:    1/11/2018    FINDINGS:      Lungs:  Dependent compressive atelectasis left base. No definite   pneumonia.   Pleural space:  Large layering left pleural effusion increased in size.   Small right pleural effusion with small loculated components similar to   prior.   Heart:  Cardiomegaly.    Mediastinum:  Esophagus is unremarkable.    Aorta: Normal. No aortic aneurysm.    Lymph nodes: Unremarkable. No enlarged lymph nodes.    Bones/joints:  Healing fracture of the left third rib. Healing   comminuted   fracture of the neck and head of the right humerus. Healing sternal   fractures.   Age-indeterminate mid thoracic compression deformity and age   indeterminate thoracolumbar compression deformity   Soft tissues: Unremarkable.    Intraperitoneal space:  Incompletely visualized perihepatic and   perisplenic   ascites as well as periportal edema.     IMPRESSION:   Large left pleural effusion with underlying dependent atelectasis. No   definite pneumonia. Small right pleural effusion with loculated   components.  Additional findings as discussed                RADHA POLK M.D., ATTENDING RADIOLOGIST  This document has been electronically signed. Sep 21 2018  9:26AM          < end of copied text >     < from: US Guided Thoracentesis w/Imaging, Left (09.24.18 @ 14:04) >  EXAM:  US THORACENTESIS [facility]                          EXAM:  XR CHEST PORTABLE URGENT 1V                            PROCEDURE DATE:  09/24/2018          INTERPRETATION:    Ultrasound-guided diagnostic and therapeutic left pleural drainage:  Chest portable semierect single AP view:  Clinical History:    Atrial fibrillation. Heart failure. Left pleural fluid. Image guided   drainage procedure.    Technique:    An informed consent obtained from patient's son . Risks and benefits   explained in detail. Patient made supine on oblique on the bed. Time out   procedure was performed. Limited ultrasonography demonstrated left   pleural fluid. The site marked on the skin.    The access site prepped and draped in sterile fashion. 1% lidocaine   utilized for local anesthesia. Left pleural cavity accessed, under   ultrasound guidance, using safety centesis catheter. Hard   Ultrasound guidance with permanent recording and reporting. Approximately   650 cc of clear yellow fluid drained.The catheterremoved. Site dressed   in sterile fashion.   Chest x-ray demonstrated no pneumothorax. Cardiomegaly. Tortuous   atherosclerotic aorta.  Patient tolerated the procedure well. No complications noted. Patient   transferred to the floor in stable condition for further observation and   management.    Impression:    Ultrasound guided left pleural drainage. Fluid sent for lab analysis.                CHRISTOPHER MARC M.D., ATTENDING RADIOLOGIST  This document has been electronically signed. Sep 24 2018 4:26PM                < end of copied text >

## 2018-09-24 NOTE — PROVIDER CONTACT NOTE (CHANGE IN STATUS NOTIFICATION) - ASSESSMENT
Patient is an 89yo bedbound contracted female presenting with vitilago, bulla and vesicles appears to be resolving zoster virus lesions are dry affecting upper and lower extremities anterior and posterior shoulders Bulla noted at bilateral dorsal foot
Patient is an 89yo bedbound contracted female presenting with vitilago, bulla and vesicles appears to be resolving zoster virus lesions are dry affecting upper and lower extremities anterior and posterior shoulders Bulla noted at bilateral dorsal foot

## 2018-09-24 NOTE — DISCHARGE NOTE ADULT - PLAN OF CARE
Improvement Unclear etiology, suspect eczema/ contact dermatitis.  Infection ruled out, seen by ID Continue meds  F/u with cardiologist Continue coumadin  Monitor INR  F/u with your doctor. Continue meds  f/u with your doctor S/p thoracentesis  F/u with your Wound care as recommended by wound care team  F/u at wound Center F/u at wound center Unclear etiology, suspect eczema/ contact dermatitis.  Infection ruled out, seen by ID  F/u with dermatologist Continue meds  Continue coumadin, home dose and frequency . INR check   No need to bridge with Lovenox or IV heparin, d/w cardiologist.   F/u with cardiologist Improved Likely secondary to diastolic heart failure. S/p thoracentesis  Continue Lasix   F/u with your cardiologist

## 2018-09-24 NOTE — SWALLOW BEDSIDE ASSESSMENT ADULT - PHARYNGEAL PHASE
Delayed throat clear post oral intake/Multiple swallows/Delayed pharyngeal swallow/Decreased laryngeal elevation Decreased laryngeal elevation/Delayed pharyngeal swallow/Multiple swallows Delayed pharyngeal swallow/Decreased laryngeal elevation/Multiple swallows

## 2018-09-24 NOTE — DISCHARGE NOTE ADULT - CARE PLAN
Principal Discharge DX:	Rash  Goal:	Improvement  Assessment and plan of treatment:	Unclear etiology, suspect eczema/ contact dermatitis.  Infection ruled out, seen by ID  Secondary Diagnosis:	Chronic atrial fibrillation  Assessment and plan of treatment:	Continue meds  F/u with cardiologist  Secondary Diagnosis:	DVT (deep venous thrombosis)  Assessment and plan of treatment:	Continue coumadin  Monitor INR  F/u with your doctor.  Secondary Diagnosis:	HTN (hypertension)  Assessment and plan of treatment:	Continue meds  f/u with your doctor  Secondary Diagnosis:	Pleural effusion  Assessment and plan of treatment:	S/p thoracentesis  F/u with your  Secondary Diagnosis:	Sacral decubitus ulcer, stage II  Assessment and plan of treatment:	Wound care as recommended by wound care team  F/u at wound Center  Secondary Diagnosis:	Ulcers of both lower legs  Assessment and plan of treatment:	F/u at wound center Principal Discharge DX:	Rash  Goal:	Improvement  Assessment and plan of treatment:	Unclear etiology, suspect eczema/ contact dermatitis.  Infection ruled out, seen by ID  F/u with dermatologist  Secondary Diagnosis:	Chronic atrial fibrillation  Assessment and plan of treatment:	Continue meds  Continue coumadin, home dose and frequency . INR check   No need to bridge with Lovenox or IV heparin, d/w cardiologist.   F/u with cardiologist  Secondary Diagnosis:	DVT (deep venous thrombosis)  Assessment and plan of treatment:	Continue coumadin  Monitor INR  F/u with your doctor.  Secondary Diagnosis:	HTN (hypertension)  Assessment and plan of treatment:	Continue meds  f/u with your doctor  Secondary Diagnosis:	Pleural effusion  Assessment and plan of treatment:	S/p thoracentesis  F/u with your  Secondary Diagnosis:	Sacral decubitus ulcer, stage II  Assessment and plan of treatment:	Wound care as recommended by wound care team  F/u at wound Center  Secondary Diagnosis:	Ulcers of both lower legs  Assessment and plan of treatment:	F/u at wound center Principal Discharge DX:	Rash  Goal:	Improved  Assessment and plan of treatment:	Unclear etiology, suspect eczema/ contact dermatitis.  Infection ruled out, seen by ID  F/u with dermatologist  Secondary Diagnosis:	Chronic atrial fibrillation  Assessment and plan of treatment:	Continue meds  Continue coumadin, home dose and frequency . INR check   No need to bridge with Lovenox or IV heparin, d/w cardiologist.   F/u with cardiologist  Secondary Diagnosis:	DVT (deep venous thrombosis)  Assessment and plan of treatment:	Continue coumadin  Monitor INR  F/u with your doctor.  Secondary Diagnosis:	HTN (hypertension)  Assessment and plan of treatment:	Continue meds  f/u with your doctor  Secondary Diagnosis:	Pleural effusion  Assessment and plan of treatment:	S/p thoracentesis  F/u with your  Secondary Diagnosis:	Sacral decubitus ulcer, stage II  Assessment and plan of treatment:	Wound care as recommended by wound care team  F/u at wound Center  Secondary Diagnosis:	Ulcers of both lower legs  Assessment and plan of treatment:	F/u at wound center Principal Discharge DX:	Rash  Goal:	Improved  Assessment and plan of treatment:	Unclear etiology, suspect eczema/ contact dermatitis.  Infection ruled out, seen by ID  F/u with dermatologist  Secondary Diagnosis:	Chronic atrial fibrillation  Assessment and plan of treatment:	Continue meds  Continue coumadin, home dose and frequency . INR check   No need to bridge with Lovenox or IV heparin, d/w cardiologist.   F/u with cardiologist  Secondary Diagnosis:	DVT (deep venous thrombosis)  Assessment and plan of treatment:	Continue coumadin  Monitor INR  F/u with your doctor.  Secondary Diagnosis:	HTN (hypertension)  Assessment and plan of treatment:	Continue meds  f/u with your doctor  Secondary Diagnosis:	Pleural effusion  Assessment and plan of treatment:	Likely secondary to diastolic heart failure. S/p thoracentesis  Continue Lasix   F/u with your cardiologist  Secondary Diagnosis:	Sacral decubitus ulcer, stage II  Assessment and plan of treatment:	Wound care as recommended by wound care team  F/u at wound Center  Secondary Diagnosis:	Ulcers of both lower legs  Assessment and plan of treatment:	F/u at wound center

## 2018-09-25 LAB
ANION GAP SERPL CALC-SCNC: 5 MMOL/L — SIGNIFICANT CHANGE UP (ref 5–17)
APTT BLD: 32.6 SEC — SIGNIFICANT CHANGE UP (ref 27.5–37.4)
BUN SERPL-MCNC: 23 MG/DL — SIGNIFICANT CHANGE UP (ref 7–23)
CALCIUM SERPL-MCNC: 8.2 MG/DL — LOW (ref 8.5–10.1)
CHLORIDE SERPL-SCNC: 104 MMOL/L — SIGNIFICANT CHANGE UP (ref 96–108)
CO2 SERPL-SCNC: 35 MMOL/L — HIGH (ref 22–31)
CREAT SERPL-MCNC: 0.47 MG/DL — LOW (ref 0.5–1.3)
GLUCOSE SERPL-MCNC: 138 MG/DL — HIGH (ref 70–99)
HCT VFR BLD CALC: 34.6 % — SIGNIFICANT CHANGE UP (ref 34.5–45)
HGB BLD-MCNC: 10.2 G/DL — LOW (ref 11.5–15.5)
INR BLD: 1.89 RATIO — HIGH (ref 0.88–1.16)
MCHC RBC-ENTMCNC: 25.6 PG — LOW (ref 27–34)
MCHC RBC-ENTMCNC: 29.5 GM/DL — LOW (ref 32–36)
MCV RBC AUTO: 86.9 FL — SIGNIFICANT CHANGE UP (ref 80–100)
NIGHT BLUE STAIN TISS: SIGNIFICANT CHANGE UP
NON-GYN CYTOLOGY SPEC: SIGNIFICANT CHANGE UP
NRBC # BLD: 0 /100 WBCS — SIGNIFICANT CHANGE UP (ref 0–0)
PLATELET # BLD AUTO: 228 K/UL — SIGNIFICANT CHANGE UP (ref 150–400)
POTASSIUM SERPL-MCNC: 3.5 MMOL/L — SIGNIFICANT CHANGE UP (ref 3.5–5.3)
POTASSIUM SERPL-SCNC: 3.5 MMOL/L — SIGNIFICANT CHANGE UP (ref 3.5–5.3)
PROTHROM AB SERPL-ACNC: 20.9 SEC — HIGH (ref 9.8–12.7)
RBC # BLD: 3.98 M/UL — SIGNIFICANT CHANGE UP (ref 3.8–5.2)
RBC # FLD: 18.6 % — HIGH (ref 10.3–14.5)
SODIUM SERPL-SCNC: 144 MMOL/L — SIGNIFICANT CHANGE UP (ref 135–145)
SPECIMEN SOURCE: SIGNIFICANT CHANGE UP
WBC # BLD: 8.84 K/UL — SIGNIFICANT CHANGE UP (ref 3.8–10.5)
WBC # FLD AUTO: 8.84 K/UL — SIGNIFICANT CHANGE UP (ref 3.8–10.5)

## 2018-09-25 PROCEDURE — 99233 SBSQ HOSP IP/OBS HIGH 50: CPT

## 2018-09-25 RX ORDER — WARFARIN SODIUM 2.5 MG/1
2.5 TABLET ORAL ONCE
Qty: 0 | Refills: 0 | Status: COMPLETED | OUTPATIENT
Start: 2018-09-25 | End: 2018-09-25

## 2018-09-25 RX ADMIN — Medication 1000 UNIT(S): at 05:03

## 2018-09-25 RX ADMIN — Medication 325 MILLIGRAM(S): at 11:00

## 2018-09-25 RX ADMIN — ALBUTEROL 2.5 MILLIGRAM(S): 90 AEROSOL, METERED ORAL at 15:42

## 2018-09-25 RX ADMIN — Medication 1 TABLET(S): at 17:14

## 2018-09-25 RX ADMIN — MIRTAZAPINE 7.5 MILLIGRAM(S): 45 TABLET, ORALLY DISINTEGRATING ORAL at 21:40

## 2018-09-25 RX ADMIN — ALBUTEROL 2.5 MILLIGRAM(S): 90 AEROSOL, METERED ORAL at 22:41

## 2018-09-25 RX ADMIN — Medication 1 TABLET(S): at 05:03

## 2018-09-25 RX ADMIN — WARFARIN SODIUM 2.5 MILLIGRAM(S): 2.5 TABLET ORAL at 21:40

## 2018-09-25 RX ADMIN — Medication 1000 UNIT(S): at 17:14

## 2018-09-25 RX ADMIN — Medication 20 MILLIGRAM(S): at 05:03

## 2018-09-25 RX ADMIN — Medication 1 APPLICATION(S): at 17:14

## 2018-09-25 RX ADMIN — Medication 1 APPLICATION(S): at 05:02

## 2018-09-25 RX ADMIN — ALBUTEROL 2.5 MILLIGRAM(S): 90 AEROSOL, METERED ORAL at 07:51

## 2018-09-25 RX ADMIN — Medication 10 MG/HR: at 05:04

## 2018-09-25 RX ADMIN — Medication 1 TABLET(S): at 08:10

## 2018-09-25 NOTE — PROGRESS NOTE ADULT - PROBLEM SELECTOR PLAN 2
Suspect eczema/ dermatitis   Not infectious per ID , shingle/ zoster ruled out   continue silver sulfadiazine cream, Prednisone taper.  F/u with dermatologist as out patient. Per son patient has seen a dermatologist recently and suggested no intervention

## 2018-09-25 NOTE — PROGRESS NOTE ADULT - PROBLEM SELECTOR PROBLEM 1
Pleural effusion
Rash

## 2018-09-25 NOTE — PROGRESS NOTE ADULT - ASSESSMENT
88F w/pmh of Afib on Coumadin, GERD, HTN, HLD, Depression, PE/DVT, COPD, UTIs, urinary retention, constipation presenting from [facility] with b/l worsening b/l leg rash with skin breakdown admitted for diffuse b/l LE/ UE suspected  rash with excoriations likely eczema, hospital course complicated by  rapid afib due to medication noncompliance, also found to have large pleural effusion s/p  thoracentesis

## 2018-09-25 NOTE — PROGRESS NOTE ADULT - SUBJECTIVE AND OBJECTIVE BOX
Patient is a 88y old  Female who presents with a chief complaint of rash (25 Sep 2018 10:26)       INTERVAL HPI/OVERNIGHT EVENTS: 88F w/pmh of Afib on Coumadin, GERD, HTN, HLD, Depression, PE/DVT, COPD, UTIs, urinary retention, constipation presenting from [facility] with b/l worsening b/l leg rash with skin breakdown admitted for diffuse b/l LE/ UE suspected  rash with excoriations likely eczema, hospital course complicated by  rapid afib due to medication noncompliance, also found to have large pleural effusion s/p  thoracentesis. Seen and examined at bedside. Denies any chest pain, palpitation, sob.       MEDICATIONS  (STANDING):  ALBUTerol    0.083% 2.5 milliGRAM(s) Nebulizer every 8 hours  calcium carbonate 1250 mG  + Vitamin D (OsCal 500 + D) 1 Tablet(s) Oral two times a day  cholecalciferol 1000 Unit(s) Oral two times a day  diltiazem    Tablet 90 milliGRAM(s) Oral three times a day  diltiazem Infusion 10 mG/Hr (10 mL/Hr) IV Continuous <Continuous>  ferrous    sulfate 325 milliGRAM(s) Oral daily  furosemide    Tablet 20 milliGRAM(s) Oral daily  influenza   Vaccine 0.5 milliLiter(s) IntraMuscular once  lactobacillus acidophilus 1 Tablet(s) Oral two times a day with meals  mirtazapine 7.5 milliGRAM(s) Oral at bedtime  predniSONE   Tablet 20 milliGRAM(s) Oral daily  silver sulfADIAZINE 1% Cream 1 Application(s) Topical two times a day  warfarin 2.5 milliGRAM(s) Oral once    MEDICATIONS  (PRN):  acetaminophen   Tablet .. 500 milliGRAM(s) Oral every 6 hours PRN Moderate Pain (4 - 6)  ALBUTerol   0.5% 2.5 milliGRAM(s) Nebulizer every 6 hours PRN Shortness of Breath and/or Wheezing      Allergies    No Known Allergies    Intolerances        REVIEW OF SYSTEMS:  CONSTITUTIONAL: No fever,  or fatigue  EYES: No eye pain, visual disturbances, or discharge  ENMT:  No difficulty hearing, tinnitus, vertigo; No sinus or throat pain  NECK: No pain or stiffness  RESPIRATORY: No cough, wheezing, chills or hemoptysis; No shortness of breath  CARDIOVASCULAR: No chest pain, palpitations, dizziness, or leg swelling  GASTROINTESTINAL: No abdominal or epigastric pain. No nausea, vomiting, or hematemesis; No diarrhea or constipation. No melena or hematochezia.  GENITOURINARY: No dysuria, frequency, hematuria, or incontinence  NEUROLOGICAL: No headaches , loss of strength, numbness, or tremors  SKIN: No itching, burning, rashes, or lesions   LYMPH NODES: No enlarged glands  ENDOCRINE: No heat or cold intolerance; No hair loss; No polydipsia or polyuria  MUSCULOSKELETAL: No joint pain or swelling; No muscle, back, or extremity pain  HEME/LYMPH: No easy bruising, or bleeding gums  ALLERGY AND IMMUNOLOGIC: No hives or eczema    Vital Signs Last 24 Hrs  T(C): 36.6 (25 Sep 2018 07:48), Max: 36.6 (24 Sep 2018 14:36)  T(F): 97.9 (25 Sep 2018 07:48), Max: 97.9 (25 Sep 2018 04:37)  HR: 85 (25 Sep 2018 07:51) (83 - 120)  BP: 119/76 (25 Sep 2018 07:48) (102/67 - 128/81)  BP(mean): --  RR: 18 (25 Sep 2018 07:48) (16 - 18)  SpO2: 100% (25 Sep 2018 07:51) (97% - 100%)    PHYSICAL EXAM:  GENERAL: NAD, awake, alert   HEAD:  Atraumatic, Normocephalic  EYES: EOMI, PERRLA, conjunctiva and sclera clear  ENMT: No tonsillar erythema, exudates, or enlargement; Moist mucous membranes  NECK: Supple, No JVD, Normal thyroid  NERVOUS SYSTEM:  Alert & Awake, grossly non focal   CHEST/LUNG: + coarse breath sounds bilat. No wheezes   HEART: S1S2+, IRRegular rate and rhythm  ABDOMEN: Soft, Nontender, Nondistended; Bowel sounds present  EXTREMITIES:  2+ Peripheral Pulses, No clubbing, cyanosis, or edema, diffuse papular skin lesions/ improving   LYMPH: No lymphadenopathy noted      LABS:                        10.2   8.84  )-----------( 228      ( 25 Sep 2018 07:05 )             34.6     25 Sep 2018 07:05    144    |  104    |  23     ----------------------------<  138    3.5     |  35     |  0.47     Ca    8.2        25 Sep 2018 07:05      PT/INR - ( 25 Sep 2018 07:05 )   PT: 20.9 sec;   INR: 1.89 ratio         PTT - ( 25 Sep 2018 07:05 )  PTT:32.6 sec  CAPILLARY BLOOD GLUCOSE        BLOOD CULTURE    RADIOLOGY & ADDITIONAL TESTS:    Imaging Personally Reviewed:  [ ] YES     Consultant(s) Notes Reviewed:      Care Discussed with Consultants/Other Providers:

## 2018-09-25 NOTE — PROGRESS NOTE ADULT - NSHPATTENDINGPLANDISCUSS_GEN_ALL_CORE
Consultants,RN
RN, Consultants
Garcia, RN, SW
demond Almaraz cell #[phone], Dr Romeo ID, [facility]
Patient, RN, Cardiology

## 2018-09-25 NOTE — PROGRESS NOTE ADULT - SUBJECTIVE AND OBJECTIVE BOX
[facility] - Marshall Frederick, Elias, Lenin, Valencia, Gualberto Kirby  Office Number:  [phone]    Patient resting comfortably in bed in NAD.  Laying flat with no respiratory distress.  No complaints of chest pain, dyspnea, palpitations, PND, or orthopnea.  Remains on cardizem gtt, af better rate controlled    ROS: negative unless otherwise mentioned.    Telemetry:  AF    MEDICATIONS  (STANDING):  ALBUTerol    0.083% 2.5 milliGRAM(s) Nebulizer every 8 hours  calcium carbonate 1250 mG  + Vitamin D (OsCal 500 + D) 1 Tablet(s) Oral two times a day  cholecalciferol 1000 Unit(s) Oral two times a day  diltiazem    Tablet 90 milliGRAM(s) Oral three times a day  diltiazem Infusion 10 mG/Hr (10 mL/Hr) IV Continuous <Continuous>  ferrous    sulfate 325 milliGRAM(s) Oral daily  furosemide    Tablet 20 milliGRAM(s) Oral daily  influenza   Vaccine 0.5 milliLiter(s) IntraMuscular once  lactobacillus acidophilus 1 Tablet(s) Oral two times a day with meals  mirtazapine 7.5 milliGRAM(s) Oral at bedtime  predniSONE   Tablet 20 milliGRAM(s) Oral daily  silver sulfADIAZINE 1% Cream 1 Application(s) Topical two times a day    MEDICATIONS  (PRN):  acetaminophen   Tablet .. 500 milliGRAM(s) Oral every 6 hours PRN Moderate Pain (4 - 6)  ALBUTerol   0.5% 2.5 milliGRAM(s) Nebulizer every 6 hours PRN Shortness of Breath and/or Wheezing      Allergies    No Known Allergies    Intolerances        Vital Signs Last 24 Hrs  T(C): 36.6 (25 Sep 2018 07:48), Max: 36.6 (24 Sep 2018 14:36)  T(F): 97.9 (25 Sep 2018 07:48), Max: 97.9 (25 Sep 2018 04:37)  HR: 85 (25 Sep 2018 07:51) (83 - 120)  BP: 119/76 (25 Sep 2018 07:48) (102/67 - 128/81)  BP(mean): --  RR: 18 (25 Sep 2018 07:48) (16 - 18)  SpO2: 100% (25 Sep 2018 07:51) (97% - 100%)    I&O's Summary    24 Sep 2018 07:01  -  25 Sep 2018 07:00  --------------------------------------------------------  IN: 700 mL / OUT: 0 mL / NET: 700 mL        ON EXAM:    Constitutional: NAD, awake and alert, frail, cachectic  HEENT: Moist Mucous Membranes, Anicteric  Pulmonary: Non-labored, breath sounds with scattered wheezing bilaterally coarse bs Left post with decreased in bases  Cardiovascular: Irregular, S1 and S2, 3/6 SM No rubs, gallops or clicks  Gastrointestinal: Bowel Sounds present, soft, nontender.   Lymph: No peripheral edema. No lymphadenopathy.  Skin: No visible rashes or ulcers. B/L lower extremity wounds wrapped  Psych:  Mood & affect flat    LABS: All Labs Reviewed:                        10.2   8.84  )-----------( 228      ( 25 Sep 2018 07:05 )             34.6                         10.8   8.18  )-----------( 247      ( 24 Sep 2018 08:01 )             37.4                         10.5   7.48  )-----------( 233      ( 23 Sep 2018 08:15 )             36.8     25 Sep 2018 07:05    144    |  104    |  23     ----------------------------<  138    3.5     |  35     |  0.47   24 Sep 2018 08:01    147    |  108    |  25     ----------------------------<  92     3.6     |  34     |  0.46   23 Sep 2018 08:15    143    |  109    |  33     ----------------------------<  87     4.3     |  28     |  0.56     Ca    8.2        25 Sep 2018 07:05  Ca    8.5        24 Sep 2018 08:01  Ca    8.1        23 Sep 2018 08:15      PT/INR - ( 25 Sep 2018 07:05 )   PT: 20.9 sec;   INR: 1.89 ratio         PTT - ( 25 Sep 2018 07:05 )  PTT:32.6 sec      Blood Culture: Organism --  Gram Stain Blood -- Gram Stain   polymorphonuclear leukocytes seen  No organisms seen  by cytocentrifuge  Specimen Source .Body Fluid Pleural Fluid  Culture-Blood --    Organism --  Gram Stain Blood -- Gram Stain --  Specimen Source .Blood Blood-Venous  Culture-Blood --

## 2018-09-25 NOTE — PROGRESS NOTE ADULT - ATTENDING COMMENTS
Ambulate as tolerated  Taper off Cardizem  Coumadin 2.5 mg X 1  INR in am Ambulate as tolerated  Taper off Cardizem drip   Coumadin 2.5 mg X 1  INR in am

## 2018-09-25 NOTE — PROGRESS NOTE ADULT - ASSESSMENT
88F w/pmh of Afib on Coumadin, GERD, HTN, HLD, Depression, PE/DVT, COPD, UTIs, urinary retention, constipation, normal lvef with mod-sev mr (echo 1/2018)  presenting from [facility] with b/l worsening b/l leg rash with skin breakdown. Difficult to obtain accurate history as patient is poor historian likely baseline dementia. History obtained from medical record. As per chart review pt with chronic b/l leg rash with multiple skins breakdown over the b/l foot and decreased oral intake. Son brought pt to ED for further evaluation of the rash but not present at bedside a the time of evaluation. Pt has been receiving sulfasalazine topical, Benadryl and prednisone oral for rash as per  chart review, pt scratching herself due to constant itching despite cream prescribed by derm at . No documented fever or recent change in medications.      Over the day she has been spitting out her diltiazem, and therefore has missed two consecutive doses.  Her heart rate was noted to be more rapid, to the 120s.  She was then administered iv diltiazem and was directly observed to take her diltiazem po.   -there is no evidence of acute ischemia.    -there is evidence for volume overload.  -she does have mild edema, which may reflect volume or be related to her skin lesions  -she has a large left pleural effusion, and a small right effusion, of uncertain etiology  -she got 2 doses of iv lasix 9/21, 9/22. Continue with po Lasix.  -s/p thoracentesis today US guided with 650cc fluid removed.  Pathology pending.  Pulm following.  -Restart coumadin for goal INR 2-3    -chronic af on coumadin  -tachycardia 9/21 likely from not taking meds properly.  -She is being placed on a diltiazem gtt at 10mg/hr for progressive tachycardia and refusal to take medications.   -Start cardizem 90 mg po tid at home dose, if willing to take po, with goal to titrate down cardizem gtt.  -no need for repeat echo at this time     -monitor electrolytes, keep k>4, Mg>2   -evaluation of skin lesions as per med  -will follow

## 2018-09-26 VITALS — WEIGHT: 119.71 LBS

## 2018-09-26 LAB
ANION GAP SERPL CALC-SCNC: 7 MMOL/L — SIGNIFICANT CHANGE UP (ref 5–17)
APTT BLD: 29.1 SEC — SIGNIFICANT CHANGE UP (ref 27.5–37.4)
BUN SERPL-MCNC: 19 MG/DL — SIGNIFICANT CHANGE UP (ref 7–23)
CALCIUM SERPL-MCNC: 8.1 MG/DL — LOW (ref 8.5–10.1)
CHLORIDE SERPL-SCNC: 100 MMOL/L — SIGNIFICANT CHANGE UP (ref 96–108)
CO2 SERPL-SCNC: 35 MMOL/L — HIGH (ref 22–31)
CREAT SERPL-MCNC: 0.61 MG/DL — SIGNIFICANT CHANGE UP (ref 0.5–1.3)
CULTURE RESULTS: SIGNIFICANT CHANGE UP
CULTURE RESULTS: SIGNIFICANT CHANGE UP
GLUCOSE SERPL-MCNC: 137 MG/DL — HIGH (ref 70–99)
HCT VFR BLD CALC: 32.1 % — LOW (ref 34.5–45)
HGB BLD-MCNC: 9.7 G/DL — LOW (ref 11.5–15.5)
INR BLD: 1.41 RATIO — HIGH (ref 0.88–1.16)
MCHC RBC-ENTMCNC: 25.8 PG — LOW (ref 27–34)
MCHC RBC-ENTMCNC: 30.2 GM/DL — LOW (ref 32–36)
MCV RBC AUTO: 85.4 FL — SIGNIFICANT CHANGE UP (ref 80–100)
NRBC # BLD: 0 /100 WBCS — SIGNIFICANT CHANGE UP (ref 0–0)
PLATELET # BLD AUTO: 210 K/UL — SIGNIFICANT CHANGE UP (ref 150–400)
POTASSIUM SERPL-MCNC: 3.8 MMOL/L — SIGNIFICANT CHANGE UP (ref 3.5–5.3)
POTASSIUM SERPL-SCNC: 3.8 MMOL/L — SIGNIFICANT CHANGE UP (ref 3.5–5.3)
PROTHROM AB SERPL-ACNC: 15.5 SEC — HIGH (ref 9.8–12.7)
RBC # BLD: 3.76 M/UL — LOW (ref 3.8–5.2)
RBC # FLD: 18.6 % — HIGH (ref 10.3–14.5)
SODIUM SERPL-SCNC: 142 MMOL/L — SIGNIFICANT CHANGE UP (ref 135–145)
SPECIMEN SOURCE: SIGNIFICANT CHANGE UP
SPECIMEN SOURCE: SIGNIFICANT CHANGE UP
WBC # BLD: 9.84 K/UL — SIGNIFICANT CHANGE UP (ref 3.8–10.5)
WBC # FLD AUTO: 9.84 K/UL — SIGNIFICANT CHANGE UP (ref 3.8–10.5)

## 2018-09-26 PROCEDURE — 88305 TISSUE EXAM BY PATHOLOGIST: CPT

## 2018-09-26 PROCEDURE — 88108 CYTOPATH CONCENTRATE TECH: CPT

## 2018-09-26 PROCEDURE — 89051 BODY FLUID CELL COUNT: CPT

## 2018-09-26 PROCEDURE — 82945 GLUCOSE OTHER FLUID: CPT

## 2018-09-26 PROCEDURE — 94760 N-INVAS EAR/PLS OXIMETRY 1: CPT

## 2018-09-26 PROCEDURE — 83605 ASSAY OF LACTIC ACID: CPT

## 2018-09-26 PROCEDURE — 87206 SMEAR FLUORESCENT/ACID STAI: CPT

## 2018-09-26 PROCEDURE — 87205 SMEAR GRAM STAIN: CPT

## 2018-09-26 PROCEDURE — 87075 CULTR BACTERIA EXCEPT BLOOD: CPT

## 2018-09-26 PROCEDURE — 87015 SPECIMEN INFECT AGNT CONCNTJ: CPT

## 2018-09-26 PROCEDURE — 96366 THER/PROPH/DIAG IV INF ADDON: CPT

## 2018-09-26 PROCEDURE — 96365 THER/PROPH/DIAG IV INF INIT: CPT

## 2018-09-26 PROCEDURE — 94640 AIRWAY INHALATION TREATMENT: CPT

## 2018-09-26 PROCEDURE — 84157 ASSAY OF PROTEIN OTHER: CPT

## 2018-09-26 PROCEDURE — 32555 ASPIRATE PLEURA W/ IMAGING: CPT

## 2018-09-26 PROCEDURE — 36415 COLL VENOUS BLD VENIPUNCTURE: CPT

## 2018-09-26 PROCEDURE — 82042 OTHER SOURCE ALBUMIN QUAN EA: CPT

## 2018-09-26 PROCEDURE — 99239 HOSP IP/OBS DSCHRG MGMT >30: CPT

## 2018-09-26 PROCEDURE — 80048 BASIC METABOLIC PNL TOTAL CA: CPT

## 2018-09-26 PROCEDURE — 71250 CT THORAX DX C-: CPT

## 2018-09-26 PROCEDURE — 85652 RBC SED RATE AUTOMATED: CPT

## 2018-09-26 PROCEDURE — 84145 PROCALCITONIN (PCT): CPT

## 2018-09-26 PROCEDURE — 85730 THROMBOPLASTIN TIME PARTIAL: CPT

## 2018-09-26 PROCEDURE — 99232 SBSQ HOSP IP/OBS MODERATE 35: CPT

## 2018-09-26 PROCEDURE — 80053 COMPREHEN METABOLIC PANEL: CPT

## 2018-09-26 PROCEDURE — 87116 MYCOBACTERIA CULTURE: CPT

## 2018-09-26 PROCEDURE — 83615 LACTATE (LD) (LDH) ENZYME: CPT

## 2018-09-26 PROCEDURE — 85610 PROTHROMBIN TIME: CPT

## 2018-09-26 PROCEDURE — 87070 CULTURE OTHR SPECIMN AEROBIC: CPT

## 2018-09-26 PROCEDURE — 87102 FUNGUS ISOLATION CULTURE: CPT

## 2018-09-26 PROCEDURE — 71045 X-RAY EXAM CHEST 1 VIEW: CPT

## 2018-09-26 PROCEDURE — 96375 TX/PRO/DX INJ NEW DRUG ADDON: CPT

## 2018-09-26 PROCEDURE — 85027 COMPLETE CBC AUTOMATED: CPT

## 2018-09-26 PROCEDURE — 93005 ELECTROCARDIOGRAM TRACING: CPT

## 2018-09-26 PROCEDURE — 83986 ASSAY PH BODY FLUID NOS: CPT

## 2018-09-26 PROCEDURE — 99285 EMERGENCY DEPT VISIT HI MDM: CPT | Mod: 25

## 2018-09-26 PROCEDURE — 87040 BLOOD CULTURE FOR BACTERIA: CPT

## 2018-09-26 RX ORDER — ASCORBIC ACID 60 MG
500 TABLET,CHEWABLE ORAL
Qty: 0 | Refills: 0 | Status: CANCELLED | OUTPATIENT
Start: 2018-09-26 | End: 2018-09-26

## 2018-09-26 RX ORDER — MULTIVIT-MIN/FERROUS GLUCONATE 9 MG/15 ML
1 LIQUID (ML) ORAL DAILY
Qty: 0 | Refills: 0 | Status: CANCELLED | OUTPATIENT
Start: 2018-09-26 | End: 2018-09-26

## 2018-09-26 RX ADMIN — ALBUTEROL 2.5 MILLIGRAM(S): 90 AEROSOL, METERED ORAL at 07:42

## 2018-09-26 RX ADMIN — Medication 1 TABLET(S): at 05:04

## 2018-09-26 RX ADMIN — Medication 325 MILLIGRAM(S): at 11:29

## 2018-09-26 RX ADMIN — Medication 1000 UNIT(S): at 05:04

## 2018-09-26 RX ADMIN — Medication 20 MILLIGRAM(S): at 05:04

## 2018-09-26 RX ADMIN — Medication 1 APPLICATION(S): at 05:04

## 2018-09-26 NOTE — DIETITIAN INITIAL EVALUATION ADULT. - PROBLEM SELECTOR PLAN 1
diffuse maculopapular rash with excoriations with several hemorrhagic vesicles on palms, and large clear vesicles on b/l ankles with skin breakdown over dorsum of right foot  unknown etiology allergic vs inflammatory vs medication induced   continue silver sulfadiazine cream, Prednisone  Will need Derm consult in the am

## 2018-09-26 NOTE — DIETITIAN INITIAL EVALUATION ADULT. - OTHER INFO
87 y/o female adm with rash from [facility]. Pt confused, EMR reviewed. Pt was given a [facility] soft diet pta. Speech eval completed on 9/24 and rx dysphagia 2 MS diet with NT liquids. Multiple Stage II pressure ulcers noted. Will rx MVI and vit C supplements.

## 2018-09-26 NOTE — DIETITIAN INITIAL EVALUATION ADULT. - NS AS NUTRI INTERV MEALS SNACK3
Texture-modified diet/General/healthful diet/dysphagia 2 [facility] soft diet with NT liquids.

## 2018-09-26 NOTE — DIETITIAN INITIAL EVALUATION ADULT. - SIGNS/SYMPTOMS
as evidenced by multiple Stage II pressure ulcers as evidenced by need for dysphagia 2 mech soft diet with nectar thick liquids as per speech therapy

## 2018-09-26 NOTE — PROGRESS NOTE ADULT - SUBJECTIVE AND OBJECTIVE BOX
[facility] -- Marshall Frederick, Elias, Lenin, Kofi Birmingham Savella  Office # [phone]    Follow Up:  Afib, left pleural effusion    Subjective/Observations: Confused and disoriented, however, able to verbalize that she feels "sick today."  Nasal cannula noted to be off, felt better when O2 replaced.    REVIEW OF SYSTEMS: All other review of systems is negative unless indicated above    PAST MEDICAL & SURGICAL HISTORY:  Femur neck fracture  Vertebral fracture, pathological  Neurogenic bladder  Afib  GERD (gastroesophageal reflux disease)  Pulmonary embolism  UTI (lower urinary tract infection)  HTN (hypertension)  Pulmonary embolism  Memory impairment  Hypertension  Atrial fibrillation  Hyponatremia  Shortness of breath: etiology probably copd  Urinary retention  Arthritis  DVT (deep venous thrombosis)  History of hip surgery: right gamma nail    MEDICATIONS  (STANDING):  ALBUTerol    0.083% 2.5 milliGRAM(s) Nebulizer every 8 hours  calcium carbonate 1250 mG  + Vitamin D (OsCal 500 + D) 1 Tablet(s) Oral two times a day  cholecalciferol 1000 Unit(s) Oral two times a day  diltiazem    Tablet 90 milliGRAM(s) Oral three times a day  ferrous    sulfate 325 milliGRAM(s) Oral daily  furosemide    Tablet 20 milliGRAM(s) Oral daily  influenza   Vaccine 0.5 milliLiter(s) IntraMuscular once  lactobacillus acidophilus 1 Tablet(s) Oral two times a day with meals  mirtazapine 7.5 milliGRAM(s) Oral at bedtime  silver sulfADIAZINE 1% Cream 1 Application(s) Topical two times a day    MEDICATIONS  (PRN):  acetaminophen   Tablet .. 500 milliGRAM(s) Oral every 6 hours PRN Moderate Pain (4 - 6)  ALBUTerol   0.5% 2.5 milliGRAM(s) Nebulizer every 6 hours PRN Shortness of Breath and/or Wheezing      Allergies    No Known Allergies    Intolerances    Vital Signs Last 24 Hrs  T(C): 36.2 (26 Sep 2018 07:25), Max: 37.1 (25 Sep 2018 15:19)  T(F): 97.2 (26 Sep 2018 07:25), Max: 98.8 (25 Sep 2018 15:19)  HR: 76 (26 Sep 2018 07:42) (62 - 94)  BP: 119/77 (26 Sep 2018 07:25) (95/51 - 119/77)  BP(mean): --  RR: 18 (26 Sep 2018 07:25) (16 - 18)  SpO2: 99% (26 Sep 2018 07:42) (94% - 100%)    I&O's Summary    25 Sep 2018 07:01  -  26 Sep 2018 07:00  --------------------------------------------------------  IN: 550 mL / OUT: 0 mL / NET: 550 mL    26 Sep 2018 07:01  -  26 Sep 2018 09:41  --------------------------------------------------------  IN: 100 mL / OUT: 0 mL / NET: 100 mL    PHYSICAL EXAM:  TELE: Afib, 70's-90's  Constitutional: NAD, awake and alert, cachectic  HEENT: Moist Mucous Membranes, Anicteric  Pulmonary: Non-labored, breath sounds are clear bilaterally, No wheezing, rales or rhonchi  Cardiovascular: Irregularly irregular, S1 and S2, No murmurs, rubs, gallops or clicks  Gastrointestinal: Bowel Sounds present, soft, nontender.   Lymph: No peripheral edema. No lymphadenopathy.  Skin: + rashes; B/L heel ulcers.  Psych:  Mood & affect appropriate    LABS: All Labs Reviewed:                        9.7    9.84  )-----------( 210      ( 26 Sep 2018 07:27 )             32.1                         10.2   8.84  )-----------( 228      ( 25 Sep 2018 07:05 )             34.6                         10.8   8.18  )-----------( 247      ( 24 Sep 2018 08:01 )             37.4     26 Sep 2018 07:27    142    |  100    |  19     ----------------------------<  137    3.8     |  35     |  0.61   25 Sep 2018 07:05    144    |  104    |  23     ----------------------------<  138    3.5     |  35     |  0.47   24 Sep 2018 08:01    147    |  108    |  25     ----------------------------<  92     3.6     |  34     |  0.46     Ca    8.1        26 Sep 2018 07:27  Ca    8.2        25 Sep 2018 07:05  Ca    8.5        24 Sep 2018 08:01    PT/INR - ( 26 Sep 2018 07:27 )   PT: 15.5 sec;   INR: 1.41 ratio       PTT - ( 26 Sep 2018 07:27 )  PTT:29.1 sec    < from: TTE Echo Doppler w/o Cont (01.12.18 @ 18:55) >     EXAM:  ECHO TTE W/O CON COMP W/DOPPLR         PROCEDURE DATE:  01/12/2018        INTERPRETATION:  Ordering Physician: LYNNE GARDNER [id number]    Indication: CHF    Study Quality: Technically difficult   A complete echocardiographic study was performed utilizing standard   protocol including spectral and color Doppler in all echocardiographic   windows.    Height: 159 cm  Weight: 53 kg  BSA: 1.5  Blood Pressure: 120/78    MEASUREMENTS  IVS: 0.9cm  PWT: 0.8cm  LA: 6.4cm  AO: 3.2cm  LVIDd: 4.2cm  LVIDs: 2.8cm    LVEF: 55-60%  RVSP: 31mmHg    FINDINGS  Left Ventricle: Endocardium not well-visualized. Grossly normal left   ventricular systolic function. Small left ventricle  Aortic Valve: Calcified trileaflet aortic valve. Mild aortic insufficiency  Mitral Valve: Mitral annular calcification with tethered mitral valve   leaflets. Moderate to severe mitral regurgitation  Tricuspid Valve: Tricuspid valve is not well-visualized. Mild to moderate   tricuspid regurgitation  Pulmonic Valve: Not well-visualized. Trace pulmonic insufficiency  Left Atrium: Severe left atrial enlargement  Right Ventricle: Probably decreased right ventricular systolic function.   Mild right ventricular enlargement  Right Atrium: Right atrial enlargement  Diastolic Function: There is evidence of diastolic dysfunction  Pericardium/Pleura: Normal pericardium with no pericardial effusion  Hemodynamics: Pulmonary artery systolic pressure is estimated to be 31   mmHg, assuming the right atrial pressure is equalto 8 mmHg, consistent   with normal pulmonary pressures.    CONCLUSIONS:  1. Technically limited study. Patient is not cooperative.  2. Endocardium not well-visualized. Grossly normal left ventricular   systolic function.  3. Mitral annular calcification with tethered mitral valve leaflets.   Moderate to severe mitral regurgitation  4. Mild to moderate tricuspid regurgitation  5. Severe left atrial enlargement  6. Mild right ventricular enlargement. Probably decreased right   ventricular systolicfunction.   7. Evidence of diastolic dysfunction  8. No pericardial effusion.      MONTANA ROTHMAN   This document has been electronically signed. Jan 13 2018  4:35PM      < end of copied text >     < from: Xray Chest 1 View- PORTABLE-Urgent (09.24.18 @ 14:06) >    EXAM:  US THORACENTESIS [facility]                          EXAM:  XR CHEST PORTABLE URGENT 1V                            PROCEDURE DATE:  09/24/2018      INTERPRETATION:    Ultrasound-guided diagnostic and therapeutic left pleural drainage:  Chest portable semierect single AP view:  Clinical History:    Atrial fibrillation. Heart failure. Left pleural fluid. Image guided   drainage procedure.    Technique:    An informed consent obtained from patient's son . Risks and benefits   explained in detail. Patient made supine on oblique on the bed. Time out   procedure was performed. Limited ultrasonography demonstrated left   pleural fluid. The site marked on the skin.    The access site prepped and draped in sterile fashion. 1% lidocaine   utilized for local anesthesia. Left pleural cavity accessed, under   ultrasound guidance, using safety centesis catheter. Hard   Ultrasound guidance with permanent recording and reporting. Approximately   650 cc of clear yellow fluid drained.The catheterremoved. Site dressed   in sterile fashion.   Chest x-ray demonstrated no pneumothorax. Cardiomegaly. Tortuous   atherosclerotic aorta.  Patient tolerated the procedure well. No complications noted. Patient   transferred to the floor in stable condition for further observation and   management.    Impression:    Ultrasound guided left pleural drainage. Fluid sent for lab analysis.    CHRISTOPHER MARC M.D., ATTENDING RADIOLOGIST  This document has been electronically signed. Sep 24 2018 4:26PM      < end of copied text > [facility] -- Marshall Frederick, Elias, Lenin, Kofi Birmingham Savella  Office # [phone]    Follow Up:  Afib, left pleural effusion    Subjective/Observations: Confused and disoriented, however, able to verbalize that she feels "sick today."  Nasal cannula noted to be off, felt better when O2 replaced.    REVIEW OF SYSTEMS: unable (ms)    PAST MEDICAL & SURGICAL HISTORY:  Femur neck fracture  Vertebral fracture, pathological  Neurogenic bladder  Afib  GERD (gastroesophageal reflux disease)  Pulmonary embolism  UTI (lower urinary tract infection)  HTN (hypertension)  Pulmonary embolism  Memory impairment  Hypertension  Atrial fibrillation  Hyponatremia  Shortness of breath: etiology probably copd  Urinary retention  Arthritis  DVT (deep venous thrombosis)  History of hip surgery: right gamma nail    MEDICATIONS  (STANDING):  ALBUTerol    0.083% 2.5 milliGRAM(s) Nebulizer every 8 hours  calcium carbonate 1250 mG  + Vitamin D (OsCal 500 + D) 1 Tablet(s) Oral two times a day  cholecalciferol 1000 Unit(s) Oral two times a day  diltiazem    Tablet 90 milliGRAM(s) Oral three times a day  ferrous    sulfate 325 milliGRAM(s) Oral daily  furosemide    Tablet 20 milliGRAM(s) Oral daily  influenza   Vaccine 0.5 milliLiter(s) IntraMuscular once  lactobacillus acidophilus 1 Tablet(s) Oral two times a day with meals  mirtazapine 7.5 milliGRAM(s) Oral at bedtime  silver sulfADIAZINE 1% Cream 1 Application(s) Topical two times a day    MEDICATIONS  (PRN):  acetaminophen   Tablet .. 500 milliGRAM(s) Oral every 6 hours PRN Moderate Pain (4 - 6)  ALBUTerol   0.5% 2.5 milliGRAM(s) Nebulizer every 6 hours PRN Shortness of Breath and/or Wheezing      Allergies    No Known Allergies    Intolerances    Vital Signs Last 24 Hrs  T(C): 36.2 (26 Sep 2018 07:25), Max: 37.1 (25 Sep 2018 15:19)  T(F): 97.2 (26 Sep 2018 07:25), Max: 98.8 (25 Sep 2018 15:19)  HR: 76 (26 Sep 2018 07:42) (62 - 94)  BP: 119/77 (26 Sep 2018 07:25) (95/51 - 119/77)  BP(mean): --  RR: 18 (26 Sep 2018 07:25) (16 - 18)  SpO2: 99% (26 Sep 2018 07:42) (94% - 100%)    I&O's Summary    25 Sep 2018 07:01  -  26 Sep 2018 07:00  --------------------------------------------------------  IN: 550 mL / OUT: 0 mL / NET: 550 mL    26 Sep 2018 07:01  -  26 Sep 2018 09:41  --------------------------------------------------------  IN: 100 mL / OUT: 0 mL / NET: 100 mL    PHYSICAL EXAM:  TELE: Afib, 70's-90's  Constitutional: NAD, awake and alert, cachectic  HEENT: Moist Mucous Membranes, Anicteric  Pulmonary: Non-labored, breath sounds are clear bilaterally, No wheezing, rales or rhonchi  Cardiovascular: Irregularly irregular, S1 and S2, No murmurs, rubs, gallops or clicks  Gastrointestinal: Bowel Sounds present, soft, nontender.   Lymph: No peripheral edema. No lymphadenopathy.  Skin: + rashes; B/L heel ulcers.  Psych:  confused    LABS: All Labs Reviewed:                        9.7    9.84  )-----------( 210      ( 26 Sep 2018 07:27 )             32.1                         10.2   8.84  )-----------( 228      ( 25 Sep 2018 07:05 )             34.6                         10.8   8.18  )-----------( 247      ( 24 Sep 2018 08:01 )             37.4     26 Sep 2018 07:27    142    |  100    |  19     ----------------------------<  137    3.8     |  35     |  0.61   25 Sep 2018 07:05    144    |  104    |  23     ----------------------------<  138    3.5     |  35     |  0.47   24 Sep 2018 08:01    147    |  108    |  25     ----------------------------<  92     3.6     |  34     |  0.46     Ca    8.1        26 Sep 2018 07:27  Ca    8.2        25 Sep 2018 07:05  Ca    8.5        24 Sep 2018 08:01    PT/INR - ( 26 Sep 2018 07:27 )   PT: 15.5 sec;   INR: 1.41 ratio       PTT - ( 26 Sep 2018 07:27 )  PTT:29.1 sec    < from: TTE Echo Doppler w/o Cont (01.12.18 @ 18:55) >     EXAM:  ECHO TTE W/O CON COMP W/DOPPLR         PROCEDURE DATE:  01/12/2018        INTERPRETATION:  Ordering Physician: LYNNE GARDNER [id number]    Indication: CHF    Study Quality: Technically difficult   A complete echocardiographic study was performed utilizing standard   protocol including spectral and color Doppler in all echocardiographic   windows.    Height: 159 cm  Weight: 53 kg  BSA: 1.5  Blood Pressure: 120/78    MEASUREMENTS  IVS: 0.9cm  PWT: 0.8cm  LA: 6.4cm  AO: 3.2cm  LVIDd: 4.2cm  LVIDs: 2.8cm    LVEF: 55-60%  RVSP: 31mmHg    FINDINGS  Left Ventricle: Endocardium not well-visualized. Grossly normal left   ventricular systolic function. Small left ventricle  Aortic Valve: Calcified trileaflet aortic valve. Mild aortic insufficiency  Mitral Valve: Mitral annular calcification with tethered mitral valve   leaflets. Moderate to severe mitral regurgitation  Tricuspid Valve: Tricuspid valve is not well-visualized. Mild to moderate   tricuspid regurgitation  Pulmonic Valve: Not well-visualized. Trace pulmonic insufficiency  Left Atrium: Severe left atrial enlargement  Right Ventricle: Probably decreased right ventricular systolic function.   Mild right ventricular enlargement  Right Atrium: Right atrial enlargement  Diastolic Function: There is evidence of diastolic dysfunction  Pericardium/Pleura: Normal pericardium with no pericardial effusion  Hemodynamics: Pulmonary artery systolic pressure is estimated to be 31   mmHg, assuming the right atrial pressure is equalto 8 mmHg, consistent   with normal pulmonary pressures.    CONCLUSIONS:  1. Technically limited study. Patient is not cooperative.  2. Endocardium not well-visualized. Grossly normal left ventricular   systolic function.  3. Mitral annular calcification with tethered mitral valve leaflets.   Moderate to severe mitral regurgitation  4. Mild to moderate tricuspid regurgitation  5. Severe left atrial enlargement  6. Mild right ventricular enlargement. Probably decreased right   ventricular systolicfunction.   7. Evidence of diastolic dysfunction  8. No pericardial effusion.      MONTANA ROTHMAN   This document has been electronically signed. Jan 13 2018  4:35PM      < end of copied text >     < from: Xray Chest 1 View- PORTABLE-Urgent (09.24.18 @ 14:06) >    EXAM:  US THORACENTESIS [facility]                          EXAM:  XR CHEST PORTABLE URGENT 1V                            PROCEDURE DATE:  09/24/2018      INTERPRETATION:    Ultrasound-guided diagnostic and therapeutic left pleural drainage:  Chest portable semierect single AP view:  Clinical History:    Atrial fibrillation. Heart failure. Left pleural fluid. Image guided   drainage procedure.    Technique:    An informed consent obtained from patient's son . Risks and benefits   explained in detail. Patient made supine on oblique on the bed. Time out   procedure was performed. Limited ultrasonography demonstrated left   pleural fluid. The site marked on the skin.    The access site prepped and draped in sterile fashion. 1% lidocaine   utilized for local anesthesia. Left pleural cavity accessed, under   ultrasound guidance, using safety centesis catheter. Hard   Ultrasound guidance with permanent recording and reporting. Approximately   650 cc of clear yellow fluid drained.The catheterremoved. Site dressed   in sterile fashion.   Chest x-ray demonstrated no pneumothorax. Cardiomegaly. Tortuous   atherosclerotic aorta.  Patient tolerated the procedure well. No complications noted. Patient   transferred to the floor in stable condition for further observation and   management.    Impression:    Ultrasound guided left pleural drainage. Fluid sent for lab analysis.    CHRISTOPHER MARC M.D., ATTENDING RADIOLOGIST  This document has been electronically signed. Sep 24 2018 4:26PM      < end of copied text >

## 2018-09-26 NOTE — PROGRESS NOTE ADULT - ASSESSMENT
88F w/pmh of Afib on Coumadin, GERD, HTN, HLD, Depression, PE/DVT, COPD, UTIs, urinary retention, constipation, normal lvef with mod-sev mr (echo 1/2018)  presenting from [facility] with b/l worsening b/l leg rash with skin breakdown. Difficult to obtain accurate history as patient is poor historian likely baseline dementia. History obtained from medical record. As per chart review pt with chronic b/l leg rash with multiple skins breakdown over the b/l foot and decreased oral intake. Son brought pt to ED for further evaluation of the rash but not present at bedside at the time of evaluation. Pt has been receiving sulfasalazine topical, Benadryl and prednisone oral for rash as per  chart review, pt scratching herself due to constant itching despite cream prescribed by derm at . No documented fever or recent change in medications.      Over the day she has been spitting out her diltiazem, and therefore has missed two consecutive doses.  Her heart rate was noted to be more rapid, to the 120s.  She was then administered iv diltiazem and was directly observed to take her diltiazem po.     #1 Paroxysmal Afib  - remains in Afib but rate controlled overnight at 70's to 90's  - there is no evidence of acute ischemia.  - Dose Coumadin daily for INR 2-3.  Rceived 2.5 mg last night.  Today's INR=1.41  - s/p Cardizem gtt.  Continue Cardizem 90 mg q8H  - Monitor electrolytes, replete to keep K>4 and Mag>2  - TTE on 1/2018 showed normal LVSF with no significant valvular pathology, although, with evidence of diastolic dysfunction  -no need for repeat echo at this time    #2 Pleural effusion  -there is evidence for volume overload.  -she has a large left pleural effusion, and a small right effusion, of uncertain etiology  -s/p thoracentesis 9/24 with 650cc fluid removed.  Pathology pending.  Pulm following.  -she got 2 doses of iv lasix 9/21, 9/22. Continue with po Lasix 20 mg daily    #3 Dispo  - Patient is a DNR/DNI    Will continue to follow    Mariza Stahl NP  Cardiology 88F w/pmh of Afib on Coumadin, GERD, HTN, HLD, Depression, PE/DVT, COPD, UTIs, urinary retention, constipation, normal lvef with mod-sev mr (echo 1/2018)  presenting from [facility] with b/l worsening b/l leg rash with skin breakdown. Difficult to obtain accurate history as patient is poor historian likely baseline dementia. History obtained from medical record. As per chart review pt with chronic b/l leg rash with multiple skins breakdown over the b/l foot and decreased oral intake. Son brought pt to ED for further evaluation of the rash but not present at bedside at the time of evaluation. Pt has been receiving sulfasalazine topical, Benadryl and prednisone oral for rash as per  chart review, pt scratching herself due to constant itching despite cream prescribed by derm at . No documented fever or recent change in medications.      Over the day she has been spitting out her diltiazem, and therefore has missed two consecutive doses.  Her heart rate was noted to be more rapid, to the 120s.  She was then administered iv diltiazem and was directly observed to take her diltiazem po.     #1 Paroxysmal Afib  - remains in Afib but rate controlled overnight at 70's to 90's  - there is no evidence of acute ischemia.  - Dose Coumadin daily for INR 2-3.  Rceived 2.5 mg last night.  Today's INR=1.41  - s/p Cardizem gtt.  Continue Cardizem 90 mg q8H  - Monitor electrolytes, replete to keep K>4 and Mag>2  - TTE on 1/2018 showed normal LVSF with no significant valvular pathology, although, with evidence of diastolic dysfunction  -no need for repeat echo at this time    #2 Pleural effusion  -there is evidence for volume overload.  -she has a large left pleural effusion, and a small right effusion, of uncertain etiology  -s/p thoracentesis 9/24 with 650cc fluid removed.  Pathology pending.  Pulm following.  -she got 2 doses of iv lasix 9/21, 9/22. Continue with po Lasix 20 mg daily    - Patient is a DNR/DNI    Will continue to follow    Mariza Stahl NP  Cardiology 88F w/pmh of Afib on Coumadin, GERD, HTN, HLD, Depression, PE/DVT, COPD, UTIs, urinary retention, constipation, normal lvef with mod-sev mr (echo 1/2018)  presenting from [facility] with b/l worsening b/l leg rash with skin breakdown. Difficult to obtain accurate history as patient is poor historian likely baseline dementia. History obtained from medical record. As per chart review pt with chronic b/l leg rash with multiple skins breakdown over the b/l foot and decreased oral intake. Son brought pt to ED for further evaluation of the rash but not present at bedside at the time of evaluation. Pt has been receiving sulfasalazine topical, Benadryl and prednisone oral for rash as per  chart review, pt scratching herself due to constant itching despite cream prescribed by derm at . No documented fever or recent change in medications.      Over the day she has been spitting out her diltiazem, and therefore has missed two consecutive doses.  Her heart rate was noted to be more rapid, to the 120s.  She was then administered iv diltiazem and was directly observed to take her diltiazem po.     #1 Paroxysmal Afib  - remains in Afib but rate controlled overnight at 70's to 90's  - there is no evidence of acute ischemia.  - Dose Coumadin daily for INR 2-3.  Rceived 2.5 mg last night.  Today's INR=1.41  - s/p Cardizem gtt.  Continue Cardizem 90 mg q8H  - Monitor electrolytes, replete to keep K>4 and Mag>2  - TTE on 1/2018 showed normal LVSF with no significant valvular pathology, although, with evidence of diastolic dysfunction  -no need for repeat echo at this time    #2 Pleural effusion  -there is evidence for volume overload.  -she has a large left pleural effusion, and a small right effusion, of uncertain etiology  -s/p thoracentesis 9/24 with 650cc fluid removed.  Pathology pending.  Pulm following.  -she got 2 doses of iv lasix 9/21, 9/22. Continue with po Lasix 20 mg daily    - Patient is a DNR/DNI  - dc planning to [facility]  - Will continue to follow    Mariza Stahl NP  Cardiology

## 2018-09-29 LAB
CULTURE RESULTS: SIGNIFICANT CHANGE UP
SPECIMEN SOURCE: SIGNIFICANT CHANGE UP

## 2018-10-24 LAB
CULTURE RESULTS: SIGNIFICANT CHANGE UP
SPECIMEN SOURCE: SIGNIFICANT CHANGE UP

## 2018-11-14 LAB
CULTURE RESULTS: SIGNIFICANT CHANGE UP
SPECIMEN SOURCE: SIGNIFICANT CHANGE UP

## 2019-02-15 ENCOUNTER — INPATIENT (INPATIENT)
Facility: HOSPITAL | Age: 84
LOS: 7 days | Discharge: NURSING, REHAB OR LONG-TERM CARE | End: 2019-02-23
Attending: FAMILY MEDICINE | Admitting: FAMILY MEDICINE
Payer: MEDICARE

## 2019-02-15 VITALS
WEIGHT: 130.07 LBS | DIASTOLIC BLOOD PRESSURE: 68 MMHG | HEART RATE: 100 BPM | SYSTOLIC BLOOD PRESSURE: 122 MMHG | RESPIRATION RATE: 20 BRPM | HEIGHT: 63 IN | OXYGEN SATURATION: 96 %

## 2019-02-15 LAB
APPEARANCE UR: CLEAR — SIGNIFICANT CHANGE UP
APTT BLD: 34.2 SEC — SIGNIFICANT CHANGE UP (ref 27.5–36.3)
BACTERIA # UR AUTO: ABNORMAL
BASOPHILS # BLD AUTO: 0.09 K/UL — SIGNIFICANT CHANGE UP (ref 0–0.2)
BASOPHILS NFR BLD AUTO: 0.8 % — SIGNIFICANT CHANGE UP (ref 0–2)
BILIRUB UR-MCNC: ABNORMAL
COLOR SPEC: YELLOW — SIGNIFICANT CHANGE UP
DIFF PNL FLD: NEGATIVE — SIGNIFICANT CHANGE UP
EOSINOPHIL # BLD AUTO: 0.26 K/UL — SIGNIFICANT CHANGE UP (ref 0–0.5)
EOSINOPHIL NFR BLD AUTO: 2.2 % — SIGNIFICANT CHANGE UP (ref 0–6)
EPI CELLS # UR: SIGNIFICANT CHANGE UP
GLUCOSE UR QL: NEGATIVE MG/DL — SIGNIFICANT CHANGE UP
HCT VFR BLD CALC: 43 % — SIGNIFICANT CHANGE UP (ref 34.5–45)
HGB BLD-MCNC: 13.2 G/DL — SIGNIFICANT CHANGE UP (ref 11.5–15.5)
HYALINE CASTS # UR AUTO: ABNORMAL /LPF
IMM GRANULOCYTES NFR BLD AUTO: 0.6 % — SIGNIFICANT CHANGE UP (ref 0–1.5)
INR BLD: 1.4 RATIO — HIGH (ref 0.88–1.16)
KETONES UR-MCNC: ABNORMAL
LACTATE SERPL-SCNC: 1.3 MMOL/L — SIGNIFICANT CHANGE UP (ref 0.7–2)
LACTATE SERPL-SCNC: 2.2 MMOL/L — HIGH (ref 0.7–2)
LEUKOCYTE ESTERASE UR-ACNC: ABNORMAL
LYMPHOCYTES # BLD AUTO: 2.56 K/UL — SIGNIFICANT CHANGE UP (ref 1–3.3)
LYMPHOCYTES # BLD AUTO: 21.6 % — SIGNIFICANT CHANGE UP (ref 13–44)
MCHC RBC-ENTMCNC: 29.6 PG — SIGNIFICANT CHANGE UP (ref 27–34)
MCHC RBC-ENTMCNC: 30.7 GM/DL — LOW (ref 32–36)
MCV RBC AUTO: 96.4 FL — SIGNIFICANT CHANGE UP (ref 80–100)
MONOCYTES # BLD AUTO: 0.88 K/UL — SIGNIFICANT CHANGE UP (ref 0–0.9)
MONOCYTES NFR BLD AUTO: 7.4 % — SIGNIFICANT CHANGE UP (ref 2–14)
NEUTROPHILS # BLD AUTO: 7.99 K/UL — HIGH (ref 1.8–7.4)
NEUTROPHILS NFR BLD AUTO: 67.4 % — SIGNIFICANT CHANGE UP (ref 43–77)
NITRITE UR-MCNC: NEGATIVE — SIGNIFICANT CHANGE UP
NRBC # BLD: 0 /100 WBCS — SIGNIFICANT CHANGE UP (ref 0–0)
PH UR: 5 — SIGNIFICANT CHANGE UP (ref 5–8)
PLATELET # BLD AUTO: 216 K/UL — SIGNIFICANT CHANGE UP (ref 150–400)
PROT UR-MCNC: 100 MG/DL
PROTHROM AB SERPL-ACNC: 15.7 SEC — HIGH (ref 10–12.9)
RBC # BLD: 4.46 M/UL — SIGNIFICANT CHANGE UP (ref 3.8–5.2)
RBC # FLD: 17.2 % — HIGH (ref 10.3–14.5)
RBC CASTS # UR COMP ASSIST: SIGNIFICANT CHANGE UP /HPF (ref 0–4)
SP GR SPEC: 1.02 — SIGNIFICANT CHANGE UP (ref 1.01–1.02)
UROBILINOGEN FLD QL: 4 MG/DL
WBC # BLD: 11.85 K/UL — HIGH (ref 3.8–10.5)
WBC # FLD AUTO: 11.85 K/UL — HIGH (ref 3.8–10.5)
WBC UR QL: SIGNIFICANT CHANGE UP

## 2019-02-15 PROCEDURE — 93010 ELECTROCARDIOGRAM REPORT: CPT

## 2019-02-15 PROCEDURE — 74177 CT ABD & PELVIS W/CONTRAST: CPT | Mod: 26

## 2019-02-15 PROCEDURE — 99285 EMERGENCY DEPT VISIT HI MDM: CPT | Mod: 25

## 2019-02-15 PROCEDURE — 71045 X-RAY EXAM CHEST 1 VIEW: CPT | Mod: 26

## 2019-02-15 PROCEDURE — 71250 CT THORAX DX C-: CPT | Mod: 26

## 2019-02-15 PROCEDURE — 70450 CT HEAD/BRAIN W/O DYE: CPT | Mod: 26

## 2019-02-15 RX ORDER — FAMOTIDINE 10 MG/ML
20 INJECTION INTRAVENOUS ONCE
Qty: 0 | Refills: 0 | Status: COMPLETED | OUTPATIENT
Start: 2019-02-15 | End: 2019-02-15

## 2019-02-15 RX ORDER — SODIUM CHLORIDE 9 MG/ML
1000 INJECTION INTRAMUSCULAR; INTRAVENOUS; SUBCUTANEOUS ONCE
Qty: 0 | Refills: 0 | Status: COMPLETED | OUTPATIENT
Start: 2019-02-15 | End: 2019-02-15

## 2019-02-15 RX ORDER — RANITIDINE HYDROCHLORIDE 150 MG/1
1 TABLET, FILM COATED ORAL
Qty: 0 | Refills: 0 | COMMUNITY

## 2019-02-15 RX ORDER — PIPERACILLIN AND TAZOBACTAM 4; .5 G/20ML; G/20ML
3.38 INJECTION, POWDER, LYOPHILIZED, FOR SOLUTION INTRAVENOUS EVERY 8 HOURS
Qty: 0 | Refills: 0 | Status: DISCONTINUED | OUTPATIENT
Start: 2019-02-15 | End: 2019-02-20

## 2019-02-15 RX ORDER — MIRTAZAPINE 45 MG/1
1 TABLET, ORALLY DISINTEGRATING ORAL
Qty: 0 | Refills: 0 | COMMUNITY

## 2019-02-15 RX ORDER — ACETAMINOPHEN 500 MG
650 TABLET ORAL EVERY 6 HOURS
Qty: 0 | Refills: 0 | Status: DISCONTINUED | OUTPATIENT
Start: 2019-02-15 | End: 2019-02-23

## 2019-02-15 RX ORDER — TRAMADOL HYDROCHLORIDE 50 MG/1
1 TABLET ORAL
Qty: 0 | Refills: 0 | COMMUNITY

## 2019-02-15 RX ORDER — APIXABAN 2.5 MG/1
2.5 TABLET, FILM COATED ORAL EVERY 12 HOURS
Qty: 0 | Refills: 0 | Status: DISCONTINUED | OUTPATIENT
Start: 2019-02-15 | End: 2019-02-17

## 2019-02-15 RX ORDER — APIXABAN 2.5 MG/1
1 TABLET, FILM COATED ORAL
Qty: 0 | Refills: 0 | COMMUNITY

## 2019-02-15 RX ORDER — CETIRIZINE HYDROCHLORIDE 10 MG/1
1 TABLET ORAL
Qty: 0 | Refills: 0 | COMMUNITY

## 2019-02-15 RX ORDER — METOPROLOL TARTRATE 50 MG
1 TABLET ORAL
Qty: 0 | Refills: 0 | COMMUNITY

## 2019-02-15 RX ORDER — FERROUS SULFATE 325(65) MG
1 TABLET ORAL
Qty: 0 | Refills: 0 | COMMUNITY

## 2019-02-15 RX ORDER — SODIUM CHLORIDE 9 MG/ML
3 INJECTION INTRAMUSCULAR; INTRAVENOUS; SUBCUTANEOUS ONCE
Qty: 0 | Refills: 0 | Status: COMPLETED | OUTPATIENT
Start: 2019-02-15 | End: 2019-02-15

## 2019-02-15 RX ORDER — DIPHENHYDRAMINE HCL/ZINC ACET 2 %-0.1 %
1 CREAM (GRAM) TOPICAL
Qty: 0 | Refills: 0 | COMMUNITY

## 2019-02-15 RX ORDER — ZINC OXIDE 200 MG/G
1 OINTMENT TOPICAL
Qty: 0 | Refills: 0 | COMMUNITY

## 2019-02-15 RX ORDER — FUROSEMIDE 40 MG
40 TABLET ORAL ONCE
Qty: 0 | Refills: 0 | Status: COMPLETED | OUTPATIENT
Start: 2019-02-15 | End: 2019-02-15

## 2019-02-15 RX ORDER — POLYETHYLENE GLYCOL 3350 17 G/17G
17 POWDER, FOR SOLUTION ORAL DAILY
Qty: 0 | Refills: 0 | Status: DISCONTINUED | OUTPATIENT
Start: 2019-02-15 | End: 2019-02-23

## 2019-02-15 RX ORDER — WARFARIN SODIUM 2.5 MG/1
1 TABLET ORAL
Qty: 0 | Refills: 0 | COMMUNITY

## 2019-02-15 RX ORDER — CHOLECALCIFEROL (VITAMIN D3) 125 MCG
1 CAPSULE ORAL
Qty: 0 | Refills: 0 | COMMUNITY

## 2019-02-15 RX ORDER — LACTULOSE 10 G/15ML
10 SOLUTION ORAL ONCE
Qty: 0 | Refills: 0 | Status: COMPLETED | OUTPATIENT
Start: 2019-02-15 | End: 2019-02-15

## 2019-02-15 RX ORDER — DILTIAZEM HCL 120 MG
1 CAPSULE, EXT RELEASE 24 HR ORAL
Qty: 0 | Refills: 0 | COMMUNITY

## 2019-02-15 RX ORDER — FUROSEMIDE 40 MG
1 TABLET ORAL
Qty: 0 | Refills: 0 | COMMUNITY

## 2019-02-15 RX ORDER — METOPROLOL TARTRATE 50 MG
25 TABLET ORAL DAILY
Qty: 0 | Refills: 0 | Status: DISCONTINUED | OUTPATIENT
Start: 2019-02-15 | End: 2019-02-18

## 2019-02-15 RX ORDER — ONDANSETRON 8 MG/1
4 TABLET, FILM COATED ORAL ONCE
Qty: 0 | Refills: 0 | Status: COMPLETED | OUTPATIENT
Start: 2019-02-15 | End: 2019-02-15

## 2019-02-15 RX ORDER — TRAMADOL HYDROCHLORIDE 50 MG/1
50 TABLET ORAL EVERY 6 HOURS
Qty: 0 | Refills: 0 | Status: DISCONTINUED | OUTPATIENT
Start: 2019-02-15 | End: 2019-02-22

## 2019-02-15 RX ORDER — LACTOBACILLUS ACIDOPHILUS 100MM CELL
1 CAPSULE ORAL
Qty: 0 | Refills: 0 | COMMUNITY

## 2019-02-15 RX ORDER — MIRTAZAPINE 45 MG/1
15 TABLET, ORALLY DISINTEGRATING ORAL AT BEDTIME
Qty: 0 | Refills: 0 | Status: DISCONTINUED | OUTPATIENT
Start: 2019-02-15 | End: 2019-02-23

## 2019-02-15 RX ORDER — MIRTAZAPINE 45 MG/1
0.5 TABLET, ORALLY DISINTEGRATING ORAL
Qty: 0 | Refills: 0 | COMMUNITY

## 2019-02-15 RX ORDER — ALBUTEROL 90 UG/1
3 AEROSOL, METERED ORAL
Qty: 0 | Refills: 0 | COMMUNITY

## 2019-02-15 RX ADMIN — MIRTAZAPINE 15 MILLIGRAM(S): 45 TABLET, ORALLY DISINTEGRATING ORAL at 21:52

## 2019-02-15 RX ADMIN — SODIUM CHLORIDE 3 MILLILITER(S): 9 INJECTION INTRAMUSCULAR; INTRAVENOUS; SUBCUTANEOUS at 02:59

## 2019-02-15 RX ADMIN — PIPERACILLIN AND TAZOBACTAM 25 GRAM(S): 4; .5 INJECTION, POWDER, LYOPHILIZED, FOR SOLUTION INTRAVENOUS at 21:51

## 2019-02-15 RX ADMIN — TRAMADOL HYDROCHLORIDE 50 MILLIGRAM(S): 50 TABLET ORAL at 21:54

## 2019-02-15 RX ADMIN — Medication 40 MILLIGRAM(S): at 15:48

## 2019-02-15 RX ADMIN — Medication 25 MILLIGRAM(S): at 17:27

## 2019-02-15 RX ADMIN — APIXABAN 2.5 MILLIGRAM(S): 2.5 TABLET, FILM COATED ORAL at 17:27

## 2019-02-15 RX ADMIN — SODIUM CHLORIDE 1000 MILLILITER(S): 9 INJECTION INTRAMUSCULAR; INTRAVENOUS; SUBCUTANEOUS at 03:10

## 2019-02-15 RX ADMIN — SODIUM CHLORIDE 1000 MILLILITER(S): 9 INJECTION INTRAMUSCULAR; INTRAVENOUS; SUBCUTANEOUS at 05:01

## 2019-02-15 RX ADMIN — POLYETHYLENE GLYCOL 3350 17 GRAM(S): 17 POWDER, FOR SOLUTION ORAL at 17:31

## 2019-02-15 RX ADMIN — PIPERACILLIN AND TAZOBACTAM 25 GRAM(S): 4; .5 INJECTION, POWDER, LYOPHILIZED, FOR SOLUTION INTRAVENOUS at 15:49

## 2019-02-15 RX ADMIN — FAMOTIDINE 20 MILLIGRAM(S): 10 INJECTION INTRAVENOUS at 03:10

## 2019-02-15 RX ADMIN — ONDANSETRON 4 MILLIGRAM(S): 8 TABLET, FILM COATED ORAL at 03:10

## 2019-02-15 NOTE — ED ADULT NURSE NOTE - OBJECTIVE STATEMENT
PT to ed from [facility] for nausea and vomiting. Pt Denies pain. Son at bedside and states that pt has decreased appetite. Son wanted his mother to come to ed to have her abdomen examined. No other complaints noted. PT resting safe and comfortable.

## 2019-02-15 NOTE — SWALLOW BEDSIDE ASSESSMENT ADULT - ASR SWALLOW RECOMMEND DIAG
Defer MBS as pt appeared clinically tolerant of suggested PO textures from an oropharyngeal swallowing stance on exam and considering her body habitus/reduced compliance with frequent psychogenically reduced willingness to accept food at times.

## 2019-02-15 NOTE — ED PROVIDER NOTE - CONSTITUTIONAL, MLM
normal... Elderly, frail, cachectic, awake, alert, oriented to person only.  Pale, dry mucous membranes.

## 2019-02-15 NOTE — SWALLOW BEDSIDE ASSESSMENT ADULT - PHARYNGEAL PHASE
Swallow was triggered in a n acceptable time frame for age. Laryngeal lift on palpation during swallowing trials was very mildly reduced but felt to be grossly functional and acceptable for age. No behavioral aspiration signs exhibited on exam. Odynophagia was denied.

## 2019-02-15 NOTE — H&P ADULT - NSHPPHYSICALEXAM_GEN_ALL_CORE
PHYSICAL EXAM:    GENERAL: NAD, Alert, awake, oriented to person, frail,   HEENT: dry mucous membranes  HEAD:  Atraumatic, Normocephalic  EYES: EOMI, PERRLA, Conjunctiva and sclera clear  NECK: Supple, No JVD, No lymphadenopathy  CHEST/LUNG: Clear to auscultation bilaterally; No rales, rhonchi, wheezing, or rubs  HEART: Regular rate and rhythm; No murmurs, rubs, or gallops  ABDOMEN: Soft, Non-tender, Non-distended; Bowel sounds present  GENITOURINARY- Voiding, No palpable bladder  EXTREMITIES:  2+ Peripheral Pulses, No clubbing, cyanosis, or edema  MUSCULOSKELTAL- No muscle tenderness, Muscle tone normal, No joint tenderness, no Joint swelling, FROM  SKIN: No rash, No lesion  NEURO: CN II-XII intact, Motor Strength- 5/5 B/L upper and lower extremities; DTRs 2+ intact and symmetric

## 2019-02-15 NOTE — SWALLOW BEDSIDE ASSESSMENT ADULT - SWALLOW EVAL: RECOMMENDED FEEDING/EATING TECHNIQUES
small sips/bites/maintain upright posture during/after eating for 30 mins/crush medication (when feasible)

## 2019-02-15 NOTE — SWALLOW BEDSIDE ASSESSMENT ADULT - SWALLOW EVAL: RECOMMENDED DIET
THERE ARE NO OVERT OROPHARYNGEAL SWALLOWING CONTRAINDICATIONS EVIDENT FOR PATIENT TO BE ON A REGULAR TEXTURE DIET WITH THIN LIQUIDS AS SHE APPEARED TO TOLERATE THESE FOOD CONSISTENCIES FROM AN OROPHARYNGEAL SWALLOWING STANCE DESPITE PRESBYPHAGIA. NOTE THAT IF ORAL DIET NEEDS TO BE MODIFIED FOR GI REASONS, THIS IS AT DISCRETION OF ATTENDINGS.

## 2019-02-15 NOTE — SWALLOW BEDSIDE ASSESSMENT ADULT - SWALLOW EVAL: STRUCTURAL ABNORMALITIES
A loss of bulk was noted in strap muscle regions./none present A loss of bulk was evident in strap muscle regions.

## 2019-02-15 NOTE — ED ADULT TRIAGE NOTE - CHIEF COMPLAINT QUOTE
failure to thrive. pt comes from [facility] for "not eating as much as she normally would" and one episode of vomiting. as per EMS pts doctor at [facility] did not want to send pt to ER, pt was sent at request of son.

## 2019-02-15 NOTE — ED ADULT NURSE REASSESSMENT NOTE - NS ED NURSE REASSESS COMMENT FT1
received pt alert, awake and confused, pt speaks armanian, used  phone#[phone],  unable to understand pt due to low speaking voice, grimace to face noted, when moving pt, repositioned for comfort with positive results, scant amount of bowel movement noted, pericare provided, pt refused bloodwork this am with phlebotomy, reattempted to draw blood, unsuccessful, will attempt again, O2 sat decreased to 87% on RA, initiated 2 liters NC, O2 sat increased to 95% , SOB on exertion noted, respirations even.

## 2019-02-15 NOTE — SWALLOW BEDSIDE ASSESSMENT ADULT - ADDITIONAL RECOMMENDATIONS
1)  NUTRITION FOLLOW U. PROFILE PLACES PATIENT AT NUTRITION RISK. EXPLORE FOOD PREFERENCES AND PROVIDE SUPPLEMENTS AS NEEDED.     2) HOSPITALIST FOLLOW UP. NOTE THAT PT IS VERY ANXIOUS AND HER WILLINGNESS TO EAT IS PSYCHOGENICALLY REDUCED. FURTHER, SHE SOMETIMES RETCHED/VOMITED WHEN EATING WHICH I DO NOT FEEL IS DUE TO AN OROPHARYNGEAL SWALLOWING ISSUE. FAVOR THE LATTER BEING DUE TO AN UGI/ESOPHAGEAL ISSUE(I.E ? GERD, ? ESOPHAGITIS, ? ESOPHAGEAL STRICTURE, ? ESOPHAGEAL DYSMOTILITY, ETC). NOTE THAT IF PHYSICIAN WISHES TO ASSESS ESOPHAGEAL INTEGRITY, I WOULD NOT CONSIDER AN ESOPHAGRAM AS HER BODY HABITUS(KYPHOSIS) AND PRESBYPHAGIA PLACE HER AT TOO HIGH OF AN ASPIRATION RISK DURING THIS PROCEDURE, PARTICULARLY IF SHE WOULD HAVE TO INGEST BARIUM IN A SUPINE POSITION. 1)  NUTRITION FOLLOW U. PROFILE PLACES PATIENT AT NUTRITION RISK. EXPLORE FOOD PREFERENCES AND PROVIDE SUPPLEMENTS AS NEEDED.     2) HOSPITALIST FOLLOW UP. NOTE THAT PT IS VERY ANXIOUS AND HER WILLINGNESS TO EAT IS PSYCHOGENICALLY REDUCED. FURTHER, SHE SOMETIMES RETCHED/VOMITED POST PRANDIALLY WHEN EATING WHICH I DO NOT FEEL IS DUE TO AN OROPHARYNGEAL SWALLOWING ISSUE. FAVOR THE LATTER BEING DUE TO AN UGI/ESOPHAGEAL ISSUE(I.E ? GERD, ? ESOPHAGITIS, ? ESOPHAGEAL STRICTURE, ? ESOPHAGEAL DYSMOTILITY, ETC). NOTE THAT IF PHYSICIAN WISHES TO ASSESS ESOPHAGEAL INTEGRITY, I WOULD NOT CONSIDER AN ESOPHAGRAM AS HER BODY HABITUS(KYPHOSIS) AND PRESBYPHAGIA PLACE HER AT TOO HIGH OF AN ASPIRATION RISK DURING THIS PROCEDURE, PARTICULARLY IF SHE WOULD HAVE TO INGEST BARIUM IN A SUPINE POSITION.    3) CONSIDER THE POTENTIAL BENEFIT OF A PALLIATIVE CARE CONSULT GIVEN HER DEBILITY/MULTIPLE COMORBIDITIES/ADVANCED AGE. 1)  NUTRITION FOLLOW U. PROFILE PLACES PATIENT AT NUTRITION RISK. EXPLORE FOOD PREFERENCES AND PROVIDE SUPPLEMENTS AS NEEDED.     2) HOSPITALIST FOLLOW UP. NOTE THAT PT IS VERY ANXIOUS AND HER WILLINGNESS TO EAT IS PSYCHOGENICALLY REDUCED. FURTHER, SHE SOMETIMES RETCHED/VOMITED POST PRANDIALLY WHEN EATING WHICH I DO NOT FEEL IS DUE TO AN OROPHARYNGEAL SWALLOWING ISSUE. FAVOR THE LATTER BEING DUE TO AN UGI/ESOPHAGEAL ISSUE(I.E ? GERD, ? ESOPHAGITIS, ? ESOPHAGEAL STRICTURE, ? ESOPHAGEAL DYSMOTILITY, ETC). NOTE THAT IF PHYSICIAN WISHES TO ASSESS ESOPHAGEAL INTEGRITY, I WOULD NOT CONSIDER AN ESOPHAGRAM AS HER BODY HABITUS(KYPHOSIS) AND PRESBYPHAGIA PLACE HER AT TOO HIGH OF AN ASPIRATION RISK DURING THIS PROCEDURE, PARTICULARLY IF SHE WOULD HAVE TO INGEST BARIUM IN A SUPINE POSITION. IT SHOULD BE INDICATED THAT PATIENT'S DEHYDRATION IS LIKELY ALSO CONTRIBUTORY TO HER PERIODIC RETCHING/EMESIS.    3) CONSIDER THE POTENTIAL BENEFIT OF A PALLIATIVE CARE CONSULT GIVEN HER DEBILITY/MULTIPLE COMORBIDITIES/ADVANCED AGE.

## 2019-02-15 NOTE — SWALLOW BEDSIDE ASSESSMENT ADULT - SWALLOW EVAL: DIAGNOSIS
1) The patient was anxious and her willingness to accept PO was psychogenically reduced at times. This is atop relatively mild to moderate functional Oropharyngeal Presbyphagia with grossly sufficient oropharyngeal swallowing preservation for age(89) nonetheless when she was willing to accept food. Note that while Presbyphagia may place patient at a relatively heightened risk for episodic aspiration, their were NO behavioral aspiration signs demonstrated on exam. Of note is that the patient periodically retched post prandially and subsequently vomited after retching at times. The latter was not food texture specific. Do not feel that periodic retching/emesis were due to an oropharyngeal swallowing issue. Favor retching/emesis to be due to an UGI/esophageal issue(i.e ? GERD, ? esophagitis, ? stricture, ? esophageal dysmotility).    2) The pt was alert and interactive but somewhat anxious as well as withdrawn at times. She was able to verbalize during communicative probes without a prima 1) Pt was anxious and her willingness to accept PO was psychogenically reduced at times. This is atop relatively mild to moderate functional Oropharyngeal Presbyphagia with grossly sufficient oropharyngeal swallowing preservation for age(89) nonetheless when she was willing to accept food. Note that while Presbyphagia may place pt at a relatively heightened risk for episodic aspiration, their were NO behavioral aspiration signs exhibited on exam. Of note is that the pt periodically retched post prandially and subsequently vomited after retching at times. The latter was not food texture specific. Do not feel that periodic retching/emesis were due to an oropharyngeal swallowing issue. Favor retching/emesis to be due to an UGI/esophageal issue(i.e ? GERD, ? esophagitis, ? stricture, ? esophageal dysmotility, etc).    2) Pt was alert/interactive but somewhat anxious and withdrawn at times. Pt able to verbalize during communicative probes without a primary speech-language pathology. 1) Pt was anxious and her willingness to accept PO was psychogenically reduced at times. This is atop relatively mild to moderate functional Oropharyngeal Presbyphagia with grossly sufficient oropharyngeal swallowing preservation for age(89) nonetheless when she was willing to accept food. Note that while Presbyphagia may place pt at a relatively heightened risk for episodic aspiration, their were NO behavioral aspiration signs exhibited on exam. Of note is that the pt periodically retched post prandially and subsequently vomited after retching at times. The latter was not food texture specific. Do not feel that periodic retching/emesis were due to an oropharyngeal swallowing issue. Favor retching/emesis to beIng due to an UGI/esophageal issue(i.e ? GERD, ? esophagitis, ? stricture, ? esophageal dysmotility, etc).    2) Pt was alert/interactive but somewhat anxious and withdrawn at times. Pt able to verbalize during communicative probes without a primary speech-language pathology.

## 2019-02-15 NOTE — ED PROVIDER NOTE - OBJECTIVE STATEMENT
Pt. is a 88 yo F with a hx of GERD, constipation, atrial fibrillation on eloquis, neurogenic bladder, cognitive impairment, DVT/PE in the past, HTN BIB ambulance from [facility] as son requested she be sent for vomiting.  Pt. has been having dysphagia problems for months per the son, but he states over the past 2 weeks, patient has had minimal PO intake and today could not keep food down at all.  He states she has a bilious yellow vomit.  Pt. states she has no desire to eat or drink.  No falls or head injury.  Nursing facility denies any recent fevers.  Pt. is having bowel movements.

## 2019-02-15 NOTE — ED ADULT NURSE NOTE - CHIEF COMPLAINT QUOTE
failure to thrive. pt comes from [facility] for "not eating as much as she normally would" and one episode of vomiting. as per EMS pts doctor at [facility] did not want to send pt to ER, pt was sent at request of son.

## 2019-02-15 NOTE — ED PROVIDER NOTE - CLINICAL SUMMARY MEDICAL DECISION MAKING FREE TEXT BOX
89 yo pt presents for vomit.  Per son pt has not been doing well, losing weight and not eating.  Pt also presnest with rapid afib.  Plan admission.

## 2019-02-15 NOTE — SWALLOW BEDSIDE ASSESSMENT ADULT - ORAL PREPARATORY PHASE
Pt typically refused PO when offered by clinician but she did sometimes accept the above PO types in small volumes as long as she fed herself. Oral aperture was reduced when she did accept PO but labial grading on utensils was functional

## 2019-02-15 NOTE — ED ADULT NURSE NOTE - NSIMPLEMENTINTERV_GEN_ALL_ED
Implemented All Fall with Harm Risk Interventions:  [place] to call system. Call bell, personal items and telephone within reach. Instruct patient to call for assistance. Room bathroom lighting operational. Non-slip footwear when patient is off stretcher. Physically safe environment: no spills, clutter or unnecessary equipment. Stretcher in lowest position, wheels locked, appropriate side rails in place. Provide visual cue, wrist band, yellow gown, etc. Monitor gait and stability. Monitor for mental status changes and reorient to person, place, and time. Review medications for side effects contributing to fall risk. Reinforce activity limits and safety measures with patient and family. Provide visual clues: red socks.

## 2019-02-15 NOTE — SWALLOW BEDSIDE ASSESSMENT ADULT - DIET PRIOR TO ADMI
The patient was reportedly on a regular texture diet with thin liquids at [facility] prior to hospitalization. I spoke with the nurse at [facility] who stated that patient was a picky eater and her PO intake was reduced PTH. However, nursing reported that they did not witness any aspiration signs when patient would eat food.

## 2019-02-15 NOTE — H&P ADULT - ASSESSMENT
dysphagia with vomiting, can be related to advancing dementia vs gerd vs stricture, ct ab/pelvis-constipation, .  cth-neg  -admit to med-surg   -s/s evaluation  -npo except meds   -check am labs   -lactulose x1 now  -miralax daily  -ppi  -gi consult     left pleural effusion, can be related to aspiration pneumonia given dysphagia at nursing home, +leukocytosis  -get ct chest    -give dose of zosyn now  -check lactate    afib on eliquis/GERD/HTN/HLD/depression/PE/DVT/ COPD/ multiple uti s/p urinary retention/constipation  -cont home meds     dvt prophylaxis  -eliquis     disposition   -DNR/DNI, Molst form in chart   -may benefit from palliative consult regarding goals of care dysphagia with vomiting, can be related to advancing dementia vs gerd vs stricture, ct ab/pelvis-constipation, .  cth-neg  -admit to med-surg   -s/s evaluation  -npo except meds   -check am labs   -lactulose x1 now  -miralax daily  -ppi  -gi consult     left pleural effusion, can be related to aspiration pneumonia given dysphagia at nursing home, +leukocytosis.  h/o pleural effusion several months ago   -get ct chest    -give dose of zosyn now  -check lactate    afib on eliquis/GERD/HTN/HLD/depression/PE/DVT/ COPD/ multiple uti s/p urinary retention/constipation  -cont home meds     dvt prophylaxis  -eliquis       disposition   -DNR/DNI, Molst form in chart   -may benefit from palliative consult regarding goals of care

## 2019-02-15 NOTE — SWALLOW BEDSIDE ASSESSMENT ADULT - SLP GENERAL OBSERVATIONS
On encounter, the pt was frail in appearance. She was cachectic. A loss of bulk was noted in strap muscle regions. A cervical kyphosis was apparent. The pt was alert and interactive but somewhat anxious as well as withdrawn at times. She sometimes moaned without clear intent and would state "I'm very sick". She was able to verbalize during communicative probes and in conversation. At these times, her motor speech abilities were functional, and her utterances were linguistically intact/contextually appropriate. Moreover, her voice was functionally presbyphonic and functionally audible. When asked about swallowing, the patient would anxiously state " I'm not hungry".

## 2019-02-15 NOTE — SWALLOW BEDSIDE ASSESSMENT ADULT - COMMENTS
The patient was admitted to  from [facility] with recurrent retching when eating, vomiting, reduced PO intake and failure to thrive. Hospital course is notable for retching, dehydration and rapid A-Fib.  This profile is superimposed upon a history of Cognitive Dysfunction, depression, constipation, GERD, neurogenic bladder, urinary retention, HTN, A-Fib, arthritis, previous femur fracture s/p surgery and prior DVT/PE. The patient was admitted to  from [facility] with retching when eating at times, periodic vomiting, reduced PO intake and failure to thrive. Hospital course is notable for retching, dehydration and rapid A-Fib.  This profile is superimposed upon a history of Cognitive Dysfunction, depression, constipation, GERD, neurogenic bladder, urinary retention, HTN, A-Fib, arthritis, previous femur fracture s/p surgery and prior DVT/PE.

## 2019-02-15 NOTE — ED PROVIDER NOTE - ENMT, MLM
Airway patent, Nasal mucosa clear. Mouth with dry tongue and  mucosa. Throat has no vesicles, no oropharyngeal exudates and uvula is midline.

## 2019-02-15 NOTE — ED ADULT NURSE REASSESSMENT NOTE - NS ED NURSE REASSESS COMMENT FT1
poor appetite, crushed medication to administer with yogurt, coughing noted with clear liquids, oral mucous membrane dry.

## 2019-02-15 NOTE — SWALLOW BEDSIDE ASSESSMENT ADULT - ASPIRATION PRECAUTIONS
yes/MAINTAIN ASPIRATION PRECAUTIONS AS A CONSERVATIVE MEASURE. NOTE THAT WHILE PRESBYPHAGIA MAY PLACE PATIENT AT A RELATIVELY HEIGHTENED RISK FOR EPISODIC ASPIRATION, SHE DID NOT APPEAR TO BE ASPIRATING ON CLINICAL EXAM.

## 2019-02-15 NOTE — CHART NOTE - NSCHARTNOTEFT_GEN_A_CORE
ct chest results noted, large left pleural effusion, s/p 2 L NS in ED,     s/p thorocentesis in 11/2018, unsure amount drained in [place].    a/p   -will get ct surg consult for possible thorocentesis vs pleurex  -hold eliquis for now   -lasix 40mg iv now

## 2019-02-15 NOTE — H&P ADULT - HISTORY OF PRESENT ILLNESS
89 female with h/o dementia, afib on eliquis, GERD, HTN, HLD, depression, PE/DVT, COPD, UTIs, urinary retention, constipation presenting from [facility] due to dehydration and unable to toelrate po intake.  patient is demented therefore much of history provided by chart and son.  spoke to son, Tessa over phone, states that patient has not been eating at nursing home and when he tries to feed her she occasionally coughs then food goes down.  In chart, patient has been seen multiple times from S/S team and has recommended a mechanical soft nectar thick diet.      In ED, labs are unremarkable. ct ab/pelvis- left pleural effusion.  afebrile.       PAST MEDICAL HISTORY:  as below    PAST SURGICAL HISTORY: as below    FAMILY HISTORY:   non-contributory to the patient's current presentation

## 2019-02-15 NOTE — PATIENT PROFILE ADULT - FALL HARM RISK
other/age(85 years old or older)/coagulation(Bleeding disorder R/T clinical cond/anti-coags)/confused/bones(Osteoporosis,prev fx,steroid use,metastatic bone ca)

## 2019-02-15 NOTE — SWALLOW BEDSIDE ASSESSMENT ADULT - ORAL PHASE
Bolus formation/transfer were mildly to moderately prolonged but mechanically functional for age without evidence of an overt oral motor focality. The pt was able to clear oral debris on her own after age acceptable piecemeal deglutition.

## 2019-02-16 LAB
ALBUMIN SERPL ELPH-MCNC: 2.8 G/DL — LOW (ref 3.3–5)
ALP SERPL-CCNC: 71 U/L — SIGNIFICANT CHANGE UP (ref 40–120)
ALT FLD-CCNC: 11 U/L — LOW (ref 12–78)
ANION GAP SERPL CALC-SCNC: 7 MMOL/L — SIGNIFICANT CHANGE UP (ref 5–17)
AST SERPL-CCNC: 30 U/L — SIGNIFICANT CHANGE UP (ref 15–37)
BASOPHILS # BLD AUTO: 0.08 K/UL — SIGNIFICANT CHANGE UP (ref 0–0.2)
BASOPHILS NFR BLD AUTO: 0.9 % — SIGNIFICANT CHANGE UP (ref 0–2)
BILIRUB SERPL-MCNC: 0.7 MG/DL — SIGNIFICANT CHANGE UP (ref 0.2–1.2)
BUN SERPL-MCNC: 31 MG/DL — HIGH (ref 7–23)
CALCIUM SERPL-MCNC: 8.5 MG/DL — SIGNIFICANT CHANGE UP (ref 8.5–10.1)
CHLORIDE SERPL-SCNC: 104 MMOL/L — SIGNIFICANT CHANGE UP (ref 96–108)
CO2 SERPL-SCNC: 26 MMOL/L — SIGNIFICANT CHANGE UP (ref 22–31)
CREAT SERPL-MCNC: 0.74 MG/DL — SIGNIFICANT CHANGE UP (ref 0.5–1.3)
CULTURE RESULTS: SIGNIFICANT CHANGE UP
EOSINOPHIL # BLD AUTO: 0.33 K/UL — SIGNIFICANT CHANGE UP (ref 0–0.5)
EOSINOPHIL NFR BLD AUTO: 3.7 % — SIGNIFICANT CHANGE UP (ref 0–6)
GLUCOSE SERPL-MCNC: 103 MG/DL — HIGH (ref 70–99)
HCT VFR BLD CALC: 38.5 % — SIGNIFICANT CHANGE UP (ref 34.5–45)
HGB BLD-MCNC: 11.6 G/DL — SIGNIFICANT CHANGE UP (ref 11.5–15.5)
IMM GRANULOCYTES NFR BLD AUTO: 0.6 % — SIGNIFICANT CHANGE UP (ref 0–1.5)
LYMPHOCYTES # BLD AUTO: 1.58 K/UL — SIGNIFICANT CHANGE UP (ref 1–3.3)
LYMPHOCYTES # BLD AUTO: 17.6 % — SIGNIFICANT CHANGE UP (ref 13–44)
MAGNESIUM SERPL-MCNC: 1.8 MG/DL — SIGNIFICANT CHANGE UP (ref 1.6–2.6)
MCHC RBC-ENTMCNC: 29.5 PG — SIGNIFICANT CHANGE UP (ref 27–34)
MCHC RBC-ENTMCNC: 30.1 GM/DL — LOW (ref 32–36)
MCV RBC AUTO: 98 FL — SIGNIFICANT CHANGE UP (ref 80–100)
MONOCYTES # BLD AUTO: 0.91 K/UL — HIGH (ref 0–0.9)
MONOCYTES NFR BLD AUTO: 10.2 % — SIGNIFICANT CHANGE UP (ref 2–14)
NEUTROPHILS # BLD AUTO: 6.01 K/UL — SIGNIFICANT CHANGE UP (ref 1.8–7.4)
NEUTROPHILS NFR BLD AUTO: 67 % — SIGNIFICANT CHANGE UP (ref 43–77)
NRBC # BLD: 0 /100 WBCS — SIGNIFICANT CHANGE UP (ref 0–0)
PHOSPHATE SERPL-MCNC: 4.7 MG/DL — HIGH (ref 2.5–4.5)
PLATELET # BLD AUTO: 200 K/UL — SIGNIFICANT CHANGE UP (ref 150–400)
POTASSIUM SERPL-MCNC: 5.2 MMOL/L — SIGNIFICANT CHANGE UP (ref 3.5–5.3)
POTASSIUM SERPL-SCNC: 5.2 MMOL/L — SIGNIFICANT CHANGE UP (ref 3.5–5.3)
PROT SERPL-MCNC: 6.1 GM/DL — SIGNIFICANT CHANGE UP (ref 6–8.3)
RBC # BLD: 3.93 M/UL — SIGNIFICANT CHANGE UP (ref 3.8–5.2)
RBC # FLD: 17.1 % — HIGH (ref 10.3–14.5)
SODIUM SERPL-SCNC: 137 MMOL/L — SIGNIFICANT CHANGE UP (ref 135–145)
SPECIMEN SOURCE: SIGNIFICANT CHANGE UP
WBC # BLD: 8.96 K/UL — SIGNIFICANT CHANGE UP (ref 3.8–10.5)
WBC # FLD AUTO: 8.96 K/UL — SIGNIFICANT CHANGE UP (ref 3.8–10.5)

## 2019-02-16 RX ORDER — ACETYLCYSTEINE 200 MG/ML
4 VIAL (ML) MISCELLANEOUS THREE TIMES A DAY
Qty: 0 | Refills: 0 | Status: COMPLETED | OUTPATIENT
Start: 2019-02-16 | End: 2019-02-17

## 2019-02-16 RX ADMIN — PIPERACILLIN AND TAZOBACTAM 25 GRAM(S): 4; .5 INJECTION, POWDER, LYOPHILIZED, FOR SOLUTION INTRAVENOUS at 05:32

## 2019-02-16 RX ADMIN — PIPERACILLIN AND TAZOBACTAM 25 GRAM(S): 4; .5 INJECTION, POWDER, LYOPHILIZED, FOR SOLUTION INTRAVENOUS at 15:09

## 2019-02-16 RX ADMIN — TRAMADOL HYDROCHLORIDE 50 MILLIGRAM(S): 50 TABLET ORAL at 05:38

## 2019-02-16 RX ADMIN — Medication 25 MILLIGRAM(S): at 11:54

## 2019-02-16 RX ADMIN — APIXABAN 2.5 MILLIGRAM(S): 2.5 TABLET, FILM COATED ORAL at 05:31

## 2019-02-16 RX ADMIN — MIRTAZAPINE 15 MILLIGRAM(S): 45 TABLET, ORALLY DISINTEGRATING ORAL at 21:43

## 2019-02-16 RX ADMIN — APIXABAN 2.5 MILLIGRAM(S): 2.5 TABLET, FILM COATED ORAL at 17:29

## 2019-02-16 RX ADMIN — Medication 1 TABLET(S): at 11:54

## 2019-02-16 RX ADMIN — POLYETHYLENE GLYCOL 3350 17 GRAM(S): 17 POWDER, FOR SOLUTION ORAL at 11:54

## 2019-02-16 RX ADMIN — PIPERACILLIN AND TAZOBACTAM 25 GRAM(S): 4; .5 INJECTION, POWDER, LYOPHILIZED, FOR SOLUTION INTRAVENOUS at 21:44

## 2019-02-16 RX ADMIN — TRAMADOL HYDROCHLORIDE 50 MILLIGRAM(S): 50 TABLET ORAL at 21:50

## 2019-02-16 NOTE — CONSULT NOTE ADULT - ASSESSMENT
#dysphagia with vomiting, can be related to advancing dementia vs gerd  ct ab/pelvis-constipation, .  cth-neg    speech swallow eval appreciated  -npo except meds   -miralax, colace  daily  s/p lactulose  -ppi  if unable to tolerate diet may eventuall need peg   discussed with Dr Viera    pleural effusion        afib on eliquis/GERD/HTN/HLD/depression/PE/DVT/ COPD/ multiple uti s/p urinary retention/constipation

## 2019-02-16 NOTE — PROGRESS NOTE ADULT - ASSESSMENT
· Assessment		    #dysphagia with vomiting, can be related to advancing dementia vs gerd  ct ab/pelvis-constipation, .  cth-neg    speech swallow eval  -npo except meds   -miralax, colace  daily  s/p lactulose  -ppi  if unable to tolerate diet may eventuall need peg   will dicuss with son  -gi consult apprceiated     left pleural effusion, /aspiration PNA vs GNR rods vs malignancy vs CHF  respiratory function reasonably stable   s/p IV lasix, no further IV lasix as BP low normal,   echo   continue  zosyn now  neb tx with mucomyst for mucus plugging  pulm consult and CT surgery for consideration of thoarcentesis  -check lactate    afib on eliquis/GERD/HTN/HLD/depression/PE/DVT/ COPD/ multiple uti s/p urinary retention/constipation  -cont home meds     dvt prophylaxis  -eliquis       disposition   -DNR/DNI, Molst form in chart   -may benefit from palliative consult regarding goals of care

## 2019-02-16 NOTE — PROGRESS NOTE ADULT - SUBJECTIVE AND OBJECTIVE BOX
History of Present Illness: 	  89 female with h/o dementia, afib on eliquis, GERD, HTN, HLD, depression, PE/DVT, COPD, UTIs, urinary retention, constipation presenting from [facility] due to dehydration and unable to toelrate po intake.  patient is demented therefore much of history provided by chart and son.  spoke to son, Tessa over phone, states that patient has not been eating at nursing home and when he tries to feed her she occasionally coughs then food goes down.  In chart, patient has been seen multiple times from S/S team and has recommended a mechanical soft nectar thick diet.      In ED, labs are unremarkable. ct ab/pelvis- left pleural effusion.  afebrile.   - patient awake demented at [facility], poor historian      PAST MEDICAL HISTORY:  as below    PAST SURGICAL HISTORY: as below    FAMILY HISTORY:   non-contributory to the patient's current presentation PHYSICAL EXAM:    	GENERAL: NAD, Alert, awake, oriented to person, frail,   	HEENT: dry mucous membranes  	HEAD:  Atraumatic, Normocephalic  	EYES: EOMI, PERRLA, Conjunctiva and sclera clear  	NECK: Supple, No JVD, No lymphadenopathy  	CHEST/LUNG: decreased breaths ounds B/l lung abses   	HEART: Regular rate and rhythm; No murmurs, rubs, or gallops  	ABDOMEN: Soft, Non-tender, Non-distended; Bowel sounds present  	GENITOURINARY- Voiding, No palpable bladder  	EXTREMITIES: no edema    PHYSICAL EXAM:    Daily     Daily     ICU Vital Signs Last 24 Hrs  T(C): 36.3 (2019 11:14), Max: 36.6 (2019 05:32)  T(F): 97.4 (2019 11:14), Max: 97.8 (2019 05:32)  HR: 107 (2019 15:04) (86 - 107)  BP: 101/70 (2019 15:04) (93/51 - 129/84)  BP(mean): --  ABP: --  ABP(mean): --  RR: 18 (2019 11:14) (18 - 18)  SpO2: 96% (2019 11:14) (86% - 98%)                              11.6   8.96  )-----------( 200      ( 2019 06:27 )             38.5       CBC Full  -  ( 2019 06:27 )  WBC Count : 8.96 K/uL  Hemoglobin : 11.6 g/dL  Hematocrit : 38.5 %  Platelet Count - Automated : 200 K/uL  Mean Cell Volume : 98.0 fl  Mean Cell Hemoglobin : 29.5 pg  Mean Cell Hemoglobin Concentration : 30.1 gm/dL  Auto Neutrophil # : 6.01 K/uL  Auto Lymphocyte # : 1.58 K/uL  Auto Monocyte # : 0.91 K/uL  Auto Eosinophil # : 0.33 K/uL  Auto Basophil # : 0.08 K/uL  Auto Neutrophil % : 67.0 %  Auto Lymphocyte % : 17.6 %  Auto Monocyte % : 10.2 %  Auto Eosinophil % : 3.7 %  Auto Basophil % : 0.9 %          137  |  104  |  31<H>  ----------------------------<  103<H>  5.2   |  26  |  0.74    Ca    8.5      2019 06:21  Phos  4.7       Mg     1.8         TPro  6.1  /  Alb  2.8<L>  /  TBili  0.7  /  DBili  x   /  AST  30  /  ALT  11<L>  /  AlkPhos  71  -16      LIVER FUNCTIONS - ( 2019 06:21 )  Alb: 2.8 g/dL / Pro: 6.1 gm/dL / ALK PHOS: 71 U/L / ALT: 11 U/L / AST: 30 U/L / GGT: x             PT/INR - ( 15 Feb 2019 02:45 )   PT: 15.7 sec;   INR: 1.40 ratio         PTT - ( 15 Feb 2019 02:45 )  PTT:34.2 sec    CARDIAC MARKERS ( 15 Feb 2019 04:49 )  0.024 ng/mL / x     / x     / x     / x            Urinalysis Basic - ( 15 Feb 2019 02:45 )    Color: Yellow / Appearance: Clear / S.020 / pH: x  Gluc: x / Ketone: Trace  / Bili: Small / Urobili: 4 mg/dL   Blood: x / Protein: 100 mg/dL / Nitrite: Negative   Leuk Esterase: Trace / RBC: 0-2 /HPF / WBC 3-5   Sq Epi: x / Non Sq Epi: Few / Bacteria: Few            MEDICATIONS  (STANDING):  apixaban 2.5 milliGRAM(s) Oral every 12 hours  diltiazem    Tablet 90 milliGRAM(s) Oral three times a day  metoprolol succinate ER 25 milliGRAM(s) Oral daily  mirtazapine 15 milliGRAM(s) Oral at bedtime  multivitamin 1 Tablet(s) Oral daily  piperacillin/tazobactam IVPB. 3.375 Gram(s) IV Intermittent every 8 hours  polyethylene glycol 3350 17 Gram(s) Oral daily

## 2019-02-17 LAB
ANION GAP SERPL CALC-SCNC: 5 MMOL/L — SIGNIFICANT CHANGE UP (ref 5–17)
ANION GAP SERPL CALC-SCNC: 8 MMOL/L — SIGNIFICANT CHANGE UP (ref 5–17)
BASOPHILS # BLD AUTO: 0.11 K/UL — SIGNIFICANT CHANGE UP (ref 0–0.2)
BASOPHILS NFR BLD AUTO: 1.1 % — SIGNIFICANT CHANGE UP (ref 0–2)
BUN SERPL-MCNC: 38 MG/DL — HIGH (ref 7–23)
BUN SERPL-MCNC: 38 MG/DL — HIGH (ref 7–23)
CALCIUM SERPL-MCNC: 8 MG/DL — LOW (ref 8.5–10.1)
CALCIUM SERPL-MCNC: 8.3 MG/DL — LOW (ref 8.5–10.1)
CHLORIDE SERPL-SCNC: 103 MMOL/L — SIGNIFICANT CHANGE UP (ref 96–108)
CHLORIDE SERPL-SCNC: 104 MMOL/L — SIGNIFICANT CHANGE UP (ref 96–108)
CO2 SERPL-SCNC: 27 MMOL/L — SIGNIFICANT CHANGE UP (ref 22–31)
CO2 SERPL-SCNC: 31 MMOL/L — SIGNIFICANT CHANGE UP (ref 22–31)
CREAT SERPL-MCNC: 1.26 MG/DL — SIGNIFICANT CHANGE UP (ref 0.5–1.3)
CREAT SERPL-MCNC: 1.26 MG/DL — SIGNIFICANT CHANGE UP (ref 0.5–1.3)
EOSINOPHIL # BLD AUTO: 0.27 K/UL — SIGNIFICANT CHANGE UP (ref 0–0.5)
EOSINOPHIL NFR BLD AUTO: 2.8 % — SIGNIFICANT CHANGE UP (ref 0–6)
GLUCOSE SERPL-MCNC: 110 MG/DL — HIGH (ref 70–99)
GLUCOSE SERPL-MCNC: 135 MG/DL — HIGH (ref 70–99)
HCT VFR BLD CALC: 41.9 % — SIGNIFICANT CHANGE UP (ref 34.5–45)
HGB BLD-MCNC: 12.9 G/DL — SIGNIFICANT CHANGE UP (ref 11.5–15.5)
IMM GRANULOCYTES NFR BLD AUTO: 0.6 % — SIGNIFICANT CHANGE UP (ref 0–1.5)
LYMPHOCYTES # BLD AUTO: 2.14 K/UL — SIGNIFICANT CHANGE UP (ref 1–3.3)
LYMPHOCYTES # BLD AUTO: 22 % — SIGNIFICANT CHANGE UP (ref 13–44)
MCHC RBC-ENTMCNC: 29.7 PG — SIGNIFICANT CHANGE UP (ref 27–34)
MCHC RBC-ENTMCNC: 30.8 GM/DL — LOW (ref 32–36)
MCV RBC AUTO: 96.3 FL — SIGNIFICANT CHANGE UP (ref 80–100)
MONOCYTES # BLD AUTO: 1.14 K/UL — HIGH (ref 0–0.9)
MONOCYTES NFR BLD AUTO: 11.7 % — SIGNIFICANT CHANGE UP (ref 2–14)
NEUTROPHILS # BLD AUTO: 5.99 K/UL — SIGNIFICANT CHANGE UP (ref 1.8–7.4)
NEUTROPHILS NFR BLD AUTO: 61.8 % — SIGNIFICANT CHANGE UP (ref 43–77)
NRBC # BLD: 0 /100 WBCS — SIGNIFICANT CHANGE UP (ref 0–0)
PLATELET # BLD AUTO: 229 K/UL — SIGNIFICANT CHANGE UP (ref 150–400)
POTASSIUM SERPL-MCNC: 5 MMOL/L — SIGNIFICANT CHANGE UP (ref 3.5–5.3)
POTASSIUM SERPL-MCNC: 5.6 MMOL/L — HIGH (ref 3.5–5.3)
POTASSIUM SERPL-SCNC: 5 MMOL/L — SIGNIFICANT CHANGE UP (ref 3.5–5.3)
POTASSIUM SERPL-SCNC: 5.6 MMOL/L — HIGH (ref 3.5–5.3)
RBC # BLD: 4.35 M/UL — SIGNIFICANT CHANGE UP (ref 3.8–5.2)
RBC # FLD: 17 % — HIGH (ref 10.3–14.5)
SODIUM SERPL-SCNC: 139 MMOL/L — SIGNIFICANT CHANGE UP (ref 135–145)
SODIUM SERPL-SCNC: 139 MMOL/L — SIGNIFICANT CHANGE UP (ref 135–145)
WBC # BLD: 9.71 K/UL — SIGNIFICANT CHANGE UP (ref 3.8–10.5)
WBC # FLD AUTO: 9.71 K/UL — SIGNIFICANT CHANGE UP (ref 3.8–10.5)

## 2019-02-17 PROCEDURE — 99255 IP/OBS CONSLTJ NEW/EST HI 80: CPT

## 2019-02-17 RX ORDER — SODIUM CHLORIDE 9 MG/ML
1000 INJECTION INTRAMUSCULAR; INTRAVENOUS; SUBCUTANEOUS
Qty: 0 | Refills: 0 | Status: DISCONTINUED | OUTPATIENT
Start: 2019-02-17 | End: 2019-02-19

## 2019-02-17 RX ORDER — SODIUM CHLORIDE 9 MG/ML
1000 INJECTION INTRAMUSCULAR; INTRAVENOUS; SUBCUTANEOUS
Qty: 0 | Refills: 0 | Status: DISCONTINUED | OUTPATIENT
Start: 2019-02-17 | End: 2019-02-17

## 2019-02-17 RX ADMIN — POLYETHYLENE GLYCOL 3350 17 GRAM(S): 17 POWDER, FOR SOLUTION ORAL at 12:22

## 2019-02-17 RX ADMIN — Medication 650 MILLIGRAM(S): at 05:24

## 2019-02-17 RX ADMIN — SODIUM CHLORIDE 50 MILLILITER(S): 9 INJECTION INTRAMUSCULAR; INTRAVENOUS; SUBCUTANEOUS at 14:39

## 2019-02-17 RX ADMIN — APIXABAN 2.5 MILLIGRAM(S): 2.5 TABLET, FILM COATED ORAL at 05:22

## 2019-02-17 RX ADMIN — Medication 650 MILLIGRAM(S): at 07:39

## 2019-02-17 RX ADMIN — PIPERACILLIN AND TAZOBACTAM 25 GRAM(S): 4; .5 INJECTION, POWDER, LYOPHILIZED, FOR SOLUTION INTRAVENOUS at 14:39

## 2019-02-17 RX ADMIN — PIPERACILLIN AND TAZOBACTAM 25 GRAM(S): 4; .5 INJECTION, POWDER, LYOPHILIZED, FOR SOLUTION INTRAVENOUS at 22:53

## 2019-02-17 RX ADMIN — PIPERACILLIN AND TAZOBACTAM 25 GRAM(S): 4; .5 INJECTION, POWDER, LYOPHILIZED, FOR SOLUTION INTRAVENOUS at 05:22

## 2019-02-17 NOTE — PROGRESS NOTE ADULT - SUBJECTIVE AND OBJECTIVE BOX
· Subjective and Objective: 	  History of Present Illness: 	  89 female with h/o dementia, afib on eliquis, GERD, HTN, HLD, depression, PE/DVT, COPD, UTIs, urinary retention, constipation presenting from [facility] due to dehydration and unable to toelrate po intake.  patient is demented therefore much of history provided by chart and son.  spoke to son, Tessa over phone, states that patient has not been eating at nursing home and when he tries to feed her she occasionally coughs then food goes down.  In chart, patient has been seen multiple times from S/S team and has recommended a mechanical soft nectar thick diet.      In ED, labs are unremarkable. ct ab/pelvis- left pleural effusion.  afebrile.   [facility]  2/17- awake , follows commands, reports she has generalised pain  GENERAL: NAD, Alert, awake, oriented to person, frail,   	HEENT: dry mucous membranes  	HEAD:  Atraumatic, Normocephalic  	EYES: EOMI, PERRLA, Conjunctiva and sclera clear  	NECK: Supple, No JVD, No lymphadenopathy  	CHEST/LUNG: decreased breaths ounds B/l lung abses   	HEART: Regular rate and rhythm; No murmurs, rubs, or gallops  	ABDOMEN: Soft, Non-tender, Non-distended; Bowel sounds present  	GENITOURINARY- Voiding, No palpable bladder  	EXTREMITIES: no edema      PHYSICAL EXAM:    Daily     Daily     ICU Vital Signs Last 24 Hrs  T(C): 36.4 (17 Feb 2019 11:59), Max: 37.2 (17 Feb 2019 05:21)  T(F): 97.5 (17 Feb 2019 11:59), Max: 99 (17 Feb 2019 05:21)  HR: 79 (17 Feb 2019 11:59) (79 - 115)  BP: 92/63 (17 Feb 2019 11:59) (92/63 - 103/72)  BP(mean): --  ABP: --  ABP(mean): --  RR: 18 (17 Feb 2019 11:59) (17 - 18)  SpO2: 95% (17 Feb 2019 11:59) (94% - 100%)                              12.9   9.71  )-----------( 229      ( 17 Feb 2019 07:14 )             41.9       CBC Full  -  ( 17 Feb 2019 07:14 )  WBC Count : 9.71 K/uL  Hemoglobin : 12.9 g/dL  Hematocrit : 41.9 %  Platelet Count - Automated : 229 K/uL  Mean Cell Volume : 96.3 fl  Mean Cell Hemoglobin : 29.7 pg  Mean Cell Hemoglobin Concentration : 30.8 gm/dL  Auto Neutrophil # : 5.99 K/uL  Auto Lymphocyte # : 2.14 K/uL  Auto Monocyte # : 1.14 K/uL  Auto Eosinophil # : 0.27 K/uL  Auto Basophil # : 0.11 K/uL  Auto Neutrophil % : 61.8 %  Auto Lymphocyte % : 22.0 %  Auto Monocyte % : 11.7 %  Auto Eosinophil % : 2.8 %  Auto Basophil % : 1.1 %      02-17    139  |  103  |  38<H>  ----------------------------<  135<H>  5.6<H>   |  31  |  1.26    Ca    8.3<L>      17 Feb 2019 07:14  Phos  4.7     02-16  Mg     1.8     02-16    TPro  6.1  /  Alb  2.8<L>  /  TBili  0.7  /  DBili  x   /  AST  30  /  ALT  11<L>  /  AlkPhos  71  02-16      LIVER FUNCTIONS - ( 16 Feb 2019 06:21 )  Alb: 2.8 g/dL / Pro: 6.1 gm/dL / ALK PHOS: 71 U/L / ALT: 11 U/L / AST: 30 U/L / GGT: x                               MEDICATIONS  (STANDING):  acetylcysteine 10%  Inhalation 4 milliLiter(s) Inhalation three times a day  apixaban 2.5 milliGRAM(s) Oral every 12 hours  diltiazem    Tablet 90 milliGRAM(s) Oral three times a day  metoprolol succinate ER 25 milliGRAM(s) Oral daily  mirtazapine 15 milliGRAM(s) Oral at bedtime  multivitamin 1 Tablet(s) Oral daily  piperacillin/tazobactam IVPB. 3.375 Gram(s) IV Intermittent every 8 hours  polyethylene glycol 3350 17 Gram(s) Oral daily  sodium chloride 0.9%. 1000 milliLiter(s) (50 mL/Hr) IV Continuous <Continuous>

## 2019-02-17 NOTE — PROGRESS NOTE ADULT - SUBJECTIVE AND OBJECTIVE BOX
History of Present Illness: 	  89 female with h/o dementia, afib on eliquis, GERD, HTN, HLD, depression, PE/DVT, COPD, UTIs, urinary retention, constipation presenting from [facility] due to dehydration and unable to toelrate po intake.  patient is demented therefore much of history provided by chart and son.  spoke to son, Tessa over phone, states that patient has not been eating at nursing home and when he tries to feed her she occasionally coughs then food goes down.  In chart, patient has been seen multiple times from S/S team and has recommended a mechanical soft nectar thick diet.    Poor historian, not in visible distress      PAST MEDICAL HISTORY:  as below    PAST SURGICAL HISTORY: as below    FAMILY HISTORY:   non-contributory to the patient's current presentation PHYSICAL EXAM:    	GENERAL: NAD, Alert, awake, oriented to person, frail,   	HEENT: dry mucous membranes  	HEAD:  Atraumatic, Normocephalic  	EYES: EOMI, PERRLA, Conjunctiva and sclera clear  	NECK: Supple, No JVD, No lymphadenopathy  	CHEST/LUNG: decreased breaths ounds B/l lung abses   	HEART: Regular rate and rhythm; No murmurs, rubs, or gallops  	ABDOMEN: Soft, Non-tender, Non-distended; Bowel sounds present  	GENITOURINARY- Voiding, No palpable bladder  	EXTREMITIES: no edema    PHYSICAL EXAM:    Daily     Daily     ICU Vital Signs Last 24 Hrs  T(C): 36.3 (2019 11:14), Max: 36.6 (2019 05:32)  T(F): 97.4 (2019 11:14), Max: 97.8 (2019 05:32)  HR: 107 (2019 15:04) (86 - 107)  BP: 101/70 (2019 15:04) (93/51 - 129/84)  BP(mean): --  ABP: --  ABP(mean): --  RR: 18 (2019 11:14) (18 - 18)  SpO2: 96% (2019 11:14) (86% - 98%)                              11.6   8.96  )-----------( 200      ( 2019 06:27 )             38.5       CBC Full  -  ( 2019 06:27 )  WBC Count : 8.96 K/uL  Hemoglobin : 11.6 g/dL  Hematocrit : 38.5 %  Platelet Count - Automated : 200 K/uL  Mean Cell Volume : 98.0 fl  Mean Cell Hemoglobin : 29.5 pg  Mean Cell Hemoglobin Concentration : 30.1 gm/dL  Auto Neutrophil # : 6.01 K/uL  Auto Lymphocyte # : 1.58 K/uL  Auto Monocyte # : 0.91 K/uL  Auto Eosinophil # : 0.33 K/uL  Auto Basophil # : 0.08 K/uL  Auto Neutrophil % : 67.0 %  Auto Lymphocyte % : 17.6 %  Auto Monocyte % : 10.2 %  Auto Eosinophil % : 3.7 %  Auto Basophil % : 0.9 %          137  |  104  |  31<H>  ----------------------------<  103<H>  5.2   |  26  |  0.74    Ca    8.5      2019 06:21  Phos  4.7       Mg     1.8         TPro  6.1  /  Alb  2.8<L>  /  TBili  0.7  /  DBili  x   /  AST  30  /  ALT  11<L>  /  AlkPhos  71  -16      LIVER FUNCTIONS - ( 2019 06:21 )  Alb: 2.8 g/dL / Pro: 6.1 gm/dL / ALK PHOS: 71 U/L / ALT: 11 U/L / AST: 30 U/L / GGT: x             PT/INR - ( 15 Feb 2019 02:45 )   PT: 15.7 sec;   INR: 1.40 ratio         PTT - ( 15 Feb 2019 02:45 )  PTT:34.2 sec    CARDIAC MARKERS ( 15 Feb 2019 04:49 )  0.024 ng/mL / x     / x     / x     / x            Urinalysis Basic - ( 15 Feb 2019 02:45 )    Color: Yellow / Appearance: Clear / S.020 / pH: x  Gluc: x / Ketone: Trace  / Bili: Small / Urobili: 4 mg/dL   Blood: x / Protein: 100 mg/dL / Nitrite: Negative   Leuk Esterase: Trace / RBC: 0-2 /HPF / WBC 3-5   Sq Epi: x / Non Sq Epi: Few / Bacteria: Few            MEDICATIONS  (STANDING):  apixaban 2.5 milliGRAM(s) Oral every 12 hours  diltiazem    Tablet 90 milliGRAM(s) Oral three times a day  metoprolol succinate ER 25 milliGRAM(s) Oral daily  mirtazapine 15 milliGRAM(s) Oral at bedtime  multivitamin 1 Tablet(s) Oral daily  piperacillin/tazobactam IVPB. 3.375 Gram(s) IV Intermittent every 8 hours  polyethylene glycol 3350 17 Gram(s) Oral daily

## 2019-02-17 NOTE — PROGRESS NOTE ADULT - SUBJECTIVE AND OBJECTIVE BOX
Patient is a 89y old  Female who presents with a chief complaint of vomiting, decreased po intake (17 Feb 2019 11:42)      HPI:  89 female with h/o dementia, afib on eliquis, GERD, HTN, HLD, depression, PE/DVT, COPD, UTIs, urinary retention, constipation presenting from [facility] due to dehydration and unable to toelrate po intake.  patient is demented therefore much of history provided by chart and son.  spoke to son, Tessa over phone, states that patient has not been eating at nursing home and when he tries to feed her she occasionally coughs then food goes down.  In chart, patient has been seen multiple times from S/S team and has recommended a mechanical soft nectar thick diet.      In ED, labs are unremarkable. ct ab/pelvis- left pleural effusion.  afebrile.     poor po intake, DW RN  neg vomit        PAST MEDICAL HISTORY:  as below    PAST SURGICAL HISTORY: as below    FAMILY HISTORY:   non-contributory to the patient's current presentation (15 Feb 2019 14:25)      PAST MEDICAL & SURGICAL HISTORY:  Femur neck fracture  Vertebral fracture, pathological  Neurogenic bladder  Afib  GERD (gastroesophageal reflux disease)  Pulmonary embolism  UTI (lower urinary tract infection)  HTN (hypertension)  Pulmonary embolism  Memory impairment  Hypertension  Atrial fibrillation  Hyponatremia  Shortness of breath: etiology probably copd  Urinary retention  Arthritis  DVT (deep venous thrombosis)  History of hip surgery: right gamma nail      MEDICATIONS  (STANDING):  acetylcysteine 10%  Inhalation 4 milliLiter(s) Inhalation three times a day  apixaban 2.5 milliGRAM(s) Oral every 12 hours  diltiazem    Tablet 90 milliGRAM(s) Oral three times a day  metoprolol succinate ER 25 milliGRAM(s) Oral daily  mirtazapine 15 milliGRAM(s) Oral at bedtime  multivitamin 1 Tablet(s) Oral daily  piperacillin/tazobactam IVPB. 3.375 Gram(s) IV Intermittent every 8 hours  polyethylene glycol 3350 17 Gram(s) Oral daily  sodium chloride 0.9%. 1000 milliLiter(s) (50 mL/Hr) IV Continuous <Continuous>    MEDICATIONS  (PRN):  acetaminophen   Tablet .. 650 milliGRAM(s) Oral every 6 hours PRN Temp greater or equal to 38C (100.4F), Mild Pain (1 - 3)  bisacodyl Suppository 10 milliGRAM(s) Rectal daily PRN Constipation  saline laxative (FLEET) Rectal Enema 1 Enema Rectal daily PRN for constipation  traMADol 50 milliGRAM(s) Oral every 6 hours PRN for moderate pain      Allergies    No Known Allergies    Intolerances        SOCIAL HISTORY:    FAMILY HISTORY:      REVIEW OF SYSTEMS:    not reliable    Vital Signs Last 24 Hrs  T(C): 36.4 (17 Feb 2019 11:59), Max: 37.2 (17 Feb 2019 05:21)  T(F): 97.5 (17 Feb 2019 11:59), Max: 99 (17 Feb 2019 05:21)  HR: 79 (17 Feb 2019 11:59) (79 - 115)  BP: 92/63 (17 Feb 2019 11:59) (92/63 - 103/72)  BP(mean): --  RR: 18 (17 Feb 2019 11:59) (17 - 18)  SpO2: 95% (17 Feb 2019 11:59) (94% - 100%)    PHYSICAL EXAM:    Constitutional: NAD,     Neck: No LAD, supple  Respiratory: CTA and P  Cardiovascular: S1 and S2, RRR, no M  Gastrointestinal: BS+, soft, NT/ND, neg HSM,  Extremities: No peripheral edema, neg clubing, cyanosis  Vascular: 2+ peripheral pulses  Neurological: A/O x 0, no focal deficits    Skin: No rashes    LABS:  CBC Full  -  ( 17 Feb 2019 07:14 )  WBC Count : 9.71 K/uL  Hemoglobin : 12.9 g/dL  Hematocrit : 41.9 %  Platelet Count - Automated : 229 K/uL  Mean Cell Volume : 96.3 fl  Mean Cell Hemoglobin : 29.7 pg  Mean Cell Hemoglobin Concentration : 30.8 gm/dL  Auto Neutrophil # : 5.99 K/uL  Auto Lymphocyte # : 2.14 K/uL  Auto Monocyte # : 1.14 K/uL  Auto Eosinophil # : 0.27 K/uL  Auto Basophil # : 0.11 K/uL  Auto Neutrophil % : 61.8 %  Auto Lymphocyte % : 22.0 %  Auto Monocyte % : 11.7 %  Auto Eosinophil % : 2.8 %  Auto Basophil % : 1.1 %    02-17    139  |  103  |  38<H>  ----------------------------<  135<H>  5.6<H>   |  31  |  1.26    Ca    8.3<L>      17 Feb 2019 07:14  Phos  4.7     02-16  Mg     1.8     02-16    TPro  6.1  /  Alb  2.8<L>  /  TBili  0.7  /  DBili  x   /  AST  30  /  ALT  11<L>  /  AlkPhos  71  02-16            RADIOLOGY & ADDITIONAL STUDIES:

## 2019-02-17 NOTE — CONSULT NOTE ADULT - ASSESSMENT
Chronic L greater than R pleural effusions.  L Thoracentesis in 9/2018 showed a transudate.  Consider secondary to CHF, valvular heart disease, AF. Also, now in setting of elevated BUN, ? intravascular volume decrease. Did receive lasix x 1 IV. For cardiology consult.  Thoracic surgery consult, consider L thoracentesis.    Prevent aspiration. S and Swallow following.     Possible aspiration, mucoid impaction, mild. On IV Zosyn. NC O2 as needed.    AF, DVT/PE hx. On eliquis.     Dementia. Chronic L greater than R pleural effusions.  L Thoracentesis in 9/2018 showed a transudate.  Consider secondary to CHF, valvular heart disease, AF. Also, now in setting of elevated BUN, ? intravascular volume decrease. Did receive lasix x 1 IV. For cardiology consult.  Thoracic surgery consult, consider L thoracentesis.    Prevent aspiration. S and Swallow following.     Possible aspiration, mucoid impaction, mild. On IV Zosyn. Mucolytic. NC O2 as needed.    AF, DVT/PE hx. On eliquis.     Dementia. Chronic L greater than R pleural effusions.  L Thoracentesis in 9/2018 showed a transudate.  Consider secondary to CHF, valvular heart disease, AF. Also, now in setting of elevated BUN, ? intravascular volume decrease. Did receive lasix x 1 IV. For cardiology consult.  Thoracic surgery consult, consider L thoracentesis.    Prevent aspiration. S and Swallow following.     Possible aspiration, mucoid impaction, mild. On IV Zosyn. Mucolytic. NC O2 as needed.  Sputum culture.    AF, DVT/PE hx. On eliquis.     Dementia.

## 2019-02-17 NOTE — PROGRESS NOTE ADULT - ASSESSMENT
#dysphagia with vomiting, can be related to advancing dementia vs gerd  ct ab/pelvis-constipation, .  cth-neg    speech swallow eval appreciated  -npo except meds   -miralax, colace  daily  s/p lactulose  -ppi  if unable to tolerate diet may eventuall need peg if family so desires  discussed with Dr Viera    pleural effusion        afib on eliquis/GERD/HTN/HLD/depression/PE/DVT/ COPD/ multiple uti s/p urinary retention/constipation

## 2019-02-17 NOTE — PROGRESS NOTE ADULT - ASSESSMENT
· Assessment		    #dysphagia with vomiting, can be related to advancing dementia vs gerd  vs structural pathology of esophagus  # patient with large L pleural effusion and advanced age and dementia poor candidate for EGD  # large L pleural effusion/DDX CHF vs aspiration PNA vs ? malignancy  #Also appears clinically dehydrated  # Hyperkalemia likely from hypovolemia  # At this time appears to have poor prognosis    I attempted several calls to son brucelaura Chandler and nimeshsaray Ramesh- unable to raech them on the phone   phone [phone]  Patient would benifit from palliative care and possibly hospice   would attempt to reach sons again   if sons would like to be agressive then would need left thoracentesis  for now due to inability to tolerate po, will give slow IVF ,although ha s further risk of worsening of CHF  check BMP at 8 pm  ct ab/pelvis-constipation, .  cth-neg  speech swallow eval recommended regular diet    -miralax, colace  daily  s/p lactulose  -ppii  f unable to tolerate diet may eventuall need peg   will dicuss with son  -gi /cardio and pulm consults appreciated     left pleural effusion, /aspiration PNA vs GNR rods vs malignancy vs CHF  respiratory function reasonably stable   s/p IV lasix, no further IV lasix as BP low normal,   echo   continue  zosyn now  neb tx with mucomyst for mucus plugging        afib on eliquis/GERD/HTN/HLD/depression/PE/DVT/ COPD/ multiple uti s/p urinary retention/constipation  -cont home meds     dvt prophylaxis  -eliquis       disposition   -DNR/DNI, Molst form in chart   -may benefit from palliative consult regarding goals of care

## 2019-02-17 NOTE — PROGRESS NOTE ADULT - ASSESSMENT
· Assessment		    Elderly female, multiple medical issues developing acute renal failure probably secondary to hypovolemia.  Severely dehydrated by physical exam,  Will benefit from hydration  Agree with palliative care

## 2019-02-18 LAB
ANION GAP SERPL CALC-SCNC: 9 MMOL/L — SIGNIFICANT CHANGE UP (ref 5–17)
BUN SERPL-MCNC: 38 MG/DL — HIGH (ref 7–23)
CALCIUM SERPL-MCNC: 8.3 MG/DL — LOW (ref 8.5–10.1)
CHLORIDE SERPL-SCNC: 104 MMOL/L — SIGNIFICANT CHANGE UP (ref 96–108)
CO2 SERPL-SCNC: 29 MMOL/L — SIGNIFICANT CHANGE UP (ref 22–31)
CREAT SERPL-MCNC: 1.06 MG/DL — SIGNIFICANT CHANGE UP (ref 0.5–1.3)
GLUCOSE SERPL-MCNC: 115 MG/DL — HIGH (ref 70–99)
POTASSIUM SERPL-MCNC: 4.7 MMOL/L — SIGNIFICANT CHANGE UP (ref 3.5–5.3)
POTASSIUM SERPL-SCNC: 4.7 MMOL/L — SIGNIFICANT CHANGE UP (ref 3.5–5.3)
SODIUM SERPL-SCNC: 142 MMOL/L — SIGNIFICANT CHANGE UP (ref 135–145)

## 2019-02-18 PROCEDURE — 99223 1ST HOSP IP/OBS HIGH 75: CPT

## 2019-02-18 PROCEDURE — 93306 TTE W/DOPPLER COMPLETE: CPT | Mod: 26

## 2019-02-18 PROCEDURE — 99233 SBSQ HOSP IP/OBS HIGH 50: CPT

## 2019-02-18 RX ORDER — METOPROLOL TARTRATE 50 MG
25 TABLET ORAL DAILY
Qty: 0 | Refills: 0 | Status: DISCONTINUED | OUTPATIENT
Start: 2019-02-18 | End: 2019-02-23

## 2019-02-18 RX ORDER — SODIUM CHLORIDE 9 MG/ML
1000 INJECTION, SOLUTION INTRAVENOUS
Qty: 0 | Refills: 0 | Status: DISCONTINUED | OUTPATIENT
Start: 2019-02-18 | End: 2019-02-19

## 2019-02-18 RX ADMIN — PIPERACILLIN AND TAZOBACTAM 25 GRAM(S): 4; .5 INJECTION, POWDER, LYOPHILIZED, FOR SOLUTION INTRAVENOUS at 06:28

## 2019-02-18 RX ADMIN — Medication 1 TABLET(S): at 11:44

## 2019-02-18 RX ADMIN — POLYETHYLENE GLYCOL 3350 17 GRAM(S): 17 POWDER, FOR SOLUTION ORAL at 11:44

## 2019-02-18 RX ADMIN — SODIUM CHLORIDE 50 MILLILITER(S): 9 INJECTION, SOLUTION INTRAVENOUS at 23:12

## 2019-02-18 RX ADMIN — PIPERACILLIN AND TAZOBACTAM 25 GRAM(S): 4; .5 INJECTION, POWDER, LYOPHILIZED, FOR SOLUTION INTRAVENOUS at 13:52

## 2019-02-18 RX ADMIN — MIRTAZAPINE 15 MILLIGRAM(S): 45 TABLET, ORALLY DISINTEGRATING ORAL at 23:10

## 2019-02-18 RX ADMIN — TRAMADOL HYDROCHLORIDE 50 MILLIGRAM(S): 50 TABLET ORAL at 13:50

## 2019-02-18 RX ADMIN — PIPERACILLIN AND TAZOBACTAM 25 GRAM(S): 4; .5 INJECTION, POWDER, LYOPHILIZED, FOR SOLUTION INTRAVENOUS at 23:10

## 2019-02-18 NOTE — PROGRESS NOTE ADULT - ASSESSMENT
Chronic L greater than R pleural effusions.  L Thoracentesis in 9/2018 showed a transudate.  Consider secondary to CHF, valvular heart disease, AF. Also, now in setting of elevated BUN, ? intravascular volume decrease. Did receive lasix x 1 IV. For cardiology consult.  Thoracic surgery consult, consider L thoracentesis (family to decide on. discussed with hospitalist).     Prevent aspiration. S and Swallow following.     Possible aspiration, mucoid impaction, mild. On IV Zosyn. Mucolytic. NC O2 as needed.  Sputum culture.    AF, DVT/PE hx. On eliquis (on hold for possible thoracentesis).     Dementia.

## 2019-02-18 NOTE — PROGRESS NOTE ADULT - SUBJECTIVE AND OBJECTIVE BOX
HPI:  89 female with h/o dementia, afib on eliquis, GERD, HTN, HLD, depression, PE/DVT, COPD, UTIs, urinary retention, constipation presenting from [facility] due to dehydration and unable to toelrate po intake.  patient is demented therefore much of history provided by chart and son.  spoke to son, Tessa over phone, states that patient has not been eating at nursing home and when he tries to feed her she occasionally coughs then food goes down.  In chart, patient has been seen multiple times from S/S team and has recommended a mechanical soft nectar thick diet.      Has h.o. mod-sev MR.     In ED, labs are unremarkable. ct ab/pelvis- left pleural effusion.  afebrile.     Pt's CT chest shows L greater than R pleural effusions, compressive atelectasis on L.  L effusion is a little larger than 9/21/18.  Is not loculated.  Had thoracentesis on 9/24/18 which showed a transudate. Currently is afebrile, with normal WBC.     2/17: resting in bed. no distress. on NC O2, sat'ing well.       PAST MEDICAL HISTORY:  as below    PAST SURGICAL HISTORY: as below    FAMILY HISTORY:   non-contributory to the patient's current presentation (15 Feb 2019 14:25)      PAST MEDICAL & SURGICAL HISTORY:  Femur neck fracture  Vertebral fracture, pathological  Neurogenic bladder  Afib  GERD (gastroesophageal reflux disease)  Pulmonary embolism  UTI (lower urinary tract infection)  HTN (hypertension)  Pulmonary embolism  Memory impairment  Hypertension  Atrial fibrillation  Hyponatremia  Shortness of breath: etiology probably copd  Urinary retention  Arthritis  DVT (deep venous thrombosis)  History of hip surgery: right gamma nail      MEDICATIONS  (STANDING):  acetylcysteine 10%  Inhalation 4 milliLiter(s) Inhalation three times a day  apixaban 2.5 milliGRAM(s) Oral every 12 hours  diltiazem    Tablet 90 milliGRAM(s) Oral three times a day  metoprolol succinate ER 25 milliGRAM(s) Oral daily  mirtazapine 15 milliGRAM(s) Oral at bedtime  multivitamin 1 Tablet(s) Oral daily  piperacillin/tazobactam IVPB. 3.375 Gram(s) IV Intermittent every 8 hours  polyethylene glycol 3350 17 Gram(s) Oral daily    MEDICATIONS  (PRN):  acetaminophen   Tablet .. 650 milliGRAM(s) Oral every 6 hours PRN Temp greater or equal to 38C (100.4F), Mild Pain (1 - 3)  bisacodyl Suppository 10 milliGRAM(s) Rectal daily PRN Constipation  saline laxative (FLEET) Rectal Enema 1 Enema Rectal daily PRN for constipation  traMADol 50 milliGRAM(s) Oral every 6 hours PRN for moderate pain      Allergies    No Known Allergies    Intolerances        SOCIAL HISTORY: Denies tobacco, etoh abuse or illicit drug use    FAMILY HISTORY:      Vital Signs Last 24 Hrs  T(C): 37.2 (17 Feb 2019 05:21), Max: 37.2 (17 Feb 2019 05:21)  T(F): 99 (17 Feb 2019 05:21), Max: 99 (17 Feb 2019 05:21)  HR: 106 (17 Feb 2019 04:59) (106 - 115)  BP: 103/71 (17 Feb 2019 04:59) (101/70 - 103/72)  BP(mean): --  RR: 17 (17 Feb 2019 04:59) (17 - 17)  SpO2: 94% (17 Feb 2019 04:59) (94% - 100%)    REVIEW OF SYSTEMS:    CONSTITUTIONAL:  As per HPI.  HEENT:  Eyes:  No diplopia or blurred vision. ENT:  No earache, sore throat or runny nose.  CARDIOVASCULAR:  see above.  RESPIRATORY:  see above.  GASTROINTESTINAL:  No nausea, vomiting or diarrhea.  GENITOURINARY:  No dysuria, frequency or urgency.  MUSCULOSKELETAL:  As per HPI.  SKIN:  No change in skin, hair or nails.  NEUROLOGIC:  No paresthesias, fasciculations, seizures or weakness.  PSYCHIATRIC:  No disorder of thought or mood.  ENDOCRINE:  No heat or cold intolerance, polyuria or polydipsia.  HEMATOLOGICAL:  No easy bruising or bleedings:  .     PHYSICAL EXAMINATION:    GENERAL APPEARANCE:  Pt. is not currently dyspneic, in no distress. Pt. is alert, oriented, and pleasant.  HEENT:  Pupils are normal and react normally. No icterus. Mucous membranes well colored.  NECK:  Supple. No lymphadenopathy. Jugular venous pressure not elevated. Carotids equal.   HEART:   The cardiac impulse has a normal quality. HR 98.   CHEST:  Pt in bed. Decreased aud BS's bilat and L base.   ABDOMEN:  Soft and nontender.   EXTREMITIES:  There is no cyanosis, clubbing or edema.   SKIN:  No rash or significant lesions are noted.  Neuro: Alert, awake, and O x 3.      LABS:                        12.9   9.71  )-----------( 229      ( 17 Feb 2019 07:14 )             41.9     02-17    139  |  103  |  38<H>  ----------------------------<  135<H>  5.6<H>   |  31  |  1.26    Ca    8.3<L>      17 Feb 2019 07:14  Phos  4.7     02-16  Mg     1.8     02-16    TPro  6.1  /  Alb  2.8<L>  /  TBili  0.7  /  DBili  x   /  AST  30  /  ALT  11<L>  /  AlkPhos  71  02-16    LIVER FUNCTIONS - ( 16 Feb 2019 06:21 )  Alb: 2.8 g/dL / Pro: 6.1 gm/dL / ALK PHOS: 71 U/L / ALT: 11 U/L / AST: 30 U/L / GGT: x                       RADIOLOGY & ADDITIONAL STUDIES:     EXAM:  CT CHEST                            PROCEDURE DATE:  02/15/2019          INTERPRETATION:  CT CHEST    HISTORY:  right pleural effusion                     TECHNIQUE: Noncontrast CT of the chest was performed. Coronal and   sagittal images were reconstructed. This study was performed using   automatic exposure control (radiation dose reduction software) to obtain   a diagnostic image quality scan with patient dose as low as reasonably   achievable.    COMPARISON: Same day chest x-ray, chest CT 9/21/2018    FINDINGS:    LUNGS, AIRWAYS: The central airways are patent. Mild bibasilar mucoid   impaction. Left basilar compressive atelectasis.    PLEURA: Enlarging small right and moderate-large left pleural effusion.    VESSELS: Aortic atherosclerosis without aneurysm.    HEART: Mild cardiomegaly. No pericardial effusion. Coronary, mitral   annular, and aortic valve calcification.    MEDIASTINUM AND DANUTA: No adenopathy.    UPPER ABDOMEN: Limited visualization is unremarkable.    BONES AND SOFT TISSUES: Multiple healing fractures again noted    IMPRESSION:     Enlarging small right and moderate-large left pleural effusion.  Mild   bibasilar mucoid impaction. Left basilar compressive atelectasis. HPI:  89 female with h/o dementia, afib on eliquis, GERD, HTN, HLD, depression, PE/DVT, COPD, UTIs, urinary retention, constipation presenting from [facility] due to dehydration and unable to toelrate po intake.  patient is demented therefore much of history provided by chart and son.  spoke to son, Tessa over phone, states that patient has not been eating at nursing home and when he tries to feed her she occasionally coughs then food goes down.  In chart, patient has been seen multiple times from S/S team and has recommended a mechanical soft nectar thick diet.      Has h.o. mod-sev MR.     In ED, labs are unremarkable. ct ab/pelvis- left pleural effusion.  afebrile.     Pt's CT chest shows L greater than R pleural effusions, compressive atelectasis on L.  L effusion is a little larger than 9/21/18.  Is not loculated.  Had thoracentesis on 9/24/18 which showed a transudate. Currently is afebrile, with normal WBC.     2/18: resting in bed. no distress. on NC O2, sat'ing well.       PAST MEDICAL HISTORY:  as below    PAST SURGICAL HISTORY: as below    FAMILY HISTORY:   non-contributory to the patient's current presentation (15 Feb 2019 14:25)      PAST MEDICAL & SURGICAL HISTORY:  Femur neck fracture  Vertebral fracture, pathological  Neurogenic bladder  Afib  GERD (gastroesophageal reflux disease)  Pulmonary embolism  UTI (lower urinary tract infection)  HTN (hypertension)  Pulmonary embolism  Memory impairment  Hypertension  Atrial fibrillation  Hyponatremia  Shortness of breath: etiology probably copd  Urinary retention  Arthritis  DVT (deep venous thrombosis)  History of hip surgery: right gamma nail      MEDICATIONS  (STANDING):  acetylcysteine 10%  Inhalation 4 milliLiter(s) Inhalation three times a day  apixaban 2.5 milliGRAM(s) Oral every 12 hours  diltiazem    Tablet 90 milliGRAM(s) Oral three times a day  metoprolol succinate ER 25 milliGRAM(s) Oral daily  mirtazapine 15 milliGRAM(s) Oral at bedtime  multivitamin 1 Tablet(s) Oral daily  piperacillin/tazobactam IVPB. 3.375 Gram(s) IV Intermittent every 8 hours  polyethylene glycol 3350 17 Gram(s) Oral daily    MEDICATIONS  (PRN):  acetaminophen   Tablet .. 650 milliGRAM(s) Oral every 6 hours PRN Temp greater or equal to 38C (100.4F), Mild Pain (1 - 3)  bisacodyl Suppository 10 milliGRAM(s) Rectal daily PRN Constipation  saline laxative (FLEET) Rectal Enema 1 Enema Rectal daily PRN for constipation  traMADol 50 milliGRAM(s) Oral every 6 hours PRN for moderate pain      Allergies    No Known Allergies    Intolerances        SOCIAL HISTORY: Denies tobacco, etoh abuse or illicit drug use    FAMILY HISTORY:      Vital Signs Last 24 Hrs  T(C): 37.2 (17 Feb 2019 05:21), Max: 37.2 (17 Feb 2019 05:21)  T(F): 99 (17 Feb 2019 05:21), Max: 99 (17 Feb 2019 05:21)  HR: 106 (17 Feb 2019 04:59) (106 - 115)  BP: 103/71 (17 Feb 2019 04:59) (101/70 - 103/72)  BP(mean): --  RR: 17 (17 Feb 2019 04:59) (17 - 17)  SpO2: 94% (17 Feb 2019 04:59) (94% - 100%)    REVIEW OF SYSTEMS:    CONSTITUTIONAL:  As per HPI.  HEENT:  Eyes:  No diplopia or blurred vision. ENT:  No earache, sore throat or runny nose.  CARDIOVASCULAR:  see above.  RESPIRATORY:  see above.  GASTROINTESTINAL:  No nausea, vomiting or diarrhea.  GENITOURINARY:  No dysuria, frequency or urgency.  MUSCULOSKELETAL:  As per HPI.  SKIN:  No change in skin, hair or nails.  NEUROLOGIC:  No paresthesias, fasciculations, seizures or weakness.  PSYCHIATRIC:  No disorder of thought or mood.  ENDOCRINE:  No heat or cold intolerance, polyuria or polydipsia.  HEMATOLOGICAL:  No easy bruising or bleedings:  .     PHYSICAL EXAMINATION:    GENERAL APPEARANCE:  Pt. is not currently dyspneic, in no distress. Pt. is alert, oriented, and pleasant.  HEENT:  Pupils are normal and react normally. No icterus. Mucous membranes well colored.  NECK:  Supple. No lymphadenopathy. Jugular venous pressure not elevated. Carotids equal.   HEART:   The cardiac impulse has a normal quality. HR 98.   CHEST:  Pt in bed. Decreased aud BS's bilat and L base.   ABDOMEN:  Soft and nontender.   EXTREMITIES:  There is no cyanosis, clubbing or edema.   SKIN:  No rash or significant lesions are noted.  Neuro: Alert, awake, and O x 3.      LABS:                        12.9   9.71  )-----------( 229      ( 17 Feb 2019 07:14 )             41.9     02-17    139  |  103  |  38<H>  ----------------------------<  135<H>  5.6<H>   |  31  |  1.26    Ca    8.3<L>      17 Feb 2019 07:14  Phos  4.7     02-16  Mg     1.8     02-16    TPro  6.1  /  Alb  2.8<L>  /  TBili  0.7  /  DBili  x   /  AST  30  /  ALT  11<L>  /  AlkPhos  71  02-16    LIVER FUNCTIONS - ( 16 Feb 2019 06:21 )  Alb: 2.8 g/dL / Pro: 6.1 gm/dL / ALK PHOS: 71 U/L / ALT: 11 U/L / AST: 30 U/L / GGT: x                       RADIOLOGY & ADDITIONAL STUDIES:     EXAM:  CT CHEST                            PROCEDURE DATE:  02/15/2019          INTERPRETATION:  CT CHEST    HISTORY:  right pleural effusion                     TECHNIQUE: Noncontrast CT of the chest was performed. Coronal and   sagittal images were reconstructed. This study was performed using   automatic exposure control (radiation dose reduction software) to obtain   a diagnostic image quality scan with patient dose as low as reasonably   achievable.    COMPARISON: Same day chest x-ray, chest CT 9/21/2018    FINDINGS:    LUNGS, AIRWAYS: The central airways are patent. Mild bibasilar mucoid   impaction. Left basilar compressive atelectasis.    PLEURA: Enlarging small right and moderate-large left pleural effusion.    VESSELS: Aortic atherosclerosis without aneurysm.    HEART: Mild cardiomegaly. No pericardial effusion. Coronary, mitral   annular, and aortic valve calcification.    MEDIASTINUM AND DANUTA: No adenopathy.    UPPER ABDOMEN: Limited visualization is unremarkable.    BONES AND SOFT TISSUES: Multiple healing fractures again noted    IMPRESSION:     Enlarging small right and moderate-large left pleural effusion.  Mild   bibasilar mucoid impaction. Left basilar compressive atelectasis.

## 2019-02-18 NOTE — PROGRESS NOTE ADULT - ASSESSMENT
· Assessment		    #dysphagia with vomiting, can be related to advancing dementia vs gerd  vs structural pathology of esophagus  # patient with large L pleural effusion and advanced age and dementia poor candidate for EGD  # large L pleural effusion/DDX CHF vs aspiration PNA vs ? malignancy  #Also appears clinically dehydrated  # Hyperkalemia likely from hypovolemia  # At this time appears to have poor prognosis    I dicussed with son Tello Chandler who is unsure at this point and would like to discuss with his brother decide if they would like for patient to take the hospice route vs aggressive management with PEG and Thoracentesis  will redicuss this issue again tomorrow, currently undecided  phone [phone]  Patient would benifit from palliative care and possibly hospice   would attempt to reach sons again   if sons would like to be agressive then would need left thoracentesis  for now due to inability to tolerate po, will give slow IVF ,although ha s further risk of worsening of CHF  continue tramadol for generailsed pain  and continue BB and cardizem with holding parameters for BP    ct ab/pelvis-constipation, .  cth-neg  speech swallow eval recommended regular diet  -miralax, colace  daily  s/p lactulose  -ppii  f unable to tolerate diet may eventuall need peg   discussed with sons  gi /cardio and pulm consults appreciated     left pleural effusion, /aspiration PNA vs GNR rods vs malignancy vs CHF  respiratory function reasonably stable   s/p IV lasix, no further IV lasix as BP low normal,   echo   continue  zosyn now  neb tx with mucomyst for mucus plugging        afib on eliquis/GERD/HTN/HLD/depression/PE/DVT/ COPD/ multiple uti s/p urinary retention/constipation  -cont home meds     dvt prophylaxis  -eliquis       disposition   -DNR/DNI, Molst form completd in chart by me today 2/18, i discussed with son regarding advanced care planning as above and time spent for advanced care planning is 30mins

## 2019-02-18 NOTE — PROGRESS NOTE ADULT - SUBJECTIVE AND OBJECTIVE BOX
· Subjective and Objective: 	  History of Present Illness: 	  89 female with h/o dementia, afib on eliquis, GERD, HTN, HLD, depression, PE/DVT, COPD, UTIs, urinary retention, constipation presenting from [facility] due to dehydration and unable to toelrate po intake.  patient is demented therefore much of history provided by chart and son.  spoke to son, Tessa over phone, states that patient has not been eating at nursing home and when he tries to feed her she occasionally coughs then food goes down.  In chart, patient has been seen multiple times from S/S team and has recommended a mechanical soft nectar thick diet.      In ED, labs are unremarkable. ct ab/pelvis- left pleural effusion.  afebrile.   2/16- patient awake demented at [facility], poor historian  2/18- awake , follows commands, reports she has generalised pain, still with very low apetites, no po intake today  GENERAL: NAD, Alert, awake, oriented to person, frail,   	HEENT: dry mucous membranes  	HEAD:  Atraumatic, Normocephalic  	EYES: EOMI, PERRLA, Conjunctiva and sclera clear  	NECK: Supple, No JVD, No lymphadenopathy  	CHEST/LUNG: decreased breaths ounds B/l lung abses   	HEART: Regular rate and rhythm; No murmurs, rubs, or gallops  	ABDOMEN: Soft, Non-tender, Non-distended; Bowel sounds present  	GENITOURINARY- Voiding, No palpable bladder  	EXTREMITIES: no edema      PHYSICAL EXAM:    Daily     Daily     ICU Vital Signs Last 24 Hrs  T(C): 36.6 (18 Feb 2019 11:35), Max: 36.6 (18 Feb 2019 11:35)  T(F): 97.9 (18 Feb 2019 11:35), Max: 97.9 (18 Feb 2019 11:35)  HR: 95 (18 Feb 2019 11:35) (69 - 100)  BP: 115/88 (18 Feb 2019 11:35) (105/71 - 115/88)  BP(mean): --  ABP: --  ABP(mean): --  RR: 17 (18 Feb 2019 11:35) (16 - 17)  SpO2: 98% (18 Feb 2019 11:35) (95% - 98%)                              12.9   9.71  )-----------( 229      ( 17 Feb 2019 07:14 )             41.9       CBC Full  -  ( 17 Feb 2019 07:14 )  WBC Count : 9.71 K/uL  Hemoglobin : 12.9 g/dL  Hematocrit : 41.9 %  Platelet Count - Automated : 229 K/uL  Mean Cell Volume : 96.3 fl  Mean Cell Hemoglobin : 29.7 pg  Mean Cell Hemoglobin Concentration : 30.8 gm/dL  Auto Neutrophil # : 5.99 K/uL  Auto Lymphocyte # : 2.14 K/uL  Auto Monocyte # : 1.14 K/uL  Auto Eosinophil # : 0.27 K/uL  Auto Basophil # : 0.11 K/uL  Auto Neutrophil % : 61.8 %  Auto Lymphocyte % : 22.0 %  Auto Monocyte % : 11.7 %  Auto Eosinophil % : 2.8 %  Auto Basophil % : 1.1 %      02-18    142  |  104  |  38<H>  ----------------------------<  115<H>  4.7   |  29  |  1.06    Ca    8.3<L>      18 Feb 2019 05:28                              MEDICATIONS  (STANDING):  acetylcysteine 10%  Inhalation 4 milliLiter(s) Inhalation three times a day  mirtazapine 15 milliGRAM(s) Oral at bedtime  multivitamin 1 Tablet(s) Oral daily  piperacillin/tazobactam IVPB. 3.375 Gram(s) IV Intermittent every 8 hours  polyethylene glycol 3350 17 Gram(s) Oral daily  sodium chloride 0.9%. 1000 milliLiter(s) (50 mL/Hr) IV Continuous <Continuous>

## 2019-02-19 LAB
ANION GAP SERPL CALC-SCNC: 2 MMOL/L — LOW (ref 5–17)
BASOPHILS # BLD AUTO: 0.1 K/UL — SIGNIFICANT CHANGE UP (ref 0–0.2)
BASOPHILS NFR BLD AUTO: 1.4 % — SIGNIFICANT CHANGE UP (ref 0–2)
BUN SERPL-MCNC: 29 MG/DL — HIGH (ref 7–23)
CALCIUM SERPL-MCNC: 7.9 MG/DL — LOW (ref 8.5–10.1)
CHLORIDE SERPL-SCNC: 108 MMOL/L — SIGNIFICANT CHANGE UP (ref 96–108)
CO2 SERPL-SCNC: 33 MMOL/L — HIGH (ref 22–31)
CREAT SERPL-MCNC: 0.83 MG/DL — SIGNIFICANT CHANGE UP (ref 0.5–1.3)
EOSINOPHIL # BLD AUTO: 0.32 K/UL — SIGNIFICANT CHANGE UP (ref 0–0.5)
EOSINOPHIL NFR BLD AUTO: 4.4 % — SIGNIFICANT CHANGE UP (ref 0–6)
GLUCOSE SERPL-MCNC: 113 MG/DL — HIGH (ref 70–99)
HCT VFR BLD CALC: 39 % — SIGNIFICANT CHANGE UP (ref 34.5–45)
HGB BLD-MCNC: 11.7 G/DL — SIGNIFICANT CHANGE UP (ref 11.5–15.5)
IMM GRANULOCYTES NFR BLD AUTO: 1.1 % — SIGNIFICANT CHANGE UP (ref 0–1.5)
LYMPHOCYTES # BLD AUTO: 1.76 K/UL — SIGNIFICANT CHANGE UP (ref 1–3.3)
LYMPHOCYTES # BLD AUTO: 24 % — SIGNIFICANT CHANGE UP (ref 13–44)
MCHC RBC-ENTMCNC: 29.4 PG — SIGNIFICANT CHANGE UP (ref 27–34)
MCHC RBC-ENTMCNC: 30 GM/DL — LOW (ref 32–36)
MCV RBC AUTO: 98 FL — SIGNIFICANT CHANGE UP (ref 80–100)
MONOCYTES # BLD AUTO: 0.81 K/UL — SIGNIFICANT CHANGE UP (ref 0–0.9)
MONOCYTES NFR BLD AUTO: 11.1 % — SIGNIFICANT CHANGE UP (ref 2–14)
NEUTROPHILS # BLD AUTO: 4.26 K/UL — SIGNIFICANT CHANGE UP (ref 1.8–7.4)
NEUTROPHILS NFR BLD AUTO: 58 % — SIGNIFICANT CHANGE UP (ref 43–77)
NRBC # BLD: 0 /100 WBCS — SIGNIFICANT CHANGE UP (ref 0–0)
PLATELET # BLD AUTO: 187 K/UL — SIGNIFICANT CHANGE UP (ref 150–400)
POTASSIUM SERPL-MCNC: 3.9 MMOL/L — SIGNIFICANT CHANGE UP (ref 3.5–5.3)
POTASSIUM SERPL-SCNC: 3.9 MMOL/L — SIGNIFICANT CHANGE UP (ref 3.5–5.3)
RBC # BLD: 3.98 M/UL — SIGNIFICANT CHANGE UP (ref 3.8–5.2)
RBC # FLD: 17 % — HIGH (ref 10.3–14.5)
SODIUM SERPL-SCNC: 143 MMOL/L — SIGNIFICANT CHANGE UP (ref 135–145)
WBC # BLD: 7.33 K/UL — SIGNIFICANT CHANGE UP (ref 3.8–10.5)
WBC # FLD AUTO: 7.33 K/UL — SIGNIFICANT CHANGE UP (ref 3.8–10.5)

## 2019-02-19 PROCEDURE — 99232 SBSQ HOSP IP/OBS MODERATE 35: CPT

## 2019-02-19 RX ORDER — MINERAL OIL
133 OIL (ML) MISCELLANEOUS DAILY
Qty: 0 | Refills: 0 | Status: DISCONTINUED | OUTPATIENT
Start: 2019-02-19 | End: 2019-02-23

## 2019-02-19 RX ORDER — OXYCODONE HYDROCHLORIDE 5 MG/1
5 TABLET ORAL ONCE
Qty: 0 | Refills: 0 | Status: DISCONTINUED | OUTPATIENT
Start: 2019-02-19 | End: 2019-02-19

## 2019-02-19 RX ORDER — ENOXAPARIN SODIUM 100 MG/ML
40 INJECTION SUBCUTANEOUS EVERY 24 HOURS
Qty: 0 | Refills: 0 | Status: DISCONTINUED | OUTPATIENT
Start: 2019-02-19 | End: 2019-02-22

## 2019-02-19 RX ORDER — SODIUM CHLORIDE 9 MG/ML
1000 INJECTION, SOLUTION INTRAVENOUS
Qty: 0 | Refills: 0 | Status: DISCONTINUED | OUTPATIENT
Start: 2019-02-19 | End: 2019-02-23

## 2019-02-19 RX ADMIN — TRAMADOL HYDROCHLORIDE 50 MILLIGRAM(S): 50 TABLET ORAL at 19:16

## 2019-02-19 RX ADMIN — PIPERACILLIN AND TAZOBACTAM 25 GRAM(S): 4; .5 INJECTION, POWDER, LYOPHILIZED, FOR SOLUTION INTRAVENOUS at 15:09

## 2019-02-19 RX ADMIN — Medication 25 MILLIGRAM(S): at 06:34

## 2019-02-19 RX ADMIN — OXYCODONE HYDROCHLORIDE 5 MILLIGRAM(S): 5 TABLET ORAL at 21:30

## 2019-02-19 RX ADMIN — TRAMADOL HYDROCHLORIDE 50 MILLIGRAM(S): 50 TABLET ORAL at 20:10

## 2019-02-19 RX ADMIN — OXYCODONE HYDROCHLORIDE 5 MILLIGRAM(S): 5 TABLET ORAL at 20:36

## 2019-02-19 RX ADMIN — SODIUM CHLORIDE 50 MILLILITER(S): 9 INJECTION, SOLUTION INTRAVENOUS at 18:30

## 2019-02-19 RX ADMIN — PIPERACILLIN AND TAZOBACTAM 25 GRAM(S): 4; .5 INJECTION, POWDER, LYOPHILIZED, FOR SOLUTION INTRAVENOUS at 06:32

## 2019-02-19 RX ADMIN — POLYETHYLENE GLYCOL 3350 17 GRAM(S): 17 POWDER, FOR SOLUTION ORAL at 11:36

## 2019-02-19 RX ADMIN — MIRTAZAPINE 15 MILLIGRAM(S): 45 TABLET, ORALLY DISINTEGRATING ORAL at 20:43

## 2019-02-19 RX ADMIN — Medication 1 TABLET(S): at 11:36

## 2019-02-19 RX ADMIN — PIPERACILLIN AND TAZOBACTAM 25 GRAM(S): 4; .5 INJECTION, POWDER, LYOPHILIZED, FOR SOLUTION INTRAVENOUS at 20:48

## 2019-02-19 RX ADMIN — ENOXAPARIN SODIUM 40 MILLIGRAM(S): 100 INJECTION SUBCUTANEOUS at 18:25

## 2019-02-19 NOTE — PROGRESS NOTE ADULT - ASSESSMENT
· Assessment		    #dysphagia with vomiting, can be related to advancing dementia vs gerd  vs structural pathology of esophagus  # patient with large L pleural effusion and advanced age and dementia poor candidate for EGD  # large L pleural effusion/DDX CHF vs aspiration PNA vs ? malignancy  #Also appears clinically dehydrated  # Hyperkalemia likely from hypovolemia  # At this time appears to have poor prognosis    I dicussed with son Tello Chandler who is unsure at this point and would like to discuss with his brother decide if they would like for patient to take the hospice route vs aggressive management with PEG and Thoracentesis  will redicuss this issue again, currently undecided  phone [phone]  Patient would benifit from palliative care and possibly hospice   would attempt to reach sons again   if sons would like to be agressive then would need left thoracentesis  for now due to inability to tolerate po, will give slow IVF ,although ha s further risk of worsening of CHF  continue tramadol for generailsed pain  and continue BB and cardizem with holding parameters for BP  slow IVF for 10hrs    ct ab/pelvis-constipation, .  cth-neg  speech swallow eval recommended regular diet  -miralax, colace  daily  s/p lactulose  -ppiif unable to tolerate diet may eventuall need peg   discussed with sons  gi /cardio and pulm consults appreciated     left pleural effusion, /aspiration PNA vs GNR rods vs malignancy vs CHF  respiratory function reasonably stable   s/p IV lasix, no further IV lasix as BP low normal,   echo   continue  zosyn now  neb tx with mucomyst for mucus plugging        afib on eliquis/GERD/HTN/HLD/depression/PE/DVT/ COPD/ multiple uti s/p urinary retention/constipation  -cont home meds     dvt prophylaxis  -eliquis   disposition   -DNR/DNI, Molst form completd in chart by me today 2/19, i discussed with son regarding advanced care planning as above and time spent for advanced care planning is 30mins · Assessment		    #dysphagia with vomiting, can be related to advancing dementia vs gerd  vs structural pathology of esophagus  # patient with large L pleural effusion and advanced age and dementia poor candidate for EGD  # large L pleural effusion/DDX CHF vs aspiration PNA vs ? malignancy  #Also appears clinically dehydrated  # Hyperkalemia likely from hypovolemia  # At this time appears to have poor prognosis    I dicussed with son Tello Chandler who is unsure at this point and would like to discuss with his brother decide if they would like for patient to take the hospice route vs aggressive management with PEG and Thoracentesis  will redicuss this issue again, currently undecided  phone [phone]  Patient would benifit from palliative care and possibly hospice   would attempt to reach sons again   if sons would like to be agressive then would need left thoracentesis  for now due to inability to tolerate po, will give slow IVF ,although ha s further risk of worsening of CHF  continue tramadol for generailsed pain  and continue BB and cardizem with holding parameters for BP  slow IVF for 10hrs  started on DVT prophylaxis as eliquis has been held    ct ab/pelvis-constipation, .  cth-neg  speech swallow eval recommended regular diet  -miralax, colace  daily  s/p lactulose  -ppiif unable to tolerate diet may eventuall need peg   discussed with sons  gi /cardio and pulm consults appreciated     left pleural effusion, /aspiration PNA vs GNR rods vs malignancy vs CHF  respiratory function reasonably stable   s/p IV lasix, no further IV lasix as BP low normal,   echo   continue  zosyn now  neb tx with mucomyst for mucus plugging        afib on eliquis/GERD/HTN/HLD/depression/PE/DVT/ COPD/ multiple uti s/p urinary retention/constipation  -cont home meds     dvt prophylaxis  -eliquis   disposition   -DNR/DNI, Molst form completd in chart by me today 2/19, i discussed with son regarding advanced care planning as above and time spent for advanced care planning is 30mins

## 2019-02-19 NOTE — PROGRESS NOTE ADULT - SUBJECTIVE AND OBJECTIVE BOX
· Subjective and Objective: 	  History of Present Illness: 	  89 female with h/o dementia, afib on eliquis, GERD, HTN, HLD, depression, PE/DVT, COPD, UTIs, urinary retention, constipation presenting from [facility] due to dehydration and unable to toelrate po intake.  patient is demented therefore much of history provided by chart and son.  spoke to son, Tessa over phone, states that patient has not been eating at nursing home and when he tries to feed her she occasionally coughs then food goes down.  In chart, patient has been seen multiple times from S/S team and has recommended a mechanical soft nectar thick diet.      In ED, labs are unremarkable. ct ab/pelvis- left pleural effusion.  afebrile.   2/16- patient awake demented at abseline, poor historian  2/18- awake , follows commands, reports she has generalised pain, still with very low apetites, no po intake today  2/19-no change in status from yesterday, no respiratory compromise  GENERAL: NAD, Alert, awake, oriented to person, frail,   	HEENT: dry mucous membranes  	HEAD:  Atraumatic, Normocephalic  	EYES: EOMI, PERRLA, Conjunctiva and sclera clear  	NECK: Supple, No JVD, No lymphadenopathy  	CHEST/LUNG: decreased breaths ounds B/l lung abses   	HEART: Regular rate and rhythm; No murmurs, rubs, or gallops  	ABDOMEN: Soft, Non-tender, Non-distended; Bowel sounds present  	GENITOURINARY- Voiding, No palpable bladder  	EXTREMITIES: no edema      PHYSICAL EXAM:    Daily     Daily     ICU Vital Signs Last 24 Hrs  T(C): 37.2 (19 Feb 2019 12:03), Max: 37.2 (19 Feb 2019 12:03)  T(F): 98.9 (19 Feb 2019 12:03), Max: 98.9 (19 Feb 2019 12:03)  HR: 90 (19 Feb 2019 12:03) (64 - 110)  BP: 119/81 (19 Feb 2019 12:03) (118/84 - 120/83)  BP(mean): --  ABP: --  ABP(mean): --  RR: 18 (19 Feb 2019 12:03) (17 - 18)  SpO2: 99% (19 Feb 2019 12:03) (99% - 99%)                              11.7   7.33  )-----------( 187      ( 19 Feb 2019 07:22 )             39.0       CBC Full  -  ( 19 Feb 2019 07:22 )  WBC Count : 7.33 K/uL  Hemoglobin : 11.7 g/dL  Hematocrit : 39.0 %  Platelet Count - Automated : 187 K/uL  Mean Cell Volume : 98.0 fl  Mean Cell Hemoglobin : 29.4 pg  Mean Cell Hemoglobin Concentration : 30.0 gm/dL  Auto Neutrophil # : 4.26 K/uL  Auto Lymphocyte # : 1.76 K/uL  Auto Monocyte # : 0.81 K/uL  Auto Eosinophil # : 0.32 K/uL  Auto Basophil # : 0.10 K/uL  Auto Neutrophil % : 58.0 %  Auto Lymphocyte % : 24.0 %  Auto Monocyte % : 11.1 %  Auto Eosinophil % : 4.4 %  Auto Basophil % : 1.4 %      02-19    143  |  108  |  29<H>  ----------------------------<  113<H>  3.9   |  33<H>  |  0.83    Ca    7.9<L>      19 Feb 2019 07:22                              MEDICATIONS  (STANDING):  acetylcysteine 10%  Inhalation 4 milliLiter(s) Inhalation three times a day  dextrose 5% + sodium chloride 0.9%. 1000 milliLiter(s) (50 mL/Hr) IV Continuous <Continuous>  dextrose 5% + sodium chloride 0.9%. 1000 milliLiter(s) (50 mL/Hr) IV Continuous <Continuous>  diltiazem    Tablet 90 milliGRAM(s) Oral three times a day  enoxaparin Injectable 40 milliGRAM(s) SubCutaneous every 24 hours  metoprolol succinate ER 25 milliGRAM(s) Oral daily  mirtazapine 15 milliGRAM(s) Oral at bedtime  multivitamin 1 Tablet(s) Oral daily  piperacillin/tazobactam IVPB. 3.375 Gram(s) IV Intermittent every 8 hours  polyethylene glycol 3350 17 Gram(s) Oral daily  sodium chloride 0.9%. 1000 milliLiter(s) (50 mL/Hr) IV Continuous <Continuous>

## 2019-02-19 NOTE — PROGRESS NOTE ADULT - SUBJECTIVE AND OBJECTIVE BOX
Subjective:  Patient asleep.  Breathing comfortably.  Awaiting family decision on level of care they want to pursue.     Vital Signs:  Vital Signs Last 24 Hrs  T(C): 37.2 (02-19-19 @ 12:03), Max: 37.2 (02-19-19 @ 12:03)  T(F): 98.9 (02-19-19 @ 12:03), Max: 98.9 (02-19-19 @ 12:03)  HR: 90 (02-19-19 @ 12:03) (64 - 110)  BP: 119/81 (02-19-19 @ 12:03) (118/84 - 120/83)  RR: 18 (02-19-19 @ 12:03) (17 - 18)  SpO2: 99% (02-19-19 @ 12:03) (99% - 99%) on RA      Relevant labs, radiology and Medications reviewed                        11.7   7.33  )-----------( 187      ( 19 Feb 2019 07:22 )             39.0     02-19    143  |  108  |  29<H>  ----------------------------<  113<H>  3.9   |  33<H>  |  0.83    Ca    7.9<L>      19 Feb 2019 07:22        MEDICATIONS  (STANDING):  acetylcysteine 10%  Inhalation 4 milliLiter(s) Inhalation three times a day  dextrose 5% + sodium chloride 0.9%. 1000 milliLiter(s) (50 mL/Hr) IV Continuous <Continuous>  diltiazem    Tablet 90 milliGRAM(s) Oral three times a day  metoprolol succinate ER 25 milliGRAM(s) Oral daily  mirtazapine 15 milliGRAM(s) Oral at bedtime  multivitamin 1 Tablet(s) Oral daily  piperacillin/tazobactam IVPB. 3.375 Gram(s) IV Intermittent every 8 hours  polyethylene glycol 3350 17 Gram(s) Oral daily  sodium chloride 0.9%. 1000 milliLiter(s) (50 mL/Hr) IV Continuous <Continuous>    MEDICATIONS  (PRN):  acetaminophen   Tablet .. 650 milliGRAM(s) Oral every 6 hours PRN Temp greater or equal to 38C (100.4F), Mild Pain (1 - 3)  bisacodyl Suppository 10 milliGRAM(s) Rectal daily PRN Constipation  mineral oil enema 133 milliLiter(s) Rectal daily PRN constipation  traMADol 50 milliGRAM(s) Oral every 6 hours PRN for moderate pain      Physical exam  Gen: NAD, breathing comfortably.  Neuro: Awake.  Card: S1 S2.  Pulm: Decreased breath sounds on the left.  Abd: Soft NT ND.  Ext: No edema.          Assessment  89y Female  w/ PAST MEDICAL & SURGICAL HISTORY:  Femur neck fracture  Vertebral fracture, pathological  Neurogenic bladder  Afib  GERD (gastroesophageal reflux disease)  Pulmonary embolism  UTI (lower urinary tract infection)  HTN (hypertension)  Pulmonary embolism  Memory impairment  Hypertension  Atrial fibrillation  Hyponatremia  Shortness of breath: etiology probably copd  Urinary retention  Arthritis  DVT (deep venous thrombosis)  History of hip surgery: right gamma nail  admitted with complaints of vomiting, decreased po intake (18 Feb 2019 17:38)      PLAN  No respiratory distress at this time.  Need for thoracentesis is non-emergent.  Family to discuss level of care they want.  Would recommend conservative management due to age, dementia and other comorbidities.

## 2019-02-20 LAB
ANION GAP SERPL CALC-SCNC: 4 MMOL/L — LOW (ref 5–17)
BUN SERPL-MCNC: 21 MG/DL — SIGNIFICANT CHANGE UP (ref 7–23)
CALCIUM SERPL-MCNC: 8.1 MG/DL — LOW (ref 8.5–10.1)
CHLORIDE SERPL-SCNC: 109 MMOL/L — HIGH (ref 96–108)
CO2 SERPL-SCNC: 31 MMOL/L — SIGNIFICANT CHANGE UP (ref 22–31)
CREAT SERPL-MCNC: 0.75 MG/DL — SIGNIFICANT CHANGE UP (ref 0.5–1.3)
GLUCOSE SERPL-MCNC: 103 MG/DL — HIGH (ref 70–99)
POTASSIUM SERPL-MCNC: 3.9 MMOL/L — SIGNIFICANT CHANGE UP (ref 3.5–5.3)
POTASSIUM SERPL-SCNC: 3.9 MMOL/L — SIGNIFICANT CHANGE UP (ref 3.5–5.3)
SODIUM SERPL-SCNC: 144 MMOL/L — SIGNIFICANT CHANGE UP (ref 135–145)

## 2019-02-20 PROCEDURE — 99232 SBSQ HOSP IP/OBS MODERATE 35: CPT

## 2019-02-20 RX ORDER — SODIUM CHLORIDE 9 MG/ML
1000 INJECTION, SOLUTION INTRAVENOUS
Qty: 0 | Refills: 0 | Status: DISCONTINUED | OUTPATIENT
Start: 2019-02-20 | End: 2019-02-23

## 2019-02-20 RX ORDER — ROBINUL 0.2 MG/ML
0.2 INJECTION INTRAMUSCULAR; INTRAVENOUS EVERY 6 HOURS
Qty: 0 | Refills: 0 | Status: DISCONTINUED | OUTPATIENT
Start: 2019-02-20 | End: 2019-02-23

## 2019-02-20 RX ORDER — MORPHINE SULFATE 50 MG/1
1 CAPSULE, EXTENDED RELEASE ORAL EVERY 4 HOURS
Qty: 0 | Refills: 0 | Status: DISCONTINUED | OUTPATIENT
Start: 2019-02-20 | End: 2019-02-23

## 2019-02-20 RX ADMIN — Medication 1 TABLET(S): at 10:38

## 2019-02-20 RX ADMIN — ENOXAPARIN SODIUM 40 MILLIGRAM(S): 100 INJECTION SUBCUTANEOUS at 17:58

## 2019-02-20 RX ADMIN — TRAMADOL HYDROCHLORIDE 50 MILLIGRAM(S): 50 TABLET ORAL at 05:52

## 2019-02-20 RX ADMIN — MIRTAZAPINE 15 MILLIGRAM(S): 45 TABLET, ORALLY DISINTEGRATING ORAL at 22:18

## 2019-02-20 RX ADMIN — TRAMADOL HYDROCHLORIDE 50 MILLIGRAM(S): 50 TABLET ORAL at 22:20

## 2019-02-20 RX ADMIN — TRAMADOL HYDROCHLORIDE 50 MILLIGRAM(S): 50 TABLET ORAL at 06:24

## 2019-02-20 RX ADMIN — PIPERACILLIN AND TAZOBACTAM 25 GRAM(S): 4; .5 INJECTION, POWDER, LYOPHILIZED, FOR SOLUTION INTRAVENOUS at 05:40

## 2019-02-20 RX ADMIN — MORPHINE SULFATE 1 MILLIGRAM(S): 50 CAPSULE, EXTENDED RELEASE ORAL at 15:37

## 2019-02-20 RX ADMIN — MORPHINE SULFATE 1 MILLIGRAM(S): 50 CAPSULE, EXTENDED RELEASE ORAL at 16:02

## 2019-02-20 NOTE — CONSULT NOTE ADULT - ASSESSMENT
89y old Female coming from [facility] (resident since 2015) with hx of dementia, afib on eliquis, GERD, HTN, HLD, depression, PE/DVT, COPD, UTIs, urinary retention, constipation admitted 2/15 due to dehydration and unable to toelrate po intake, often not been eating at nursing home and when he tries to feed her she occasionally coughs then food goes down. Of note, patient has been seen multiple times from S/S team and has recommended a mechanical soft nectar thick diet. In ED, labs are unremarkable. CTH wit no acute changes, CT C/ab/pelvis also done showing mod-large left pleural effusion likely 2/2 to CHF. Echo also done showing EF 35%, and mod-sev MR + TR. Palliative care consulted to assist with establishing GOC.     1) Pain  - denies at present, but as per hospitalist, family, and RN this happens often  - pain is not localized when it occurs, likely musculoskeletal  - c/w tramadol 50mg prn  - if unable or unwilling to take po added very low dose IV morphine 1mg q4h prn     2) Dysphagia  - GI and speech and swallow eval appreciated  - impression of advancing dementia vs. GERD, EGD not recommended  - no aspiration signs on speech eval but some retching after  - regular diet recommended  - pt with poor intake and often refusing po meds as per nursing staff    3) Effusion/CHF  - CHF with echo showing EF 35% and mod-sev MR and TR  - pulm notes and imaging appreciated  - recurrent effusion with thoracentesis in September revealing transudate  - supplemental O2 as needed  - opioids may also be helpful for any air hunger  - CT surgery notes also appreciated- recommending conservative measures, no need for thoracentesis as not emergent    4) Debility  - PPS<40%  - rec dietary consult cc: documentation of malnutrition (muscle and fat wasting noted on exam)  - as per NH records pt needs assistance for all ADLs    5) Prognosis  - poor  - in setting of advanced age, advanced dementia , FAST 7 (needs assistance for all ADLs, incontinent), CHF with EF 35% recurrent effusions, malnutrition, PPS<40%, dysphagia, seems hospice eligible    6) GOC/Advanced Directives  - pt does not have capacity for decision making 2/2 to dementia  - no HCP on file, thus sons would be sharing surrogate decision making responsibilities: 1) Louis [phone]; 2) Ondina; 3) Concepcion [phone]  - DNR and DNI on MOLST (first page needs witnesses and second page needs surrogate signature or oral consent box to be checked)  - [facility] meeting scheduled for 1pm    Thank you for including us in Ms. Chandler's care. Will continue to follow with you.    Tanya Jacinto MD  Palliative Care Attending

## 2019-02-20 NOTE — CONSULT NOTE ADULT - SUBJECTIVE AND OBJECTIVE BOX
Patient is a 89y old  Female who presents with a chief complaint of vomiting, decreased po intake (16 Feb 2019 16:54)      HPI:  89 female with h/o dementia, afib on eliquis, GERD, HTN, HLD, depression, PE/DVT, COPD, UTIs, urinary retention, constipation presenting from [facility] due to dehydration and unable to toelrate po intake.  patient is demented therefore much of history provided by chart and son.  spoke to son, Tessa over phone, states that patient has not been eating at nursing home and when he tries to feed her she occasionally coughs then food goes down.  In chart, patient has been seen multiple times from S/S team and has recommended a mechanical soft nectar thick diet.      In ED, labs are unremarkable. ct ab/pelvis- left pleural effusion.  afebrile.     swallowing eval appreciated and pt comf appearing  DW RN and pt gags on food and appears to not like soft food      PAST MEDICAL HISTORY:  as below    PAST SURGICAL HISTORY: as below    FAMILY HISTORY:   non-contributory to the patient's current presentation (15 Feb 2019 14:25)      PAST MEDICAL & SURGICAL HISTORY:  Femur neck fracture  Vertebral fracture, pathological  Neurogenic bladder  Afib  GERD (gastroesophageal reflux disease)  Pulmonary embolism  UTI (lower urinary tract infection)  HTN (hypertension)  Pulmonary embolism  Memory impairment  Hypertension  Atrial fibrillation  Hyponatremia  Shortness of breath: etiology probably copd  Urinary retention  Arthritis  DVT (deep venous thrombosis)  History of hip surgery: right gamma nail      MEDICATIONS  (STANDING):  acetylcysteine 10%  Inhalation 4 milliLiter(s) Inhalation three times a day  apixaban 2.5 milliGRAM(s) Oral every 12 hours  diltiazem    Tablet 90 milliGRAM(s) Oral three times a day  metoprolol succinate ER 25 milliGRAM(s) Oral daily  mirtazapine 15 milliGRAM(s) Oral at bedtime  multivitamin 1 Tablet(s) Oral daily  piperacillin/tazobactam IVPB. 3.375 Gram(s) IV Intermittent every 8 hours  polyethylene glycol 3350 17 Gram(s) Oral daily    MEDICATIONS  (PRN):  acetaminophen   Tablet .. 650 milliGRAM(s) Oral every 6 hours PRN Temp greater or equal to 38C (100.4F), Mild Pain (1 - 3)  bisacodyl Suppository 10 milliGRAM(s) Rectal daily PRN Constipation  saline laxative (FLEET) Rectal Enema 1 Enema Rectal daily PRN for constipation  traMADol 50 milliGRAM(s) Oral every 6 hours PRN for moderate pain      Allergies    No Known Allergies    Intolerances        SOCIAL HISTORY:neg drugs    FAMILY HISTORY:NC      REVIEW OF SYSTEMS:    CONSTITUTIONAL: No weakness, fevers or chills  EYES/ENT: No visual changes;  No vertigo or throat pain   NECK: No pain or stiffness  RESPIRATORY: No cough, wheezing, hemoptysis; No shortness of breath  CARDIOVASCULAR: No chest pain or palpitations  GENITOURINARY: No dysuria, frequency or hematuria  NEUROLOGICAL: No numbness or weakness  SKIN: No itching, burning, rashes, or lesions   All other review of systems is negative unless indicated above.    Vital Signs Last 24 Hrs  T(C): 36.3 (16 Feb 2019 11:14), Max: 36.6 (16 Feb 2019 05:32)  T(F): 97.4 (16 Feb 2019 11:14), Max: 97.8 (16 Feb 2019 05:32)  HR: 107 (16 Feb 2019 15:04) (86 - 107)  BP: 101/70 (16 Feb 2019 15:04) (93/51 - 129/84)  BP(mean): --  RR: 18 (16 Feb 2019 11:14) (18 - 18)  SpO2: 96% (16 Feb 2019 11:14) (86% - 98%)    PHYSICAL EXAM:    Constitutional: NAD, well-developed  HEENT:  throat clear  Neck: No LAD, supple  Respiratory: CTA and P  Cardiovascular: S1 and S2, RRR, no M  Gastrointestinal: BS+, soft, NT/ND, neg HSM,  Extremities: No peripheral edema, neg clubing, cyanosis  Vascular: 2+ peripheral pulses  Neurological: A/O x 0, no focal deficits  Psychiatric: Normal mood, normal affect  Skin: No rashes    LABS:  CBC Full  -  ( 16 Feb 2019 06:27 )  WBC Count : 8.96 K/uL  Hemoglobin : 11.6 g/dL  Hematocrit : 38.5 %  Platelet Count - Automated : 200 K/uL  Mean Cell Volume : 98.0 fl  Mean Cell Hemoglobin : 29.5 pg  Mean Cell Hemoglobin Concentration : 30.1 gm/dL  Auto Neutrophil # : 6.01 K/uL  Auto Lymphocyte # : 1.58 K/uL  Auto Monocyte # : 0.91 K/uL  Auto Eosinophil # : 0.33 K/uL  Auto Basophil # : 0.08 K/uL  Auto Neutrophil % : 67.0 %  Auto Lymphocyte % : 17.6 %  Auto Monocyte % : 10.2 %  Auto Eosinophil % : 3.7 %  Auto Basophil % : 0.9 %    02-16    137  |  104  |  31<H>  ----------------------------<  103<H>  5.2   |  26  |  0.74    Ca    8.5      16 Feb 2019 06:21  Phos  4.7     02-16  Mg     1.8     02-16    TPro  6.1  /  Alb  2.8<L>  /  TBili  0.7  /  DBili  x   /  AST  30  /  ALT  11<L>  /  AlkPhos  71  02-16    PT/INR - ( 15 Feb 2019 02:45 )   PT: 15.7 sec;   INR: 1.40 ratio         PTT - ( 15 Feb 2019 02:45 )  PTT:34.2 sec        RADIOLOGY & ADDITIONAL STUDIES:  < from: CT Abdomen and Pelvis w/ IV Cont (02.15.19 @ 07:29) >  EXAM:  CT ABDOMEN AND PELVIS IC                            PROCEDURE DATE:  02/15/2019          INTERPRETATION:  Clinical information: Vomiting, abdominal distention    COMPARISON: 5.6.16    PROCEDURE:   CT of the Abdomen and Pelvis was performed with intravenous contrast.   Intravenous contrast: 80 ml Omnipaque 350. 20 ml discarded.  Oral contrast: Not administered.  Sagittal and coronal reformats were performed.    FINDINGS:    LOWER CHEST: Cardiomegaly and coronary artery calcifications, large left   and small right pleural effusions.    LIVER: Within normal limits.  SPLEEN: Within normal limits.  PANCREAS: Within normal limits.  GALLBLADDER: Within normal limits.  BILE DUCTS: Normal caliber.  ADRENALS: Within normal limits.  KIDNEYS/URETERS: Low-density lesions in both kidneys which are too small   for accurate characterization. 8 mm right renal angiomyolipoma.    RETROPERITONEUM: No lymphadenopathy.    VESSELS:  Within normal limits    BOWEL: No bowel obstruction, wall thickening or inflammatory change.   Appendix normal. Large amount of stool in the colon, including moderate   distention of the rectum with stool.  PERITONEUM: No ascites or pneumoperitoneum.    REPRODUCTIVE ORGANS: Hysterectomy. No adnexal mass.  BLADDER: Within normal limits.    ABDOMINAL WALL: Within normal limits.  BONES: Right hip arthroplasty. Unchanged severe T11 compression deformity.  New mild L2 and severe L3 compression deformities.    IMPRESSION:     No acute abdominal pathology.  Large amount ofstool in the colon.  Large right pleural effusion.                  < end of copied text >
HPI:  89 female with h/o dementia, afib on eliquis, GERD, HTN, HLD, depression, PE/DVT, COPD, UTIs, urinary retention, constipation presenting from [facility] due to dehydration and unable to toelrate po intake.  patient is demented therefore much of history provided by chart and son.  spoke to son, Tessa over phone, states that patient has not been eating at nursing home and when he tries to feed her she occasionally coughs then food goes down.  In chart, patient has been seen multiple times from S/S team and has recommended a mechanical soft nectar thick diet.      Has h.o. mod-sev MR.     In ED, labs are unremarkable. ct ab/pelvis- left pleural effusion.  afebrile.     Pt's CT chest shows L greater than R pleural effusions, compressive atelectasis on L.  L effusion is a little larger than 9/21/18.  Is not loculated.  Had thoracentesis on 9/24/18 which showed a transudate. Currently is afebrile, with normal WBC.       PAST MEDICAL HISTORY:  as below    PAST SURGICAL HISTORY: as below    FAMILY HISTORY:   non-contributory to the patient's current presentation (15 Feb 2019 14:25)      PAST MEDICAL & SURGICAL HISTORY:  Femur neck fracture  Vertebral fracture, pathological  Neurogenic bladder  Afib  GERD (gastroesophageal reflux disease)  Pulmonary embolism  UTI (lower urinary tract infection)  HTN (hypertension)  Pulmonary embolism  Memory impairment  Hypertension  Atrial fibrillation  Hyponatremia  Shortness of breath: etiology probably copd  Urinary retention  Arthritis  DVT (deep venous thrombosis)  History of hip surgery: right gamma nail      MEDICATIONS  (STANDING):  acetylcysteine 10%  Inhalation 4 milliLiter(s) Inhalation three times a day  apixaban 2.5 milliGRAM(s) Oral every 12 hours  diltiazem    Tablet 90 milliGRAM(s) Oral three times a day  metoprolol succinate ER 25 milliGRAM(s) Oral daily  mirtazapine 15 milliGRAM(s) Oral at bedtime  multivitamin 1 Tablet(s) Oral daily  piperacillin/tazobactam IVPB. 3.375 Gram(s) IV Intermittent every 8 hours  polyethylene glycol 3350 17 Gram(s) Oral daily    MEDICATIONS  (PRN):  acetaminophen   Tablet .. 650 milliGRAM(s) Oral every 6 hours PRN Temp greater or equal to 38C (100.4F), Mild Pain (1 - 3)  bisacodyl Suppository 10 milliGRAM(s) Rectal daily PRN Constipation  saline laxative (FLEET) Rectal Enema 1 Enema Rectal daily PRN for constipation  traMADol 50 milliGRAM(s) Oral every 6 hours PRN for moderate pain      Allergies    No Known Allergies    Intolerances        SOCIAL HISTORY: Denies tobacco, etoh abuse or illicit drug use    FAMILY HISTORY:      Vital Signs Last 24 Hrs  T(C): 37.2 (17 Feb 2019 05:21), Max: 37.2 (17 Feb 2019 05:21)  T(F): 99 (17 Feb 2019 05:21), Max: 99 (17 Feb 2019 05:21)  HR: 106 (17 Feb 2019 04:59) (106 - 115)  BP: 103/71 (17 Feb 2019 04:59) (101/70 - 103/72)  BP(mean): --  RR: 17 (17 Feb 2019 04:59) (17 - 17)  SpO2: 94% (17 Feb 2019 04:59) (94% - 100%)    REVIEW OF SYSTEMS:    CONSTITUTIONAL:  As per HPI.  HEENT:  Eyes:  No diplopia or blurred vision. ENT:  No earache, sore throat or runny nose.  CARDIOVASCULAR:  see above.  RESPIRATORY:  see above.  GASTROINTESTINAL:  No nausea, vomiting or diarrhea.  GENITOURINARY:  No dysuria, frequency or urgency.  MUSCULOSKELETAL:  As per HPI.  SKIN:  No change in skin, hair or nails.  NEUROLOGIC:  No paresthesias, fasciculations, seizures or weakness.  PSYCHIATRIC:  No disorder of thought or mood.  ENDOCRINE:  No heat or cold intolerance, polyuria or polydipsia.  HEMATOLOGICAL:  No easy bruising or bleedings:  .     PHYSICAL EXAMINATION:    GENERAL APPEARANCE:  Pt. is not currently dyspneic, in no distress. Pt. is alert, oriented, and pleasant.  HEENT:  Pupils are normal and react normally. No icterus. Mucous membranes well colored.  NECK:  Supple. No lymphadenopathy. Jugular venous pressure not elevated. Carotids equal.   HEART:   The cardiac impulse has a normal quality. .   CHEST:  Pt in bed. Decreased aud BS's bilat and L base.   ABDOMEN:  Soft and nontender.   EXTREMITIES:  There is no cyanosis, clubbing or edema.   SKIN:  No rash or significant lesions are noted.  Neuro: Alert, awake, and O x 3.      LABS:                        12.9   9.71  )-----------( 229      ( 17 Feb 2019 07:14 )             41.9     02-17    139  |  103  |  38<H>  ----------------------------<  135<H>  5.6<H>   |  31  |  1.26    Ca    8.3<L>      17 Feb 2019 07:14  Phos  4.7     02-16  Mg     1.8     02-16    TPro  6.1  /  Alb  2.8<L>  /  TBili  0.7  /  DBili  x   /  AST  30  /  ALT  11<L>  /  AlkPhos  71  02-16    LIVER FUNCTIONS - ( 16 Feb 2019 06:21 )  Alb: 2.8 g/dL / Pro: 6.1 gm/dL / ALK PHOS: 71 U/L / ALT: 11 U/L / AST: 30 U/L / GGT: x                       RADIOLOGY & ADDITIONAL STUDIES:     EXAM:  CT CHEST                            PROCEDURE DATE:  02/15/2019          INTERPRETATION:  CT CHEST    HISTORY:  right pleural effusion                     TECHNIQUE: Noncontrast CT of the chest was performed. Coronal and   sagittal images were reconstructed. This study was performed using   automatic exposure control (radiation dose reduction software) to obtain   a diagnostic image quality scan with patient dose as low as reasonably   achievable.    COMPARISON: Same day chest x-ray, chest CT 9/21/2018    FINDINGS:    LUNGS, AIRWAYS: The central airways are patent. Mild bibasilar mucoid   impaction. Left basilar compressive atelectasis.    PLEURA: Enlarging small right and moderate-large left pleural effusion.    VESSELS: Aortic atherosclerosis without aneurysm.    HEART: Mild cardiomegaly. No pericardial effusion. Coronary, mitral   annular, and aortic valve calcification.    MEDIASTINUM AND DANUTA: No adenopathy.    UPPER ABDOMEN: Limited visualization is unremarkable.    BONES AND SOFT TISSUES: Multiple healing fractures again noted    IMPRESSION:     Enlarging small right and moderate-large left pleural effusion.  Mild   bibasilar mucoid impaction. Left basilar compressive atelectasis.
HPI: Ms. Chandler is an 89y old Female coming from [facility] (resident since 2015) with hx of dementia, afib on eliquis, GERD, HTN, HLD, depression, PE/DVT, COPD, UTIs, urinary retention, constipation admitted 2/15 due to dehydration and unable to toelrate po intake, often not been eating at nursing home and when he tries to feed her she occasionally coughs then food goes down. Of note, patient has been seen multiple times from S/S team and has recommended a mechanical soft nectar thick diet. In ED, labs are unremarkable. CTH wit no acute changes, CT C/ab/pelvis also done showing mod-large left pleural effusion likely 2/2 to CHF. Echo also done showing EF 35%, and mod-sev MR + TR. Palliative care consulted to assist with establishing GOC.     Met Ms. Smith this afternoon, eldest son Ondina at bedside. Pt awake and alert, but otherwise poor historian. She was able to orient to self, but not place, year, or circumstance. Pt denied pain, dyspnea or discomfort. Gathered remainder of HPI from son Ondina.     Son notes he has 2 other brothers: middle son Louis (lives the closest and spends the most time), and youngest son Concepcion who lives out of state and is coming in today. He (Ondina) feels like he just wants pt to be comfortable, but notes his brothers may feel otherwise. He also noted that his brother Louis is sometimes "emotional" having issues with small things like morphine, but he spoke with him and after discussing with us was ok with having small dose of IV morphine available for if pt is unable to swallow po meds. Reassured him that this would only be used prn and if pt unable to tolerate tramadol. He notes intent to speak to his brothers to make a decision, but is open to meeting at 1pm tomorrow to sit review all issues and make a final plan. Also noted intent to review MOLST for completion as well.       PAIN: ( )Yes   (x )No- not currently, but RN also endorses this    DYSPNEA: ( ) Yes  (x ) No    PAST MEDICAL & SURGICAL HISTORY:  Femur neck fracture  Vertebral fracture, pathological  Neurogenic bladder  Afib  GERD (gastroesophageal reflux disease)  Pulmonary embolism  UTI (lower urinary tract infection)  HTN (hypertension)  Pulmonary embolism  Memory impairment  Hypertension  Atrial fibrillation  Hyponatremia  Shortness of breath: etiology probably copd  Urinary retention  Arthritis  DVT (deep venous thrombosis)  History of hip surgery: right gamma nail      SOCIAL HX: Lives at [facility], , 3 sons    Hx opiate tolerance (x )YES  ( ) NO    Baseline ADLs  (Prior to Admission)- as per NH paperwork pt needs assistance for all ADLs  ( ) Independent   (x )Dependent    FAMILY HISTORY:  unable to obtain given dementia    Review of Systems:    Nausea- denies now, son notes this after eating  Vomiting- before  Otherwise limited 2/2 to dementia      PHYSICAL EXAM:    Vital Signs Last 24 Hrs  T(C): 37.1 (20 Feb 2019 11:33), Max: 37.1 (20 Feb 2019 11:33)  T(F): 98.8 (20 Feb 2019 11:33), Max: 98.8 (20 Feb 2019 11:33)  HR: 119 (20 Feb 2019 13:43) (92 - 119)  BP: 126/80 (20 Feb 2019 13:43) (102/68 - 136/79)  BP(mean): --  RR: 181 (20 Feb 2019 11:33) (18 - 181)  SpO2: 96% (20 Feb 2019 11:33) (96% - 98%)  Daily     Daily     PPSV2: 30  %  FAST: 7    General: Elderly female sitting up in the bed, ill-appearing, thin, awake, alert, follows commands, NAD  Mental Status: AOx1 (self only)  HEENT: dmm, perrl, eomi + temporal wasting  Lungs: dec at bases bl L>R  Cardiac: +s1 s2 tachy  GI: soft nt nd +bs, diaper in place  : incontinent  Ext: moves all extremities, to touch, muscle and fat wasting throughout  Neuro: no focal deficits outside of forgetfulness      LABS:                        11.7   7.33  )-----------( 187      ( 19 Feb 2019 07:22 )             39.0     02-20    144  |  109<H>  |  21  ----------------------------<  103<H>  3.9   |  31  |  0.75    Ca    8.1<L>      20 Feb 2019 12:21        Albumin: Albumin, Serum: 2.8 g/dL (02-16 @ 06:21)      Allergies    No Known Allergies    Intolerances      MEDICATIONS  (STANDING):  acetylcysteine 10%  Inhalation 4 milliLiter(s) Inhalation three times a day  dextrose 5% + sodium chloride 0.9%. 1000 milliLiter(s) (50 mL/Hr) IV Continuous <Continuous>  diltiazem    Tablet 90 milliGRAM(s) Oral three times a day  enoxaparin Injectable 40 milliGRAM(s) SubCutaneous every 24 hours  metoprolol succinate ER 25 milliGRAM(s) Oral daily  mirtazapine 15 milliGRAM(s) Oral at bedtime  multivitamin 1 Tablet(s) Oral daily  polyethylene glycol 3350 17 Gram(s) Oral daily    MEDICATIONS  (PRN):  acetaminophen   Tablet .. 650 milliGRAM(s) Oral every 6 hours PRN Temp greater or equal to 38C (100.4F), Mild Pain (1 - 3)  bisacodyl Suppository 10 milliGRAM(s) Rectal daily PRN Constipation  mineral oil enema 133 milliLiter(s) Rectal daily PRN constipation  traMADol 50 milliGRAM(s) Oral every 6 hours PRN for moderate pain      RADIOLOGY/ADDITIONAL STUDIES:    EXAM:  CT BRAIN                        PROCEDURE DATE:  02/15/2019      IMPRESSION:  No hemorrhage or mass effect.     If there are new or persistent symptoms, consider further evaluation with   brain MRI if clinically warranted and if there are no contraindications.    LEÓN LINK   This document has been electronically signed. Feb 15 2019  7:56AM      EXAM:  CT ABDOMEN AND PELVIS IC                        PROCEDURE DATE:  02/15/2019      IMPRESSION:     No acute abdominal pathology.  Large amount ofstool in the colon.  Large right pleural effusion.    CAREN CACERES   This document has been electronically signed. Feb 15 2019  8:28AM      EXAM:  CT CHEST                        PROCEDURE DATE:  02/15/2019      IMPRESSION:     Enlarging small right and moderate-large left pleural effusion.  Mild   bibasilar mucoid impaction. Left basilar compressive atelectasis.    MAGALI EDMOND   This document has been electronically signed. Feb 15 2019  2:46PM       EXAM:  ECHO TTE WO CON COMP W DOP    PROCEDURE DATE:  02/18/2019       Summary     The left ventricle size is normal, moderate hypokinesis, and moderately   reduced function. The mid and apical septum are akinetic.   Estimated left ventricular ejection fraction is 35 %.   The left atrium is moderately dilated.   The right atrium appears moderately dilated.   The right ventricle exhibits mild dilation, mild to moderate diffuse   hypokinesis, and moderate depression of contractility.   There are fibrocalcific changes noted to the aortic valve leaflets with   restriction in leaflet excursion. Probable mild aortic stenosis.Mild   aortic insufficiency noted.   Fibrocalcific changes noted to the mitral valve leaflets with preserved   leaflet excursion.   Mild mitral annular calcification is present.   Moderate to severe (3+) mitral regurgitation is present.   Moderate to severe (3+) tricuspid valve regurgitation is present.   No evidence of pericardial effusion.     Signature     ----------------------------------------------------------------   Electronically signed by Dick Clifton MD(Interpreting physician)   on 02/18/2019 05:34 PM  ----------------------------------------------------------------
History of Present Illness:  89y Female presents with n/v and found to have mod lt pleural effusion.  Demented and minimally responsive.  Info obtained from chart    Past Medical History  Femur neck fracture  Vertebral fracture, pathological  Neurogenic bladder  DVT (deep venous thrombosis)  Afib  GERD (gastroesophageal reflux disease)  Pulmonary embolism  UTI (lower urinary tract infection)  HTN (hypertension)  Pulmonary embolism  Memory impairment  Hypertension  Atrial fibrillation  Hyponatremia  Shortness of breath: etiology probably copd  Urinary retention  Arthritis  DVT (deep venous thrombosis)      Past Surgical History  No significant past surgical history  History of hip surgery: right gamma nail  No significant past surgical history      MEDICATIONS  (STANDING):  acetylcysteine 10%  Inhalation 4 milliLiter(s) Inhalation three times a day  diltiazem    Tablet 90 milliGRAM(s) Oral three times a day  metoprolol succinate ER 25 milliGRAM(s) Oral daily  mirtazapine 15 milliGRAM(s) Oral at bedtime  multivitamin 1 Tablet(s) Oral daily  piperacillin/tazobactam IVPB. 3.375 Gram(s) IV Intermittent every 8 hours  polyethylene glycol 3350 17 Gram(s) Oral daily  sodium chloride 0.9%. 1000 milliLiter(s) (50 mL/Hr) IV Continuous <Continuous>    MEDICATIONS  (PRN):  acetaminophen   Tablet .. 650 milliGRAM(s) Oral every 6 hours PRN Temp greater or equal to 38C (100.4F), Mild Pain (1 - 3)  bisacodyl Suppository 10 milliGRAM(s) Rectal daily PRN Constipation  saline laxative (FLEET) Rectal Enema 1 Enema Rectal daily PRN for constipation  traMADol 50 milliGRAM(s) Oral every 6 hours PRN for moderate pain    Antiplatelet therapy:                           Last dose/amt:    Allergies: No Known Allergies      SOCIAL HISTORY:  Smoker: [ ] Yes  [ x] No        PACK YEARS:                         WHEN QUIT?  ETOH use: [ ] Yes  [x ] No              FREQUENCY / QUANTITY:  Ilicit Drug use:  [ ] Yes  [ ] No  Occupation:  Live with:  Assist device use:    Relevant Family History  FAMILY HISTORY:      Review of Systems: unable to assess  GENERAL:  Fevers[] chills[] sweats[] fatigue[] weight loss[] weight gain []                                        NEURO:  parathesias[] seizures []  syncope []  confusion []                                                                                  EYES: glasses[]  blurry vision[]  discharge[] pain[] glaucoma []                                                                            ENMT:  difficulty hearing []  vertigo[]  dysphagia[] epistaxis[] recent dental work []                                      CV:  chest pain[] palpitations[] RICHARDSON [] diaphoresis [] edema[]                                                                                             RESPIRATORY:  wheezing[] SOB[] cough [] sputum[] hemoptysis[]                                                                    GI:  nausea[]  vomiting []  diarrhea[] constipation [] melena []                                                                        : hematuria[ ]  dysuria[ ] urgency[] incontinence[]                                                                                              MUSKULOSKELETAL:  arthritis[ ]  joint swelling [ ] muscle weakness [ ]                                                                  SKIN/BREAST:  rash[ ] itching [ ]  hair loss[ ] masses[ ]                                                                                                PSYCH:  dementia [ ] depresion [ ] anxiety[ ]                                                                                                                  HEME/LYMPH:  bruises easily[ ] enlarged lymph nodes[ ] tender lymph nodes[ ]                                                 ENDOCRINE:  cold intolerance[ ] heat intolerance[ ] polydipsia[ ]                                                                              PHYSICAL EXAM  Vital Signs Last 24 Hrs  T(C): 36.4 (18 Feb 2019 05:28), Max: 36.4 (17 Feb 2019 11:59)  T(F): 97.5 (18 Feb 2019 05:28), Max: 97.6 (17 Feb 2019 21:29)  HR: 100 (18 Feb 2019 05:22) (69 - 100)  BP: 105/71 (18 Feb 2019 05:28) (92/63 - 110/77)  BP(mean): --  RR: 16 (18 Feb 2019 05:28) (16 - 18)  SpO2: 95% (18 Feb 2019 05:28) (95% - 98%)    General: Sedate, not responding well to conversation.                                                         Neuro:not oriented                 Eyes: Normal exam of conjunctiva & lids, pupils equally reactive.   ENT: Normal exam of nasal/oral mucosa with absence of cyanosis.   Neck: Normal exam of jugular veins, trachea & thyroid.   Chest: decr lt chest                                                                        CV:  Auscultation: normal [x ] S3[ ] S4[ ] Irregular [ ] Rub[ ] Clicks[ ]  Murmurs none:[ ]systolic [ ]  diastolic [ ] holosystolic [ ]  Carotids: No Bruits[ ] Other____________ Abdominal Aorta: normal [ ] nonpalpable[ ]                                                                         GI: Normal exam of abdomen, liver & spleen with no noted masses or tenderness.                                                                                              Extremities: Normal no evidence of cyanosis or deformity Edema: none[ ]trace[ ]1+[ ]2+[ ]3+[ ]4+[ ]  Lower Extremity Pulses: Right[ ] Left[ ]Varicosities[ ]  SKIN : Normal exam to inspection & palation.                                                           LABS:                        12.9   9.71  )-----------( 229      ( 17 Feb 2019 07:14 )             41.9     02-18    142  |  104  |  38<H>  ----------------------------<  115<H>  4.7   |  29  |  1.06    Ca    8.3<L>      18 Feb 2019 05:28                  CXR:  CT Chest: mod lt effusion with compressive atelectasis        Assessment:  89y Female presents with Dehydration found to have lt pleural effusion.  Comfortable at rest but overall prognosis poor.  Family undecided whether want aggressive tx.  May need thora if SOB.  Hold anticoagulation       Plan:

## 2019-02-20 NOTE — PROGRESS NOTE ADULT - SUBJECTIVE AND OBJECTIVE BOX
· Subjective and Objective: 	  History of Present Illness: 	  89 female with h/o dementia, afib on eliquis, GERD, HTN, HLD, depression, PE/DVT, COPD, UTIs, urinary retention, constipation presenting from [facility] due to dehydration and unable to toelrate po intake.  patient is demented therefore much of history provided by chart and son.  spoke to son, Tessa over phone, states that patient has not been eating at nursing home and when he tries to feed her she occasionally coughs then food goes down.  In chart, patient has been seen multiple times from S/S team and has recommended a mechanical soft nectar thick diet.      In ED, labs are unremarkable. ct ab/pelvis- left pleural effusion.  afebrile.   2/16- patient awake demented at abseline, poor historian  2/18- awake , follows commands, reports she has generalised pain, still with very low apetites, no po intake today  2/20-no change in status from yesterday, no respiratory compromise  GENERAL: NAD, Alert, awake, oriented to person, frail,   	HEENT: dry mucous membranes  	HEAD:  Atraumatic, Normocephalic  	EYES: EOMI, PERRLA, Conjunctiva and sclera clear  	NECK: Supple, No JVD, No lymphadenopathy  	CHEST/LUNG: decreased breaths ounds B/l lung abses   	HEART: Regular rate and rhythm; No murmurs, rubs, or gallops  	ABDOMEN: Soft, Non-tender, Non-distended; Bowel sounds present  	GENITOURINARY- Voiding, No palpable bladder  	EXTREMITIES: no edema      PHYSICAL EXAM:    Daily     Daily     ICU Vital Signs Last 24 Hrs  T(C): 37.1 (20 Feb 2019 11:33), Max: 37.1 (20 Feb 2019 11:33)  T(F): 98.8 (20 Feb 2019 11:33), Max: 98.8 (20 Feb 2019 11:33)  HR: 119 (20 Feb 2019 13:43) (92 - 119)  BP: 126/80 (20 Feb 2019 13:43) (102/68 - 136/79)  BP(mean): --  ABP: --  ABP(mean): --  RR: 181 (20 Feb 2019 11:33) (18 - 181)  SpO2: 96% (20 Feb 2019 11:33) (96% - 98%)                              11.7   7.33  )-----------( 187      ( 19 Feb 2019 07:22 )             39.0       CBC Full  -  ( 19 Feb 2019 07:22 )  WBC Count : 7.33 K/uL  Hemoglobin : 11.7 g/dL  Hematocrit : 39.0 %  Platelet Count - Automated : 187 K/uL  Mean Cell Volume : 98.0 fl  Mean Cell Hemoglobin : 29.4 pg  Mean Cell Hemoglobin Concentration : 30.0 gm/dL  Auto Neutrophil # : 4.26 K/uL  Auto Lymphocyte # : 1.76 K/uL  Auto Monocyte # : 0.81 K/uL  Auto Eosinophil # : 0.32 K/uL  Auto Basophil # : 0.10 K/uL  Auto Neutrophil % : 58.0 %  Auto Lymphocyte % : 24.0 %  Auto Monocyte % : 11.1 %  Auto Eosinophil % : 4.4 %  Auto Basophil % : 1.4 %      02-20    144  |  109<H>  |  21  ----------------------------<  103<H>  3.9   |  31  |  0.75    Ca    8.1<L>      20 Feb 2019 12:21                              MEDICATIONS  (STANDING):  acetylcysteine 10%  Inhalation 4 milliLiter(s) Inhalation three times a day  dextrose 5% + sodium chloride 0.9%. 1000 milliLiter(s) (50 mL/Hr) IV Continuous <Continuous>  diltiazem    Tablet 90 milliGRAM(s) Oral three times a day  enoxaparin Injectable 40 milliGRAM(s) SubCutaneous every 24 hours  metoprolol succinate ER 25 milliGRAM(s) Oral daily  mirtazapine 15 milliGRAM(s) Oral at bedtime  multivitamin 1 Tablet(s) Oral daily  polyethylene glycol 3350 17 Gram(s) Oral daily

## 2019-02-20 NOTE — PROGRESS NOTE ADULT - ASSESSMENT
· Assessment		    #dysphagia with vomiting, can be related to advancing dementia vs gerd  vs structural pathology of esophagus  # patient with large L pleural effusion and advanced age and dementia poor candidate for EGD  # large L pleural effusion/DDX CHF vs aspiration PNA vs ? malignancy  #Also appears clinically dehydrated  # Hyperkalemia likely from hypovolemia  # At this time appears to have poor prognosis    I dicussed with son Tello Chandler who is unsure at this point and would like to discuss with his brother decide if they would like for patient to take the hospice route vs aggressive management with PEG and Thoracentesis  will redicuss this issue again, currently undecided  phone [phone]  Patient would benifit from palliative care and possibly hospice   would attempt to reach sons again   if sons would like to be agressive then would need left thoracentesis  for now due to inability to tolerate po, will give slow IVF ,although ha s further risk of worsening of CHF  continue tramadol for generailsed pain  and continue BB and cardizem with holding parameters for BP  slow IVF for 10hrs  started on DVT prophylaxis as eliquis has been held  I discussed with palliative care    ct ab/pelvis-constipation, .  cth-neg  speech swallow eval recommended regular diet-miralax, colace  daily  s/p lactulose  -ppiif unable to tolerate diet may eventuall need peg   discussed with sons  gi /cardio and pulm consults appreciated     left pleural effusion, /aspiration PNA vs GNR rods vs malignancy vs CHF  respiratory function reasonably stable   s/p IV lasix, no further IV lasix as BP low normal,   echo   continue  zosyn now  neb tx with mucomyst for mucus plugging        afib on eliquis/GERD/HTN/HLD/depression/PE/DVT/ COPD/ multiple uti s/p urinary retention/constipation  -cont home meds     dvt prophylaxis  -eliquis     disposition   -DNR/DNI, Molst form completd in chart by me today 2/19, i discussed with son regarding advanced care planning as above and time spent for advanced care planning is 30mins

## 2019-02-20 NOTE — PROGRESS NOTE ADULT - SUBJECTIVE AND OBJECTIVE BOX
Subjective: Patient resting in bed.    Vital Signs:  Vital Signs Last 24 Hrs  T(C): 36.4 (02-20-19 @ 05:27), Max: 37.2 (02-19-19 @ 12:03)  T(F): 97.6 (02-20-19 @ 05:27), Max: 98.9 (02-19-19 @ 12:03)  HR: 108 (02-20-19 @ 05:27) (90 - 117)  BP: 102/68 (02-20-19 @ 05:27) (102/68 - 136/79)  RR: 18 (02-20-19 @ 05:27) (18 - 18)  SpO2: 96% (02-20-19 @ 05:27) (96% - 99%) on NC      Relevant labs, radiology and Medications reviewed                        11.7   7.33  )-----------( 187      ( 19 Feb 2019 07:22 )             39.0     02-19    143  |  108  |  29<H>  ----------------------------<  113<H>  3.9   |  33<H>  |  0.83    Ca    7.9<L>      19 Feb 2019 07:22        MEDICATIONS  (STANDING):  acetylcysteine 10%  Inhalation 4 milliLiter(s) Inhalation three times a day  dextrose 5% + sodium chloride 0.9%. 1000 milliLiter(s) (50 mL/Hr) IV Continuous <Continuous>  diltiazem    Tablet 90 milliGRAM(s) Oral three times a day  enoxaparin Injectable 40 milliGRAM(s) SubCutaneous every 24 hours  metoprolol succinate ER 25 milliGRAM(s) Oral daily  mirtazapine 15 milliGRAM(s) Oral at bedtime  multivitamin 1 Tablet(s) Oral daily  polyethylene glycol 3350 17 Gram(s) Oral daily    MEDICATIONS  (PRN):  acetaminophen   Tablet .. 650 milliGRAM(s) Oral every 6 hours PRN Temp greater or equal to 38C (100.4F), Mild Pain (1 - 3)  bisacodyl Suppository 10 milliGRAM(s) Rectal daily PRN Constipation  mineral oil enema 133 milliLiter(s) Rectal daily PRN constipation  traMADol 50 milliGRAM(s) Oral every 6 hours PRN for moderate pain    I&O's Summary    19 Feb 2019 07:01  -  20 Feb 2019 07:00  --------------------------------------------------------  IN: 463 mL / OUT: 0 mL / NET: 463 mL        Physical exam  Gen: NAD, breathing comfortably.  Neuro: Awake.  Card: S1 S2.  Pulm: Decreased breath sounds on the left.  Abd: Soft NT ND.  Ext: No edema.      Assessment  89y Female  w/ PAST MEDICAL & SURGICAL HISTORY:  Femur neck fracture  Vertebral fracture, pathological  Neurogenic bladder  Afib  GERD (gastroesophageal reflux disease)  Pulmonary embolism  UTI (lower urinary tract infection)  HTN (hypertension)  Pulmonary embolism  Memory impairment  Hypertension  Atrial fibrillation  Hyponatremia  Shortness of breath: etiology probably copd  Urinary retention  Arthritis  DVT (deep venous thrombosis)  History of hip surgery: right gamma nail  admitted with complaints of vomiting, decreased po intake (18 Feb 2019 17:38)      PLAN  Bedside RN states family opting for hospice.  Will sign off, please reconsult as needed.

## 2019-02-20 NOTE — CONSULT NOTE ADULT - REASON FOR ADMISSION
vomiting, decreased po intake

## 2019-02-21 RX ADMIN — TRAMADOL HYDROCHLORIDE 50 MILLIGRAM(S): 50 TABLET ORAL at 11:59

## 2019-02-21 RX ADMIN — Medication 25 MILLIGRAM(S): at 12:00

## 2019-02-21 RX ADMIN — MORPHINE SULFATE 1 MILLIGRAM(S): 50 CAPSULE, EXTENDED RELEASE ORAL at 13:44

## 2019-02-21 RX ADMIN — TRAMADOL HYDROCHLORIDE 50 MILLIGRAM(S): 50 TABLET ORAL at 13:49

## 2019-02-21 RX ADMIN — Medication 1 TABLET(S): at 11:59

## 2019-02-21 RX ADMIN — SODIUM CHLORIDE 50 MILLILITER(S): 9 INJECTION, SOLUTION INTRAVENOUS at 03:23

## 2019-02-21 RX ADMIN — POLYETHYLENE GLYCOL 3350 17 GRAM(S): 17 POWDER, FOR SOLUTION ORAL at 12:01

## 2019-02-21 RX ADMIN — MIRTAZAPINE 15 MILLIGRAM(S): 45 TABLET, ORALLY DISINTEGRATING ORAL at 21:40

## 2019-02-21 RX ADMIN — MORPHINE SULFATE 1 MILLIGRAM(S): 50 CAPSULE, EXTENDED RELEASE ORAL at 14:04

## 2019-02-21 RX ADMIN — TRAMADOL HYDROCHLORIDE 50 MILLIGRAM(S): 50 TABLET ORAL at 21:40

## 2019-02-21 NOTE — PROGRESS NOTE ADULT - ASSESSMENT
89y old Female coming from [facility] (resident since 2015) with hx of dementia, afib on eliquis, GERD, HTN, HLD, depression, PE/DVT, COPD, UTIs, urinary retention, constipation admitted 2/15 due to dehydration and unable to toelrate po intake, often not been eating at nursing home and when he tries to feed her she occasionally coughs then food goes down. Of note, patient has been seen multiple times from S/S team and has recommended a mechanical soft nectar thick diet. In ED, labs are unremarkable. CTH wit no acute changes, CT C/ab/pelvis also done showing mod-large left pleural effusion likely 2/2 to CHF. Echo also done showing EF 35%, and mod-sev MR + TR. Palliative care consulted to assist with establishing GOC.     1) Pain  - denies at present, but as per hospitalist, family, and RN this happens often  - pain is not localized when it occurs, likely musculoskeletal  - c/w tramadol 50mg prn  - if unable or unwilling to take po added very low dose IV morphine 1mg q4h prn     2) Dysphagia  - GI and speech and swallow eval appreciated  - impression of advancing dementia vs. GERD, EGD not recommended  - no aspiration signs on speech eval but some retching after  - regular diet recommended  - pt with poor intake and often refusing po meds as per nursing staff    3) Effusion/CHF  - CHF with echo showing EF 35% and mod-sev MR and TR  - pulm notes and imaging appreciated  - recurrent effusion with thoracentesis in September revealing transudate  - supplemental O2 as needed  - opioids may also be helpful for any air hunger  - CT surgery notes also appreciated- recommending conservative measures, no need for thoracentesis as not emergent    4) Debility  - PPS<40%  - rec dietary consult cc: documentation of malnutrition (muscle and fat wasting noted on exam)  - as per NH records pt needs assistance for all ADLs    5) Prognosis  - poor  - in setting of advanced age, advanced dementia , FAST 7 (needs assistance for all ADLs, incontinent), CHF with EF 35% recurrent effusions, malnutrition, PPS<40%, dysphagia, seems hospice eligible    6) GOC/Advanced Directives  - pt does not have capacity for decision making 2/2 to dementia  - no HCP on file, thus sons would be sharing surrogate decision making responsibilities: 1) Louis [phone]; 2) Ondina; 3) Concepcion [phone]  - DNR and DNI on MOLST (first page needs witnesses and second page needs surrogate signature or oral consent box to be checked)  - [facility] meeting scheduled for 1pm today    Thank you for including us in Ms. Chandler's care. Will continue to follow with you.    Tanya Jacinto MD  Palliative Care Attending

## 2019-02-21 NOTE — GOALS OF CARE CONVERSATION - PERSONAL ADVANCE DIRECTIVE - WHAT MATTERS MOST
keeping pt. comfortable
Comfort, family understands pt will eventually die, they just want to feel like they have done all they can. Ondina (son who was present) seems very reasonable and is conversing with his brothers who he understands must continue to be on the same page.

## 2019-02-21 NOTE — GOALS OF CARE CONVERSATION - PERSONAL ADVANCE DIRECTIVE - CONVERSATION DETAILS
Team met with pts son to discuss goals of care, assist with planning and provide supportive counseling. Pt. is a long term resident at [facility]. Pts son discussed how pt. needs assistance with all ADLs and is not active.    Pts current medical condition and goals of care discussed. Pts son discussed how he has spoken with his brothers and they have decided to not pursue a PEG. They wish to focus on keeping pt. comfortable. Team discussed option of hospice services at [facility]. We also explained inpatient hospice services however, pt. is able to take PO medications therefore, would not be eligible for inpatient hospice at this time. We explained what hospice would look like at [facility] and the philosophy. Pts son is in agreement with this plan and would like pt. to return to [facility] with [facility].     Team reviewed and completed and new MOLST. MOLST states DNR/DNI, Comfort Measures, Do not send to hospital, No feeding tube, Trial of IV fluids, Determine use of Antibiotics, No Dialysis, No Transfusions, No ICU, No Pressors.    Plan at this time is for pt. to return to [facility] with home hospice services. Emotional support provided. Our team will continue to follow.
Met with pt's son to discuss medical issues and overall GOC. He explained that pt has been living at [facility] since , a few years after her  . Initially, pt was doing well, calling family members and walking with walker. However, now pt is fully dependent for all ADLs and spends majority of time in the bed. He notes she still remembers her children and some day-to-day events, but does have cognitive decline. Decline including some pertinent odd thoughts like sometimes thinking people are trying to poison her with pills based on a remote conversation with her brother who is a physician in [place] who once cautioned her against need for HLD meds since she already eats so well. Son notes that at this point they know pt is older and not going to be cured, having spoken to his brother's already and opting for more of a comfort focus. He was open to full review of medical issues and plans.     Son was already aware of pleural effusion noting that this has already happened last year and he and his brothers would like a tap so they can feel like they have done all that they can. Took time to explain that this is coming from her CHF, EF 35% and that this is considered a chronic and progressive illness with high likelihood of recurrence of sx like this which we have already seen. He was able to acknowledge this and that pt's dementia is also chronic and progressive with functional decline, dysphagia, and recurrent infections like her UTIs.     Reviewed options given the above including 1) regular home care/palliative plan: acknowledge chronic and progressive illnesses are present and pt is likely to return to the hospital with recurrent issues, allowing pt to return to hospital for further interventions; or 2) focus on comfort, without having to return to hospital, via hospice. Spent time explaining each option in detail including differentiating between home and inpt hospice (pt is eligible for home since she is taking po meds currently). Son was clear that he would prefer home hospice (at [facility]) with [facility] as agency, wanting a referral to be placed to arrange this. We discussed how CT surgery has indicated high risk of procedure and that this is not emergently necessary. Family would still like to have hospitalist inquire with CTS team to see if thoracentesis can be done, which we discussed with Dr. Roque who will pursue this.     We also reviewed and replaced MOLST with completed form, including decision against PEG as they would not want pt to have a feeding tube, noting astutely that this would not prolong her life.     Ultimately, plan is for Dr. roque to reach out to [facility] cc: possibility of thoracentesis and in the interim floor SW will place home hospice referral with [facility] at [facility]. Discussed above with Dr. Rouqe, VERONICA Motley, and THELMA Collins. Will continue to follow with you.

## 2019-02-21 NOTE — PROGRESS NOTE ADULT - ASSESSMENT
# Dysphagia with vomiting, can be related to advancing dementia vs gerd  vs structural pathology of esophagus  # large L pleural effusion 2ndry to Acute on Chronic Systolic CHF vs aspiration PNA vs ? malignancy  # Hyperkalemia likely from hypovolemia and Dehydration  -Due to Advanced age and dementia, patient will be a poor candidate for EGD. At this time appears to have poor prognosis  -CT head NAD  -CT Chest Noted   -s/p IV lasix, no further IV lasix as BP low normal   -Echo -  Estimated left ventricular ejection fraction is 35 % and mod-sev MR and TR  -continue zosyn   -neb tx with mucomyst for mucus plugging   -speech swallow eval recommended regular texture with thin liquids   -Pulm consult appreciated   -Palliative care consult appreciated - Home with hospice  -continue tramadol for generalized pain  -Garcia Myers [phone] has agreed to home hospice but would like to still continue with thoracentesis  -Discussed with Dr Rutledge and will plan to do thoracentesis tomorrow    *Constipation  -ct ab/pelvis-constipation,   -continue miralax, colace S/P lactulose     *afib on eliquis/GERD/HTN/HLD/depression/PE/DVT/COPD/ multiple uti s/p urinary retention/constipation  -cont home meds   -continue BB and cardizem with holding parameters for BP   -Hold Eliquis    *dvt prophylaxis  -eliquis on Hold      disposition   -DNR/DNI, Home with hospice

## 2019-02-21 NOTE — PROGRESS NOTE ADULT - SUBJECTIVE AND OBJECTIVE BOX
HPI: Pt seen and examined this am in follow up for sx and GOC, RN at bedside. Pt denies pain or discomfort, though slumped to side in bed, repositioned. Pt mildly confused, mixing native language with English. No issues as per RN. Plan for GOC meeting with sons this afternoon at 1pm.       PAIN: denies  DYSPNEA: denies      ROS:    Otherwise limited by dementia      PHYSICAL EXAM:    Vital Signs Last 24 Hrs  T(C): 36.7 (21 Feb 2019 05:23), Max: 37.1 (20 Feb 2019 11:33)  T(F): 98.1 (21 Feb 2019 05:23), Max: 98.8 (20 Feb 2019 11:33)  HR: 76 (21 Feb 2019 05:23) (76 - 119)  BP: 107/71 (21 Feb 2019 05:23) (107/71 - 126/80)  BP(mean): --  RR: 18 (21 Feb 2019 05:23) (18 - 181)  SpO2: 99% (21 Feb 2019 05:23) (96% - 99%)  Daily     Daily     PPSV2: 30  %  FAST: 7    General: Elderly female slumped to the side in the bed, ill-appearing, thin, awake, alert, follows commands, better once repositioned, NAD  Mental Status: AOx1 (self only)  HEENT: dmm, perrl, eomi + temporal wasting  Lungs: dec at bases bl L>R  Cardiac: +s1 s2 tachy  GI: soft nt nd +bs, diaper in place  : incontinent  Ext: moves all extremities, to touch, muscle and fat wasting throughout  Neuro: no focal deficits outside of forgetfulness    LABS:    02-20    144  |  109<H>  |  21  ----------------------------<  103<H>  3.9   |  31  |  0.75    Ca    8.1<L>      20 Feb 2019 12:21        Albumin: Albumin, Serum: 2.8 g/dL (02-16 @ 06:21)      Allergies    No Known Allergies    Intolerances      MEDICATIONS  (STANDING):  acetylcysteine 10%  Inhalation 4 milliLiter(s) Inhalation three times a day  dextrose 5% + sodium chloride 0.45%. 1000 milliLiter(s) (50 mL/Hr) IV Continuous <Continuous>  dextrose 5% + sodium chloride 0.9%. 1000 milliLiter(s) (50 mL/Hr) IV Continuous <Continuous>  diltiazem    Tablet 90 milliGRAM(s) Oral three times a day  enoxaparin Injectable 40 milliGRAM(s) SubCutaneous every 24 hours  metoprolol succinate ER 25 milliGRAM(s) Oral daily  mirtazapine 15 milliGRAM(s) Oral at bedtime  multivitamin 1 Tablet(s) Oral daily  polyethylene glycol 3350 17 Gram(s) Oral daily    MEDICATIONS  (PRN):  acetaminophen   Tablet .. 650 milliGRAM(s) Oral every 6 hours PRN Temp greater or equal to 38C (100.4F), Mild Pain (1 - 3)  bisacodyl Suppository 10 milliGRAM(s) Rectal daily PRN Constipation  glycopyrrolate Injectable 0.2 milliGRAM(s) IV Push every 6 hours PRN secretions  mineral oil enema 133 milliLiter(s) Rectal daily PRN constipation  morphine  - Injectable 1 milliGRAM(s) IV Push every 4 hours PRN breakthough pain or dyspnea  traMADol 50 milliGRAM(s) Oral every 6 hours PRN for moderate pain

## 2019-02-21 NOTE — GOALS OF CARE CONVERSATION - PERSONAL ADVANCE DIRECTIVE - TREATMENT GUIDELINE COMMENT
MOLST decisions: DNR, comfort measures only (upon dc), DNI, do not send, no feeding tube, trial of IVF, determine use of abx, no dialysis, no transfusions, no icu, no pressors.     *Spent 50 minutes discussing GOC with family including Advance care planning, explanation and discussion of advance directives, reviewed all treatment/dispo options, and MOLST.

## 2019-02-21 NOTE — PROGRESS NOTE ADULT - SUBJECTIVE AND OBJECTIVE BOX
HOSPITALIST PROGRESS NOTE:  SUBJECTIVE:  PCP: yelena ng    Chief Complaint: Patient is a 89y old  Female who presents with a chief complaint of vomiting, decreased po intake (21 Feb 2019 10:07)    HPI:  89 female with h/o dementia, afib on eliquis, GERD, HTN, HLD, depression, PE/DVT, COPD, UTIs, urinary retention, constipation presenting from [facility] due to dehydration and unable to toelrate po intake.  patient is demented therefore much of history provided by chart and son.  spoke to sonTessa over phone, states that patient has not been eating at nursing home and when he tries to feed her she occasionally coughs then food goes down.  In chart, patient has been seen multiple times from S/S team and has recommended a mechanical soft nectar thick diet.    In ED, labs are unremarkable. ct ab/pelvis- left pleural effusion.  afebrile.   2/16- patient awake demented at abseline, poor historian  2/18- awake , follows commands, reports she has generalised pain, still with very low apetites, no po intake today  2/20-no change in status from yesterday, no respiratory compromise    2/21: Above Reviewed. Patient has no complaints. nonverbal, moans when i touch her; Discussed with Garcia Myers.       Allergies:  No Known Allergies    REVIEW OF SYSTEMS:  See HPI. All other review of systems is negative unless indicated above.     OBJECTIVE  Physical Exam:  Vital Signs:    Vital Signs Last 24 Hrs  T(C): 36.7 (21 Feb 2019 11:59), Max: 36.7 (21 Feb 2019 05:23)  T(F): 98.1 (21 Feb 2019 11:59), Max: 98.1 (21 Feb 2019 05:23)  HR: 124 (21 Feb 2019 13:32) (76 - 124)  BP: 118/89 (21 Feb 2019 13:32) (107/71 - 120/85)  BP(mean): --  RR: 16 (21 Feb 2019 11:59) (16 - 18)  SpO2: 98% (21 Feb 2019 11:59) (96% - 99%)  I&O's Summary    21 Feb 2019 07:01  -  21 Feb 2019 14:43  --------------------------------------------------------  IN: 603 mL / OUT: 0 mL / NET: 603 mL      Constitutional: NAD, awake, frail  Neurological: no focal deficits  HEENT: PERRLA, EOMI, MMM  Neck: Soft and supple, No LAD, No JVD  Respiratory: Breath sounds are clear bilaterally, No wheezing, rales or rhonchi  Cardiovascular: S1 and S2, regular rate and rhythm; no Murmurs, gallops or rubs  Gastrointestinal: Bowel Sounds present, soft, nontender, nondistended, no guarding, no rebound tenderness  Back: No CVA tenderness   Extremities: No peripheral edema  Vascular: 2+ peripheral pulses  Musculoskeletal: 5/5 strength b/l upper and lower extremities  Skin: No rashes  Breast: Deferred  Rectal: Deferred    MEDICATIONS  (STANDING):  acetylcysteine 10%  Inhalation 4 milliLiter(s) Inhalation three times a day  dextrose 5% + sodium chloride 0.45%. 1000 milliLiter(s) (50 mL/Hr) IV Continuous <Continuous>  dextrose 5% + sodium chloride 0.9%. 1000 milliLiter(s) (50 mL/Hr) IV Continuous <Continuous>  diltiazem    Tablet 90 milliGRAM(s) Oral three times a day  enoxaparin Injectable 40 milliGRAM(s) SubCutaneous every 24 hours  metoprolol succinate ER 25 milliGRAM(s) Oral daily  mirtazapine 15 milliGRAM(s) Oral at bedtime  multivitamin 1 Tablet(s) Oral daily  polyethylene glycol 3350 17 Gram(s) Oral daily      LABS: All Labs Reviewed:    02-20    144  |  109<H>  |  21  ----------------------------<  103<H>  3.9   |  31  |  0.75    Ca    8.1<L>      20 Feb 2019 12:21    RADIOLOGY/EKG:    < from: Xray Chest 1 View AP/PA. (02.15.19 @ 07:40) >    No change heart mediastinum. There is a large left pleural effusion   increased compared to prior. There is airspace infiltrate in the left   midlung. Right lung and costophrenic angle clear.    < end of copied text >      < from: CT Chest No Cont (02.15.19 @ 13:34) >    IMPRESSION:     Enlarging small right and moderate-large left pleural effusion.  Mild   bibasilar mucoid impaction. Left basilar compressive atelectasis.    < end of copied text >    < from: CT Head No Cont (02.15.19 @ 07:28) >    IMPRESSION:  No hemorrhage or mass effect.     If there are new or persistent symptoms, consider further evaluation with   brain MRI if clinically warranted and if there are no contraindications.    < end of copied text >    < from: CT Abdomen and Pelvis w/ IV Cont (02.15.19 @ 07:29) >    IMPRESSION:     No acute abdominal pathology.  Large amount ofstool in the colon.  Large right pleural effusion.      < end of copied text >    < from: Transthoracic Echocardiogram (02.18.19 @ 09:23) >     Impression     Summary     The left ventricle size is normal, moderate hypokinesis, and moderately   reduced function. The mid and apical septum are akinetic.   Estimated left ventricular ejection fraction is 35 %.   The left atrium is moderately dilated.   The right atrium appears moderately dilated.   The right ventricle exhibits mild dilation, mild to moderate diffuse   hypokinesis, and moderate depression of contractility.   There are fibrocalcific changes noted to the aortic valve leaflets with   restriction in leaflet excursion. Probable mild aortic stenosis.Mild   aortic insufficiency noted.   Fibrocalcific changes noted to the mitral valve leaflets with preserved   leaflet excursion.   Mild mitral annular calcification is present.   Moderate to severe (3+) mitral regurgitation is present.   Moderate to severe (3+) tricuspid valve regurgitation is present.   No evidence of pericardial effusion.     Signature     ----------------------------------------------------------------   Electronically signed by Dick Clifton MD(Interpreting physician)   on 02/18/2019 05:34 PM  ----------------------------------------------------------------    < end of copied text >

## 2019-02-22 PROCEDURE — 99232 SBSQ HOSP IP/OBS MODERATE 35: CPT

## 2019-02-22 RX ORDER — APIXABAN 2.5 MG/1
2.5 TABLET, FILM COATED ORAL EVERY 12 HOURS
Qty: 0 | Refills: 0 | Status: DISCONTINUED | OUTPATIENT
Start: 2019-02-22 | End: 2019-02-23

## 2019-02-22 RX ADMIN — APIXABAN 2.5 MILLIGRAM(S): 2.5 TABLET, FILM COATED ORAL at 14:10

## 2019-02-22 RX ADMIN — MIRTAZAPINE 15 MILLIGRAM(S): 45 TABLET, ORALLY DISINTEGRATING ORAL at 20:51

## 2019-02-22 RX ADMIN — APIXABAN 2.5 MILLIGRAM(S): 2.5 TABLET, FILM COATED ORAL at 17:37

## 2019-02-22 RX ADMIN — Medication 1 TABLET(S): at 14:10

## 2019-02-22 RX ADMIN — POLYETHYLENE GLYCOL 3350 17 GRAM(S): 17 POWDER, FOR SOLUTION ORAL at 14:11

## 2019-02-22 RX ADMIN — MORPHINE SULFATE 1 MILLIGRAM(S): 50 CAPSULE, EXTENDED RELEASE ORAL at 04:54

## 2019-02-22 RX ADMIN — TRAMADOL HYDROCHLORIDE 50 MILLIGRAM(S): 50 TABLET ORAL at 20:51

## 2019-02-22 NOTE — PROGRESS NOTE ADULT - ASSESSMENT
# Dysphagia with vomiting, can be related to advancing dementia vs gerd  vs structural pathology of esophagus  # large L pleural effusion 2ndry to Acute on Chronic Systolic CHF vs aspiration PNA vs ? malignancy  # Hyperkalemia likely from hypovolemia and Dehydration  -Due to Advanced age and dementia, patient will be a poor candidate for EGD. At this time appears to have poor prognosis  -CT head NAD  -CT Chest Noted   -s/p IV lasix, no further IV lasix as BP low normal   -Echo -  Estimated left ventricular ejection fraction is 35 % and mod-sev MR and TR  -continue zosyn   -neb tx with mucomyst for mucus plugging   -speech swallow eval recommended regular texture with thin liquids   -Pulm consult appreciated   -Palliative care consult appreciated - Home with hospice  -continue tramadol for generalized pain  -Son vishnu does not want thoracentesis and wants to focus on comfort care    *Constipation  -ct ab/pelvis-constipation,   -continue miralax, colace S/P lactulose     *afib on eliquis/GERD/HTN/HLD/depression/PE/DVT/COPD/ multiple uti s/p urinary retention/constipation  -cont home meds   -continue BB and cardizem with holding parameters for BP   -resume Eliquis    *dvt prophylaxis  -eliquis      disposition   -DNR/DNI, Home with hospice

## 2019-02-22 NOTE — PROGRESS NOTE ADULT - ASSESSMENT
89y old Female coming from [facility] (resident since 2015) with hx of dementia, afib on eliquis, GERD, HTN, HLD, depression, PE/DVT, COPD, UTIs, urinary retention, constipation admitted 2/15 due to dehydration and unable to toelrate po intake, often not been eating at nursing home and when he tries to feed her she occasionally coughs then food goes down. Of note, patient has been seen multiple times from S/S team and has recommended a mechanical soft nectar thick diet. In ED, labs are unremarkable. CTH wit no acute changes, CT C/ab/pelvis also done showing mod-large left pleural effusion likely 2/2 to CHF. Echo also done showing EF 35%, and mod-sev MR + TR. Palliative care consulted to assist with establishing GOC.     1) Pain  - denies at present, but as per hospitalist, family, and RN this happens often  - pain is not localized when it occurs, likely musculoskeletal  - c/w tramadol 50mg prn  - if unable or unwilling to take po added very low dose IV morphine 1mg q4h prn     2) Dysphagia  - GI and speech and swallow eval appreciated  - impression of advancing dementia vs. GERD, EGD not recommended  - no aspiration signs on speech eval but some retching after  - regular diet recommended  - pt with poor intake and often refusing po meds as per nursing staff    3) Effusion/CHF  - CHF with echo showing EF 35% and mod-sev MR and TR  - pulm notes and imaging appreciated  - recurrent effusion with thoracentesis in September revealing transudate  - supplemental O2 as needed  - opioids may also be helpful for any air hunger  - CT surgery notes also appreciated- recommending conservative measures, no need for thoracentesis as not emergent  - CTS follow up note today noting conversation with family who ultimately agreed with not pursuing thoracentesis and opting for comfort focus via hospice instead    4) Debility  - PPS<40%  - rec dietary consult cc: documentation of malnutrition (muscle and fat wasting noted on exam)  - as per NH records pt needs assistance for all ADLs    5) Prognosis  - poor  - in setting of advanced age, advanced dementia , FAST 7 (needs assistance for all ADLs, incontinent), CHF with EF 35% recurrent effusions, malnutrition, PPS<40%, dysphagia, seems hospice eligible    6) GOC/Advanced Directives  - pt does not have capacity for decision making 2/2 to dementia  - no HCP on file, thus sons would be sharing surrogate decision making responsibilities: 1) Louis [phone]; 2) Ondina; 3) Concepcion [phone]  - DNR and DNI on MOLST (first page needs witnesses and second page needs surrogate signature or oral consent box to be checked)  - GO meeting held 2/21- plan for hospice referral (HCN) and try to pursue thoracentesis. See GOC note for further details. As per conversation with SW today and review of record, family no longer pursuing thoracentesis opting instead for full comfort focus via hospice at [facility], planned dc for tomorrow.     Thank you for including us in Ms. Chandler's care. Will continue to follow with you.    Tanya Jacinto MD  Palliative Care Attending

## 2019-02-22 NOTE — DIETITIAN INITIAL EVALUATION ADULT. - PHYSICAL APPEARANCE
NFPE significant for severe muscle wasting; temporal, clavicle, scapula.  severe fat wasting; orbital and tricep./underweight/emaciated/other (specify)

## 2019-02-22 NOTE — PROGRESS NOTE ADULT - SUBJECTIVE AND OBJECTIVE BOX
HOSPITALIST PROGRESS NOTE:  SUBJECTIVE:  PCP: yelena ng    Chief Complaint: Patient is a 89y old  Female who presents with a chief complaint of vomiting, decreased po intake (21 Feb 2019 10:07)    HPI:  89 female with h/o dementia, afib on eliquis, GERD, HTN, HLD, depression, PE/DVT, COPD, UTIs, urinary retention, constipation presenting from [facility] due to dehydration and unable to toelrate po intake.  patient is demented therefore much of history provided by chart and son.  spoke to sonTessa over phone, states that patient has not been eating at nursing home and when he tries to feed her she occasionally coughs then food goes down.  In chart, patient has been seen multiple times from S/S team and has recommended a mechanical soft nectar thick diet.    In ED, labs are unremarkable. ct ab/pelvis- left pleural effusion.  afebrile.   2/16- patient awake demented at abseline, poor historian  2/18- awake , follows commands, reports she has generalised pain, still with very low apetites, no po intake today  2/20-no change in status from yesterday, no respiratory compromise    2/21: Above Reviewed. Patient has no complaints. nonverbal, moans when i touch her; Discussed with Son Louis.   2/22: Spoke to daughter in law and eldest son vishnu and at this time they do not want to go forward with the thoracentesis; they would like to focus on comfort.      Allergies:  No Known Allergies    REVIEW OF SYSTEMS:  See HPI. All other review of systems is negative unless indicated above.     OBJECTIVE  Physical Exam:  Vital Signs Last 24 Hrs  T(C): 36.5 (22 Feb 2019 12:33), Max: 36.5 (21 Feb 2019 20:05)  T(F): 97.7 (22 Feb 2019 12:33), Max: 97.7 (21 Feb 2019 20:05)  HR: 94 (22 Feb 2019 12:33) (74 - 94)  BP: 110/61 (22 Feb 2019 12:33) (109/67 - 125/76)  BP(mean): --  RR: 13 (22 Feb 2019 12:33) (13 - 19)  SpO2: 95% (22 Feb 2019 12:33) (93% - 97%)    Constitutional: NAD, awake, frail  Neurological: no focal deficits  HEENT: PERRLA, EOMI, MMM  Neck: Soft and supple, No LAD, No JVD  Respiratory: Breath sounds are clear bilaterally, No wheezing, rales or rhonchi  Cardiovascular: S1 and S2, regular rate and rhythm; no Murmurs, gallops or rubs  Gastrointestinal: Bowel Sounds present, soft, nontender, nondistended, no guarding, no rebound tenderness  Back: No CVA tenderness   Extremities: No peripheral edema  Vascular: 2+ peripheral pulses  Musculoskeletal: 5/5 strength b/l upper and lower extremities  Skin: No rashes  Breast: Deferred  Rectal: Deferred    MEDICATIONS  (STANDING):  acetylcysteine 10%  Inhalation 4 milliLiter(s) Inhalation three times a day  dextrose 5% + sodium chloride 0.45%. 1000 milliLiter(s) (50 mL/Hr) IV Continuous <Continuous>  dextrose 5% + sodium chloride 0.9%. 1000 milliLiter(s) (50 mL/Hr) IV Continuous <Continuous>  diltiazem    Tablet 90 milliGRAM(s) Oral three times a day  enoxaparin Injectable 40 milliGRAM(s) SubCutaneous every 24 hours  metoprolol succinate ER 25 milliGRAM(s) Oral daily  mirtazapine 15 milliGRAM(s) Oral at bedtime  multivitamin 1 Tablet(s) Oral daily  polyethylene glycol 3350 17 Gram(s) Oral daily      LABS: All Labs Reviewed:    02-20    144  |  109<H>  |  21  ----------------------------<  103<H>  3.9   |  31  |  0.75    Ca    8.1<L>      20 Feb 2019 12:21    RADIOLOGY/EKG:    < from: Xray Chest 1 View AP/PA. (02.15.19 @ 07:40) >    No change heart mediastinum. There is a large left pleural effusion   increased compared to prior. There is airspace infiltrate in the left   midlung. Right lung and costophrenic angle clear.    < end of copied text >      < from: CT Chest No Cont (02.15.19 @ 13:34) >    IMPRESSION:     Enlarging small right and moderate-large left pleural effusion.  Mild   bibasilar mucoid impaction. Left basilar compressive atelectasis.    < end of copied text >    < from: CT Head No Cont (02.15.19 @ 07:28) >    IMPRESSION:  No hemorrhage or mass effect.     If there are new or persistent symptoms, consider further evaluation with   brain MRI if clinically warranted and if there are no contraindications.    < end of copied text >    < from: CT Abdomen and Pelvis w/ IV Cont (02.15.19 @ 07:29) >    IMPRESSION:     No acute abdominal pathology.  Large amount ofstool in the colon.  Large right pleural effusion.      < end of copied text >    < from: Transthoracic Echocardiogram (02.18.19 @ 09:23) >     Impression     Summary     The left ventricle size is normal, moderate hypokinesis, and moderately   reduced function. The mid and apical septum are akinetic.   Estimated left ventricular ejection fraction is 35 %.   The left atrium is moderately dilated.   The right atrium appears moderately dilated.   The right ventricle exhibits mild dilation, mild to moderate diffuse   hypokinesis, and moderate depression of contractility.   There are fibrocalcific changes noted to the aortic valve leaflets with   restriction in leaflet excursion. Probable mild aortic stenosis.Mild   aortic insufficiency noted.   Fibrocalcific changes noted to the mitral valve leaflets with preserved   leaflet excursion.   Mild mitral annular calcification is present.   Moderate to severe (3+) mitral regurgitation is present.   Moderate to severe (3+) tricuspid valve regurgitation is present.   No evidence of pericardial effusion.     Signature     ----------------------------------------------------------------   Electronically signed by Dick Clifton MD(Interpreting physician)   on 02/18/2019 05:34 PM  ----------------------------------------------------------------    < end of copied text >

## 2019-02-22 NOTE — CHART NOTE - NSCHARTNOTEFT_GEN_A_CORE
Upon Nutritional Assessment by the Registered Dietitian your patient was determined to meet criteria / has evidence of the following diagnosis/diagnoses:          [ ]  Mild Protein Calorie Malnutrition        [ ]  Moderate Protein Calorie Malnutrition        [x] Severe Protein Calorie Malnutrition        [ ] Unspecified Protein Calorie Malnutrition        [ ] Underweight / BMI <19        [ ] Morbid Obesity / BMI > 40      Findings:  Upon visit, pt appears underwt with BMI of 23, question accuracy as this admit wt is 10# above last admit wt.  NFPE significant for severe muscle wasting; temporal, clavicle, scapula.  severe fat wasting; orbital and tricep.  no edema. PO intake since admission meeting <50% of estimated nutr needs.  BM (+) 2/15.  yolie score of 11 with PU of Lt lateral unstageable.      ***based on above, pt meets criteria for severe malnutrition in chronic illness (severe muscle/fat wasting, intake meeting <50% of ENN x 7 days).***      Findings as based on:  •  Comprehensive nutrition assessment and consultation  •  Calorie counts (nutrient intake analysis)  •  Food acceptance and intake status from observations by staff  •  Follow up  •  Patient education  •  Intervention secondary to interdisciplinary rounds  •   concerns      Treatment:    The following diet has been recommended:  1) add ensure enlive BID and ensure pudding BID   2) provide assistance/encouragement with all PO intake with oral supplements encouraged between meals    PROVIDER Section:     By signing this assessment you are acknowledging and agree with the diagnosis/diagnoses assigned by the Registered Dietitian    Comments:

## 2019-02-22 NOTE — PROGRESS NOTE ADULT - SUBJECTIVE AND OBJECTIVE BOX
HPI: Pt seen and examined this afternoon, no family at bedside. Pt sleepy but arouseable and denies any complaints. No other issues. As per SW, family spoke with CTS and no longer pursuing thoracentesis. Plan is for dc back to [facility] with hospice tomorrow.       PAIN: denies    DYSPNEA: denies      ROS:    Otherwise limited by lethargy      PHYSICAL EXAM:    Vital Signs Last 24 Hrs  T(C): 36.5 (2019 12:33), Max: 36.5 (2019 20:05)  T(F): 97.7 (2019 12:33), Max: 97.7 (2019 20:05)  HR: 94 (2019 12:33) (74 - 94)  BP: 110/61 (2019 12:33) (109/67 - 125/76)  BP(mean): --  RR: 13 (2019 12:33) (13 - 19)  SpO2: 95% (2019 12:33) (93% - 97%)  Daily     Daily Weight in k (2019 13:55)    PPSV2: 30  %  FAST: 7    General: Elderly female sleeping on side in bed, ill-appearing, thin, lethargic, NAD  HEENT: dmm, perrl, eomi + temporal wasting  Lungs: dec at bases bl L>R  Cardiac: +s1 s2 tachy  GI: soft nt nd +bs, diaper in place  : incontinent  Ext: moves all extremities, to touch, muscle and fat wasting throughout  Neuro: no focal deficits outside of forgetfulness    LABS: none today    Albumin: Albumin, Serum: 2.8 g/dL ( @ 06:21)      Allergies    No Known Allergies    Intolerances      MEDICATIONS  (STANDING):  apixaban 2.5 milliGRAM(s) Oral every 12 hours  dextrose 5% + sodium chloride 0.45%. 1000 milliLiter(s) (50 mL/Hr) IV Continuous <Continuous>  dextrose 5% + sodium chloride 0.9%. 1000 milliLiter(s) (50 mL/Hr) IV Continuous <Continuous>  diltiazem    Tablet 90 milliGRAM(s) Oral three times a day  metoprolol succinate ER 25 milliGRAM(s) Oral daily  mirtazapine 15 milliGRAM(s) Oral at bedtime  multivitamin 1 Tablet(s) Oral daily  polyethylene glycol 3350 17 Gram(s) Oral daily    MEDICATIONS  (PRN):  acetaminophen   Tablet .. 650 milliGRAM(s) Oral every 6 hours PRN Temp greater or equal to 38C (100.4F), Mild Pain (1 - 3)  bisacodyl Suppository 10 milliGRAM(s) Rectal daily PRN Constipation  glycopyrrolate Injectable 0.2 milliGRAM(s) IV Push every 6 hours PRN secretions  mineral oil enema 133 milliLiter(s) Rectal daily PRN constipation  morphine  - Injectable 1 milliGRAM(s) IV Push every 4 hours PRN breakthough pain or dyspnea  traMADol 50 milliGRAM(s) Oral every 6 hours PRN for moderate pain      RADIOLOGY:

## 2019-02-22 NOTE — DIETITIAN INITIAL EVALUATION ADULT. - OTHER INFO
pt seen as LOS: 89yo female from [facility] of dementia, Afib, GERD, HTN, HLD, depression, COPD, UTI, urinary retention, constipation p/w dehydration and unable to tolerate PO intake.  Pt being managed for dysphagia with vomiting with large L pleural effision, hyperkalemia from hypovolemia and dehydration.  Pt seen by paliative care; GOC meeting had.  MOLST: DNR/DNI/no TF/comfort measures.  home with hospice.  SLP eval'd pt on 2/15, rec'd regular texture with thin liquids.  Upon visit, pt appears underwt with BMI of 23, question accuracy as this admit wt is 10# above last admit wt.  NFPE significant for severe muscle wasting; temporal, clavicle, scapula.  severe fat wasting; orbital and tricep.  no edema. PO intake since admission meeting <50% of estimated nutr needs.  BM (+) 2/15.  yolie score of 11 with PU of Lt lateral unstageable.  based on above, pt meets criteria for severe malnutrition in chronic illness (severe muscle/fat wasting, intake meeting <50% of ENN x 7 days).  RECOMMENDATIONS: 1) add ensure enlive BID and ensure pudding BID 2) provide assistance/encouragement with all PO intake with oral supplements encouraged between meals

## 2019-02-22 NOTE — PROGRESS NOTE ADULT - SUBJECTIVE AND OBJECTIVE BOX
Subjective:  Re-called to evaluate pt for left thoracentesis. Pt is 89 female with h/o dementia, afib on eliquis, GERD, HTN, HLD, depression, PE/DVT, COPD, UTIs, urinary retention, constipation presenting from [facility] due to dehydration and unable to toelrate po intake. Pt has deteriorated over last several years, has become contracted and minimal PO intake. GOC conversation took place yesterday and decision to place pt on hospice, with no enteral feeds, DNR/DNI and comfort measures.   Pt seen at bedside, responds to questions with nodding of head, resting. DIL at bedside.     Vital Signs:  Vital Signs Last 24 Hrs  T(C): 36.4 (02-22-19 @ 05:54), Max: 36.7 (02-21-19 @ 11:59)  T(F): 97.5 (02-22-19 @ 05:54), Max: 98.1 (02-21-19 @ 11:59)  HR: 84 (02-22-19 @ 05:54) (74 - 124)  BP: 109/67 (02-22-19 @ 05:54) (109/67 - 125/76)  RR: 19 (02-22-19 @ 05:54) (16 - 19)  SpO2: 93% (02-22-19 @ 05:54) (93% - 98%) on (O2)    Telemetry/Alarms:    Relevant labs, radiology and Medications reviewed    02-20    144  |  109<H>  |  21  ----------------------------<  103<H>  3.9   |  31  |  0.75    Ca    8.1<L>      20 Feb 2019 12:21        MEDICATIONS  (STANDING):  dextrose 5% + sodium chloride 0.45%. 1000 milliLiter(s) (50 mL/Hr) IV Continuous <Continuous>  dextrose 5% + sodium chloride 0.9%. 1000 milliLiter(s) (50 mL/Hr) IV Continuous <Continuous>  diltiazem    Tablet 90 milliGRAM(s) Oral three times a day  enoxaparin Injectable 40 milliGRAM(s) SubCutaneous every 24 hours  metoprolol succinate ER 25 milliGRAM(s) Oral daily  mirtazapine 15 milliGRAM(s) Oral at bedtime  multivitamin 1 Tablet(s) Oral daily  polyethylene glycol 3350 17 Gram(s) Oral daily    MEDICATIONS  (PRN):  acetaminophen   Tablet .. 650 milliGRAM(s) Oral every 6 hours PRN Temp greater or equal to 38C (100.4F), Mild Pain (1 - 3)  bisacodyl Suppository 10 milliGRAM(s) Rectal daily PRN Constipation  glycopyrrolate Injectable 0.2 milliGRAM(s) IV Push every 6 hours PRN secretions  mineral oil enema 133 milliLiter(s) Rectal daily PRN constipation  morphine  - Injectable 1 milliGRAM(s) IV Push every 4 hours PRN breakthough pain or dyspnea  traMADol 50 milliGRAM(s) Oral every 6 hours PRN for moderate pain      Physical exam  Gen cachetic, contracted, NAD  Neuro alert, sleepy  Card RRR  Pulm decreased left side  Abd soft  Ext contracted        I&O's Summary    21 Feb 2019 07:01  -  22 Feb 2019 07:00  --------------------------------------------------------  IN: 603 mL / OUT: 0 mL / NET: 603 mL        Assessment  89y Female  w/ PAST MEDICAL & SURGICAL HISTORY:  Femur neck fracture  Vertebral fracture, pathological  Neurogenic bladder  Afib  GERD (gastroesophageal reflux disease)  Pulmonary embolism  UTI (lower urinary tract infection)  HTN (hypertension)  Pulmonary embolism  Memory impairment  Hypertension  Atrial fibrillation  Hyponatremia  Shortness of breath: etiology probably copd  Urinary retention  Arthritis  DVT (deep venous thrombosis)  History of hip surgery: right gamma nail  admitted with complaints of Patient is a 89y old  Female who presents with a chief complaint of vomiting, decreased po intake (21 Feb 2019 14:43)  found to have left large pleural effusion    PLAN  Long discussions with son, Ondina on phone and daughter in law at bedside. Explained procedure in detail and risks/benefits of procedure. Benefits being pt would breath better and less SOB. Risks bleeding, pneumothorax. Pain is usually associated with procedure even w generous lidocaine administration.   Pt family states that pt does not appear SOB to them, and their goal is for her to comfortable at the end of her life. As she is not eating, and they are not going to agree to enteral feeds, they understand the end is near and do not wish for her to undergo an invasive, potentially painful intervention as she is not uncomfortable/SOB from the effusion. Pt DIL states that pt never wanted any procedures or hospitalizations prior to dementia.  The plan is for pt to be d/c'ed w hospice care for comfort measures. Family understands if they change mind or if she appears SOB they can always come back to hospital if they choose.     Discussed with Cardiothoracic Team at AM rounds.

## 2019-02-22 NOTE — DIETITIAN INITIAL EVALUATION ADULT. - PERTINENT MEDS FT
MEDICATIONS  (STANDING):  apixaban 2.5 milliGRAM(s) Oral every 12 hours  dextrose 5% + sodium chloride 0.45%. 1000 milliLiter(s) (50 mL/Hr) IV Continuous <Continuous>  dextrose 5% + sodium chloride 0.9%. 1000 milliLiter(s) (50 mL/Hr) IV Continuous <Continuous>  diltiazem    Tablet 90 milliGRAM(s) Oral three times a day  metoprolol succinate ER 25 milliGRAM(s) Oral daily  mirtazapine 15 milliGRAM(s) Oral at bedtime  multivitamin 1 Tablet(s) Oral daily  polyethylene glycol 3350 17 Gram(s) Oral daily    MEDICATIONS  (PRN):  acetaminophen   Tablet .. 650 milliGRAM(s) Oral every 6 hours PRN Temp greater or equal to 38C (100.4F), Mild Pain (1 - 3)  bisacodyl Suppository 10 milliGRAM(s) Rectal daily PRN Constipation  glycopyrrolate Injectable 0.2 milliGRAM(s) IV Push every 6 hours PRN secretions  mineral oil enema 133 milliLiter(s) Rectal daily PRN constipation  morphine  - Injectable 1 milliGRAM(s) IV Push every 4 hours PRN breakthough pain or dyspnea  traMADol 50 milliGRAM(s) Oral every 6 hours PRN for moderate pain

## 2019-02-23 ENCOUNTER — TRANSCRIPTION ENCOUNTER (OUTPATIENT)
Age: 84
End: 2019-02-23

## 2019-02-23 VITALS — SYSTOLIC BLOOD PRESSURE: 111 MMHG | DIASTOLIC BLOOD PRESSURE: 69 MMHG | RESPIRATION RATE: 17 BRPM

## 2019-02-23 RX ORDER — TRAMADOL HYDROCHLORIDE 50 MG/1
50 TABLET ORAL ONCE
Qty: 0 | Refills: 0 | Status: DISCONTINUED | OUTPATIENT
Start: 2019-02-23 | End: 2019-02-23

## 2019-02-23 RX ADMIN — TRAMADOL HYDROCHLORIDE 50 MILLIGRAM(S): 50 TABLET ORAL at 06:17

## 2019-02-23 RX ADMIN — Medication 25 MILLIGRAM(S): at 06:08

## 2019-02-23 RX ADMIN — MORPHINE SULFATE 1 MILLIGRAM(S): 50 CAPSULE, EXTENDED RELEASE ORAL at 00:39

## 2019-02-23 RX ADMIN — APIXABAN 2.5 MILLIGRAM(S): 2.5 TABLET, FILM COATED ORAL at 06:08

## 2019-02-23 NOTE — PROGRESS NOTE ADULT - SUBJECTIVE AND OBJECTIVE BOX
HOSPITALIST PROGRESS NOTE:  SUBJECTIVE:  PCP: yelena ng    Chief Complaint: Patient is a 89y old  Female who presents with a chief complaint of vomiting, decreased po intake (21 Feb 2019 10:07)    HPI:  89 female with h/o dementia, afib on eliquis, GERD, HTN, HLD, depression, PE/DVT, COPD, UTIs, urinary retention, constipation presenting from [facility] due to dehydration and unable to toelrate po intake.  patient is demented therefore much of history provided by chart and son.  spoke to sonTessa over phone, states that patient has not been eating at nursing home and when he tries to feed her she occasionally coughs then food goes down.  In chart, patient has been seen multiple times from S/S team and has recommended a mechanical soft nectar thick diet.    In ED, labs are unremarkable. ct ab/pelvis- left pleural effusion.  afebrile.   2/16- patient awake demented at abseline, poor historian  2/18- awake , follows commands, reports she has generalised pain, still with very low apetites, no po intake today  2/20-no change in status from yesterday, no respiratory compromise    2/21: Above Reviewed. Patient has no complaints. nonverbal, moans when i touch her; Discussed with Son Louis.   2/22: Spoke to daughter in law and eldest son vishnu and at this time they do not want to go forward with the thoracentesis; they would like to focus on comfort.    2/23:  No overnight patient is going back today to  with hospice    Allergies:  No Known Allergies    REVIEW OF SYSTEMS:  See HPI. All other review of systems is negative unless indicated above.     OBJECTIVE  Physical Exam:  Vital Signs Last 24 Hrs  T(C): 36.3 (23 Feb 2019 06:10), Max: 36.6 (22 Feb 2019 20:25)  T(F): 97.4 (23 Feb 2019 06:10), Max: 97.9 (22 Feb 2019 20:25)  HR: 111 (23 Feb 2019 06:10) (82 - 111)  BP: 124/87 (23 Feb 2019 06:10) (105/66 - 124/87)  BP(mean): --  RR: 14 (23 Feb 2019 06:10) (13 - 17)  SpO2: 91% (23 Feb 2019 06:10) (91% - 95%)    Constitutional: NAD, awake, frail  Neurological: no focal deficits  HEENT: PERRLA, EOMI, MMM  Neck: Soft and supple, No LAD, No JVD  Respiratory: Breath sounds are clear bilaterally, No wheezing, rales or rhonchi  Cardiovascular: S1 and S2, regular rate and rhythm; no Murmurs, gallops or rubs  Gastrointestinal: Bowel Sounds present, soft, nontender, nondistended, no guarding, no rebound tenderness  Back: No CVA tenderness   Extremities: No peripheral edema  Vascular: 2+ peripheral pulses  Musculoskeletal: 5/5 strength b/l upper and lower extremities  Skin: No rashes  Breast: Deferred  Rectal: Deferred    MEDICATIONS  (STANDING):  acetylcysteine 10%  Inhalation 4 milliLiter(s) Inhalation three times a day  dextrose 5% + sodium chloride 0.45%. 1000 milliLiter(s) (50 mL/Hr) IV Continuous <Continuous>  dextrose 5% + sodium chloride 0.9%. 1000 milliLiter(s) (50 mL/Hr) IV Continuous <Continuous>  diltiazem    Tablet 90 milliGRAM(s) Oral three times a day  enoxaparin Injectable 40 milliGRAM(s) SubCutaneous every 24 hours  metoprolol succinate ER 25 milliGRAM(s) Oral daily  mirtazapine 15 milliGRAM(s) Oral at bedtime  multivitamin 1 Tablet(s) Oral daily  polyethylene glycol 3350 17 Gram(s) Oral daily      LABS: All Labs Reviewed:    02-20    144  |  109<H>  |  21  ----------------------------<  103<H>  3.9   |  31  |  0.75    Ca    8.1<L>      20 Feb 2019 12:21    RADIOLOGY/EKG:    < from: Xray Chest 1 View AP/PA. (02.15.19 @ 07:40) >    No change heart mediastinum. There is a large left pleural effusion   increased compared to prior. There is airspace infiltrate in the left   midlung. Right lung and costophrenic angle clear.    < end of copied text >      < from: CT Chest No Cont (02.15.19 @ 13:34) >    IMPRESSION:     Enlarging small right and moderate-large left pleural effusion.  Mild   bibasilar mucoid impaction. Left basilar compressive atelectasis.    < end of copied text >    < from: CT Head No Cont (02.15.19 @ 07:28) >    IMPRESSION:  No hemorrhage or mass effect.     If there are new or persistent symptoms, consider further evaluation with   brain MRI if clinically warranted and if there are no contraindications.    < end of copied text >    < from: CT Abdomen and Pelvis w/ IV Cont (02.15.19 @ 07:29) >    IMPRESSION:     No acute abdominal pathology.  Large amount ofstool in the colon.  Large right pleural effusion.      < end of copied text >    < from: Transthoracic Echocardiogram (02.18.19 @ 09:23) >     Impression     Summary     The left ventricle size is normal, moderate hypokinesis, and moderately   reduced function. The mid and apical septum are akinetic.   Estimated left ventricular ejection fraction is 35 %.   The left atrium is moderately dilated.   The right atrium appears moderately dilated.   The right ventricle exhibits mild dilation, mild to moderate diffuse   hypokinesis, and moderate depression of contractility.   There are fibrocalcific changes noted to the aortic valve leaflets with   restriction in leaflet excursion. Probable mild aortic stenosis.Mild   aortic insufficiency noted.   Fibrocalcific changes noted to the mitral valve leaflets with preserved   leaflet excursion.   Mild mitral annular calcification is present.   Moderate to severe (3+) mitral regurgitation is present.   Moderate to severe (3+) tricuspid valve regurgitation is present.   No evidence of pericardial effusion.     Signature     ----------------------------------------------------------------   Electronically signed by Dick Clifton MD(Interpreting physician)   on 02/18/2019 05:34 PM  ----------------------------------------------------------------    < end of copied text >

## 2019-02-23 NOTE — DISCHARGE NOTE ADULT - PATIENT PORTAL LINK FT
You can access the [facility] Patient Portal, offered by [facility], by registering with the following website: [email or web address]

## 2019-02-23 NOTE — DISCHARGE NOTE ADULT - DISCHARGE TO
[facility]  [phone]/Home with Hospice

## 2019-02-23 NOTE — DISCHARGE NOTE ADULT - MEDICATION SUMMARY - MEDICATIONS TO TAKE
I will START or STAY ON the medications listed below when I get home from the hospital:    traMADol 50 mg oral tablet  -- 1 tab(s) by mouth 2 times a day  -- Indication: For Pain    traMADol 50 mg oral tablet  -- 1 tab(s) by mouth every 6 hours, As Needed - for moderate pain  -- Indication: For Pain    acetaminophen 325 mg oral tablet  -- 2 tab(s) by mouth every 6 hours, As Needed - for moderate pain - 6)  -- Indication: For Pain    dilTIAZem 90 mg oral tablet  -- 1 tab(s) by mouth 3 times a day  -- Indication: For AFIB    Eliquis 2.5 mg oral tablet  -- 1 tab(s) by mouth 2 times a day  -- Indication: For AFIB    mirtazapine 15 mg oral tablet  -- 1 tab(s) by mouth once a day (at bedtime)  -- Indication: For insomnia    metoprolol succinate 25 mg oral tablet, extended release  -- 1 tab(s) by mouth once a day  -- Indication: For AFIB    albuterol 2.5 mg/3 mL (0.083%) inhalation solution  -- 3 milliliter(s) inhaled every 6 hours, As Needed  -- Indication: For Dyspnea    diphenhydramine-zinc acetate 1%-0.1% topical cream  -- Apply on skin to affected area once a day, As Needed  -- Indication: For cream    zinc oxide 10% topical ointment  -- Apply on skin to affected area 2 times a day      ****Sacrum and right lateral back****  -- Indication: For cream    Dulcolax Laxative 10 mg rectal suppository  -- 1 suppository(ies) rectally once a day, As Needed - for constipation  -- Indication: For constipation    Fleet Enema 7 g-19 g rectal enema  -- 133 milliliter(s) rectally once, As Needed - for constipation  -- Indication: For constipation    Multiple Vitamins oral tablet  -- 1 tab(s) by mouth once a day  -- Indication: For vitamin

## 2019-02-23 NOTE — PROGRESS NOTE ADULT - REASON FOR ADMISSION
vomiting, decreased po intake

## 2019-02-23 NOTE — DISCHARGE NOTE ADULT - PLAN OF CARE
large L pleural effusion 2ndry to Acute on Chronic Systolic CHF vs aspiration PNA vs ? malignancy - Home with hospice, family wants to focus on comfort no thoracentesis Dysphagia with vomiting, can be related to advancing dementia vs gerd  vs structural pathology of esophagus -Due to Advanced age and dementia, patient will be a poor candidate for EGD. At this time appears to have poor prognosis  -neb tx with mucomyst for mucus plugging   -speech swallow eval recommended regular texture with thin liquids

## 2019-02-23 NOTE — DISCHARGE NOTE ADULT - HOSPITAL COURSE
HOSPITALIST PROGRESS NOTE:  SUBJECTIVE:  PCP: yelena ng    Chief Complaint: Patient is a 89y old  Female who presents with a chief complaint of vomiting, decreased po intake (21 Feb 2019 10:07)    HPI:  89 female with h/o dementia, afib on eliquis, GERD, HTN, HLD, depression, PE/DVT, COPD, UTIs, urinary retention, constipation presenting from [facility] due to dehydration and unable to toelrate po intake.  patient is demented therefore much of history provided by chart and son.  spoke to sonTessa over phone, states that patient has not been eating at nursing home and when he tries to feed her she occasionally coughs then food goes down.  In chart, patient has been seen multiple times from S/S team and has recommended a mechanical soft nectar thick diet.    In ED, labs are unremarkable. ct ab/pelvis- left pleural effusion.  afebrile.   2/16- patient awake demented at abseline, poor historian  2/18- awake , follows commands, reports she has generalised pain, still with very low apetites, no po intake today  2/20-no change in status from yesterday, no respiratory compromise    2/21: Above Reviewed. Patient has no complaints. nonverbal, moans when i touch her; Discussed with Garcia Myers.   2/22: Spoke to daughter in law and eldest son vishnu and at this time they do not want to go forward with the thoracentesis; they would like to focus on comfort.    2/23:  No overnight patient is going back today to  with hospice      # Dysphagia with vomiting, can be related to advancing dementia vs gerd  vs structural pathology of esophagus  # large L pleural effusion 2ndry to Acute on Chronic Systolic CHF vs aspiration PNA vs ? malignancy  # Hyperkalemia likely from hypovolemia and Dehydration  -Due to Advanced age and dementia, patient will be a poor candidate for EGD. At this time appears to have poor prognosis  -CT head NAD  -CT Chest Noted   -s/p IV lasix, no further IV lasix as BP low normal   -Echo -  Estimated left ventricular ejection fraction is 35 % and mod-sev MR and TR  -continue zosyn   -neb tx with mucomyst for mucus plugging   -speech swallow eval recommended regular texture with thin liquids   -Pulm consult appreciated   -Palliative care consult appreciated - Home with hospice  -continue tramadol for generalized pain  -Son vishnu does not want thoracentesis and wants to focus on comfort care    *Constipation  -ct ab/pelvis-constipation,   -continue miralax, colace S/P lactulose     *afib on eliquis/GERD/HTN/HLD/depression/PE/DVT/COPD/ multiple uti s/p urinary retention/constipation  -cont home meds   -continue BB and cardizem with holding parameters for BP   -resume Eliquis    *dvt prophylaxis  -eliquis      disposition   -DNR/DNI, Home with hospice     total time: 43 minutes

## 2019-02-23 NOTE — DISCHARGE NOTE ADULT - OTHER SIGNIFICANT FINDINGS
RADIOLOGY/EKG:    < from: Xray Chest 1 View AP/PA. (02.15.19 @ 07:40) >    No change heart mediastinum. There is a large left pleural effusion   increased compared to prior. There is airspace infiltrate in the left   midlung. Right lung and costophrenic angle clear.    < end of copied text >      < from: CT Chest No Cont (02.15.19 @ 13:34) >    IMPRESSION:     Enlarging small right and moderate-large left pleural effusion.  Mild   bibasilar mucoid impaction. Left basilar compressive atelectasis.    < end of copied text >    < from: CT Head No Cont (02.15.19 @ 07:28) >    IMPRESSION:  No hemorrhage or mass effect.     If there are new or persistent symptoms, consider further evaluation with   brain MRI if clinically warranted and if there are no contraindications.    < end of copied text >    < from: CT Abdomen and Pelvis w/ IV Cont (02.15.19 @ 07:29) >    IMPRESSION:     No acute abdominal pathology.  Large amount ofstool in the colon.  Large right pleural effusion.      < end of copied text >    < from: Transthoracic Echocardiogram (02.18.19 @ 09:23) >     Impression     Summary     The left ventricle size is normal, moderate hypokinesis, and moderately   reduced function. The mid and apical septum are akinetic.   Estimated left ventricular ejection fraction is 35 %.   The left atrium is moderately dilated.   The right atrium appears moderately dilated.   The right ventricle exhibits mild dilation, mild to moderate diffuse   hypokinesis, and moderate depression of contractility.   There are fibrocalcific changes noted to the aortic valve leaflets with   restriction in leaflet excursion. Probable mild aortic stenosis.Mild   aortic insufficiency noted.   Fibrocalcific changes noted to the mitral valve leaflets with preserved   leaflet excursion.   Mild mitral annular calcification is present.   Moderate to severe (3+) mitral regurgitation is present.   Moderate to severe (3+) tricuspid valve regurgitation is present.   No evidence of pericardial effusion.     Signature     ----------------------------------------------------------------   Electronically signed by Dick Clifton MD(Interpreting physician)   on 02/18/2019 05:34 PM  ----------------------------------------------------------------    < end of copied text >

## 2019-02-23 NOTE — DISCHARGE NOTE ADULT - CARE PLAN
Principal Discharge DX:	Shortness of breath  Goal:	large L pleural effusion 2ndry to Acute on Chronic Systolic CHF vs aspiration PNA vs ? malignancy  Assessment and plan of treatment:	- Home with hospice, family wants to focus on comfort no thoracentesis  Goal:	Dysphagia with vomiting, can be related to advancing dementia vs gerd  vs structural pathology of esophagus  Assessment and plan of treatment:	-Due to Advanced age and dementia, patient will be a poor candidate for EGD. At this time appears to have poor prognosis  -neb tx with mucomyst for mucus plugging   -speech swallow eval recommended regular texture with thin liquids

## 2019-02-27 DIAGNOSIS — E43 UNSPECIFIED SEVERE PROTEIN-CALORIE MALNUTRITION: ICD-10-CM

## 2019-02-27 DIAGNOSIS — K59.00 CONSTIPATION, UNSPECIFIED: ICD-10-CM

## 2019-02-27 DIAGNOSIS — Z79.01 LONG TERM (CURRENT) USE OF ANTICOAGULANTS: ICD-10-CM

## 2019-02-27 DIAGNOSIS — Z79.899 OTHER LONG TERM (CURRENT) DRUG THERAPY: ICD-10-CM

## 2019-02-27 DIAGNOSIS — K21.9 GASTRO-ESOPHAGEAL REFLUX DISEASE WITHOUT ESOPHAGITIS: ICD-10-CM

## 2019-02-27 DIAGNOSIS — E87.5 HYPERKALEMIA: ICD-10-CM

## 2019-02-27 DIAGNOSIS — F32.9 MAJOR DEPRESSIVE DISORDER, SINGLE EPISODE, UNSPECIFIED: ICD-10-CM

## 2019-02-27 DIAGNOSIS — I48.2 CHRONIC ATRIAL FIBRILLATION: ICD-10-CM

## 2019-02-27 DIAGNOSIS — Z66 DO NOT RESUSCITATE: ICD-10-CM

## 2019-02-27 DIAGNOSIS — R13.10 DYSPHAGIA, UNSPECIFIED: ICD-10-CM

## 2019-02-27 DIAGNOSIS — Z51.5 ENCOUNTER FOR PALLIATIVE CARE: ICD-10-CM

## 2019-02-27 DIAGNOSIS — D72.829 ELEVATED WHITE BLOOD CELL COUNT, UNSPECIFIED: ICD-10-CM

## 2019-02-27 DIAGNOSIS — F03.90 UNSPECIFIED DEMENTIA WITHOUT BEHAVIORAL DISTURBANCE: ICD-10-CM

## 2019-02-27 DIAGNOSIS — T17.990A OTHER FOREIGN OBJECT IN RESPIRATORY TRACT, PART UNSPECIFIED IN CAUSING ASPHYXIATION, INITIAL ENCOUNTER: ICD-10-CM

## 2019-02-27 DIAGNOSIS — Z86.718 PERSONAL HISTORY OF OTHER VENOUS THROMBOSIS AND EMBOLISM: ICD-10-CM

## 2019-02-27 DIAGNOSIS — Z86.711 PERSONAL HISTORY OF PULMONARY EMBOLISM: ICD-10-CM

## 2019-02-27 DIAGNOSIS — E86.0 DEHYDRATION: ICD-10-CM

## 2019-02-27 DIAGNOSIS — I11.0 HYPERTENSIVE HEART DISEASE WITH HEART FAILURE: ICD-10-CM

## 2019-02-27 DIAGNOSIS — E78.5 HYPERLIPIDEMIA, UNSPECIFIED: ICD-10-CM

## 2019-02-27 DIAGNOSIS — I50.23 ACUTE ON CHRONIC SYSTOLIC (CONGESTIVE) HEART FAILURE: ICD-10-CM

## 2019-05-20 NOTE — PROGRESS NOTE ADULT - I WAS PHYSICALLY PRESENT FOR THE KEY PORTIONS OF THE EVALUATION AND MANAGEMENT (E/M) SERVICE PROVIDED.  I AGREE WITH THE ABOVE HISTORY, PHYSICAL, AND PLAN WHICH I HAVE REVIEWED AND EDITED WHERE APPROPRIATE
Pt back to room 3317 from stress lab. VSS. NPO status reinforced until test results come back, v/u. Will monitor.
Statement Selected

## 2019-06-25 NOTE — ED ADULT NURSE NOTE - CARDIO ASSESSMENT
WDL

## 2019-09-24 NOTE — PROVIDER CONTACT NOTE (CRITICAL VALUE NOTIFICATION) - ACTION/TREATMENT ORDERED:
Awaiting orders
MD jeff
will continue to monitor- will follow u with MD Sahnkar
Continue with plan of care. Bleeding precautions maintained.
[Awake] : awake
[Alert] : alert
[Soft] : soft
[Oriented x3] : oriented to person, place, and time
[Labia Majora] : labia major
[Labia Minora] : labia minora
[Normal] : clitoris
[No Bleeding] : there was no active vaginal bleeding
[Uterine Adnexae] : were not tender and not enlarged
[No Tenderness] : no rectal tenderness
[Nl Sphincter Tone] : normal sphincter tone
[RRR, No Murmurs] : RRR, no murmurs
[CTAB] : CTAB
[Acute Distress] : no acute distress
[LAD] : no lymphadenopathy
[Thyroid Nodule] : no thyroid nodule
[Goiter] : no goiter
[Mass] : no breast mass
[Axillary LAD] : no axillary lymphadenopathy
[Nipple Discharge] : no nipple discharge
[Tender] : non tender
[Occult Blood] : occult blood test from digital rectal exam was negative
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
## 2020-12-10 NOTE — ED ADULT NURSE REASSESSMENT NOTE - NS ED NURSE REASSESS COMMENT FT1
Talked to patient.     Advised All labs stable.  I will review in detail with him when I see him next week.  Patient verbalizes understanding.     Pt's BP at 0205 was 113/76, HR between 125 and 135. Dr. Husain was notified who ordered Lasix and Cardizem which were given. Pt is afebrile and pt denied pain and discomfort. EKG was done and presented to BENJAMIN Husain as he requested. No s/s of distress noted. Continue to monitor pt.

## 2021-02-13 NOTE — PATIENT PROFILE ADULT. - NS PRO ABUSE SCREEN SUSPICION NEGLECT YN
[facility] Consult Note    Date of Consult: December 25, 2019    Reason for Consult: Hematemesis    Referring physician: Dr. Chopra    History of Present Illness:   78-year-old female presents with nausea, vomiting and diarrhea after eating luminal 90s with her family on Monday night.  Patient states that symptoms started yesterday morning.  Nausea and vomiting were severe and progressed to hematemesis.  Multiple family members had similar symptoms which resolved.  Patient's hemoglobin is within normal limits.  She is hemodynamically stable.  She was started on PPI therapy.  When seen this evening patient denies any nausea, vomiting or abdominal pain.  She states diarrhea has resolved as well.  She would like to try clear liquid diet.  She is not on any anticoagulation.    Past Medical History: Alzheimer's dementia, hyperlipidemia, coronary artery disease,    Past Surgical History: Coronary artery stent placement, breast augmentation    Family History: No GI cancers or disorders     Social History: No tobacco use, no alcohol use    Allergies:  NKDA    Medications: Medication Reconciliation reviewed    Review of Systems:    Constitutional: denies fevers, weight changes, or change in appetite    HEENT: denies changes in vision or hearing    Respiratory: denies shortness of breath, wheezing, or cough    Cardiovascular: denies chest pain, palpitations, or dizziness    Gastrointestinal: +nausea, +hemetemesis, +diarrhea    Genitourinary: denies dysuria or hematuria    Musculoskeletal: denies joint aches, pain, or swelling    Skin: denies jaundice, rashes, or lesions    Neurological: denies numbness or tingling    Psychiatric: denies anxiety or depression    Heme/lymph: denies bleeding or bruising      Physical Examination:    Constitutional: well developed, well nourished    Vital Signs (last 24 hrs)_____ Last Charted___________Minimum____________ Maximum____________  Temp    98.4  (DEC 25  14:57) 98.4  (DEC 25 14:57) 98.8  (DEC 25 11:49)  Heart Rate   85  (DEC 25 14:57) 85  (DEC 25 14:57) 93  (DEC 25 11:49)  Resp Rate       16  (DEC 25 14:57) 16  (DEC 25 14:57) 20  (DEC 25 11:49)  SBP    H 143 (DEC 25 14:57) 124  (DEC 25 13:52) H 143 (DEC 25 14:57)  DBP    84  (DEC 25 14:57) 74  (DEC 25 13:52) 84  (DEC 25 14:57)      Eyes: non icteric, pink conjunctiva    ENT: no oral lesions, moist mucous membranes     Neck: supple, no lymphadenopathy    Respiratory: lungs clear to auscultation, no wheeze    Cardiovascular: S1/S2, regular rate and rhythm, no murmurs    Gastrointestinal: abdomen soft, non distended, non tender; bowel sounds present; no massess    Rectal: deferred    Musculoskeletal: muscle mass appropriate for age    Skin: no jaundice, rashes or lesions    Psychiatric: alert and oriented x3, appropriate    Labs (Last four charted values)  WBC                  10.9 (DEC 25)   Hgb                  12.7 (DEC 25)   Hct                  39 (DEC 25)   Plt                  207 (DEC 25)   Na                   140 (DEC 25)   K                    4.3 (DEC 25)   CO2                  22 (DEC 25)   Cl                   H 109 (DEC 25)   Cr                   1.16 (DEC 25)   BUN                  H 23 (DEC 25)   Glucose              H 109 (DEC 25)   Ca                   9.6 (DEC 25)   PT                   10.1 (DEC 25)   INR                  0.9 (DEC 25)   PTT                  26 (DEC 25)     Radiology:   CT ABD/PELVIS FINDINGS:   1.  The lung bases are clear.  There are bilateral breast augmentation  prostheses with capsular calcification and contracture.  The heart  size appears mildly enlarged.  There is prominent coronary artery  calcification.   The distal esophagus and stomach are normal as  visualized.  2.  The liver is normal in size and contour without focal mass lesion  or biliary ductal dilatation.  There is a 15 mm cyst in segment 6  along the inferior margin of the right lobe.  The portal and  hepatic  veins are widely patent.  The gallbladder is normal as visualized.  3.  The pancreas and spleen have a normal appearance.  4.  The kidneys exhibit bilateral symmetric enhancement on early  venous phase imaging.  There is no mass, calcifications, or  hydronephrosis in either kidney.    5.  The adrenal glands are not enlarged.  6.  Both ureters have a normal appearance and the urinary bladder is  normal.  7.  The bowel is normal in caliber. There is no evidence of  perforation or obstruction. There is no infiltration of the mesenteric  fat. There is sigmoid diverticulosis without diverticulitis.  I do not  see the appendix.  The mesenteric arteries and veins remain widely  patent.  8.  The retroperitoneal space is normal. There is no periportal or  periaortic lymphadenopathy. There is no retroperitoneal mass or fluid  collection.  The aorta and iliac arteries are moderately calcified  without aneurysm.  9.  The pelvis  is unremarkable. There is no mass or fluid collection.  10.  Severe degenerative disc changes are noted at L5-S1.  Moderate  disc space narrowing is noted at L2-L3.  There is moderate facet joint  arthropathy at L4-L5 and L5-S1 with minimal spondylolisthesis at  L4-L5.      Assessment/ Plan:   Nausea, vomiting , hemetemsis and diarrhea     Clinical history appears to be consistent with food poisoning    When seen this evening patient is asymptomatic from GI perspective    GI bleeding may be secondary to Elsie-Cardoza tear, peptic ulcer disease, AVM, etc.    Okay for clear liquid diet.    Continue with PPI therapy.    Monitor hemoglobin/hematocrit and for signs of recurrent GI bleeding.    If patient has significant drop in hemoglobin or recurrent symptoms, we will consider pursuing upper endoscopy.    At this time no endoscopic procedure scheduled.    Thank you for allowing us to participate in the patients care. Please do not hesitate to call us with any questions or concerns.              Electronically Signed On 12.25.2019 18:26  ___________________________________________________   Rajesh Chaudhry MD     no

## 2021-03-30 NOTE — PROGRESS NOTE ADULT - ATTENDING COMMENTS
pt improving, however poor overal prognosis, consider dc to [facility] if medically stable tomorrow, pt severely malnourished will attempt marinol low dose, apprec nutrition recs, thrush improving, likely dc fluconazole, monitor inr, hold coumadin as border high, will speak to son regarding dispo, monitor clinical course. Asthma

## 2021-11-03 NOTE — PROGRESS NOTE ADULT - PROBLEM SELECTOR PLAN 7
General Sunscreen Counseling: I recommended a broad spectrum sunscreen with a SPF of 30 or higher.  I explained that SPF 30 sunscreens block approximately 97 percent of the sun's harmful rays.  Sunscreens should be applied at least 15 minutes prior to expected sun exposure and then every 2 hours after that as long as sun exposure continues. If swimming or exercising sunscreen should be reapplied every  hour after getting wet or sweating.  One ounce, or the equivalent of a shot glass full of sunscreen, is adequate to protect the skin not covered by a bathing suit. I also recommended a lip balm with a sunscreen as well. Sun protective clothing can be used in lieu of sunscreen but must be worn the entire time you are exposed to the sun's rays. Detail Level: Zone -INR was under therapeutic range  -lovenox and coumadin to bridge to therapeutic 2-3 inr goal

## 2022-05-29 NOTE — DISCHARGE NOTE ADULT - CONDITIONS AT DISCHARGE
Nasal mucosa clear.  Mouth with normal mucosa  Throat has no vesicles, no oropharyngeal exudates and uvula is midline.
stable

## 2022-08-09 NOTE — DISCHARGE NOTE ADULT - MODE OF TRANSPORTATION
Personalized Preventive Plan for Maggie Moffett - 8/9/2022  Medicare offers a range of preventive health benefits. Some of the tests and screenings are paid in full while other may be subject to a deductible, co-insurance, and/or copay. Some of these benefits include a comprehensive review of your medical history including lifestyle, illnesses that may run in your family, and various assessments and screenings as appropriate. After reviewing your medical record and screening and assessments performed today your provider may have ordered immunizations, labs, imaging, and/or referrals for you. A list of these orders (if applicable) as well as your Preventive Care list are included within your After Visit Summary for your review. Other Preventive Recommendations:    A preventive eye exam performed by an eye specialist is recommended every 1-2 years to screen for glaucoma; cataracts, macular degeneration, and other eye disorders. A preventive dental visit is recommended every 6 months. Try to get at least 150 minutes of exercise per week or 10,000 steps per day on a pedometer . Order or download the FREE \"Exercise & Physical Activity: Your Everyday Guide\" from [employer]. Call 7-984.821.9956 or search [employer] online. You need 9305-2143 mg of calcium and 6653-2455 IU of vitamin D per day. It is possible to meet your calcium requirement with diet alone, but a vitamin D supplement is usually necessary to meet this goal.  When exposed to the sun, use a sunscreen that protects against both UVA and UVB radiation with an SPF of 30 or greater. Reapply every 2 to 3 hours or after sweating, drying off with a towel, or swimming. Always wear a seat belt when traveling in a car. Always wear a helmet when riding a bicycle or motorcycle. Ambulance

## 2022-10-20 NOTE — ED ADULT TRIAGE NOTE - BP NONINVASIVE DIASTOLIC (MM HG)
83 Vital Signs Last 24 Hrs  T(C): 36.9 (20 Oct 2022 07:00), Max: 37.3 (19 Oct 2022 23:26)  T(F): 98.4 (20 Oct 2022 07:00), Max: 99.2 (19 Oct 2022 23:26)  HR: 73 (20 Oct 2022 07:00) (65 - 86)  BP: 90/51 (20 Oct 2022 07:00) (72/40 - 118/58)  BP(mean): 65 (20 Oct 2022 07:00) (50 - 90)  RR: 18 (20 Oct 2022 07:00) (17 - 33)  SpO2: 91% (20 Oct 2022 07:00) (83% - 100%)    Parameters below as of 20 Oct 2022 07:00  Patient On (Oxygen Delivery Method): nasal cannula, high flow     Vital Signs Last 24 Hrs  T(C): 36.6 (17 Oct 2022 08:00), Max: 37.2 (16 Oct 2022 16:00)  T(F): 97.9 (17 Oct 2022 08:00), Max: 99 (16 Oct 2022 16:00)  HR: 81 (17 Oct 2022 10:00) (71 - 87)  BP: 118/73 (17 Oct 2022 10:00) (74/43 - 135/58)  BP(mean): 90 (17 Oct 2022 10:00) (54 - 90)  RR: 13 (17 Oct 2022 10:00) (12 - 49)  SpO2: 95% (17 Oct 2022 10:00) (87% - 97%)    Parameters below as of 17 Oct 2022 10:00  Patient On (Oxygen Delivery Method): nasal cannula, high flow  O2 Flow (L/min): 4  O2 Concentration (%): 50 Vital Signs Last 24 Hrs  T(C): 37.1 (19 Oct 2022 04:00), Max: 37.3 (18 Oct 2022 11:49)  T(F): 98.8 (19 Oct 2022 04:00), Max: 99.1 (18 Oct 2022 11:49)  HR: 81 (19 Oct 2022 08:00) (75 - 93)  BP: 100/60 (19 Oct 2022 08:00) (81/46 - 133/76)  BP(mean): 75 (19 Oct 2022 08:00) (57 - 93)  RR: 12 (19 Oct 2022 08:00) (10 - 29)  SpO2: 97% (19 Oct 2022 08:00) (75% - 100%)    Parameters below as of 19 Oct 2022 08:00  Patient On (Oxygen Delivery Method): nasal cannula, high flow    O2 Concentration (%): 60

## 2022-11-15 NOTE — PROGRESS NOTE ADULT - PROBLEM/PLAN-6
DISPLAY PLAN FREE TEXT Composite Graft Text: The defect edges were debeveled with a #15 scalpel blade.  Given the location of the defect, shape of the defect, the proximity to free margins and the fact the defect was full thickness a composite graft was deemed most appropriate.  The defect was outline and then transferred to the donor site.  A full thickness graft was then excised from the donor site. The graft was then placed in the primary defect, oriented appropriately and then sutured into place.  The secondary defect was then repaired using a primary closure.

## 2023-04-24 NOTE — PATIENT PROFILE ADULT. - PSH
Call to pt. LVM advising pt to call clinic to schedule annual and fasting labs.  
Please schedule fasting labs and an annual exam.  Thank you!  
History of hip surgery  right gamma nail

## 2023-10-24 NOTE — PROGRESS NOTE ADULT - PROBLEM SELECTOR PLAN 5
Patient Education        Learning About Type 2 Diabetes  What is type 2 diabetes? Type 2 diabetes is a condition in which you have too much sugar (glucose) in your blood. Glucose is a type of sugar produced in your body when carbohydrates and other foods are digested. It provides energy to cells throughout the body. Normally, blood sugar levels increase after you eat a meal. When blood sugar rises, cells in the pancreas release insulin, which causes the body to absorb sugar from the blood and lowers the blood sugar level to normal.  When you have type 2 diabetes, sugar stays in the blood rather than entering the body's cells to be used for energy. This results in high blood sugar. It happens when your body can't use insulin the right way. Over time, high blood sugar can harm many parts of the body, such as your eyes, heart, blood vessels, nerves, and kidneys. It can also increase your risk for other health problems (complications). What can you expect with type 2 diabetes? Vannie Lights keep hearing about how important it is to keep your blood sugar within a target range. That's because over time, high blood sugar can lead to serious problems. It can:  Harm your eyes, nerves, and kidneys. Damage your blood vessels, leading to heart disease and stroke. Reduce blood flow and cause nerve damage to parts of your body, especially your feet. This can cause slow healing and pain when you walk. Make your immune system weak and less able to fight infections. When people hear the word \"diabetes,\" they often think of problems like these. But daily care and treatment can help prevent or delay these problems. The goal is to keep your blood sugar in a target range. That's the best way to reduce your chance of having more problems from diabetes. What are the symptoms? Some people who have type 2 diabetes may not have any symptoms early on.  Many people with the disease don't even know they have it at first. But with time, -sodium wnl,   -c/w BMP monitor

## 2023-11-14 NOTE — DIETITIAN INITIAL EVALUATION ADULT. - PROBLEM SELECTOR PROBLEM 5
IVÁN on CKD2. Baseline Cr of 1.7-2.0.   Current sCr uptrending (3.1 this AM)  - Trend with diuresis  - Hold ARNi   Anemia, unspecified type

## 2023-12-02 NOTE — ED PROVIDER NOTE - PROGRESS NOTE DETAILS
[facility] Attending Jero pt refused second tropin Attending NERY Nogueira/wilfredo with [facility] for social work consult. [place] for Dr Rae Nogueira : Called [facility] and spoke with Nursing there. States that patient is picky about her food but is eating. She had a large BM yesterday. Her weight in Jan 2019 was 94.6 and has fluctuated up to 96.4. Last documented weight by NH on 02/11/2019 was 93.6 [place] for Dr Rae Nogueira : Called [facility] and spoke with Nursing there. States that patient is picky about her food but is eating. Tolerated her cardizem and some applesauce and pudding in the ED. She had a large BM yesterday. Her weight in Jan 2019 was 94.6 and has fluctuated up to 96.4. Last documented weight by NH on 02/11/2019 was 93.6 [place] for dr Rae Nogueira: Pt was able to tolerate pudding and applesauce. Plan to discharge home with Zofran and pepcid. Suggest pt be on thickened fluids. [place] for Dr Rae Nogueira : Called [facility] and spoke with Nursing there. States that patient is picky about her food but is eating. Her weight in Jan 2019 was 94.6 and has fluctuated up to 98lb. Last documented weight by NH on 02/11/2019 was 93.6 [place] for dr Rae Nogueira: was able to only take medication, but refusing to eat.  Pt failed dysphagia screen.  Dry mm and cachetic appearing. Attending orly Nogueira/wilfredo Villanueva for admiisson for dehydration and failure to thrive.

## 2024-08-13 NOTE — PROGRESS NOTE ADULT - PROBLEM SELECTOR PLAN 8
Chief complaint:   Chief Complaint   Patient presents with    Office Visit    Follow-up       Vitals:  Visit Vitals  /78   Pulse 90   Ht 5' 6\" (1.676 m)   Wt 98 kg (216 lb)   LMP 08/01/2024 (Within Days)   SpO2 98%   BMI 34.86 kg/m²       HISTORY OF PRESENT ILLNESS     Beverly was seen today for fu  Diagnoses and associated orders for this visit:    Hypothyroidism euthyroid    Denies palpitations,  hair loss    Hyperlipidemia, which has been treated with diet   -ldl not at goal, higher than before  LDL (mg/dL)   Date Value   08/09/2024 131 (H)        Anemia, thought to be iron deficiency, due to menorrhagia, saw ob/gyn, taking iron prn during periods only as makes her constipated  No s/s of gi bleed  Cbc shows normal h/h   Iron is low  Pt takes iron twice a week  C/o fatigue  Pt is s/p egd and colonoscopy 4/2021  Iron panel and ferritin pending    Elevated blood pressure reading without diagnosis of hypertension  Now normal    Obesity    Wt Readings from Last 4 Encounters:   08/13/24 98 kg (216 lb)   04/23/24 96.6 kg (213 lb)   03/12/24 96.6 kg (213 lb)   02/06/24 96.6 kg (213 lb)   pt gained weight    Chronic throat clearing  Since 4/2019  At that time also had runny nose, took zyrtec and flonase which helped, since then on and off symptoms for which pt takes prn medicines  No new pets or other new changes  Now only symptoms throat clearing, all day, worse at night, no runny nose, itchy eyes, or ear itchy  No nausea, epigastric pain, vomiting, dysphagia or unintentional wt loss  On ppi (seen ent and gi) recent egd showed intestinal metaplasia,   Also takes allegra daily  Discussed possibility of skin testing/allergy referal  ent recommended same  Pt doesn't want to do  Thinks symptoms are better with ppi and allegra                                            Other significant problems:  Patient Active Problem List    Diagnosis Date Noted    FH: colon cancer (rectal cancer) diagnosed age 76 in father 02/06/2023      Priority: Low    Elevated blood pressure reading without diagnosis of hypertension 01/04/2022     Priority: Low    S/P endoscopy 4/2021 showed intestinal metaplasia, on omeprazole qd, rpt 10.2021 showed mild gastritis, no metaplasia, rpt 3 yrs per gi 05/20/2021     Priority: Low    S/P colonoscopy with polypectomy 4/2021, serrated adenoma, rpt 5 yrs 05/20/2021     Priority: Low    Snoring, saw sleep specialist, sleep test not recommended 05/20/2021     Priority: Low    Liver cyst, incidental finding, 4/2021 05/20/2021     Priority: Low    Chronic throat clearing , on ppi, seen ent, suggested allergy eval, pt on fexofenadine and flonase  10/21/2020     Priority: Low    Obesity (BMI 30.0-34.9) 10/21/2020     Priority: Low    Hyperlipidemia, which has been treated with diet  01/21/2014     Priority: Low    bilateral plantar fasciitis that resolved with physical therapy. 01/21/2014     Priority: Low    Anemia, thought to be iron deficiency, due to menorrhagia, sees ob/gyn, now compliant with iron prn as qd makes constipation 01/21/2014     Priority: Low    Unspecified hypothyroidism 01/12/2012     Priority: Low       PAST MEDICAL, FAMILY AND SOCIAL HISTORY     Current Outpatient Medications   Medication Sig    rosuvastatin (CRESTOR) 5 MG tablet Take 1 tablet by mouth daily.    levothyroxine 112 MCG tablet Take 1 tablet by mouth daily.    omeprazole (PrilOSEC) 20 MG capsule Take 1 capsule by mouth daily.    diclofenac (VOLTAREN) 1 % gel Apply 2-4 grams to the right achilles 3-4 times a day as needed for pain.    VITAMIN D PO Take by mouth daily.    MAGNESIUM PO Take by mouth daily.    Multiple Vitamins-Minerals (ONE-A-DAY WOMENS 50 PLUS PO) Take by mouth daily.    fexofenadine (ALLEGRA) 180 MG tablet Take 1 tablet by mouth daily.    Ferrous Sulfate (IRON) 325 (65 FE) MG TABS Take 1 tablet by mouth as needed.      No current facility-administered medications for this visit.     Iron takes only prn during heavy  periods    Allergies:  ALLERGIES:  No Known Allergies    Past Medical  History/Surgeries:  Past Medical History:   Diagnosis Date    Fasciitis     bilateral heel    Hyperlipidemia     Hypothyroidism        Past Surgical History:   Procedure Laterality Date    Colonoscopy diagnostic  04/09/2021    Colonoscopy 5 year recall    Esophagogastroduodenoscopy  04/09/2021    EGD 6 month recall    Esophagogastroduodenoscopy transoral flex w/bx single or mult  10/14/2021    Tyler, EGD 3 yrs recall.    Tubal ligation  09/2008    essure       Family History:  Family History   Problem Relation Age of Onset    Cervical cancer Mother 71        cervical cancer    Cancer Father 76        rectal cancer    Hypertension Father         and mother    Hyperlipidemia Father         and mother       Social History:  Social History     Tobacco Use    Smoking status: Never    Smokeless tobacco: Never   Substance Use Topics    Alcohol use: No       REVIEW OF SYSTEMS     Review of Systems  No cp ,sob, dizziness,palpitations,mylagias,nausea,vomiting.  PHYSICAL EXAM     Physical Exam    Wt Readings from Last 4 Encounters:   08/13/24 98 kg (216 lb)   04/23/24 96.6 kg (213 lb)   03/12/24 96.6 kg (213 lb)   02/06/24 96.6 kg (213 lb)     Physical Exam    Vital Signs:    Visit Vitals  /78   Pulse 90   Ht 5' 6\" (1.676 m)   Wt 98 kg (216 lb)   LMP 08/01/2024 (Within Days)   SpO2 98%   BMI 34.86 kg/m²     Constitutional:  Well groomed, obese  Psychiatric: Alert & Oriented X3.  Good mood/affect.  Eyes:  Conjunctiva normal.  EOMI  Cardiovascular:  Normal heart rate. .    Respiratory:  Effort normal.   No respiratory distress.   Skin: No rash, No erythema.  eyes: EOM are normal.    ASSESSMENT/PLAN     Beverly was seen today for er fu  Diagnoses and associated orders for this visit:    Hypothyroidism  ,euthyroid   cpm  pt tsh reflex 6 mths    Hyperlipidemia, which has been treated with diet so far  Ldl higher  LDL (mg/dL)   Date Value   08/09/2024 131  (H)    Agreed to starting statin  Add crestor 5 mg po qd  Informed s/e and risks  Informed risks of hld  Counseled about diet and exercise .  Rpt next appt    Anemia, thought to be iron deficiency, due to menorrhagia, saw ob/gyn, was taking iron prn during periods only  No s/s of gi bleed  Given info on dietary sources of iron last visit  Cbc shows normal h/h and low iron   Iron percent and ferritin levels pending  Takes iron 2 times a week  As cant tolerate regular iron  At last appt, asked to take a more natural form of iron, like m[employer] blood builder and see if easy to tolerate  Pt is s/p egd and colonoscopy 4/2021  C/o fatigue, which could be from low iron or other causes like perimenopause as well  Review iron test results before making any further rec    Elevated blood pressure reading without diagnosis of hypertension  Now normal  Monitor at home and inform  Counseled about diet and exercise.    Obesity  Gained weight  Counseled about diet and exercise.  Wt Readings from Last 4 Encounters:   08/13/24 98 kg (216 lb)   04/23/24 96.6 kg (213 lb)   03/12/24 96.6 kg (213 lb)   02/06/24 96.6 kg (213 lb)     Chronic throat clearing  Sine 4/2019  At that time also had runny nose, took zyrtec and flonase which helped, since then on and off symptoms for which pt takes prn medicines  No new pets or other new changes  Now only symptoms throat clearing, all day, worse at night, no runny nose, itchy eyes, or ear itchy  No nausea, epigastric pain, vomiting, dysphagia or unintentional wt loss  On ppi (seen ent and gi) last egd showed intestinal metaplasia,   Also takes allegra daily  Discussed possibility of skin testing/allergy referal  ent recommended same  Pt doesn't want to do  Thinks symptoms are better with ppi and allegra    S/P endoscopy 4/2021 showed intestinal metaplasia, on omeprazole once a day, rpt 10.2021 egd showed mild chronic gastritis, rpt egd 3 years per gi, sees gi, rpt egd due 10/2024    S/P  colonoscopy with polypectomy 4/2021, serrated adenoma, rpt 5 yrs                               Snoring seen sleep specialist, now snoring better, sleep studies not recommended at this time            S/P endoscopy 4/2021 showed intestinal metaplasia, on omeprazole once a day, rpt 10.2021 egd showed mild chronic gastritis, rpt egd 3 years per gi, sees gi, continue ppi, increase to bid    Atypical chest pain at rest for 6 mths, upper chest b/l, ct chest coronary angio normal, trop and ekg normal, on ppi,cxr normal, on ppi,? Musculoskeletal, now resovled    S/P colonoscopy with polypectomy 4/2021, serrated adenoma, rpt 5 yrs        Liver cyst, incidental finding, 4/2021     Fu 6 mths    Recent PHQ 2/9 Score    PHQ 2:  PHQ 2 Score Adult PHQ 2 Score Adult PHQ 2 Interpretation Little interest or pleasure in activity?   8/13/2024   2:14 PM 0 No further screening needed 0          HPI   -INR within therapeutic range   -3.5 mg coumadin today   -f/u INR in AM -INR within therapeutic range   -2.5 mg coumadin today   -f/u INR in AM

## 2024-08-26 NOTE — SWALLOW BEDSIDE ASSESSMENT ADULT - NS SPL SWALLOW CLINIC TRIAL FT
NOTE THAT THE PATIENT SOMETIMES RETCHED/VOMITED POST PRANDIALLY WHEN EATING WHICH I DO NOT FEEL IS DUE TO AN OROPHARYNGEAL SWALLOWING ISSUE. FAVOR THE LATTER BEING DUE TO AN UGI/ESOPHAGEAL ISSUE(I.E ? GERD, ? ESOPHAGITIS, ? ESOPHAGEAL STRICTURE, ? ESOPHAGEAL DYSMOTILITY, ETC). NOTE THAT IF PHYSICIAN WISHES TO ASSESS ESOPHAGEAL INTEGRITY, I WOULD NOT CONSIDER AN ESOPHAGRAM AS HER BODY HABITUS(KYPHOSIS) AND PRESBYPHAGIA PLACE HER AT TOO HIGH OF AN ASPIRATION RISK DURING THIS PROCEDURE, PARTICULARLY IF SHE WOULD HAVE TO INGEST BARIUM IN A SUPINE POSITION. PLEASE NOTE THAT PT WOULD BECOME VERY ANXIOUS AT TIMES SHE DID RETCH WHICH LIKELY EXACERBATED THIS BEHAVIOR. 74.8

## 2025-02-04 NOTE — PROGRESS NOTE ADULT - PROBLEM SELECTOR PROBLEM 4
Izabela Evans : 2/15/1958  attended individual initial assessment for Diabetes Education:    Date: 2025         Start time: 1445 End time: 1545    HgbA1C (%)   Date Value   2025 6.5 (H)       Wt Readings from Last 1 Encounters:   25 216 lb 6.4 oz       Assessment: Pt comes to the visit not knowing what she is here for. Pt was unaware of her A1C results or DM dx. Pt states her A1C has always been 6.2-6.4%. Pt is retired. Pt was prescribed a BG meter which she brought in to the visit.Instructed pt on how to use the BG meter with return demonstration.     Exercise: pt is attending a free weight training class tomorrow and intends to start exercising as well. Pt states she was exercising more than she does now but would like to get back to an exercise routine.     Diet Hx: mostly home cooked meals. Skips BF or eats brunch on most days.     Wake up at 6am. Get out of the bed 7am. Drinks coffee at 8:30 w/ 2 tbsp creamer with monk fruit extract    BF at 9:30-10:30 am: 2 eggs and toast OR avocado toast.   Lunch at 1pm: small salad (lettuce, cucumbers, beans, corn, taco meat).  Snack at 4-4:30p: apple, cheese, almonds  Dinner at 5-6pm: chicken and rice and broccoli OR fish, roasted potatoes w/veggies  OR homemade soup  No bedtime snack.   Drinks: mostly water about 2L daily.     Education provided on the below topics:     Diabetes Overview:  Pathophysiology, A1C results and treatment options for diabetes self-management reviewed.   Described basic process and treatment options for diabetes self-management.  Introduced long term impact of uncontrolled blood glucose on health.    Healthy Eating:  Obtained usual diet history.  Instructed on Basic Diet Guidelines which includes eating 3 meals/day. Eat moderate amounts at consistent times from day to day. Limit/avoid sweets. Instructed on Balanced Plate Method of meal planning. Instructed to limit grains or starchy vegetables to ¼ of plate, protein to ¼ of  plate, non-starchy vegetables to ½ plate and modest portions of fruit, yogurt, milk as desired.    Being Active:   Encouraged Physical Activity and briefly reviewed ADA recommended guidelines for activity with type 2 diabetes.    Taking Medications:   Reviewed current diabetes medications (if applicable).    Monitoring:  Instructed/reinforced blood glucose monitoring, testing schedules and target goals:   Fasting / Pre-meal  mg/dL 2 Hour Post-prandial <180 mg/dL  Demonstrated ability to perform blood glucose testing.     Problem Solving:   Prevention, detection and treatment of acute complications: taught symptoms of hypoglycemia, hyperglycemia, how to treat low blood sugar (Rule of 15) and actions for lowering high blood glucose levels.     Behavior Change:  Initiated individualized goal setting process and will continue to refine throughout class series.    Recommendations:      Monitor blood glucose as directed.  Follow Balance Plate method of meal planning.     Pt to leave for [place] next week for a month. Provided information on DSMT classes. Pt will decide later.     Written materials provided for all areas covered.  Patient verbalized understanding and all questions answered.    Eveline Anton RDN, [facility]     Dehydration

## 2025-04-12 NOTE — ED PROVIDER NOTE - CPE EDP RESP NORM
4/12/2025  7:48 AM    Eva Beasley is a 50 year old female.    Chief complaint(s):   Chief Complaint   Patient presents with   • Follow - Up     Patient is coming in for a follow up on her knee and diabetes      HPI:     Eva Beasley primary complaint is regarding multiple issues .     Patient is a 50-year-old female who presents for follow-up regarding osteoarthritis and meniscus tears of the knees.  She has been to see orthopedic which have given you injections on her knees and reports mild positive improvement as well as being on physical therapy.  She was started on meloxicam but reports that it does not help and ibuprofen did help more.  Will go back on ibuprofen and start meloxicam.    She is also due for her eye visual exam due to diabetes as well as renal function test for her diabetes.  She is doing well with her sugars and the last A1c was less than 6.  She remains on Min Mounjaro.     She is also due for her mammography for breast cancer screening.      HISTORY:  Past Medical History[1]   Past Surgical History[2]   Family History[3]   Social History: Short Social Hx on File[4]     Immunizations:   Immunization History   Administered Date(s) Administered   • FLULAVAL 6 months & older 0.5 ml Prefilled syringe ([id number]) 12/04/2017, 11/02/2018, 10/22/2019, 10/23/2021, 01/14/2023   • FLUZONE 6 months and older PFS 0.5 ml ([id number]) 10/01/2016, 12/04/2017, 11/02/2018, 12/16/2023   • Flulaval, 3 Years & >, IM 10/16/2007, 09/28/2010   • Fluvirin, 3 Years & >, Im 12/13/2005, 11/02/2006, 11/06/2012   • Influenza 11/11/2008, 10/10/2009, 09/23/2014, 09/16/2015   • Influenza Vaccine, trivalent (IIV3), PF 0.5mL ([id number]) 10/19/2024   • Influenza Virus Vaccine, H1N1 12/19/2009   • TD 02/29/2004   • TDAP 07/25/2013, 01/14/2023   • Zoster Vaccine Recombinant Adjuvanted (Shingrix) 04/12/2025       Medications (Active prior to today's visit):  Current  Medications[5]    Allergies:  Allergies[6]      ROS:   Review of Systems   Constitutional:  Negative for appetite change and fever.   Eyes:  Negative for visual disturbance.   Respiratory:  Negative for shortness of breath.    Cardiovascular:  Negative for chest pain.   Gastrointestinal:  Negative for abdominal pain, nausea and vomiting.   Endocrine: Negative for polydipsia and polyuria.   Genitourinary:  Positive for menstrual problem (irregular menses).   Musculoskeletal:  Negative for back pain.   Skin:  Negative for rash.   Neurological:  Negative for dizziness and headaches.       PHYSICAL EXAM:   VS: /74 (BP Location: Right arm, Patient Position: Sitting, Cuff Size: adult)   Pulse 69   Temp 97 °F (36.1 °C)   Ht 5' 6\" (1.676 m)   Wt 230 lb (104.3 kg)   LMP 03/02/2025 (Approximate)   BMI 37.12 kg/m²     Physical Exam  Vitals reviewed.   Constitutional:       General: She is not in acute distress.     Appearance: Normal appearance.   HENT:      Head: Normocephalic.   Eyes:      Conjunctiva/sclera: Conjunctivae normal.   Cardiovascular:      Rate and Rhythm: Normal rate.   Pulmonary:      Effort: Pulmonary effort is normal.   Musculoskeletal:      Cervical back: Neck supple.   Feet:      Right foot:      Protective Sensation: 10 sites tested.  10 sites sensed.      Left foot:      Protective Sensation: 10 sites tested.  10 sites sensed.   Lymphadenopathy:      Comments: LEs no edema    Skin:     Findings: No rash.   Psychiatric:         Mood and Affect: Mood normal.       LABORATORY RESULTS:     EKG / Spirometry : -     Radiology: No results found.     ASSESSMENT/PLAN:   Assessment   Encounter Diagnoses   Name Primary?   • Primary osteoarthritis of both knees Yes   • Acute medial meniscus tear of right knee, subsequent encounter    • Type 2 diabetes mellitus without complication, without long-term current use of insulin (HCC)    • Encounter for screening mammogram for malignant neoplasm of breast         1. Primary osteoarthritis of both knees  2. Acute medial meniscus tear of right knee, subsequent encounter    Doing well  Switch from Meloxicam to Ibuprofen  Follow up with Orthopedic and PT    3. Type 2 diabetes mellitus without complication, without long-term current use of insulin (HCC)    Doing well  CPM  Labs;  - Microalb/Creat Ratio, Random Urine  - Comp Metabolic Panel (14)  - Diabetic Retinopathy Exam  OU - Both Eyes; Future  Follow up [facility]    4. Encounter for screening mammogram for malignant neoplasm of breast    Referral Mammography          Orders This Visit:  Orders Placed This Encounter   Procedures   • Microalb/Creat Ratio, Random Urine   • Comp Metabolic Panel (14)   • ZOSTER VACC RECOMBINANT IM NJX   • Diabetic Retinopathy Exam  OU - Both Eyes       Meds This Visit:  Requested Prescriptions     Signed Prescriptions Disp Refills   • ibuprofen 600 MG Oral Tab 60 tablet 0     Sig: Take 1 tablet (600 mg total) by mouth every 6 (six) hours as needed for Pain. Always take it with food.       Imaging & Referrals:  ZOSTER VACC RECOMBINANT IM NJX  [facility] RANDALL 2D+3D SCREENING BILAT (CPT=77067/43402)         BRANDEN HUNTER MD           [1]  Past Medical History:  • Hypothyroidism   • Polycystic ovarian disease   • Rheumatic fever   [2]  Past Surgical History:  Procedure Laterality Date   • Appendectomy     • Colonoscopy N/A 10/28/2023    Procedure: COLONOSCOPY;  Surgeon: Ollie Whalen MD;  Location: [facility] ENDOSCOPY   [3]  Family History  Problem Relation Age of Onset   • Circulatory Problems Mother         Peripheral vascular disease   • Cancer Paternal Grandfather         Liver cancer    • Alcohol and Other Disorders Associated Paternal Grandfather         Alcoholism   • Diabetes Paternal Grandmother         Type 2   [4]  Social History  Socioeconomic History   • Marital status:    Tobacco Use   • Smoking status: Never     Passive exposure: Never   • Smokeless tobacco: Never   Vaping Use    • Vaping status: Never Used   Substance and Sexual Activity   • Alcohol use: No     Alcohol/week: 0.0 standard drinks of alcohol   • Drug use: No     Comment: No history of illicit substance abuse   [5]  Current Outpatient Medications   Medication Sig Dispense Refill   • ibuprofen 600 MG Oral Tab Take 1 tablet (600 mg total) by mouth every 6 (six) hours as needed for Pain. Always take it with food. 60 tablet 0   • Continuous Glucose  (DEXCOM G7 ) Does not apply Device USE WITH DEXCOM G7 SENSOR 9 each 0   • gabapentin 100 MG Oral Cap Take 1 capsule (100 mg total) by mouth 3 (three) times daily. 90 capsule 2   • ergocalciferol 1.25 MG (44532 UT) Oral Cap Take 1 capsule (50,000 Units total) by mouth once a week. 12 capsule 0   • LEVOTHYROXINE 137 MCG Oral Tab TAKE 137 MCG BY MOUTH BEFORE BREAKFAST. 90 tablet 3   • Tirzepatide (MOUNJARO) 15 MG/0.5ML Subcutaneous Solution Auto-injector Inject 15 mg into the skin once a week. 2 mL 5   • atorvastatin 40 MG Oral Tab Take 1 tablet (40 mg total) by mouth nightly. 90 tablet 1   • montelukast 10 MG Oral Tab Take 1 tablet (10 mg total) by mouth nightly. 90 tablet 2   • escitalopram 10 MG Oral Tab Take 1 tablet (10 mg total) by mouth every morning. 90 tablet 3   • Tirzepatide (MOUNJARO) 15 MG/0.5ML Subcutaneous Solution Pen-injector Inject 15 mg into the skin once a week. 2 mL 4   • Loratadine-Pseudoephedrine (EQL ALLERGY/CONGESTION RELIEF OR) Take 1 tablet by mouth Q12H.     • pantoprazole 40 MG Oral Tab EC Take 1 tablet (40 mg total) by mouth daily. 30 tablet 3   • fluticasone propionate 50 MCG/ACT Nasal Suspension 1 spray by Nasal route 2 (two) times daily. 16 g 3   • simethicone 125 MG Oral Chew Tab Chew 2 tablets (250 mg total) by mouth every 6 (six) hours as needed for FLATULENCE. 60 tablet 1   • Continuous Glucose Sensor (DEXCOM G7 SENSOR) Does not apply Misc 1 each Every 10 days. Use as directed every 10 days 3 each 5   [6]  Allergies  Allergen Reactions    • Contrave [Naltrexone-Bupropion Hcl Er] DIZZINESS and OTHER (SEE COMMENTS)     Headaches    • Victoza OTHER (SEE COMMENTS)     Abdominal pain    normal...